# Patient Record
Sex: FEMALE | Race: WHITE | NOT HISPANIC OR LATINO | Employment: OTHER | URBAN - METROPOLITAN AREA
[De-identification: names, ages, dates, MRNs, and addresses within clinical notes are randomized per-mention and may not be internally consistent; named-entity substitution may affect disease eponyms.]

---

## 2017-05-03 DIAGNOSIS — R35.0 FREQUENCY OF MICTURITION: ICD-10-CM

## 2017-05-03 DIAGNOSIS — R30.0 DYSURIA: ICD-10-CM

## 2017-05-26 ENCOUNTER — APPOINTMENT (EMERGENCY)
Dept: CT IMAGING | Facility: HOSPITAL | Age: 82
End: 2017-05-26
Payer: MEDICARE

## 2017-05-26 ENCOUNTER — HOSPITAL ENCOUNTER (EMERGENCY)
Facility: HOSPITAL | Age: 82
Discharge: HOME | End: 2017-05-26
Attending: EMERGENCY MEDICINE | Admitting: EMERGENCY MEDICINE
Payer: MEDICARE

## 2017-05-26 ENCOUNTER — APPOINTMENT (EMERGENCY)
Dept: RADIOLOGY | Facility: HOSPITAL | Age: 82
End: 2017-05-26
Payer: MEDICARE

## 2017-05-26 VITALS
DIASTOLIC BLOOD PRESSURE: 74 MMHG | TEMPERATURE: 97.4 F | OXYGEN SATURATION: 94 % | HEIGHT: 62 IN | SYSTOLIC BLOOD PRESSURE: 178 MMHG | BODY MASS INDEX: 22.03 KG/M2 | RESPIRATION RATE: 21 BRPM | HEART RATE: 103 BPM | WEIGHT: 119.71 LBS

## 2017-05-26 DIAGNOSIS — R53.83 FATIGUE: Primary | ICD-10-CM

## 2017-05-26 DIAGNOSIS — R30.0 DYSURIA: ICD-10-CM

## 2017-05-26 DIAGNOSIS — R20.2 FACIAL PARESTHESIA: ICD-10-CM

## 2017-05-26 LAB
ALBUMIN SERPL BCP-MCNC: 3.4 G/DL (ref 3.5–5)
ALP SERPL-CCNC: 76 U/L (ref 46–116)
ALT SERPL W P-5'-P-CCNC: 29 U/L (ref 12–78)
ANION GAP SERPL CALCULATED.3IONS-SCNC: 10 MMOL/L (ref 4–13)
AST SERPL W P-5'-P-CCNC: 39 U/L (ref 5–45)
ATRIAL RATE: 102 BPM
BASOPHILS # BLD AUTO: 0.12 THOUSANDS/ΜL (ref 0–0.1)
BASOPHILS NFR BLD AUTO: 1 % (ref 0–1)
BILIRUB SERPL-MCNC: 0.5 MG/DL (ref 0.2–1)
BUN SERPL-MCNC: 29 MG/DL (ref 5–25)
CALCIUM SERPL-MCNC: 10.7 MG/DL (ref 8.3–10.1)
CHLORIDE SERPL-SCNC: 96 MMOL/L (ref 100–108)
CO2 SERPL-SCNC: 26 MMOL/L (ref 21–32)
CREAT SERPL-MCNC: 0.76 MG/DL (ref 0.6–1.3)
EOSINOPHIL # BLD AUTO: 0.65 THOUSAND/ΜL (ref 0–0.61)
EOSINOPHIL NFR BLD AUTO: 6 % (ref 0–6)
ERYTHROCYTE [DISTWIDTH] IN BLOOD BY AUTOMATED COUNT: 12 % (ref 11.6–15.1)
EXT BILIRUBIN, UA: ABNORMAL
EXT BLOOD URINE: ABNORMAL
EXT GLUCOSE, UA: ABNORMAL
EXT KETONES: ABNORMAL
EXT NITRITE, UA: ABNORMAL
EXT PH, UA: 8
EXT PROTEIN, UA: ABNORMAL
EXT SPECIFIC GRAVITY, UA: 1
EXT UROBILINOGEN: ABNORMAL
GFR SERPL CREATININE-BSD FRML MDRD: >60 ML/MIN/1.73SQ M
GLUCOSE SERPL-MCNC: 124 MG/DL (ref 65–140)
HCT VFR BLD AUTO: 41.2 % (ref 34.8–46.1)
HGB BLD-MCNC: 13.2 G/DL (ref 11.5–15.4)
LACTATE SERPL-SCNC: 1.4 MMOL/L (ref 0.5–2)
LYMPHOCYTES # BLD AUTO: 2.59 THOUSANDS/ΜL (ref 0.6–4.47)
LYMPHOCYTES NFR BLD AUTO: 24 % (ref 14–44)
MCH RBC QN AUTO: 29.1 PG (ref 26.8–34.3)
MCHC RBC AUTO-ENTMCNC: 32 G/DL (ref 31.4–37.4)
MCV RBC AUTO: 91 FL (ref 82–98)
MONOCYTES # BLD AUTO: 0.79 THOUSAND/ΜL (ref 0.17–1.22)
MONOCYTES NFR BLD AUTO: 7 % (ref 4–12)
NEUTROPHILS # BLD AUTO: 6.79 THOUSANDS/ΜL (ref 1.85–7.62)
NEUTS SEG NFR BLD AUTO: 62 % (ref 43–75)
NRBC BLD AUTO-RTO: 0 /100 WBCS
P AXIS: 93 DEGREES
PLATELET # BLD AUTO: 275 THOUSANDS/UL (ref 149–390)
PMV BLD AUTO: 11.1 FL (ref 8.9–12.7)
POTASSIUM SERPL-SCNC: 4.5 MMOL/L (ref 3.5–5.3)
PR INTERVAL: 160 MS
PROT SERPL-MCNC: 8.5 G/DL (ref 6.4–8.2)
QRS AXIS: 93 DEGREES
QRSD INTERVAL: 86 MS
QT INTERVAL: 326 MS
QTC INTERVAL: 424 MS
RBC # BLD AUTO: 4.53 MILLION/UL (ref 3.81–5.12)
SODIUM SERPL-SCNC: 132 MMOL/L (ref 136–145)
SPECIMEN SOURCE: NORMAL
T WAVE AXIS: 54 DEGREES
TROPONIN I BLD-MCNC: 0.01 NG/ML (ref 0–0.08)
VENTRICULAR RATE: 102 BPM
WBC # BLD AUTO: 11 THOUSAND/UL (ref 4.31–10.16)
WBC # BLD EST: ABNORMAL 10*3/UL

## 2017-05-26 PROCEDURE — 96361 HYDRATE IV INFUSION ADD-ON: CPT

## 2017-05-26 PROCEDURE — 36415 COLL VENOUS BLD VENIPUNCTURE: CPT | Performed by: EMERGENCY MEDICINE

## 2017-05-26 PROCEDURE — 71020 HB CHEST X-RAY 2VW FRONTAL&LATL: CPT

## 2017-05-26 PROCEDURE — 93005 ELECTROCARDIOGRAM TRACING: CPT

## 2017-05-26 PROCEDURE — 96360 HYDRATION IV INFUSION INIT: CPT

## 2017-05-26 PROCEDURE — 83605 ASSAY OF LACTIC ACID: CPT | Performed by: EMERGENCY MEDICINE

## 2017-05-26 PROCEDURE — 80053 COMPREHEN METABOLIC PANEL: CPT | Performed by: EMERGENCY MEDICINE

## 2017-05-26 PROCEDURE — 71250 CT THORAX DX C-: CPT

## 2017-05-26 PROCEDURE — 84484 ASSAY OF TROPONIN QUANT: CPT

## 2017-05-26 PROCEDURE — 81002 URINALYSIS NONAUTO W/O SCOPE: CPT | Performed by: EMERGENCY MEDICINE

## 2017-05-26 PROCEDURE — 70450 CT HEAD/BRAIN W/O DYE: CPT

## 2017-05-26 PROCEDURE — 85025 COMPLETE CBC W/AUTO DIFF WBC: CPT | Performed by: EMERGENCY MEDICINE

## 2017-05-26 PROCEDURE — 99284 EMERGENCY DEPT VISIT MOD MDM: CPT

## 2017-05-26 RX ORDER — NITROFURANTOIN 25; 75 MG/1; MG/1
100 CAPSULE ORAL 2 TIMES DAILY
COMMUNITY
End: 2017-06-04

## 2017-05-26 RX ORDER — METOPROLOL SUCCINATE 50 MG/1
1 TABLET, EXTENDED RELEASE ORAL DAILY
COMMUNITY
Start: 2014-06-05 | End: 2018-09-10 | Stop reason: HOSPADM

## 2017-05-26 RX ORDER — LISINOPRIL AND HYDROCHLOROTHIAZIDE 25; 20 MG/1; MG/1
1 TABLET ORAL DAILY
COMMUNITY
Start: 2014-06-05 | End: 2018-09-10 | Stop reason: HOSPADM

## 2017-05-26 RX ORDER — ASPIRIN 81 MG/1
1 TABLET, CHEWABLE ORAL DAILY
Status: ON HOLD | COMMUNITY
Start: 2014-06-05 | End: 2019-01-01 | Stop reason: SDUPTHER

## 2017-05-26 RX ORDER — FELODIPINE 5 MG/1
1 TABLET, EXTENDED RELEASE ORAL DAILY
COMMUNITY
Start: 2014-06-05 | End: 2019-04-22 | Stop reason: HOSPADM

## 2017-05-26 RX ORDER — NITROFURANTOIN 25; 75 MG/1; MG/1
100 CAPSULE ORAL 2 TIMES DAILY
Qty: 14 CAPSULE | Refills: 0 | Status: SHIPPED | OUTPATIENT
Start: 2017-05-26 | End: 2017-06-02

## 2017-05-26 RX ADMIN — SODIUM CHLORIDE 1000 ML: 0.9 INJECTION, SOLUTION INTRAVENOUS at 13:42

## 2017-05-30 ENCOUNTER — ALLSCRIPTS OFFICE VISIT (OUTPATIENT)
Dept: OTHER | Facility: OTHER | Age: 82
End: 2017-05-30

## 2017-05-31 ENCOUNTER — TRANSCRIBE ORDERS (OUTPATIENT)
Dept: LAB | Facility: CLINIC | Age: 82
End: 2017-05-31

## 2017-05-31 ENCOUNTER — APPOINTMENT (OUTPATIENT)
Dept: LAB | Facility: CLINIC | Age: 82
End: 2017-05-31
Payer: MEDICARE

## 2017-05-31 DIAGNOSIS — R35.0 URINARY FREQUENCY: ICD-10-CM

## 2017-05-31 DIAGNOSIS — R35.0 URINARY FREQUENCY: Primary | ICD-10-CM

## 2017-05-31 DIAGNOSIS — R30.0 DYSURIA: ICD-10-CM

## 2017-05-31 LAB
BACTERIA UR QL AUTO: ABNORMAL /HPF
BILIRUB UR QL STRIP: NEGATIVE
CLARITY UR: CLEAR
COLOR UR: ABNORMAL
GLUCOSE UR STRIP-MCNC: NEGATIVE MG/DL
HGB UR QL STRIP.AUTO: ABNORMAL
KETONES UR STRIP-MCNC: NEGATIVE MG/DL
LEUKOCYTE ESTERASE UR QL STRIP: ABNORMAL
NITRITE UR QL STRIP: POSITIVE
NON-SQ EPI CELLS URNS QL MICRO: ABNORMAL /HPF
PH UR STRIP.AUTO: 8.5 [PH] (ref 4.5–8)
PROT UR STRIP-MCNC: ABNORMAL MG/DL
RBC #/AREA URNS AUTO: ABNORMAL /HPF
SP GR UR STRIP.AUTO: 1 (ref 1–1.03)
TRI-PHOS CRY URNS QL MICRO: ABNORMAL /HPF
UROBILINOGEN UR QL STRIP.AUTO: 0.2 E.U./DL
WBC #/AREA URNS AUTO: ABNORMAL /HPF

## 2017-05-31 PROCEDURE — 87086 URINE CULTURE/COLONY COUNT: CPT

## 2017-05-31 PROCEDURE — 81001 URINALYSIS AUTO W/SCOPE: CPT

## 2017-05-31 PROCEDURE — 87077 CULTURE AEROBIC IDENTIFY: CPT

## 2017-05-31 PROCEDURE — 87186 SC STD MICRODIL/AGAR DIL: CPT

## 2017-06-01 ENCOUNTER — GENERIC CONVERSION - ENCOUNTER (OUTPATIENT)
Dept: OTHER | Facility: OTHER | Age: 82
End: 2017-06-01

## 2017-06-02 LAB — BACTERIA UR CULT: NORMAL

## 2017-06-04 ENCOUNTER — HOSPITAL ENCOUNTER (EMERGENCY)
Facility: HOSPITAL | Age: 82
Discharge: HOME | End: 2017-06-04
Attending: EMERGENCY MEDICINE | Admitting: EMERGENCY MEDICINE
Payer: MEDICARE

## 2017-06-04 ENCOUNTER — APPOINTMENT (EMERGENCY)
Dept: RADIOLOGY | Facility: HOSPITAL | Age: 82
End: 2017-06-04
Payer: MEDICARE

## 2017-06-04 VITALS
BODY MASS INDEX: 22.68 KG/M2 | SYSTOLIC BLOOD PRESSURE: 147 MMHG | RESPIRATION RATE: 16 BRPM | TEMPERATURE: 98 F | HEART RATE: 82 BPM | OXYGEN SATURATION: 97 % | HEIGHT: 61 IN | DIASTOLIC BLOOD PRESSURE: 72 MMHG | WEIGHT: 120.15 LBS

## 2017-06-04 DIAGNOSIS — N39.0 UTI (URINARY TRACT INFECTION): Primary | ICD-10-CM

## 2017-06-04 DIAGNOSIS — E87.8 HYPOCHLOREMIA: ICD-10-CM

## 2017-06-04 DIAGNOSIS — E87.6 HYPOKALEMIA: ICD-10-CM

## 2017-06-04 DIAGNOSIS — E87.1 HYPONATREMIA: ICD-10-CM

## 2017-06-04 DIAGNOSIS — R53.1 WEAKNESS: ICD-10-CM

## 2017-06-04 LAB
ALBUMIN SERPL BCP-MCNC: 3.4 G/DL (ref 3.5–5)
ALP SERPL-CCNC: 69 U/L (ref 46–116)
ALT SERPL W P-5'-P-CCNC: 26 U/L (ref 12–78)
ANION GAP SERPL CALCULATED.3IONS-SCNC: 10 MMOL/L (ref 4–13)
AST SERPL W P-5'-P-CCNC: 21 U/L (ref 5–45)
BACTERIA UR QL AUTO: ABNORMAL /HPF
BASOPHILS # BLD AUTO: 0.1 THOUSANDS/ΜL (ref 0–0.1)
BASOPHILS NFR BLD AUTO: 1 % (ref 0–1)
BILIRUB SERPL-MCNC: 0.4 MG/DL (ref 0.2–1)
BILIRUB UR QL STRIP: NEGATIVE
BUN SERPL-MCNC: 24 MG/DL (ref 5–25)
CALCIUM SERPL-MCNC: 10 MG/DL (ref 8.3–10.1)
CHLORIDE SERPL-SCNC: 93 MMOL/L (ref 100–108)
CLARITY UR: CLEAR
CO2 SERPL-SCNC: 28 MMOL/L (ref 21–32)
COLOR UR: YELLOW
CREAT SERPL-MCNC: 0.8 MG/DL (ref 0.6–1.3)
EOSINOPHIL # BLD AUTO: 0.42 THOUSAND/ΜL (ref 0–0.61)
EOSINOPHIL NFR BLD AUTO: 5 % (ref 0–6)
ERYTHROCYTE [DISTWIDTH] IN BLOOD BY AUTOMATED COUNT: 12 % (ref 11.6–15.1)
GFR SERPL CREATININE-BSD FRML MDRD: >60 ML/MIN/1.73SQ M
GLUCOSE SERPL-MCNC: 137 MG/DL (ref 65–140)
GLUCOSE UR STRIP-MCNC: NEGATIVE MG/DL
HCT VFR BLD AUTO: 38.3 % (ref 34.8–46.1)
HGB BLD-MCNC: 12.4 G/DL (ref 11.5–15.4)
HGB UR QL STRIP.AUTO: NEGATIVE
KETONES UR STRIP-MCNC: NEGATIVE MG/DL
LEUKOCYTE ESTERASE UR QL STRIP: ABNORMAL
LYMPHOCYTES # BLD AUTO: 1.65 THOUSANDS/ΜL (ref 0.6–4.47)
LYMPHOCYTES NFR BLD AUTO: 19 % (ref 14–44)
MCH RBC QN AUTO: 28.5 PG (ref 26.8–34.3)
MCHC RBC AUTO-ENTMCNC: 32.4 G/DL (ref 31.4–37.4)
MCV RBC AUTO: 88 FL (ref 82–98)
MONOCYTES # BLD AUTO: 0.92 THOUSAND/ΜL (ref 0.17–1.22)
MONOCYTES NFR BLD AUTO: 11 % (ref 4–12)
NEUTROPHILS # BLD AUTO: 5.37 THOUSANDS/ΜL (ref 1.85–7.62)
NEUTS SEG NFR BLD AUTO: 63 % (ref 43–75)
NITRITE UR QL STRIP: NEGATIVE
NON-SQ EPI CELLS URNS QL MICRO: ABNORMAL /HPF
NRBC BLD AUTO-RTO: 0 /100 WBCS
PH UR STRIP.AUTO: 7 [PH] (ref 4.5–8)
PLATELET # BLD AUTO: 258 THOUSANDS/UL (ref 149–390)
PMV BLD AUTO: 10.4 FL (ref 8.9–12.7)
POTASSIUM SERPL-SCNC: 3.2 MMOL/L (ref 3.5–5.3)
PROT SERPL-MCNC: 7.7 G/DL (ref 6.4–8.2)
PROT UR STRIP-MCNC: NEGATIVE MG/DL
RBC # BLD AUTO: 4.35 MILLION/UL (ref 3.81–5.12)
RBC #/AREA URNS AUTO: ABNORMAL /HPF
SODIUM SERPL-SCNC: 131 MMOL/L (ref 136–145)
SP GR UR STRIP.AUTO: 1.01 (ref 1–1.03)
UROBILINOGEN UR QL STRIP.AUTO: 0.2 E.U./DL
WBC # BLD AUTO: 8.49 THOUSAND/UL (ref 4.31–10.16)
WBC #/AREA URNS AUTO: ABNORMAL /HPF

## 2017-06-04 PROCEDURE — 93005 ELECTROCARDIOGRAM TRACING: CPT

## 2017-06-04 PROCEDURE — 96361 HYDRATE IV INFUSION ADD-ON: CPT

## 2017-06-04 PROCEDURE — 87040 BLOOD CULTURE FOR BACTERIA: CPT | Performed by: EMERGENCY MEDICINE

## 2017-06-04 PROCEDURE — 81001 URINALYSIS AUTO W/SCOPE: CPT | Performed by: EMERGENCY MEDICINE

## 2017-06-04 PROCEDURE — 36415 COLL VENOUS BLD VENIPUNCTURE: CPT | Performed by: EMERGENCY MEDICINE

## 2017-06-04 PROCEDURE — 85025 COMPLETE CBC W/AUTO DIFF WBC: CPT | Performed by: EMERGENCY MEDICINE

## 2017-06-04 PROCEDURE — 96365 THER/PROPH/DIAG IV INF INIT: CPT

## 2017-06-04 PROCEDURE — 99285 EMERGENCY DEPT VISIT HI MDM: CPT

## 2017-06-04 PROCEDURE — 96375 TX/PRO/DX INJ NEW DRUG ADDON: CPT

## 2017-06-04 PROCEDURE — 80053 COMPREHEN METABOLIC PANEL: CPT | Performed by: EMERGENCY MEDICINE

## 2017-06-04 RX ORDER — ONDANSETRON 2 MG/ML
4 INJECTION INTRAMUSCULAR; INTRAVENOUS ONCE
Status: COMPLETED | OUTPATIENT
Start: 2017-06-04 | End: 2017-06-04

## 2017-06-04 RX ORDER — POTASSIUM CHLORIDE 20 MEQ/1
40 TABLET, EXTENDED RELEASE ORAL ONCE
Status: COMPLETED | OUTPATIENT
Start: 2017-06-04 | End: 2017-06-04

## 2017-06-04 RX ORDER — CIPROFLOXACIN 500 MG/1
500 TABLET, FILM COATED ORAL DAILY
COMMUNITY
Start: 2017-06-02 | End: 2017-06-09

## 2017-06-04 RX ORDER — CIPROFLOXACIN 2 MG/ML
400 INJECTION, SOLUTION INTRAVENOUS ONCE
Status: COMPLETED | OUTPATIENT
Start: 2017-06-04 | End: 2017-06-04

## 2017-06-04 RX ORDER — ONDANSETRON 4 MG/1
4 TABLET, FILM COATED ORAL EVERY 6 HOURS PRN
Qty: 6 TABLET | Refills: 0 | Status: SHIPPED | OUTPATIENT
Start: 2017-06-04 | End: 2018-10-09 | Stop reason: ALTCHOICE

## 2017-06-04 RX ADMIN — POTASSIUM CHLORIDE 40 MEQ: 1500 TABLET, EXTENDED RELEASE ORAL at 14:21

## 2017-06-04 RX ADMIN — SODIUM CHLORIDE 500 ML: 0.9 INJECTION, SOLUTION INTRAVENOUS at 15:56

## 2017-06-04 RX ADMIN — ONDANSETRON 4 MG: 2 INJECTION INTRAMUSCULAR; INTRAVENOUS at 15:11

## 2017-06-04 RX ADMIN — CIPROFLOXACIN 400 MG: 2 INJECTION, SOLUTION INTRAVENOUS at 14:18

## 2017-06-04 RX ADMIN — SODIUM CHLORIDE 1000 ML: 0.9 INJECTION, SOLUTION INTRAVENOUS at 12:02

## 2017-06-05 ENCOUNTER — GENERIC CONVERSION - ENCOUNTER (OUTPATIENT)
Dept: OTHER | Facility: OTHER | Age: 82
End: 2017-06-05

## 2017-06-05 LAB
ATRIAL RATE: 93 BPM
P AXIS: 94 DEGREES
PR INTERVAL: 128 MS
QRS AXIS: 95 DEGREES
QRSD INTERVAL: 82 MS
QT INTERVAL: 556 MS
QTC INTERVAL: 691 MS
T WAVE AXIS: 93 DEGREES
VENTRICULAR RATE: 93 BPM

## 2017-06-09 LAB
BACTERIA BLD CULT: NORMAL
BACTERIA BLD CULT: NORMAL

## 2017-07-10 ENCOUNTER — ALLSCRIPTS OFFICE VISIT (OUTPATIENT)
Dept: OTHER | Facility: OTHER | Age: 82
End: 2017-07-10

## 2017-07-10 DIAGNOSIS — I10 ESSENTIAL (PRIMARY) HYPERTENSION: ICD-10-CM

## 2018-01-10 NOTE — RESULT NOTES
Verified Results  (1) URINALYSIS (will reflex a microscopy if leukocytes, occult blood, protein or nitrites are not within normal limits) [id number] 11:41AM Janna Petties, [place]     Test Name Result Flag Reference   COLOR Ana     CLARITY Clear     SPECIFIC GRAVITY UA 1 000 L 1 003-1 030   PH UA 8 5 H 4 5-8 0   LEUKOCYTE ESTERASE UA Large A Negative   NITRITE UA Positive A Negative   PROTEIN UA 30 (1+) mg/dl A Negative   GLUCOSE UA Negative mg/dl  Negative   KETONES UA Negative mg/dl  Negative   UROBILINOGEN UA 0 2 E U /dl  0 2, 1 0 E U /dl   BILIRUBIN UA Negative  Negative   BLOOD UA Trace A Negative   BACTERIA Occasional /hpf  None Seen, Occasional   EPITHELIAL CELLS None Seen /hpf  None Seen, Occasional   TRIPLE PHOSPHATE CRYSTALS Occasional /hpf     RBC UA None Seen /hpf  None Seen   WBC UA 20-30 /hpf A None Seen

## 2018-01-12 VITALS
HEART RATE: 114 BPM | DIASTOLIC BLOOD PRESSURE: 68 MMHG | OXYGEN SATURATION: 92 % | BODY MASS INDEX: 23.07 KG/M2 | HEIGHT: 60 IN | TEMPERATURE: 98.2 F | SYSTOLIC BLOOD PRESSURE: 132 MMHG | WEIGHT: 117.5 LBS

## 2018-01-12 NOTE — RESULT NOTES
Verified Results  (1) URINE CULTURE [id number] 11:41AM Ashlee Limonos, Hildred Koyanagi     Test Name Result Flag Reference   CLINICAL REPORT (Report)     Test:        Urine culture  Specimen Type:   Urine  Specimen Date:   5/31/2017 11:41 AM  Result Date:    6/2/2017 9:55 AM  Result Status:   Final result  Resulting Lab:   [facility] [address] [place] [id number]            Tel: [phone]      CULTURE                                       ------------------                                   >100,000 cfu/ml Proteus mirabilis      SUSCEPTIBILITY                                   ------------------                                                       Proteus mirabilis  METHOD                 EULA  -------------------------------------  -------------------------  AMPICILLIN ($$)             <=8 00 ug/ml Susceptible  AZTREONAM ($$$)             <=8 ug/ml   Susceptible  CEFAZOLIN ($)              <=8 00 ug/ml Susceptible  CIPROFLOXACIN ($)            <=1 00 ug/ml Susceptible  GENTAMICIN ($$)             <=4 ug/ml   Susceptible  LEVOFLOXACIN ($)            <=2 00 ug/ml Susceptible  NITROFURANTOIN             >64 ug/ml   Resistant  PIPERACILLIN + TAZOBACTAM ($$$)     <=16 ug/ml  Susceptible  TETRACYCLINE              >8 ug/ml   Resistant  TOBRAMYCIN ($)             <=4 ug/ml   Susceptible  TRIMETHOPRIM + SULFAMETHOXAZOLE ($$$)  <=2/38 ug/ml Susceptible

## 2018-01-13 NOTE — CONSULTS
Current Meds   1  Acetaminophen-Codeine #3 300-30 MG Oral Tablet; TAKE 1 TABLET TWICE DAILY AS   NEEDED FOR PAIN;   Therapy: 36UWF3452 to (Evaluate:05Apr2016); Last Rx:[id number] Ordered   2  Aspir-81 81 MG Oral Tablet Delayed Release; TAKE 1 TABLET DAILY; Therapy: 78CUZ9205 to Recorded   3  Felodipine ER 5 MG Oral Tablet Extended Release 24 Hour; TAKE 1 TABLET ONCE   DAILY; Therapy: 84NVX6370 to (Rick Villanueva)  Requested for: 76ZKV0618; Last   Rx:[id number] Ordered   4  Lisinopril-Hydrochlorothiazide 20-25 MG Oral Tablet; TAKE 1 TABLET DAILY; Therapy: 97SOO9258 to (Rick Villanueva)  Requested for: 73USI8260; Last   Rx:[id number] Ordered   5  Metoprolol Succinate ER 50 MG Oral Tablet Extended Release 24 Hour; TAKE 1 TABLET   DAILY; Therapy: 05SPS7116 to (Rick Villanueva)  Requested for: 18PWO8643; Last   Rx:[id number] Ordered   6  Zolpidem Tartrate 5 MG Oral Tablet; take 1 tablet by mouth at bedtime if needed for   sleep; Therapy: 53HJA5968 to (Evaluate:04Jun2016)  Requested for: 29JBA9761; Last   Rx:[id number] Ordered    Allergies    1  No Known Drug Allergies    Procedure    Findings:  This tracing demonstrated sinus rhythm with a heart rate ranging between 55 and 1:30 the average rate was 78--#2 occasional PVCs totaling 755 throughout the recording #3 one ventricular couplet #4 rare APCs #5 several 3 beat runs of SVT with rates of approximately 140 #6 short runs of ectopic atrial rhythm and ectopic atrial tachycardia with rates of approximately  #5 no pauses greater than 2 seconds noted #6 no symptoms reported      Signatures   Electronically signed by : IAIN Cummings ; Oct 11 2016  2:29PM EST                       (Author)

## 2018-01-15 VITALS
BODY MASS INDEX: 23.21 KG/M2 | TEMPERATURE: 97.9 F | HEART RATE: 92 BPM | DIASTOLIC BLOOD PRESSURE: 88 MMHG | HEIGHT: 59 IN | OXYGEN SATURATION: 94 % | SYSTOLIC BLOOD PRESSURE: 152 MMHG | WEIGHT: 115.13 LBS

## 2018-01-16 NOTE — PROGRESS NOTES
Assessment    1  Encounter for preventive health examination (V70 0) (Z00 00)    Plan  Hypertension    · Felodipine ER 5 MG Oral Tablet Extended Release 24 Hour; TAKE 1 TABLET  ONCE DAILY   · Lisinopril-Hydrochlorothiazide 20-25 MG Oral Tablet (Zestoretic); TAKE 1 TABLET  DAILY   · Metoprolol Succinate ER 50 MG Oral Tablet Extended Release 24 Hour; TAKE 1  TABLET DAILY    Discussion/Summary    Chronic problems are stable  Laboratory testing is up-to-date  No symptoms are reported  Medications will be renewed  Follow-up in 6 months  Impression: Initial Annual Wellness Visit, with preventive exam as well as age and risk appropriate counseling completed  Cardiovascular screening and counseling: screening is current  Diabetes screening and counseling: screening is current and Dx - V77 1 Screen for DM  Colorectal cancer screening and counseling: screening not indicated and Dx - V76 51 Screen for CRC  Breast cancer screening and counseling: the risks and benefits of screening were discussed and the patient declines screening  Cervical cancer screening and counseling: screening not indicated  Osteoporosis screening and counseling: counseling was given on obtaining adequate amounts of calcium and vitamin D on a daily basis  Abdominal aortic aneurysm screening and counseling: screening not indicated and Dx - V81 2 Screen for CV Disorder  Glaucoma screening and counseling: screening is current and Dx - V80 1 Screen for Glaucoma  HIV screening and counseling: screening not indicated  Immunizations: the patient declines the influenza vaccination, the patient declines the pneumococcal vaccination, hepatitis B vaccination series is not indicated at this time due to the patient's low risk of marie the disease, the patient declines the Zostavax vaccine, the patient declines the Td vaccine and the patient declines the Tdap vaccine        History of Present Illness  A patient with hypertension, with a remote history of left breast cancer with mastectomy and resultant chronic lymphedema of the left arm, hypertension, hyperlipidemia, carotid plaque without stenosis, osteoarthritis  The patient does not wish to pursue anything regarding these diagnoses other than basic treatment  She does not want to do carotid ultrasounds, echocardiograms, other preventive measures  Complaint of periodic fatigue which seems to wax and wane with no particular rhyme or reason; it is not exertional   Complaint of arthritic pain in particular pain regarding the left upper arm and shoulder  I believe a certain degree of this is due to the weight of the left arm which is diffusely edematous and has been that way for a number of years  The patient is being seen for the initial annual wellness visit  Medicare Screening and Risk Factors   Hospitalizations: no previous hospitalizations  Medicare Screening Tests Risk Questions   Abdominal aortic aneurysm risk assessment: none indicated  Osteoporosis risk assessment: none indicated  Drug and Alcohol Use: The patient has never used smokeless tobacco  The patient reports rare alcohol use  She has never used illicit drugs  Diet and Physical Activity: Current diet includes limited junk food, 1 servings of fruit per day, 1 servings of vegetables per day, 1 servings of meat per day, 1 servings of whole grains per day, 1 servings of simple carbohydrates per day and 2 cups of coffee per day  She exercises daily  Exercise: walking 30 minutes per day  Mood Disorder and Cognitive Impairment Screening:   Depression screening  mild to moderate symptoms  She reports feeling down, depressed, or hopeless over the past two weeks  She reports feeling little interest or pleasure in doing things over the past two weeks     Cognitive impairment screening: denies difficulty learning/retaining new information, denies difficulty handling complex tasks, denies difficulty with reasoning, denies difficulty with language and denies difficulty with behavior  Functional Ability/Level of Safety: Hearing is slightly decreased, significantly decreased in the right ear, slightly decreased in the left ear and a hearing aid is not used  The patient is currently able to do activities of daily living with limitations, able to do instrumental activities of daily living with limitations and unable to drive  Activities of daily living details: transportation help needed  Fall risk factors: The patient fell 1 GETTING OUT OF BED times in the past 12 months  Advance Directives: Advance directives: no living will, no durable power of  for health care directives and no advance directives  Co-Managers and Medical Equipment/Suppliers: See Patient Care Team      Active Problems    1  Actinic keratosis (702 0) (L57 0)   2  GERD (gastroesophageal reflux disease) (530 81) (K21 9)   3  Hypercalcemia (275 42) (E83 52)   4  Hyperlipidemia (272 4) (E78 5)   5  Hypertension (401 9) (I10)   6  Injury of head, sequela (908 9) (S09 90XS)   7  Intermittent palpitations (785 1) (R00 2)   8  Leg pain (729 5) (M79 606)   9  Mitral valve disorder (424 0) (I05 9)   10  Occlusion and stenosis of carotid artery (433 10) (I65 29)   11  Osteoarthritis, generalized (715 00) (M15 9)   12  Other fatigue (780 79) (R53 83)   13  Other headache syndrome (339 89) (G44 89)    Past Medical History    1  History of Abnormal EKG (794 31) (R94 31)   2  History of Acute upper respiratory infection of multiple sites (465 8) (J06 9)   3  History of Benign neoplasm of skin of face (216 3) (D23 30)   4  History of Cerebrovascular disease, ill-defined, acute (436) (I67 89)   5  History of Colonoscopy (Fiberoptic)   6  History of Ecchymoses, spontaneous (782 7) (R23 3)   7  History of cataract (V12 49) (Z86 69)   8  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)   9  History of dizziness (V13 89) (Z87 898)   10   History of Lack of coordination due to stroke (434 91,781 3) (I63 9,R27 9)   11  History of Lymphedema syndrome, postmastectomy (457 0) (I97 2)   12  History of Malignant neoplasm of skin of face (173 30) (C44 300)   13  Personal history of malignant neoplasm of breast (V10 3) (Z85 3)   14  Personal history of urinary tract infection (V13 02) (Z87 440)   15  History of Rectal prolapse (569 1) (K62 3)   16  History of Seborrheic keratosis, inflamed (702 11) (L82 0)    Surgical History    1  History of Appendectomy   2  History of Breast Surgery Radical Mastectomy   3  History of Total Abdominal Hysterectomy    Social History    · Denied: History of Alcohol use   · Denied: History of drug use   · Lives independently   · Never a smoker   · Retired    Current Meds   1  Aspir-81 81 MG Oral Tablet Delayed Release; TAKE 1 TABLET DAILY; Therapy: 29SGS8406 to Recorded   2  Felodipine ER 5 MG Oral Tablet Extended Release 24 Hour; TAKE 1 TABLET ONCE   DAILY; Therapy: 62ANU4404 to ([employer])  Requested for: 87LTO8044; Last   Rx:[id number] Ordered   3  Lisinopril-Hydrochlorothiazide 20-25 MG Oral Tablet; TAKE 1 TABLET DAILY; Therapy: 44AYI3364 to (Arachno)  Requested for: 04VGP3015; Last   Rx:[id number] Ordered   4  Metoprolol Succinate ER 50 MG Oral Tablet Extended Release 24 Hour; TAKE 1 TABLET   DAILY; Therapy: 71PPX4793 to (Arachno)  Requested for: 78SGZ5049; Last   Rx:[id number] Ordered    Allergies    1   No Known Drug Allergies    Immunizations  Influenza --- Gaby Pippins: never   PPSV --- Gaby Pippins: never   Tdap --- Gaby Pippins: never     Vitals  Signs   Recorded: 13Dec2016 12:58PM   Heart Rate: 97  Systolic: 247  Diastolic: 80  Height: 5 ft   Weight: 121 lb   BMI Calculated: 23 63  BSA Calculated: 1 5    Results/Data  PHQ-2 Adult Depression Screening 13Dec2016 01:05PM User, Ahs     Test Name Result Flag Reference   PHQ-2 Adult Depression Score 1     Over the last two weeks, how often have you been bothered by any of the following problems?   Little interest or pleasure in doing things: Not at all - 0  Feeling down, depressed, or hopeless: Several days - 1   PHQ-2 Adult Depression Screening Negative         Signatures   Electronically signed by : IAIN Stein ; Dec 13 2016  1:14PM EST                       (Author)

## 2018-03-07 NOTE — PROCEDURES
Current Meds   1  Acetaminophen-Codeine #3 300-30 MG Oral Tablet; TAKE 1 TABLET TWICE DAILY AS   NEEDED FOR PAIN;   Therapy: 35DXB8183 to (Evaluate:05Apr2016); Last Rx:[id number] Ordered   2  Aspir-81 81 MG Oral Tablet Delayed Release; TAKE 1 TABLET DAILY; Therapy: 88HZM2220 to Recorded   3  Felodipine ER 5 MG Oral Tablet Extended Release 24 Hour; TAKE 1 TABLET ONCE   DAILY; Therapy: 00TNY2073 to (Janeal Stagers)  Requested for: 93ZZT8628; Last   Rx:[id number] Ordered   4  Lisinopril-Hydrochlorothiazide 20-25 MG Oral Tablet; TAKE 1 TABLET DAILY; Therapy: 65YMK7182 to (Janeal Stagers)  Requested for: 56LOT3531; Last   Rx:[id number] Ordered   5  Metoprolol Succinate ER 50 MG Oral Tablet Extended Release 24 Hour; TAKE 1 TABLET   DAILY; Therapy: 85PKX2776 to (Janeal Stagers)  Requested for: 96FQD8841; Last   Rx:[id number] Ordered   6  Zolpidem Tartrate 5 MG Oral Tablet; take 1 tablet by mouth at bedtime if needed for   sleep; Therapy: 95VAN0579 to (Evaluate:04Jun2016)  Requested for: 15WIO5626; Last   Rx:[id number] Ordered    Allergies    1  No Known Drug Allergies    Procedure    Findings: This tracing demonstrated sinus rhythm with a heart rate ranging between 55 and 1:30 the average rate was 78--#2 occasional PVCs totaling 755 throughout the recording #3 one ventricular couplet #4 rare APCs #5 several 3 beat runs of SVT with rates of approximately 140 #6 short runs of ectopic atrial rhythm and ectopic atrial tachycardia with rates of approximately  #5 no pauses greater than 2 seconds noted #6 no symptoms reported      Future Appointments    Date/Time Provider Specialty Site   12/13/2016 01:00 PM IAIN Espinosa   [facility]     Signatures   Electronically signed by : IAIN Crow ; Oct 11 2016  2:29PM EST                       (Author)

## 2018-04-02 ENCOUNTER — HOSPITAL ENCOUNTER (INPATIENT)
Facility: HOSPITAL | Age: 83
LOS: 6 days | Discharge: NURSING, REHAB OR LONG-TERM CARE | DRG: 394 | End: 2018-04-09
Attending: EMERGENCY MEDICINE | Admitting: INTERNAL MEDICINE
Payer: MEDICARE

## 2018-04-02 DIAGNOSIS — K92.1 BLOODY STOOLS: ICD-10-CM

## 2018-04-02 DIAGNOSIS — E86.0 DEHYDRATION: ICD-10-CM

## 2018-04-02 DIAGNOSIS — K59.00 CONSTIPATION: ICD-10-CM

## 2018-04-02 DIAGNOSIS — R19.7 DIARRHEA: Primary | ICD-10-CM

## 2018-04-02 DIAGNOSIS — R10.9 ABDOMINAL PAIN: ICD-10-CM

## 2018-04-02 LAB
ALBUMIN SERPL BCP-MCNC: 3.5 G/DL (ref 3.5–5)
ALP SERPL-CCNC: 69 U/L (ref 46–116)
ALT SERPL W P-5'-P-CCNC: 23 U/L (ref 12–78)
ANION GAP SERPL CALCULATED.3IONS-SCNC: 8 MMOL/L (ref 4–13)
AST SERPL W P-5'-P-CCNC: 29 U/L (ref 5–45)
BASOPHILS # BLD AUTO: 0.07 THOUSANDS/ΜL (ref 0–0.1)
BASOPHILS NFR BLD AUTO: 0 % (ref 0–1)
BILIRUB SERPL-MCNC: 0.4 MG/DL (ref 0.2–1)
BUN SERPL-MCNC: 43 MG/DL (ref 5–25)
CALCIUM SERPL-MCNC: 10 MG/DL (ref 8.3–10.1)
CHLORIDE SERPL-SCNC: 100 MMOL/L (ref 100–108)
CO2 SERPL-SCNC: 31 MMOL/L (ref 21–32)
CREAT SERPL-MCNC: 1.4 MG/DL (ref 0.6–1.3)
EOSINOPHIL # BLD AUTO: 0.36 THOUSAND/ΜL (ref 0–0.61)
EOSINOPHIL NFR BLD AUTO: 2 % (ref 0–6)
ERYTHROCYTE [DISTWIDTH] IN BLOOD BY AUTOMATED COUNT: 13.1 % (ref 11.6–15.1)
GFR SERPL CREATININE-BSD FRML MDRD: 32 ML/MIN/1.73SQ M
GLUCOSE SERPL-MCNC: 155 MG/DL (ref 65–140)
HCT VFR BLD AUTO: 38.6 % (ref 34.8–46.1)
HGB BLD-MCNC: 12.3 G/DL (ref 11.5–15.4)
LACTATE SERPL-SCNC: 2.5 MMOL/L (ref 0.5–2)
LIPASE SERPL-CCNC: 252 U/L (ref 73–393)
LYMPHOCYTES # BLD AUTO: 1.82 THOUSANDS/ΜL (ref 0.6–4.47)
LYMPHOCYTES NFR BLD AUTO: 10 % (ref 14–44)
MAGNESIUM SERPL-MCNC: 1.5 MG/DL (ref 1.6–2.6)
MCH RBC QN AUTO: 29.3 PG (ref 26.8–34.3)
MCHC RBC AUTO-ENTMCNC: 31.9 G/DL (ref 31.4–37.4)
MCV RBC AUTO: 92 FL (ref 82–98)
MONOCYTES # BLD AUTO: 1.08 THOUSAND/ΜL (ref 0.17–1.22)
MONOCYTES NFR BLD AUTO: 6 % (ref 4–12)
NEUTROPHILS # BLD AUTO: 14.39 THOUSANDS/ΜL (ref 1.85–7.62)
NEUTS SEG NFR BLD AUTO: 81 % (ref 43–75)
NRBC BLD AUTO-RTO: 0 /100 WBCS
PLATELET # BLD AUTO: 169 THOUSANDS/UL (ref 149–390)
PMV BLD AUTO: 12 FL (ref 8.9–12.7)
POTASSIUM SERPL-SCNC: 3.7 MMOL/L (ref 3.5–5.3)
PROT SERPL-MCNC: 7.6 G/DL (ref 6.4–8.2)
RBC # BLD AUTO: 4.2 MILLION/UL (ref 3.81–5.12)
SODIUM SERPL-SCNC: 139 MMOL/L (ref 136–145)
TROPONIN I SERPL-MCNC: <0.02 NG/ML
WBC # BLD AUTO: 17.8 THOUSAND/UL (ref 4.31–10.16)

## 2018-04-02 PROCEDURE — 93010 ELECTROCARDIOGRAM REPORT: CPT | Performed by: INTERNAL MEDICINE

## 2018-04-02 PROCEDURE — 80053 COMPREHEN METABOLIC PANEL: CPT | Performed by: EMERGENCY MEDICINE

## 2018-04-02 PROCEDURE — 84484 ASSAY OF TROPONIN QUANT: CPT | Performed by: EMERGENCY MEDICINE

## 2018-04-02 PROCEDURE — 96360 HYDRATION IV INFUSION INIT: CPT

## 2018-04-02 PROCEDURE — 83735 ASSAY OF MAGNESIUM: CPT | Performed by: EMERGENCY MEDICINE

## 2018-04-02 PROCEDURE — 93005 ELECTROCARDIOGRAM TRACING: CPT

## 2018-04-02 PROCEDURE — 83690 ASSAY OF LIPASE: CPT | Performed by: EMERGENCY MEDICINE

## 2018-04-02 PROCEDURE — 83605 ASSAY OF LACTIC ACID: CPT | Performed by: EMERGENCY MEDICINE

## 2018-04-02 PROCEDURE — 36415 COLL VENOUS BLD VENIPUNCTURE: CPT | Performed by: EMERGENCY MEDICINE

## 2018-04-02 PROCEDURE — 85025 COMPLETE CBC W/AUTO DIFF WBC: CPT | Performed by: EMERGENCY MEDICINE

## 2018-04-02 RX ORDER — SODIUM CHLORIDE 9 MG/ML
125 INJECTION, SOLUTION INTRAVENOUS CONTINUOUS
Status: DISCONTINUED | OUTPATIENT
Start: 2018-04-02 | End: 2018-04-03 | Stop reason: HOSPADM

## 2018-04-02 RX ADMIN — SODIUM CHLORIDE 125 ML/HR: 0.9 INJECTION, SOLUTION INTRAVENOUS at 23:40

## 2018-04-03 ENCOUNTER — APPOINTMENT (EMERGENCY)
Dept: CT IMAGING | Facility: HOSPITAL | Age: 83
DRG: 394 | End: 2018-04-03
Payer: MEDICARE

## 2018-04-03 PROBLEM — I10 HTN (HYPERTENSION): Chronic | Status: ACTIVE | Noted: 2018-04-03

## 2018-04-03 PROBLEM — E86.0 DEHYDRATION: Status: ACTIVE | Noted: 2018-04-03

## 2018-04-03 PROBLEM — R19.7 DIARRHEA: Status: ACTIVE | Noted: 2018-04-03

## 2018-04-03 PROBLEM — N17.9 ACUTE KIDNEY INJURY (HCC): Status: ACTIVE | Noted: 2018-04-03

## 2018-04-03 LAB
ANION GAP SERPL CALCULATED.3IONS-SCNC: 10 MMOL/L (ref 4–13)
ATRIAL RATE: 78 BPM
ATRIAL RATE: 84 BPM
BUN SERPL-MCNC: 41 MG/DL (ref 5–25)
CALCIUM SERPL-MCNC: 9.4 MG/DL (ref 8.3–10.1)
CAMPYLOBACTER DNA SPEC NAA+PROBE: NORMAL
CHLORIDE SERPL-SCNC: 103 MMOL/L (ref 100–108)
CO2 SERPL-SCNC: 25 MMOL/L (ref 21–32)
CREAT SERPL-MCNC: 1.13 MG/DL (ref 0.6–1.3)
ERYTHROCYTE [DISTWIDTH] IN BLOOD BY AUTOMATED COUNT: 13.2 % (ref 11.6–15.1)
GFR SERPL CREATININE-BSD FRML MDRD: 41 ML/MIN/1.73SQ M
GLUCOSE SERPL-MCNC: 157 MG/DL (ref 65–140)
HCT VFR BLD AUTO: 38.4 % (ref 34.8–46.1)
HCT VFR BLD AUTO: 39.8 % (ref 34.8–46.1)
HGB BLD-MCNC: 12.1 G/DL (ref 11.5–15.4)
HGB BLD-MCNC: 12.6 G/DL (ref 11.5–15.4)
LACTATE SERPL-SCNC: 1.8 MMOL/L (ref 0.5–2)
LACTATE SERPL-SCNC: 2.2 MMOL/L (ref 0.5–2)
MCH RBC QN AUTO: 28.9 PG (ref 26.8–34.3)
MCHC RBC AUTO-ENTMCNC: 31.5 G/DL (ref 31.4–37.4)
MCV RBC AUTO: 92 FL (ref 82–98)
P AXIS: 66 DEGREES
P AXIS: 98 DEGREES
PLATELET # BLD AUTO: 144 THOUSANDS/UL (ref 149–390)
PMV BLD AUTO: 11.6 FL (ref 8.9–12.7)
POTASSIUM SERPL-SCNC: 3.6 MMOL/L (ref 3.5–5.3)
PR INTERVAL: 134 MS
PR INTERVAL: 144 MS
QRS AXIS: 97 DEGREES
QRS AXIS: 97 DEGREES
QRSD INTERVAL: 78 MS
QRSD INTERVAL: 80 MS
QT INTERVAL: 360 MS
QT INTERVAL: 442 MS
QTC INTERVAL: 410 MS
QTC INTERVAL: 558 MS
RBC # BLD AUTO: 4.19 MILLION/UL (ref 3.81–5.12)
SALMONELLA DNA SPEC QL NAA+PROBE: NORMAL
SHIGA TOXIN STX GENE SPEC NAA+PROBE: NORMAL
SHIGELLA DNA SPEC QL NAA+PROBE: NORMAL
SODIUM SERPL-SCNC: 138 MMOL/L (ref 136–145)
T WAVE AXIS: -86 DEGREES
T WAVE AXIS: 75 DEGREES
VENTRICULAR RATE: 78 BPM
VENTRICULAR RATE: 96 BPM
WBC # BLD AUTO: 12.51 THOUSAND/UL (ref 4.31–10.16)

## 2018-04-03 PROCEDURE — 85027 COMPLETE CBC AUTOMATED: CPT | Performed by: PHYSICIAN ASSISTANT

## 2018-04-03 PROCEDURE — 99285 EMERGENCY DEPT VISIT HI MDM: CPT

## 2018-04-03 PROCEDURE — 85018 HEMOGLOBIN: CPT | Performed by: INTERNAL MEDICINE

## 2018-04-03 PROCEDURE — 93010 ELECTROCARDIOGRAM REPORT: CPT | Performed by: INTERNAL MEDICINE

## 2018-04-03 PROCEDURE — 99223 1ST HOSP IP/OBS HIGH 75: CPT | Performed by: INTERNAL MEDICINE

## 2018-04-03 PROCEDURE — 74176 CT ABD & PELVIS W/O CONTRAST: CPT

## 2018-04-03 PROCEDURE — 71250 CT THORAX DX C-: CPT

## 2018-04-03 PROCEDURE — 99223 1ST HOSP IP/OBS HIGH 75: CPT | Performed by: PHYSICIAN ASSISTANT

## 2018-04-03 PROCEDURE — 80048 BASIC METABOLIC PNL TOTAL CA: CPT | Performed by: PHYSICIAN ASSISTANT

## 2018-04-03 PROCEDURE — 83605 ASSAY OF LACTIC ACID: CPT | Performed by: PHYSICIAN ASSISTANT

## 2018-04-03 PROCEDURE — 87505 NFCT AGENT DETECTION GI: CPT | Performed by: EMERGENCY MEDICINE

## 2018-04-03 PROCEDURE — 96361 HYDRATE IV INFUSION ADD-ON: CPT

## 2018-04-03 PROCEDURE — 85014 HEMATOCRIT: CPT | Performed by: INTERNAL MEDICINE

## 2018-04-03 RX ORDER — SODIUM CHLORIDE 9 MG/ML
125 INJECTION, SOLUTION INTRAVENOUS CONTINUOUS
Status: DISCONTINUED | OUTPATIENT
Start: 2018-04-03 | End: 2018-04-03 | Stop reason: HOSPADM

## 2018-04-03 RX ORDER — HEPARIN SODIUM 5000 [USP'U]/ML
5000 INJECTION, SOLUTION INTRAVENOUS; SUBCUTANEOUS EVERY 8 HOURS SCHEDULED
Status: DISCONTINUED | OUTPATIENT
Start: 2018-04-03 | End: 2018-04-03

## 2018-04-03 RX ORDER — ONDANSETRON 2 MG/ML
4 INJECTION INTRAMUSCULAR; INTRAVENOUS EVERY 6 HOURS PRN
Status: DISCONTINUED | OUTPATIENT
Start: 2018-04-03 | End: 2018-04-09 | Stop reason: HOSPADM

## 2018-04-03 RX ORDER — FENTANYL CITRATE 50 UG/ML
50 INJECTION, SOLUTION INTRAMUSCULAR; INTRAVENOUS ONCE
Status: COMPLETED | OUTPATIENT
Start: 2018-04-03 | End: 2018-04-03

## 2018-04-03 RX ORDER — ASPIRIN 81 MG/1
81 TABLET, CHEWABLE ORAL DAILY
Status: DISCONTINUED | OUTPATIENT
Start: 2018-04-03 | End: 2018-04-09 | Stop reason: HOSPADM

## 2018-04-03 RX ORDER — SODIUM CHLORIDE 9 MG/ML
75 INJECTION, SOLUTION INTRAVENOUS CONTINUOUS
Status: DISCONTINUED | OUTPATIENT
Start: 2018-04-03 | End: 2018-04-04

## 2018-04-03 RX ORDER — TRAZODONE HYDROCHLORIDE 50 MG/1
50 TABLET ORAL
Status: DISCONTINUED | OUTPATIENT
Start: 2018-04-03 | End: 2018-04-09 | Stop reason: HOSPADM

## 2018-04-03 RX ORDER — HEPARIN SODIUM 5000 [USP'U]/ML
5000 INJECTION, SOLUTION INTRAVENOUS; SUBCUTANEOUS EVERY 8 HOURS SCHEDULED
Status: DISCONTINUED | OUTPATIENT
Start: 2018-04-03 | End: 2018-04-09 | Stop reason: HOSPADM

## 2018-04-03 RX ORDER — METOPROLOL SUCCINATE 50 MG/1
50 TABLET, EXTENDED RELEASE ORAL DAILY
Status: DISCONTINUED | OUTPATIENT
Start: 2018-04-03 | End: 2018-04-09 | Stop reason: HOSPADM

## 2018-04-03 RX ORDER — AMLODIPINE BESYLATE 5 MG/1
5 TABLET ORAL DAILY
Status: DISCONTINUED | OUTPATIENT
Start: 2018-04-03 | End: 2018-04-09 | Stop reason: HOSPADM

## 2018-04-03 RX ORDER — ACETAMINOPHEN 325 MG/1
650 TABLET ORAL EVERY 6 HOURS PRN
Status: DISCONTINUED | OUTPATIENT
Start: 2018-04-03 | End: 2018-04-09 | Stop reason: HOSPADM

## 2018-04-03 RX ADMIN — HEPARIN SODIUM 5000 UNITS: 5000 INJECTION, SOLUTION INTRAVENOUS; SUBCUTANEOUS at 14:10

## 2018-04-03 RX ADMIN — SODIUM CHLORIDE 75 ML/HR: 0.9 INJECTION, SOLUTION INTRAVENOUS at 03:00

## 2018-04-03 RX ADMIN — FENTANYL CITRATE 50 MCG: 50 INJECTION, SOLUTION INTRAMUSCULAR; INTRAVENOUS at 01:32

## 2018-04-03 RX ADMIN — HEPARIN SODIUM 5000 UNITS: 5000 INJECTION, SOLUTION INTRAVENOUS; SUBCUTANEOUS at 06:19

## 2018-04-03 RX ADMIN — TRAZODONE HYDROCHLORIDE 50 MG: 50 TABLET ORAL at 03:02

## 2018-04-03 RX ADMIN — AMLODIPINE BESYLATE 5 MG: 5 TABLET ORAL at 08:56

## 2018-04-03 RX ADMIN — METOPROLOL SUCCINATE 50 MG: 50 TABLET, EXTENDED RELEASE ORAL at 08:56

## 2018-04-03 RX ADMIN — HEPARIN SODIUM 5000 UNITS: 5000 INJECTION, SOLUTION INTRAVENOUS; SUBCUTANEOUS at 21:21

## 2018-04-03 RX ADMIN — ASPIRIN 81 MG 81 MG: 81 TABLET ORAL at 08:55

## 2018-04-03 RX ADMIN — TRAZODONE HYDROCHLORIDE 50 MG: 50 TABLET ORAL at 21:21

## 2018-04-03 RX ADMIN — SODIUM CHLORIDE 75 ML/HR: 0.9 INJECTION, SOLUTION INTRAVENOUS at 11:34

## 2018-04-03 NOTE — PROGRESS NOTES
Progress Note - Umberto Langley 12/6/1922, 80 y o  female MRN: [id number]    Unit/Bed#: -01 Encounter: [id number]    Primary Care Provider: Navi Tovar MD   Date and time admitted to hospital: 4/2/2018 10:28 PM        * Diarrhea   Assessment & Plan    Patient reports her diarrhea has resolved  As per nursing staff patient had blood in the stools last night  Hemoglobin appears stable at baseline  CT of the abdomen on admission showed thickening of the colon suggestive of infectious versus inflammatory colitis  No evidence of diverticulitis  Check FOBT   Consult Gastroenterology  Follow-up on H&H  C diff pending            Acute kidney injury [facility])   Assessment & Plan    Patient's creatinine was elevated on admission, improved with IV fluids back to baseline today  Monitor BMP        HTN (hypertension)   Assessment & Plan    Blood pressure is stable continue home medications            VTE Pharmacologic Prophylaxis:   Pharmacologic: Heparin  Mechanical VTE Prophylaxis in Place: Yes    Patient Centered Rounds: I have performed bedside rounds with nursing staff today  Discussions with Specialists or Other Care Team Provider:  Discussed with case management    Education and Discussions with Family / Patient:  Explained to the patient in detail about her management plan    Time Spent for Care: 30 minutes  More than 50% of total time spent on counseling and coordination of care as described above  Current Length of Stay: 0 day(s)    Current Patient Status: Inpatient   Certification Statement: The patient will continue to require additional inpatient hospital stay due to Management of diarrhea, further evaluation of blood in the stool    Discharge Plan:    Continued hospitalization For evaluation for diarrhea  Code Status: Level 3 - DNAR and DNI      Subjective:   Patient seen and examined  She notes her diarrhea has improved, did not have any bowel movements since last night    Has mild abdominal pain otherwise feels better  Denies any nausea or vomiting  No other acute complaints  As per nursing staff patient had an episode of blood in the stool last night  Objective:     Vitals:   Temp (24hrs), Av 1 °F (36 7 °C), Min:97 7 °F (36 5 °C), Max:98 5 °F (36 9 °C)    HR:  [] 104  Resp:  [18] 18  BP: (124-155)/(60-80) 124/60  SpO2:  [95 %-96 %] 96 %  Body mass index is 20 16 kg/m²  Input and Output Summary (last 24 hours): Intake/Output Summary (Last 24 hours) at 18 1316  Last data filed at 18 1148   Gross per 24 hour   Intake             1442 ml   Output              625 ml   Net              817 ml       Physical Exam:     Physical Exam   Constitutional: She is oriented to person, place, and time  She appears well-developed and well-nourished  HENT:   Head: Normocephalic and atraumatic  Eyes: EOM are normal  Pupils are equal, round, and reactive to light  Neck: Normal range of motion  Neck supple  Cardiovascular: Normal rate and regular rhythm  Pulmonary/Chest: Effort normal and breath sounds normal    Abdominal: Soft  Bowel sounds are normal  There is tenderness  Diffuse mild tenderness all over the abdomen   Musculoskeletal: Normal range of motion  Neurological: She is alert and oriented to person, place, and time  Skin: Skin is warm and dry           Additional Data:     Labs:      Results from last 7 days  Lab Units 18  0501 18  2321   WBC Thousand/uL 12 51* 17 80*   HEMOGLOBIN g/dL 12 1 12 3   HEMATOCRIT % 38 4 38 6   PLATELETS Thousands/uL 144* 169   NEUTROS PCT %  --  81*   LYMPHS PCT %  --  10*   MONOS PCT %  --  6   EOS PCT %  --  2       Results from last 7 days  Lab Units 18  0501 18  2321   SODIUM mmol/L 138 139   POTASSIUM mmol/L 3 6 3 7   CHLORIDE mmol/L 103 100   CO2 mmol/L 25 31   BUN mg/dL 41* 43*   CREATININE mg/dL 1 13 1 40*   CALCIUM mg/dL 9 4 10 0   TOTAL PROTEIN g/dL  --  7 6   BILIRUBIN TOTAL mg/dL  -- 0 40   ALK PHOS U/L  --  69   ALT U/L  --  23   AST U/L  --  29   GLUCOSE RANDOM mg/dL 157* 155*           * I Have Reviewed All Lab Data Listed Above  * Additional Pertinent Lab Tests Reviewed: [place] 66 Admission Reviewed    Imaging:    Imaging Reports Reviewed Today Include:  CT of the chest abdomen pelvis-mild wall thickening throughout the colon, not well evaluated in absence of colonic distention suggesting inflammatory or infectious colitis  Imaging Personally Reviewed by Myself Includes:  CT of abdomen    Recent Cultures (last 7 days):           Last 24 Hours Medication List:     Current Facility-Administered Medications:  acetaminophen 650 mg Oral Q6H PRN Little Monique, PA-C    amLODIPine 5 mg Oral Daily Little Monique, PA-C    aspirin 81 mg Oral Daily Little Monique, PA-C    heparin (porcine) 5,000 Units Subcutaneous Q8H [facility] Abbi Camacho MD    metoprolol succinate 50 mg Oral Daily Little Monique, PA-C    ondansetron 4 mg Intravenous Q6H PRN Little Monique, PA-C    sodium chloride 1,000 mL Intravenous Once Karl Osgood, MD    sodium chloride 75 mL/hr Intravenous Continuous Little Monique, PA-C Last Rate: 75 mL/hr (04/03/18 1134)   traZODone 50 mg Oral HS Little oMnique, PA-C         Today, Patient Was Seen By: Quinn Barbour MD    ** Please Note: Dictation voice to text software may have been used in the creation of this document   **

## 2018-04-03 NOTE — ASSESSMENT & PLAN NOTE
Patient reports her diarrhea has resolved  As per nursing staff patient had blood in the stools last night  Hemoglobin appears stable at baseline  CT of the abdomen on admission showed thickening of the colon suggestive of infectious versus inflammatory colitis  No evidence of diverticulitis  Check FOBT   Consult Gastroenterology    Follow-up on H&H  C diff pending

## 2018-04-03 NOTE — ASSESSMENT & PLAN NOTE
Admit  IV fluids  C diff pending, contact precautions  Will wait on c diff results before starting any antibiotics

## 2018-04-03 NOTE — CONSULTS
Consultation - [facility]  Lisa Herrera 80 y o  female MRN: [id number]  Unit/Bed#: -01 Encounter: [id number]        Consults    Reason for Consult / Principal Problem: Abdominal Pain, Diarrhea    HPI: Ms Brittanie Warren is a 79 yo F with a PMH of HTN, hx breast cancer s/p L mastectomy with LUE lymphedema, presenting with acute, large volume watery diarrhea yesterday  She denies any true abdominal pain prior to the watery diarrhea but did note a distended abdomen  She did not see any blood in her stools  She denies any associated fever, nausea, or vomiting  She lives in [facility] but she is not aware of anyone with similar symptoms  She has not had any further BMs today  Her last colonoscopy was >10 years ago and she denies any significant findings  Currently she is having mild abdominal tenderness  REVIEW OF SYSTEMS: Negative except for as stated above    Historical Information   Past Medical History:   Diagnosis Date    Breast cancer ([address] )     Cancer ([address] )     Hypertension     Lymphedema of arm     L arm     Past Surgical History:   Procedure Laterality Date    BLADDER REPAIR      BREAST SURGERY      HYSTERECTOMY      MASTECTOMY       Social History   History   Alcohol Use No     History   Drug Use No     History   Smoking Status    Never Smoker   Smokeless Tobacco    Not on file     History reviewed  No pertinent family history      Meds/Allergies     Prescriptions Prior to Admission   Medication    aspirin 81 mg chewable tablet    felodipine (PLENDIL) 5 mg 24 hr tablet    lisinopril-hydrochlorothiazide (PRINZIDE,ZESTORETIC) 20-25 MG per tablet    metoprolol succinate (TOPROL-XL) 50 mg 24 hr tablet    ondansetron (ZOFRAN) 4 mg tablet     Current Facility-Administered Medications   Medication Dose Route Frequency    acetaminophen (TYLENOL) tablet 650 mg  650 mg Oral Q6H PRN    amLODIPine (NORVASC) tablet 5 mg  5 mg Oral Daily    aspirin chewable tablet 81 mg  81 mg Oral Daily    heparin (porcine) subcutaneous injection 5,000 Units  5,000 Units Subcutaneous Q8H [facility]    metoprolol succinate (TOPROL-XL) 24 hr tablet 50 mg  50 mg Oral Daily    ondansetron (ZOFRAN) injection 4 mg  4 mg Intravenous Q6H PRN    sodium chloride 0 9 % bolus 1,000 mL  1,000 mL Intravenous Once    sodium chloride 0 9 % infusion  75 mL/hr Intravenous Continuous    traZODone (DESYREL) tablet 50 mg  50 mg Oral HS       No Known Allergies        Objective     Blood pressure 119/53, pulse 105, temperature 98 6 °F (37 °C), temperature source Oral, resp  rate 17, height 5' 2" (1 575 m), weight 50 kg (110 lb 3 7 oz), SpO2 96 %        Intake/Output Summary (Last 24 hours) at 04/03/18 1613  Last data filed at 04/03/18 1338   Gross per 24 hour   Intake             1442 ml   Output              725 ml   Net              717 ml         PHYSICAL EXAM:      General Appearance:   Alert, oriented x3, no acute distress   HENT[de-identified]  Eyes:   Normocephalic, atraumatic   Anicteric   Neck:  Supple, symmetrical, trachea midline   Lungs:   Clear to auscultation bilaterally, no respiratory distress   Heart[de-identified]   RRR, no murmur   Abdomen:   Non-distended, soft, BS active, (+) mild generalized TTP    Rectal:   Deferred    Extremities:  No cyanosis or LE edema    Skin:  No jaundice or pallor     Lab Results:     Results from last 7 days  Lab Units 04/03/18  1402 04/03/18  0501 04/02/18  2321   WBC Thousand/uL  --  12 51* 17 80*   HEMOGLOBIN g/dL 12 6 12 1 12 3   HEMATOCRIT % 39 8 38 4 38 6   PLATELETS Thousands/uL  --  144* 169   NEUTROS PCT %  --   --  81*   LYMPHS PCT %  --   --  10*   MONOS PCT %  --   --  6   EOS PCT %  --   --  2       Results from last 7 days  Lab Units 04/03/18  0501 04/02/18  2321   SODIUM mmol/L 138 139   POTASSIUM mmol/L 3 6 3 7   CHLORIDE mmol/L 103 100   CO2 mmol/L 25 31   BUN mg/dL 41* 43*   CREATININE mg/dL 1 13 1 40*   CALCIUM mg/dL 9 4 10 0   TOTAL PROTEIN g/dL  --  7 6   BILIRUBIN TOTAL mg/dL  --  0 40   ALK PHOS U/L  --  69   ALT U/L  --  23   AST U/L  --  29   GLUCOSE RANDOM mg/dL 157* 155*           Results from last 7 days  Lab Units 04/02/18  2321   LIPASE u/L 252       Imaging Studies: I have personally reviewed pertinent imaging studies  Ct Chest Abdomen Pelvis Wo Contrast  Result Date: 4/3/2018  Impression: 1  Mild wall thickening throughout the colon, not well evaluated in the absence of colonic distention suggesting inflammatory or infectious colitis  No evidence of diverticulitis or bowel obstruction  2   No acute cardiopulmonary process  ASSESSMENT and PLAN:    Principal Problem:    Diarrhea  Active Problems:    Dehydration    HTN (hypertension)    Acute kidney injury ([address] )    Abdominal Pain  Diarrhea  Hematochezia  - CT on admission shows mild wall thickening in the transverse, splenic flexure, and descending colon; discussion with radiologist also notes she is a vasculopath  - Due to associated leukocytosis, elevated lactic acid, and CONSTANCE on admission, high suspicion for Cdiff colitis v ischemic colitis due to report of bloody diarrhea  - Check Cdiff  - Stool culture pending  - Hb stable, continue to monitor closely  - Would prefer to hold off on antibiotics for now until Cdiff is collected but if she becomes HD unstable, would recommend empiric abx with cipro/flagyl  - IVF hydration, supportive care with antiemetics  - Keep NPO for now for bowel rest due to concern for ischemic colitis      Patient will be seen and examined by Dr Bryson Saenz  All alva medical decisions were made by Dr Bryson Saenz  Thank you for allowing us to participate in the care of this patient  We will follow with you closely

## 2018-04-03 NOTE — CASE MANAGEMENT
Initial Clinical Review    Admission: Date/Time/Statement: 4/3/18 @ 0124     Orders Placed This Encounter   Procedures    Inpatient Admission (expected length of stay for this patient is greater than two midnights)     Standing Status:   Standing     Number of Occurrences:   1     Order Specific Question:   Admitting Physician     Answer:   Jerad Birch [[id number]]     Order Specific Question:   Level of Care     Answer:   Med Surg [[id number]]     Order Specific Question:   Estimated length of stay     Answer:   More than 2 Midnights     Order Specific Question:   Certification     Answer:   I certify that inpatient services are medically necessary for this patient for a duration of greater than two midnights  See H&P and MD Progress Notes for additional information about the patient's course of treatment  ED: Date/Time/Mode of Arrival:   ED Arrival Information     Expected Arrival Acuity Means of Arrival Escorted By Service Admission Type    - 4/2/2018 22:28 Urgent Ambulance [facility] General Medicine Urgent    Arrival Complaint    ABDOMINAL PAIN          Chief Complaint:   Chief Complaint   Patient presents with    Diarrhea     Pt presents with c/o severe diarrhea, weakness and diffuse abd discomfort  Symptoms began early afternoon   Abdominal Pain       History of Illness: Andrews Fletcher is a 80 y o  female who presents with complaints of abdominal pain and diarrhea that began tonight  Pt denies fever, chills, nausea    Pt resides at [facility] and began to feel ill around 9:00 pm and asked to be taken to the hospital    ED Vital Signs:   ED Triage Vitals   Temperature Pulse Respirations Blood Pressure SpO2   04/03/18 0141 04/02/18 2231 04/02/18 2231 04/02/18 2231 04/02/18 2231   97 7 °F (36 5 °C) 74 18 137/64 95 %      Temp Source Heart Rate Source Patient Position - Orthostatic VS BP Location FiO2 (%)   04/03/18 0141 04/02/18 2231 04/02/18 2231 04/02/18 2231 --   Oral Monitor Lying Right arm       Pain Score       04/03/18 0251       5        Wt Readings from Last 1 Encounters:   04/03/18 50 kg (110 lb 3 7 oz)       Vital Signs (abnormal): wnl    Abnormal Labs/Diagnostic Test Results: lactic acid   2 5, BUN creat   43  1 40, gluc   155, mg  1 5, wbc 17 80  Ct chest- 1   Mild wall thickening throughout the colon, not well evaluated in the absence of colonic distention suggesting inflammatory or infectious colitis   No evidence of diverticulitis or bowel obstruction  2   No acute cardiopulmonary process  EKG-Sinus rhythm with marked sinus arrhythmia with occasional Premature ventricular complexes    ED Treatment:   Medication Administration from 04/02/2018 2226 to 04/03/2018 0245       Date/Time Order Dose Route Action Action by Comments     04/02/2018 2340 sodium chloride 0 9 % infusion 125 mL/hr Intravenous [address] Jose Alejandro Saunders RN      04/03/2018 0132 fentanyl citrate (PF) 100 MCG/2ML 50 mcg 50 mcg Intravenous Given Jose Alejandro Saunders RN           Past Medical/Surgical History: Active Ambulatory Problems     Diagnosis Date Noted    No Active Ambulatory Problems     Resolved Ambulatory Problems     Diagnosis Date Noted    No Resolved Ambulatory Problems     Past Medical History:   Diagnosis Date    [facility]     Cancer [facility] [facility]     Hypertension     Lymphedema of arm        Admitting Diagnosis: Diarrhea [R19 7]  Dehydration [E86 0]  Abdominal pain [R10 9]    Age/Sex: 80 y o  female    Assessment/Plan:   * Diarrhea   Assessment & Plan     Admit  IV fluids  C diff pending, contact precautions  Will wait on c diff results before starting any antibiotics            Dehydration   Assessment & Plan     IV fluid hydration          HTN (hypertension)   Assessment & Plan     Continue metoprolol, norvasc  Lisinopril/HCTZ held secondary to dehydration              VTE Prophylaxis: Heparin  / sequential compression device   Code Status: DNR/DNI  POLST: There is no POLST form on file for this patient (pre-hospital)  Discussion with family: Family not present for discussion      Anticipated Length of Stay:  Patient will be admitted on an Inpatient basis with an anticipated length of stay of  More than 2 midnights     Justification for Hospital Stay: IV fluids         Admission Orders:  Scheduled Meds:   Current Facility-Administered Medications:  acetaminophen 650 mg Oral Q6H PRN Benjamen Rice, PA-C    amLODIPine 5 mg Oral Daily Benjamen Rice, PA-C    aspirin 81 mg Oral Daily Benjamen Rice, PA-C    heparin (porcine) 5,000 Units Subcutaneous Q8H [facility] Luan Haas MD    metoprolol succinate 50 mg Oral Daily Benjamen Rice, PA-C    ondansetron 4 mg Intravenous Q6H PRN Benjamen Rice, PA-C    sodium chloride 1,000 mL Intravenous Once Claudeen Baumann, MD    sodium chloride 75 mL/hr Intravenous Continuous Benjamen Rice, PA-C Last Rate: 75 mL/hr (04/03/18 0300)   traZODone 50 mg Oral HS Benjamen Rice, PA-C      Continuous Infusions:   sodium chloride 75 mL/hr Last Rate: 75 mL/hr (04/03/18 0300)     PRN Meds:   acetaminophen    ondansetron    Reg diet   Up w/ assist   Tele   Bmp, cbc , c-diff  GI consult   SCD  Contact isolation   Lactic acid  2 2, BUN creat   41  1 13, gluc   157, wbc   12 51, plt   144

## 2018-04-03 NOTE — PHYSICIAN ADVISOR
Current patient class: Inpatient  The patient is currently on Hospital Day: 2      The patient was admitted to the hospital at [address] [place] [address] on 4/3/18 for the following diagnosis:  Diarrhea [R19 7]  Dehydration [E86 0]  Abdominal pain [R10 9]       There is documentation in the medical record of an expected length of stay of at least 2 midnights  The patient is therefore expected to satisfy the 2 midnight benchmark and given the 2 midnight presumption is appropriate for INPATIENT ADMISSION  Given this expectation of a satisfying stay, CMS instructs us that the patient is most often appropriate for inpatient admission under part A provided medical necessity is documented in the chart  After review of the relevant documentation, labs, vital signs and test results, the patient is appropriate for INPATIENT ADMISSION  Admission to the hospital as an inpatient is a complex decision making process which requires the practitioner to consider the patients presenting complaint, history and physical examination and all relevant testing  With this in mind, in this case, the patient was deemed appropriate for INPATIENT ADMISSION  After review of the documentation and testing available at the time of the admission I concur with this clinical determination of medical necessity  Rationale is as follows: The patient is a 41-year-old female who presented to the hospital on April 2, 2018 with abdominal pain and diarrhea  White blood cell count was 17 80 with BUN 43, creatinine 1 40 and baseline creatinine 0 80  CT showed mild wall thickening throughout the colon  Lactic acid was elevated  The primary diagnoses include diarrhea, dehydration, and acute kidney injury  She remains on intravenous fluids and stool analysis is pending  The patient is expected to remain hospitalized for more than two midnights  The patient currently has tachycardia  She continues to have leukocytosis, although improving    Inpatient level of care is appropriate given her advanced age and concerns upon admission      The patients vitals on arrival were ED Triage Vitals   Temperature Pulse Respirations Blood Pressure SpO2   04/03/18 0141 04/02/18 2231 04/02/18 2231 04/02/18 2231 04/02/18 2231   97 7 °F (36 5 °C) 74 18 137/64 95 %      Temp Source Heart Rate Source Patient Position - Orthostatic VS BP Location FiO2 (%)   04/03/18 0141 04/02/18 2231 04/02/18 2231 04/02/18 2231 --   Oral Monitor Lying Right arm       Pain Score       04/03/18 0251       5           Past Medical History:   Diagnosis Date    [facility] [facility]     Cancer [facility] [facility]     Hypertension     Lymphedema of arm     L arm     Past Surgical History:   Procedure Laterality Date    BLADDER REPAIR      BREAST SURGERY      HYSTERECTOMY      MASTECTOMY             Consults have been placed to:   IP CONSULT TO GASTROENTEROLOGY    Vitals:    04/02/18 2231 04/03/18 0141 04/03/18 0251 04/03/18 0750   BP: 137/64  155/80 124/60   BP Location: Right arm  Right arm Right arm   Pulse: 74  98 104   Resp: 18  18 18   Temp:  97 7 °F (36 5 °C) 98 °F (36 7 °C) 98 5 °F (36 9 °C)   TempSrc:  Oral Oral Oral   SpO2: 95%  96% 96%   Weight: 52 9 kg (116 lb 11 2 oz)  50 kg (110 lb 3 7 oz)    Height: 5' 2" (1 575 m)  5' 2" (1 575 m)        Most recent labs:    Recent Labs      04/02/18 2321 04/03/18   0501   WBC  17 80*  12 51*   HGB  12 3  12 1   HCT  38 6  38 4   PLT  169  144*   K  3 7  3 6   NA  139  138   CALCIUM  10 0  9 4   BUN  43*  41*   CREATININE  1 40*  1 13   LIPASE  252   --    TROPONINI  <0 02   --    AST  29   --    ALT  23   --    ALKPHOS  69   --    BILITOT  0 40   --        Scheduled Meds:  Current Facility-Administered Medications:  acetaminophen 650 mg Oral Q6H PRN Yareli Precise, PA-C    amLODIPine 5 mg Oral Daily Yareli Precise, PA-C    aspirin 81 mg Oral Daily Yareli Precise, PA-C    heparin (porcine) 5,000 Units Subcutaneous Q8H Gema Panchal MD    metoprolol succinate 50 mg Oral Daily Fernanda Amaya PA-C    ondansetron 4 mg Intravenous Q6H PRN Fernanda Amaya PA-C    sodium chloride 1,000 mL Intravenous Once Karl Osgood, MD    sodium chloride 75 mL/hr Intravenous Continuous Fernanda Amaya PA-C Last Rate: 75 mL/hr (04/03/18 1134)   traZODone 50 mg Oral HS Fernanda Amaya PA-C      Continuous Infusions:  sodium chloride 75 mL/hr Last Rate: 75 mL/hr (04/03/18 1134)     PRN Meds:   acetaminophen    ondansetron    Surgical procedures (if appropriate):

## 2018-04-03 NOTE — ASSESSMENT & PLAN NOTE
Patient's creatinine was elevated on admission, improved with IV fluids back to baseline today    Monitor BMP

## 2018-04-03 NOTE — H&P
H&P- Jennifer Dacosta 12/6/1922, 80 y o  female MRN: [id number]    Unit/Bed#: ED 18 Encounter: [id number]    Primary Care Provider: Matilde Tsang MD   Date and time admitted to hospital: 4/2/2018 10:28 PM        * Diarrhea   Assessment & Plan    Admit  IV fluids  C diff pending, contact precautions  Will wait on c diff results before starting any antibiotics  Dehydration   Assessment & Plan    IV fluid hydration        HTN (hypertension)   Assessment & Plan    Continue metoprolol, norvasc  Lisinopril/HCTZ held secondary to dehydration  VTE Prophylaxis: Heparin  / sequential compression device   Code Status: DNR/DNI  POLST: There is no POLST form on file for this patient (pre-hospital)  Discussion with family: Family not present for discussion  Anticipated Length of Stay:  Patient will be admitted on an Inpatient basis with an anticipated length of stay of  More than 2 midnights  Justification for Hospital Stay: IV fluids    Total Time for Visit, including Counseling / Coordination of Care: 30 minutes  Greater than 50% of this total time spent on direct patient counseling and coordination of care  Chief Complaint:   Diarrhea    History of Present Illness:    Jennifer Dacosta is a 80 y o  female who presents with complaints of abdominal pain and diarrhea that began tonight  Pt denies fever, chills, nausea  Pt resides at [facility] and began to feel ill around 9:00 pm and asked to be taken to the hospital     Review of Systems:    Review of Systems   Gastrointestinal: Positive for diarrhea  All other systems reviewed and are negative        Past Medical and Surgical History:     Past Medical History:   Diagnosis Date    [facility] [address] [facility]     Cancer ([facility] )     Hypertension     Lymphedema of arm     L arm       Past Surgical History:   Procedure Laterality Date    BLADDER REPAIR      BREAST SURGERY      HYSTERECTOMY      MASTECTOMY Meds/Allergies:    Prior to Admission medications    Medication Sig Start Date End Date Taking? Authorizing Provider   aspirin 81 mg chewable tablet Chew 1 tablet daily 6/5/14   Historical Provider, MD   felodipine (PLENDIL) 5 mg 24 hr tablet Take 1 tablet by mouth daily 6/5/14   Historical Provider, MD   lisinopril-hydrochlorothiazide (PRINZIDE,ZESTORETIC) 20-25 MG per tablet Take 1 tablet by mouth daily 6/5/14   Historical Provider, MD   metoprolol succinate (TOPROL-XL) 50 mg 24 hr tablet Take 1 tablet by mouth daily 6/5/14   Historical Provider, MD   ondansetron (ZOFRAN) 4 mg tablet Take 1 tablet by mouth every 6 (six) hours as needed for nausea or vomiting for up to 6 doses 6/4/17   Yamel Adan DO     I have reviewed home medications with patient personally  Allergies: No Known Allergies    Social History:     Marital Status:    Occupation: retired  Patient Pre-hospital Living Situation: lives at [facility]  Patient Pre-hospital Level of Mobility: ambulatory  Patient Pre-hospital Diet Restrictions: none  Substance Use History:   History   Alcohol Use No     History   Smoking Status    Never Smoker   Smokeless Tobacco    Not on file     History   Drug Use No       Family History:    History reviewed  No pertinent family history  Physical Exam:     Vitals:   Blood Pressure: 137/64 (04/02/18 2231)  Pulse: 74 (04/02/18 2231)  Temperature: 97 7 °F (36 5 °C) (04/03/18 0141)  Temp Source: Oral (04/03/18 0141)  Respirations: 18 (04/02/18 2231)  Height: 5' 2" (157 5 cm) (04/02/18 2231)  Weight - Scale: 52 9 kg (116 lb 11 2 oz) (04/02/18 2231)  SpO2: 95 % (04/02/18 2231)    Physical Exam   Constitutional: She is oriented to person, place, and time  She appears well-developed and well-nourished  HENT:   Head: Normocephalic and atraumatic     Right Ear: External ear normal    Left Ear: External ear normal    Nose: Nose normal    Mouth/Throat: Oropharynx is clear and moist    Eyes: Conjunctivae and EOM are normal  Pupils are equal, round, and reactive to light  Neck: Normal range of motion  Cardiovascular: Normal rate, regular rhythm and normal heart sounds  Pulmonary/Chest: Effort normal and breath sounds normal    Abdominal: Soft  Bowel sounds are normal  There is no tenderness  Musculoskeletal: Normal range of motion  She exhibits no edema  Neurological: She is alert and oriented to person, place, and time  Skin: Skin is warm and dry  Vitals reviewed  Additional Data:     Lab Results: I have personally reviewed pertinent reports  Results from last 7 days  Lab Units 04/02/18  2321   WBC Thousand/uL 17 80*   HEMOGLOBIN g/dL 12 3   HEMATOCRIT % 38 6   PLATELETS Thousands/uL 169   NEUTROS PCT % 81*   LYMPHS PCT % 10*   MONOS PCT % 6   EOS PCT % 2       Results from last 7 days  Lab Units 04/02/18  2321   SODIUM mmol/L 139   POTASSIUM mmol/L 3 7   CHLORIDE mmol/L 100   CO2 mmol/L 31   BUN mg/dL 43*   CREATININE mg/dL 1 40*   CALCIUM mg/dL 10 0   TOTAL PROTEIN g/dL 7 6   BILIRUBIN TOTAL mg/dL 0 40   ALK PHOS U/L 69   ALT U/L 23   AST U/L 29   GLUCOSE RANDOM mg/dL 155*           Imaging: I have personally reviewed pertinent reports  CT chest abdomen pelvis wo contrast   Final Result by Raine Barraza MD (04/03 0047)         1  Mild wall thickening throughout the colon, not well evaluated in the absence of colonic distention suggesting inflammatory or infectious colitis  No evidence of diverticulitis or bowel obstruction  2   No acute cardiopulmonary process  Workstation performed: [id number]             EKG, Pathology, and Other Studies Reviewed on Admission:   · EKG: sinus tachycardia    Allscripts / Epic Records Reviewed: Yes     ** Please Note: This note has been constructed using a voice recognition system   **

## 2018-04-03 NOTE — PLAN OF CARE
Problem: PAIN - ADULT  Goal: Verbalizes/displays adequate comfort level or baseline comfort level  Interventions:  - Encourage patient to monitor pain and request assistance  - Assess pain using appropriate pain scale  - Administer analgesics based on type and severity of pain and evaluate response  - Implement non-pharmacological measures as appropriate and evaluate response  - Consider cultural and social influences on pain and pain management  - Notify physician/advanced practitioner if interventions unsuccessful or patient reports new pain   Outcome: Progressing      Problem: INFECTION - ADULT  Goal: Absence or prevention of progression during hospitalization  INTERVENTIONS:  - Assess and monitor for signs and symptoms of infection  - Monitor lab/diagnostic results  - Monitor all insertion sites, i e  indwelling lines, tubes, and drains  - Monitor endotracheal (as able) and nasal secretions for changes in amount and color  - Tea appropriate cooling/warming therapies per order  - Administer medications as ordered  - Instruct and encourage patient and family to use good hand hygiene technique  - Identify and instruct in appropriate isolation precautions for identified infection/condition   Outcome: Progressing    Goal: Absence of fever/infection during neutropenic period  INTERVENTIONS:  - Monitor WBC  - Implement neutropenic guidelines   Outcome: Progressing      Problem: SAFETY ADULT  Goal: Patient will remain free of falls  INTERVENTIONS:  - Assess patient frequently for physical needs  -  Identify cognitive and physical deficits and behaviors that affect risk of falls    -  Tea fall precautions as indicated by assessment   - Educate patient/family on patient safety including physical limitations  - Instruct patient to call for assistance with activity based on assessment  - Modify environment to reduce risk of injury  - Consider OT/PT consult to assist with strengthening/mobility    Outcome: Progressing    Goal: Maintain or return to baseline ADL function  INTERVENTIONS:  -  Assess patient's ability to carry out ADLs; assess patient's baseline for ADL function and identify physical deficits which impact ability to perform ADLs (bathing, care of mouth/teeth, toileting, grooming, dressing, etc )  - Assess/evaluate cause of self-care deficits   - Assess range of motion  - Assess patient's mobility; develop plan if impaired  - Assess patient's need for assistive devices and provide as appropriate  - Encourage maximum independence but intervene and supervise when necessary  ¯ Involve family in performance of ADLs  ¯ Assess for home care needs following discharge   ¯ Request OT consult to assist with ADL evaluation and planning for discharge  ¯ Provide patient education as appropriate   Outcome: Progressing    Goal: Maintain or return mobility status to optimal level  INTERVENTIONS:  - Assess patient's baseline mobility status (ambulation, transfers, stairs, etc )    - Identify cognitive and physical deficits and behaviors that affect mobility  - Identify mobility aids required to assist with transfers and/or ambulation (gait belt, sit-to-stand, lift, walker, cane, etc )  - Evans fall precautions as indicated by assessment  - Record patient progress and toleration of activity level on Mobility SBAR; progress patient to next Phase/Stage  - Instruct patient to call for assistance with activity based on assessment  - Request Rehabilitation consult to assist with strengthening/weightbearing, etc    Outcome: Progressing      Problem: DISCHARGE PLANNING  Goal: Discharge to home or other facility with appropriate resources  INTERVENTIONS:  - Identify barriers to discharge w/patient and caregiver  - Arrange for needed discharge resources and transportation as appropriate  - Identify discharge learning needs (meds, wound care, etc )  - Arrange for interpretive services to assist at discharge as needed  - Refer to Case Management Department for coordinating discharge planning if the patient needs post-hospital services based on physician/advanced practitioner order or complex needs related to functional status, cognitive ability, or social support system   Outcome: Progressing      Problem: Knowledge Deficit  Goal: Patient/family/caregiver demonstrates understanding of disease process, treatment plan, medications, and discharge instructions  Complete learning assessment and assess knowledge base  Interventions:  - Provide teaching at level of understanding  - Provide teaching via preferred learning methods   Outcome: Progressing      Problem: Prexisting or High Potential for Compromised Skin Integrity  Goal: Skin integrity is maintained or improved  INTERVENTIONS:  - Identify patients at risk for skin breakdown  - Assess and monitor skin integrity  - Assess and monitor nutrition and hydration status  - Monitor labs (i e  albumin)  - Assess for incontinence   - Turn and reposition patient  - Assist with mobility/ambulation  - Relieve pressure over bony prominences  - Avoid friction and shearing  - Provide appropriate hygiene as needed including keeping skin clean and dry  - Evaluate need for skin moisturizer/barrier cream  - Collaborate with interdisciplinary team (i e  Nutrition, Rehabilitation, etc )   - Patient/family teaching   Outcome: Progressing      Problem: Potential for Falls  Goal: Patient will remain free of falls  INTERVENTIONS:  - Assess patient frequently for physical needs  -  Identify cognitive and physical deficits and behaviors that affect risk of falls    -  Phyllis fall precautions as indicated by assessment   - Educate patient/family on patient safety including physical limitations  - Instruct patient to call for assistance with activity based on assessment  - Modify environment to reduce risk of injury  - Consider OT/PT consult to assist with strengthening/mobility    Outcome: Progressing

## 2018-04-03 NOTE — ED PROVIDER NOTES
History  Chief Complaint   Patient presents with    Diarrhea     Pt presents with c/o severe diarrhea, weakness and diffuse abd discomfort  Symptoms began early afternoon   Abdominal Pain       History provided by:  Patient  Abdominal Pain   Pain location:  Generalized  Pain quality: aching and cramping    Pain radiates to:  Does not radiate  Pain severity:  Moderate  Onset quality:  Gradual  Duration:  1 day  Timing:  Constant  Progression:  Worsening  Chronicity:  New  Context: previous surgery (BULL/BSO)    Context: not suspicious food intake    Relieved by:  None tried  Worsened by: Bowel movements  Ineffective treatments:  None tried  Associated symptoms: anorexia, chills, diarrhea, fatigue and nausea    Associated symptoms: no chest pain and no vomiting    Risk factors: being elderly    Risk factors: no recent hospitalization        Prior to Admission Medications   Prescriptions Last Dose Informant Patient Reported? Taking?   aspirin 81 mg chewable tablet   Yes No   Sig: Chew 1 tablet daily   felodipine (PLENDIL) 5 mg 24 hr tablet   Yes No   Sig: Take 1 tablet by mouth daily   lisinopril-hydrochlorothiazide (PRINZIDE,ZESTORETIC) 20-25 MG per tablet   Yes No   Sig: Take 1 tablet by mouth daily   metoprolol succinate (TOPROL-XL) 50 mg 24 hr tablet   Yes No   Sig: Take 1 tablet by mouth daily   ondansetron (ZOFRAN) 4 mg tablet   No No   Sig: Take 1 tablet by mouth every 6 (six) hours as needed for nausea or vomiting for up to 6 doses      Facility-Administered Medications: None       Past Medical History:   Diagnosis Date    [facility]     [facility]     Hypertension     Lymphedema of arm     L arm       Past Surgical History:   Procedure Laterality Date    BLADDER REPAIR      BREAST SURGERY      HYSTERECTOMY      MASTECTOMY         No family history on file  I have reviewed and agree with the history as documented      Social History   Substance Use Topics    Smoking status: Never Smoker    Smokeless tobacco: Not on file    Alcohol use No        Review of Systems   Constitutional: Positive for chills and fatigue  Cardiovascular: Negative for chest pain  Gastrointestinal: Positive for abdominal pain, anorexia, diarrhea and nausea  Negative for vomiting  All other systems reviewed and are negative  Physical Exam  ED Triage Vitals [04/02/18 2231]   Temp Pulse Respirations Blood Pressure SpO2   -- 74 18 137/64 95 %      Temp src Heart Rate Source Patient Position - Orthostatic VS BP Location FiO2 (%)   -- Monitor Lying Right arm --      Pain Score       --           Orthostatic Vital Signs  Vitals:    04/02/18 2231   BP: 137/64   Pulse: 74   Patient Position - Orthostatic VS: Lying       Physical Exam   Constitutional: She appears well-developed and well-nourished  No distress  HENT:   Head: Normocephalic and atraumatic  Mouth/Throat: Mucous membranes are dry  Eyes: EOM are normal  Pupils are equal, round, and reactive to light  Neck: Neck supple  Cardiovascular: Normal rate and regular rhythm  Pulmonary/Chest: Effort normal and breath sounds normal  No respiratory distress  She has no wheezes  Abdominal: Soft  Bowel sounds are normal  She exhibits no distension  There is tenderness  Musculoskeletal: She exhibits edema (left arm with chronic lymphedema)  Neurological: She is alert  She exhibits normal muscle tone  Skin: Skin is warm  No rash noted  She is not diaphoretic  No erythema  Nursing note and vitals reviewed        ED Medications  Medications   sodium chloride 0 9 % infusion (125 mL/hr Intravenous New Bag 4/2/18 0140)   sodium chloride 0 9 % bolus 1,000 mL (not administered)   fentanyl citrate (PF) 100 MCG/2ML 50 mcg (not administered)       Diagnostic Studies  Results Reviewed     Procedure Component Value Units Date/Time    Stool Enteric Bacterial Panel by PCR [[id number]]     Lab Status:  No result Specimen:  Stool from Rectum     Clostridium difficile toxin by PCR [[id number]]     Lab Status:  No result Specimen:  Stool from Per Rectum     Lactic acid, plasma [[id number]]  (Abnormal) Collected:  04/02/18 2321    Lab Status:  Final result Specimen:  Blood from Arm, Right Updated:  04/02/18 2353     LACTIC ACID 2 5 (HH) mmol/L     Narrative:         Result may be elevated if tourniquet was used during collection  Troponin I [[id number]]  (Normal) Collected:  04/02/18 2321    Lab Status:  Final result Specimen:  Blood from Arm, Right Updated:  04/02/18 2347     Troponin I <0 02 ng/mL     Narrative:         [employer] Chemistry analyzer 99% cutoff is > 0 04 ng/mL in network labs    o cTnI 99% cutoff is useful only when applied to patients in the clinical setting of myocardial ischemia  o cTnI 99% cutoff should be interpreted in the context of clinical history, ECG findings and possibly cardiac imaging to establish correct diagnosis  o cTnI 99% cutoff may be suggestive but clearly not indicative of a coronary event without the clinical setting of myocardial ischemia  Comprehensive metabolic panel [[id number]]  (Abnormal) Collected:  04/02/18 2321    Lab Status:  Final result Specimen:  Blood from Arm, Right Updated:  04/02/18 2344     Sodium 139 mmol/L      Potassium 3 7 mmol/L      Chloride 100 mmol/L      CO2 31 mmol/L      Anion Gap 8 mmol/L      BUN 43 (H) mg/dL      Creatinine 1 40 (H) mg/dL      Glucose 155 (H) mg/dL      Calcium 10 0 mg/dL      AST 29 U/L      ALT 23 U/L      Alkaline Phosphatase 69 U/L      Total Protein 7 6 g/dL      Albumin 3 5 g/dL      Total Bilirubin 0 40 mg/dL      eGFR 32 ml/min/1 73sq m     Narrative:         National Kidney Disease Education Program recommendations are as follows:  GFR calculation is accurate only with a steady state creatinine  Chronic Kidney disease less than 60 ml/min/1 73 sq  meters  Kidney failure less than 15 ml/min/1 73 sq  meters      Magnesium [[id number]]  (Abnormal) Collected:  04/02/18 2321    Lab Status:  Final result Specimen:  Blood from Arm, Right Updated:  04/02/18 2344     Magnesium 1 5 (L) mg/dL     Lipase [[id number]]  (Normal) Collected:  04/02/18 2321    Lab Status:  Final result Specimen:  Blood from Arm, Right Updated:  04/02/18 2344     Lipase 252 u/L     CBC and differential [[id number]]  (Abnormal) Collected:  04/02/18 2321    Lab Status:  Final result Specimen:  Blood from Arm, Right Updated:  04/02/18 2329     WBC 17 80 (H) Thousand/uL      RBC 4 20 Million/uL      Hemoglobin 12 3 g/dL      Hematocrit 38 6 %      MCV 92 fL      MCH 29 3 pg      MCHC 31 9 g/dL      RDW 13 1 %      MPV 12 0 fL      Platelets 550 Thousands/uL      nRBC 0 /100 WBCs      Neutrophils Relative 81 (H) %      Lymphocytes Relative 10 (L) %      Monocytes Relative 6 %      Eosinophils Relative 2 %      Basophils Relative 0 %      Neutrophils Absolute 14 39 (H) Thousands/µL      Lymphocytes Absolute 1 82 Thousands/µL      Monocytes Absolute 1 08 Thousand/µL      Eosinophils Absolute 0 36 Thousand/µL      Basophils Absolute 0 07 Thousands/µL                  CT chest abdomen pelvis wo contrast   Final Result by Hector Olivarez MD (04/03 0047)         1  Mild wall thickening throughout the colon, not well evaluated in the absence of colonic distention suggesting inflammatory or infectious colitis  No evidence of diverticulitis or bowel obstruction  2   No acute cardiopulmonary process                 Workstation performed: [id number]                    Procedures  ECG 12 Lead Documentation  Date/Time: 4/3/2018 1:25 AM  Performed by: Jana Méndez  Authorized by: Jana Méndez     Indications / Diagnosis:  Tachycarida  ECG reviewed by me, the ED Provider: yes    Patient location:  ED  Quality:     Tracing quality:  Limited by artifact  Rate:     ECG rate:  78    ECG rate assessment: normal    Rhythm:     Rhythm: sinus rhythm    Ectopy:     Ectopy: PVCs    T waves:     T waves: non-specific             Phone Contacts  ED Phone Contact    ED Course  ED Course                                MDM  Number of Diagnoses or Management Options  Abdominal pain: new and requires workup  Dehydration: new and requires workup  Diarrhea: new and requires workup  Diagnosis management comments: 49-year-old female with history of breast cancer over 40 years ago, hypertension and osteoarthritis is coming in with complaint of abdominal pain with diarrhea for the past 1 day  Has been weak and fatigued  Has had numerous episodes of diarrhea  Does not report any bleeding  Has associated nausea but no vomiting  Has associated chills but did not note fever  Patient denies lightheadedness or dizziness but patient care home record says that patient had near syncopal episode related to it  Patient denies having any chest pain or shortness of breath at this time  Patient's EKG showed sinus rhythm with frequent PVCs, however nurses repeated EKG because patient will have periods of tachycardia were her heart rate is over 100  Also on tele monitor patient will have periods of tachycardia that P-waves do not appear parents that could be atrial fibrillation versus frequent PACs but because patient was having chills there is irregular baseline making it difficult to determine rhythm  Patient had labs and CT scan showing bowel inflammation but was limited secondary to no IV contrast   However no IV contrast use because patient is dehydrated with mildly elevated creatinine and elevated white blood cell count lactate  Patient will receive IV fluid boluses and received a small dose of fentanyl for pain  Stool cultures were sent for possible bacterial etiology versus viral given patient is high-risk being a nursing home patient         Amount and/or Complexity of Data Reviewed  Clinical lab tests: ordered and reviewed  Tests in the radiology section of CPT®: ordered and reviewed  Independent visualization of images, tracings, or specimens: yes    Risk of Complications, Morbidity, and/or Mortality  Presenting problems: high    Patient Progress  Patient progress: stable    CritCare Time    Disposition  Final diagnoses:   Diarrhea   Abdominal pain   Dehydration     Time reflects when diagnosis was documented in both MDM as applicable and the Disposition within this note     Time User Action Codes Description Comment    4/3/2018  1:23 AM Carlos TIMMONS Add [R19 7] Diarrhea     4/3/2018  1:23 AM Manuel, [address] [R10 9] Abdominal pain     4/3/2018  1:23 AM Manuel, [address] [E86 0] Dehydration       ED Disposition     ED Disposition Condition Comment    Admit  Case was discussed with Araseli and the patient's admission status was agreed to be Admission Status: inpatient status to the service of Dr Wayne Gibson   Follow-up Information    None       Patient's Medications   Discharge Prescriptions    No medications on file     No discharge procedures on file      ED Provider  Electronically Signed by           Andrea Cleveland MD  04/03/18 0099

## 2018-04-04 PROBLEM — D64.9 ANEMIA: Status: ACTIVE | Noted: 2018-04-04

## 2018-04-04 PROBLEM — E87.6 HYPOKALEMIA: Status: ACTIVE | Noted: 2018-04-04

## 2018-04-04 LAB
ANION GAP SERPL CALCULATED.3IONS-SCNC: 9 MMOL/L (ref 4–13)
BUN SERPL-MCNC: 26 MG/DL (ref 5–25)
CALCIUM SERPL-MCNC: 8.6 MG/DL (ref 8.3–10.1)
CHLORIDE SERPL-SCNC: 105 MMOL/L (ref 100–108)
CO2 SERPL-SCNC: 26 MMOL/L (ref 21–32)
CREAT SERPL-MCNC: 0.73 MG/DL (ref 0.6–1.3)
ERYTHROCYTE [DISTWIDTH] IN BLOOD BY AUTOMATED COUNT: 13.2 % (ref 11.6–15.1)
GFR SERPL CREATININE-BSD FRML MDRD: 70 ML/MIN/1.73SQ M
GLUCOSE SERPL-MCNC: 88 MG/DL (ref 65–140)
HCT VFR BLD AUTO: 34.6 % (ref 34.8–46.1)
HGB BLD-MCNC: 11.1 G/DL (ref 11.5–15.4)
MCH RBC QN AUTO: 29.1 PG (ref 26.8–34.3)
MCHC RBC AUTO-ENTMCNC: 32.1 G/DL (ref 31.4–37.4)
MCV RBC AUTO: 91 FL (ref 82–98)
PLATELET # BLD AUTO: 133 THOUSANDS/UL (ref 149–390)
PMV BLD AUTO: 11.3 FL (ref 8.9–12.7)
POTASSIUM SERPL-SCNC: 2.9 MMOL/L (ref 3.5–5.3)
RBC # BLD AUTO: 3.82 MILLION/UL (ref 3.81–5.12)
SODIUM SERPL-SCNC: 140 MMOL/L (ref 136–145)
WBC # BLD AUTO: 8.09 THOUSAND/UL (ref 4.31–10.16)

## 2018-04-04 PROCEDURE — 99232 SBSQ HOSP IP/OBS MODERATE 35: CPT | Performed by: INTERNAL MEDICINE

## 2018-04-04 PROCEDURE — 85027 COMPLETE CBC AUTOMATED: CPT | Performed by: INTERNAL MEDICINE

## 2018-04-04 PROCEDURE — 80048 BASIC METABOLIC PNL TOTAL CA: CPT | Performed by: INTERNAL MEDICINE

## 2018-04-04 RX ORDER — POTASSIUM CHLORIDE 20MEQ/15ML
40 LIQUID (ML) ORAL ONCE
Status: COMPLETED | OUTPATIENT
Start: 2018-04-04 | End: 2018-04-04

## 2018-04-04 RX ORDER — POTASSIUM CHLORIDE 14.9 MG/ML
20 INJECTION INTRAVENOUS ONCE
Status: COMPLETED | OUTPATIENT
Start: 2018-04-04 | End: 2018-04-05

## 2018-04-04 RX ADMIN — ASPIRIN 81 MG 81 MG: 81 TABLET ORAL at 08:28

## 2018-04-04 RX ADMIN — AMLODIPINE BESYLATE 5 MG: 5 TABLET ORAL at 08:29

## 2018-04-04 RX ADMIN — METOPROLOL SUCCINATE 50 MG: 50 TABLET, EXTENDED RELEASE ORAL at 08:29

## 2018-04-04 RX ADMIN — HEPARIN SODIUM 5000 UNITS: 5000 INJECTION, SOLUTION INTRAVENOUS; SUBCUTANEOUS at 13:08

## 2018-04-04 RX ADMIN — SODIUM CHLORIDE 75 ML/HR: 0.9 INJECTION, SOLUTION INTRAVENOUS at 00:09

## 2018-04-04 RX ADMIN — TRAZODONE HYDROCHLORIDE 50 MG: 50 TABLET ORAL at 21:25

## 2018-04-04 RX ADMIN — SODIUM CHLORIDE 75 ML/HR: 0.9 INJECTION, SOLUTION INTRAVENOUS at 13:08

## 2018-04-04 RX ADMIN — HEPARIN SODIUM 5000 UNITS: 5000 INJECTION, SOLUTION INTRAVENOUS; SUBCUTANEOUS at 21:25

## 2018-04-04 RX ADMIN — HEPARIN SODIUM 5000 UNITS: 5000 INJECTION, SOLUTION INTRAVENOUS; SUBCUTANEOUS at 07:02

## 2018-04-04 RX ADMIN — POTASSIUM CHLORIDE 40 MEQ: 20 SOLUTION ORAL at 14:13

## 2018-04-04 RX ADMIN — POTASSIUM CHLORIDE 20 MEQ: 200 INJECTION, SOLUTION INTRAVENOUS at 14:13

## 2018-04-04 NOTE — PROGRESS NOTES
GI Progress Note - [facility] 80 y o  female MRN: [id number]    Unit/Bed#: -01 Encounter: [id number]    Subjective: Ms Jakub Santos is feeling better today  She had a bloody bowel movement last night but has not had any further bowel movements since then  She denies abdominal pain or abdominal distention  She denies nausea or vomiting  She is tolerating clear liquids so far  Nephew updated at bedside  Objective:     Vitals: Blood pressure 105/55, pulse 84, temperature 98 1 °F (36 7 °C), temperature source Oral, resp  rate 16, height 5' 2" (1 575 m), weight 50 kg (110 lb 3 7 oz), SpO2 93 %  ,Body mass index is 20 16 kg/m²  Intake/Output Summary (Last 24 hours) at 04/04/18 1537  Last data filed at 04/04/18 1348   Gross per 24 hour   Intake          1486 25 ml   Output              350 ml   Net          1136 25 ml       Physical Exam:     General Appearance: Alert, oriented x3, no acute distress  Lungs: CTA bilaterally, no respiratory distress  Heart: RRR, no murmur  Abdomen: Non-distended, BS active, soft, NTTP  Extremities: No cyanosis or LE edema, (+) LUE lymphedema    Invasive Devices     Peripheral Intravenous Line            Peripheral IV 04/03/18 Right Hand 1 day                Lab Results:    Results from last 7 days  Lab Units 04/04/18  0533  04/02/18  2321   WBC Thousand/uL 8 09  < > 17 80*   HEMOGLOBIN g/dL 11 1*  < > 12 3   HEMATOCRIT % 34 6*  < > 38 6   PLATELETS Thousands/uL 133*  < > 169   NEUTROS PCT %  --   --  81*   LYMPHS PCT %  --   --  10*   MONOS PCT %  --   --  6   EOS PCT %  --   --  2   < > = values in this interval not displayed      Results from last 7 days  Lab Units 04/04/18  0533  04/02/18  2321   SODIUM mmol/L 140  < > 139   POTASSIUM mmol/L 2 9*  < > 3 7   CHLORIDE mmol/L 105  < > 100   CO2 mmol/L 26  < > 31   BUN mg/dL 26*  < > 43*   CREATININE mg/dL 0 73  < > 1 40*   CALCIUM mg/dL 8 6  < > 10 0   TOTAL PROTEIN g/dL  --   --  7 6   BILIRUBIN TOTAL mg/dL  --   --  0 40 ALK PHOS U/L  --   --  69   ALT U/L  --   --  23   AST U/L  --   --  29   GLUCOSE RANDOM mg/dL 88  < > 155*   < > = values in this interval not displayed  Results from last 7 days  Lab Units 04/02/18  2321   LIPASE u/L 252       Imaging Studies: I have personally reviewed pertinent imaging studies  Ct Chest Abdomen Pelvis Wo Contrast  Result Date: 4/3/2018  Impression: 1  Mild wall thickening throughout the colon, not well evaluated in the absence of colonic distention suggesting inflammatory or infectious colitis  No evidence of diverticulitis or bowel obstruction  2   No acute cardiopulmonary process         Assessment and Plan:     Acute Colitis  Hematochezia  - CT on admission shows mild wall thickening in the transverse, splenic flexure, and descending colon; discussion with radiologist also notes she is a vasculopath  - Suspect infectious colitis such as Cdiff v ischemic colitis in the setting of bloody diarrhea and mildly elevated lactic acid on admission  - Check Cdiff, discussed with nursing staff to send next sample even if it is bloody  - Stool culture negative  - Hb has remained stable and pt denies any further bloody BMs since last night; continue to monitor Hb  - Continue to hold off on empiric antibiotics for now; her leukocytosis has resolved with supportive care and she has been afebrile  - IVF hydration, antiemetics if needed  - Continue clear liquids for now, can consider advancing diet later today if she does not have any further bloody BMs and her symptoms do not return  - Hold off on colonoscopy for now, she is HD stable      The patient will be seen by Dr Viola Lugo

## 2018-04-04 NOTE — PROGRESS NOTES
Progress Note - Diego Pearce 12/6/1922, 80 y o  female MRN: [id number]    Unit/Bed#: -01 Encounter: [id number]    Primary Care Provider: Nerissa Warner MD   Date and time admitted to hospital: 4/2/2018 10:28 PM        * Diarrhea   Assessment & Plan    Differential:  Infective/inflammatory/ ischemic colitis  CT of the abdomen on admission showed thickening of the colon suggestive of infectious versus inflammatory colitis  No evidence of diverticulitis  No more episodes of diarrhea/bloody stools  Gastroenterology recommendations appreciated  Follow-up on H&H  C diff pending  Will start oral vancomycin once C diff sample was collected  Dehydration   Assessment & Plan    Encourage oral fluid intake  Will start clear liquid diet        Anemia   Assessment & Plan    Likely secondary to GI blood loss  Hemoglobin stable  Continue to monitor        Hypokalemia   Assessment & Plan    Likely secondary to diarrhea  Replete and monitor        Acute kidney injury [facility])   Assessment & Plan    Patient's creatinine was elevated on admission, improved with IV fluids  Encourage p o  fluid intake          HTN (hypertension)   Assessment & Plan    Blood pressure is stable continue home medications            VTE Pharmacologic Prophylaxis:   Pharmacologic: Heparin  Mechanical VTE Prophylaxis in Place: Yes    Patient Centered Rounds: I have performed bedside rounds with nursing staff today  Discussions with Specialists or Other Care Team Provider:  Discussed with GI    Education and Discussions with Family / Patient:  Patient    Time Spent for Care: 20 minutes  More than 50% of total time spent on counseling and coordination of care as described above      Current Length of Stay: 1 day(s)    Current Patient Status: Inpatient   Certification Statement: The patient will continue to require additional inpatient hospital stay due to Evaluation of abdominal pain/diarrhea/rectal bleed    Discharge Plan: Continued hospitalization for now  Code Status: Level 3 - DNAR and DNI      Subjective:   Patient seen and examined  She is sitting in the chair comfortably  Denies any complaints at this time  She told me that she is very hungry and would like to eat something  Denies any more episodes of diarrhea/blood in the stool  No acute events per nursing staff    Objective:     Vitals:   Temp (24hrs), Av 3 °F (36 8 °C), Min:98 1 °F (36 7 °C), Max:98 6 °F (37 °C)    HR:  [] 84  Resp:  [16-18] 16  BP: (102-130)/(53-60) 105/55  SpO2:  [91 %-96 %] 93 %  Body mass index is 20 16 kg/m²  Input and Output Summary (last 24 hours): Intake/Output Summary (Last 24 hours) at 18 1337  Last data filed at 18 0959   Gross per 24 hour   Intake          1486 25 ml   Output              250 ml   Net          1236 25 ml       Physical Exam:     Physical Exam   Constitutional: She is oriented to person, place, and time  She appears well-developed and well-nourished  No distress  Elderly, frail woman not in acute distress   HENT:   Head: Normocephalic and atraumatic  Mouth/Throat: Oropharynx is clear and moist    Eyes: EOM are normal  Pupils are equal, round, and reactive to light  Neck: Normal range of motion  Neck supple  Cardiovascular: Normal rate and regular rhythm  Pulmonary/Chest: Effort normal and breath sounds normal    Abdominal: Soft  Bowel sounds are normal  She exhibits no distension  Generalized tenderness to palpation   Musculoskeletal: Normal range of motion  Neurological: She is alert and oriented to person, place, and time  Skin: Skin is warm and dry           Additional Data:     Labs:      Results from last 7 days  Lab Units 18  0533  18  2321   WBC Thousand/uL 8 09  < > 17 80*   HEMOGLOBIN g/dL 11 1*  < > 12 3   HEMATOCRIT % 34 6*  < > 38 6   PLATELETS Thousands/uL 133*  < > 169   NEUTROS PCT %  --   --  81*   LYMPHS PCT %  --   --  10*   MONOS PCT %  --   -- 6   EOS PCT %  --   --  2   < > = values in this interval not displayed  Results from last 7 days  Lab Units 04/04/18  0533  04/02/18  2321   SODIUM mmol/L 140  < > 139   POTASSIUM mmol/L 2 9*  < > 3 7   CHLORIDE mmol/L 105  < > 100   CO2 mmol/L 26  < > 31   BUN mg/dL 26*  < > 43*   CREATININE mg/dL 0 73  < > 1 40*   CALCIUM mg/dL 8 6  < > 10 0   TOTAL PROTEIN g/dL  --   --  7 6   BILIRUBIN TOTAL mg/dL  --   --  0 40   ALK PHOS U/L  --   --  69   ALT U/L  --   --  23   AST U/L  --   --  29   GLUCOSE RANDOM mg/dL 88  < > 155*   < > = values in this interval not displayed  * I Have Reviewed All Lab Data Listed Above  * Additional Pertinent Lab Tests Reviewed: Ronnell 66 Admission Reviewed    Imaging:    Imaging Reports Reviewed Today Include:  Imaging Personally Reviewed by Myself Includes:     Recent Cultures (last 7 days):           Last 24 Hours Medication List:     Current Facility-Administered Medications:  acetaminophen 650 mg Oral Q6H PRN Benjamen Rice, PA-C    amLODIPine 5 mg Oral Daily Benjamen Rice, PA-C    aspirin 81 mg Oral Daily Benjamen Rice, PA-C    heparin (porcine) 5,000 Units Subcutaneous Q8H [address] Luan Haas MD    metoprolol succinate 50 mg Oral Daily Benjamen Rice, PA-C    ondansetron 4 mg Intravenous Q6H PRN Benjamen Rice, PA-C    potassium chloride 40 mEq Oral Once Suresh Samuel MD    potassium chloride 20 mEq Intravenous Once Suresh Samuel MD    sodium chloride 1,000 mL Intravenous Once Claudeen Baumann, MD    sodium chloride 75 mL/hr Intravenous Continuous Benjamen Rice, PA-C Last Rate: 75 mL/hr (04/04/18 1308)   traZODone 50 mg Oral HS Benjamen Rice, PA-C         Today, Patient Was Seen By: Suresh Samuel MD    ** Please Note: Dictation voice to text software may have been used in the creation of this document   **

## 2018-04-04 NOTE — PLAN OF CARE
Problem: DISCHARGE PLANNING  Goal: Discharge to home or other facility with appropriate resources  INTERVENTIONS:  - Identify barriers to discharge w/patient and caregiver  - Arrange for needed discharge resources and transportation as appropriate  - Identify discharge learning needs (meds, wound care, etc )  - Arrange for interpretive services to assist at discharge as needed  - Refer to Case Management Department for coordinating discharge planning if the patient needs post-hospital services based on physician/advanced practitioner order or complex needs related to functional status, cognitive ability, or social support system   Outcome: Progressing  Spoke to her nephew in the room and he states that the patient lives at [facility] and will return there at [facility]  Nurse from [facility] will need to be called at [facility]  Pt ambulates there with a walker and has been doing the same here  Spoke with nephew and he is POA  He wants her transported back to [facility] in a [facility] [employer]  [facility]

## 2018-04-04 NOTE — ASSESSMENT & PLAN NOTE
Patient's creatinine was elevated on admission, improved with IV fluids    Encourage p o  fluid intake

## 2018-04-04 NOTE — ASSESSMENT & PLAN NOTE
Differential:  Infective/inflammatory/ ischemic colitis  CT of the abdomen on admission showed thickening of the colon suggestive of infectious versus inflammatory colitis  No evidence of diverticulitis  No more episodes of diarrhea/bloody stools  Gastroenterology recommendations appreciated  Follow-up on H&H  C diff pending  Will start oral vancomycin once C diff sample was collected

## 2018-04-04 NOTE — SOCIAL WORK
Spoke to her nephew in the room and he states that the patient lives at [facility] and will return there at [facility]  Nurse from [facility] will need to be called at [place]  Pt ambulates there with a walker and has been doing the same here  Spoke with nephew and he is POA  He wants her transported back to [facility] in a [facility] [employer]  [place]   CM reviewed discharge planning process including the following: identifying help at home, patient preference for discharge planning needs, pharmacy preference, and availability of treatment team to discuss questions or concerns patient and/or family may have regarding understanding medications and recognizing signs and symptoms once discharged  CM also encouraged patient to follow up with all recommended appointments after discharge  Patient advised of importance for patient and family to participate in managing patients medical well being

## 2018-04-05 PROBLEM — K52.9 COLITIS, ACUTE: Status: ACTIVE | Noted: 2018-04-03

## 2018-04-05 LAB
ANION GAP SERPL CALCULATED.3IONS-SCNC: 8 MMOL/L (ref 4–13)
BUN SERPL-MCNC: 13 MG/DL (ref 5–25)
CALCIUM SERPL-MCNC: 9.4 MG/DL (ref 8.3–10.1)
CHLORIDE SERPL-SCNC: 104 MMOL/L (ref 100–108)
CO2 SERPL-SCNC: 27 MMOL/L (ref 21–32)
CREAT SERPL-MCNC: 0.67 MG/DL (ref 0.6–1.3)
ERYTHROCYTE [DISTWIDTH] IN BLOOD BY AUTOMATED COUNT: 13 % (ref 11.6–15.1)
GFR SERPL CREATININE-BSD FRML MDRD: 75 ML/MIN/1.73SQ M
GLUCOSE SERPL-MCNC: 99 MG/DL (ref 65–140)
HCT VFR BLD AUTO: 38.5 % (ref 34.8–46.1)
HEMOCCULT STL QL: NEGATIVE
HGB BLD-MCNC: 12.5 G/DL (ref 11.5–15.4)
MAGNESIUM SERPL-MCNC: 1 MG/DL (ref 1.6–2.6)
MCH RBC QN AUTO: 28.9 PG (ref 26.8–34.3)
MCHC RBC AUTO-ENTMCNC: 32.5 G/DL (ref 31.4–37.4)
MCV RBC AUTO: 89 FL (ref 82–98)
PLATELET # BLD AUTO: 122 THOUSANDS/UL (ref 149–390)
PMV BLD AUTO: 11.2 FL (ref 8.9–12.7)
POTASSIUM SERPL-SCNC: 3.3 MMOL/L (ref 3.5–5.3)
RBC # BLD AUTO: 4.32 MILLION/UL (ref 3.81–5.12)
SODIUM SERPL-SCNC: 139 MMOL/L (ref 136–145)
WBC # BLD AUTO: 7.35 THOUSAND/UL (ref 4.31–10.16)

## 2018-04-05 PROCEDURE — 99232 SBSQ HOSP IP/OBS MODERATE 35: CPT | Performed by: INTERNAL MEDICINE

## 2018-04-05 PROCEDURE — 83735 ASSAY OF MAGNESIUM: CPT | Performed by: INTERNAL MEDICINE

## 2018-04-05 PROCEDURE — 80048 BASIC METABOLIC PNL TOTAL CA: CPT | Performed by: INTERNAL MEDICINE

## 2018-04-05 PROCEDURE — 85027 COMPLETE CBC AUTOMATED: CPT | Performed by: INTERNAL MEDICINE

## 2018-04-05 PROCEDURE — 87493 C DIFF AMPLIFIED PROBE: CPT | Performed by: EMERGENCY MEDICINE

## 2018-04-05 PROCEDURE — 82272 OCCULT BLD FECES 1-3 TESTS: CPT | Performed by: INTERNAL MEDICINE

## 2018-04-05 RX ORDER — POTASSIUM CHLORIDE 20MEQ/15ML
40 LIQUID (ML) ORAL ONCE
Status: COMPLETED | OUTPATIENT
Start: 2018-04-05 | End: 2018-04-05

## 2018-04-05 RX ADMIN — HEPARIN SODIUM 5000 UNITS: 5000 INJECTION, SOLUTION INTRAVENOUS; SUBCUTANEOUS at 22:29

## 2018-04-05 RX ADMIN — POTASSIUM CHLORIDE 40 MEQ: 20 SOLUTION ORAL at 16:16

## 2018-04-05 RX ADMIN — ASPIRIN 81 MG 81 MG: 81 TABLET ORAL at 09:42

## 2018-04-05 RX ADMIN — TRAZODONE HYDROCHLORIDE 50 MG: 50 TABLET ORAL at 22:26

## 2018-04-05 RX ADMIN — HEPARIN SODIUM 5000 UNITS: 5000 INJECTION, SOLUTION INTRAVENOUS; SUBCUTANEOUS at 13:28

## 2018-04-05 RX ADMIN — AMLODIPINE BESYLATE 5 MG: 5 TABLET ORAL at 09:41

## 2018-04-05 RX ADMIN — HEPARIN SODIUM 5000 UNITS: 5000 INJECTION, SOLUTION INTRAVENOUS; SUBCUTANEOUS at 06:05

## 2018-04-05 RX ADMIN — POTASSIUM CHLORIDE 40 MEQ: 20 SOLUTION ORAL at 11:21

## 2018-04-05 RX ADMIN — METOPROLOL SUCCINATE 50 MG: 50 TABLET, EXTENDED RELEASE ORAL at 09:41

## 2018-04-05 NOTE — PROGRESS NOTES
GI Progress Note - Tyree Hauser 80 y o  female MRN: [id number]    Unit/Bed#: -01 Encounter: [id number]    Subjective:  She had a large watery bowel movement this morning  She denies any presence blood or melena  She is having some mild abdominal tenderness  She denies any nausea or vomiting  Objective:     Vitals: Blood pressure 151/83, pulse (!) 115, temperature 98 °F (36 7 °C), temperature source Oral, resp  rate 18, height 5' 2" (1 575 m), weight 50 kg (110 lb 3 7 oz), SpO2 98 %  ,Body mass index is 20 16 kg/m²  Intake/Output Summary (Last 24 hours) at 04/05/18 1354  Last data filed at 04/05/18 0900   Gross per 24 hour   Intake              400 ml   Output              300 ml   Net              100 ml       Physical Exam:     General Appearance: Alert and oriented x3, no acute distress  Lungs: Clear to auscultation bilaterally, no rales or rhonchi, no labored breathing/accessory muscle use  Heart: Regular rate and rhythm, no murmur  Abdomen: Non-distended, soft, BS active, (+) mild generalized TTP  Extremities: No cyanosis or LE edema, (+) LUE lymphedema    Invasive Devices     Peripheral Intravenous Line            Peripheral IV 04/03/18 Right Hand 2 days                Lab Results:    Results from last 7 days  Lab Units 04/05/18  0440  04/02/18  2321   WBC Thousand/uL 7 35  < > 17 80*   HEMOGLOBIN g/dL 12 5  < > 12 3   HEMATOCRIT % 38 5  < > 38 6   PLATELETS Thousands/uL 122*  < > 169   NEUTROS PCT %  --   --  81*   LYMPHS PCT %  --   --  10*   MONOS PCT %  --   --  6   EOS PCT %  --   --  2   < > = values in this interval not displayed      Results from last 7 days  Lab Units 04/05/18  0440  04/02/18  2321   SODIUM mmol/L 139  < > 139   POTASSIUM mmol/L 3 3*  < > 3 7   CHLORIDE mmol/L 104  < > 100   CO2 mmol/L 27  < > 31   BUN mg/dL 13  < > 43*   CREATININE mg/dL 0 67  < > 1 40*   CALCIUM mg/dL 9 4  < > 10 0   TOTAL PROTEIN g/dL  --   --  7 6   BILIRUBIN TOTAL mg/dL  --   --  0 40   ALK PHOS U/L  --   --  69   ALT U/L  --   --  23   AST U/L  --   --  29   GLUCOSE RANDOM mg/dL 99  < > 155*   < > = values in this interval not displayed  Results from last 7 days  Lab Units 04/02/18  2321   LIPASE u/L 252       Imaging Studies: I have personally reviewed pertinent imaging studies  Ct Chest Abdomen Pelvis Wo Contrast  Result Date: 4/3/2018  Impression: 1  Mild wall thickening throughout the colon, not well evaluated in the absence of colonic distention suggesting inflammatory or infectious colitis  No evidence of diverticulitis or bowel obstruction  2   No acute cardiopulmonary process         Assessment and Plan:     Acute Colitis  Hematochezia  - CT on admission shows mild wall thickening in the transverse, splenic flexure, and descending colon  - Suspect infectious colitis such as Cdiff v ischemic colitis in the setting of bloody diarrhea and mildly elevated lactic acid on admission  - Cdiff sample sent, results pending  - Stool culture negative  - Hb has remained stable, no further hematochezia  - Okay to advance diet to low fiber diet due to the colitis  - If Cdiff is positive, will recommend treatment with flagyl 500 mg PO TID x 14 days  - Supportive care  - We will continue to hold off on colonoscopy for now as she has remained HD stable with no further episodes of bleeding  - Pt will likely be stable tomorrow for discharge from [facility] once Cdiff results are back      The patient will be seen by Dr Liseth Aj

## 2018-04-05 NOTE — ASSESSMENT & PLAN NOTE
CT of the abdomen on admission showed thickening of the colon suggestive of infectious versus inflammatory colitis  No evidence of diverticulitis  No more episodes of diarrhea/bloody stools         Hemoglobin remained stable  C diff sample sent, follow-up with result  Gastroenterology recommendations appreciated  Diet advance to low-fiber diet for colitis

## 2018-04-05 NOTE — PROGRESS NOTES
Progress Note - Chrissy Card 12/6/1922, 80 y o  female MRN: [id number]    Unit/Bed#: -01 Encounter: [id number]    Primary Care Provider: Golden Mcneal MD   Date and time admitted to hospital: 4/2/2018 10:28 PM        * Colitis, acute   Assessment & Plan    CT of the abdomen on admission showed thickening of the colon suggestive of infectious versus inflammatory colitis  No evidence of diverticulitis  No more episodes of diarrhea/bloody stools  Hemoglobin remained stable  C diff sample sent, follow-up with result  Gastroenterology recommendations appreciated  Diet advance to low-fiber diet for colitis            Anemia   Assessment & Plan    Likely secondary to GI blood loss  Hemoglobin stable  Continue to monitor        Hypokalemia   Assessment & Plan    Likely secondary to diarrhea  Replete and monitor        Acute kidney injury [facility])   Assessment & Plan    Patient's creatinine was elevated on admission, improved with IV fluids  Encourage p o  fluid intake          HTN (hypertension)   Assessment & Plan    Blood pressure is stable continue home medications            VTE Pharmacologic Prophylaxis:   Pharmacologic: Heparin  Mechanical VTE Prophylaxis in Place: Yes    Patient Centered Rounds: I have performed bedside rounds with nursing staff today  Discussions with Specialists or Other Care Team Provider:  Discussed with GI    Education and Discussions with Family / Patient: patient    Time Spent for Care: 20 minutes  More than 50% of total time spent on counseling and coordination of care as described above  Current Length of Stay: 2 day(s)    Current Patient Status: Inpatient   Certification Statement: The patient will continue to require additional inpatient hospital stay due to GI evaluation, C diff result is pending    Discharge Plan: plan to discharge next 24 hours if C diff is negative    Code Status: Level 3 - DNAR and DNI      Subjective:   Patient seen and examined    She denies any complaints at this time  She notes abdominal pain is improved  Denies any nausea/vomiting  Patient did not have any bowel movement since yesterday morning  Objective:     Vitals:   Temp (24hrs), Av 1 °F (36 7 °C), Min:98 °F (36 7 °C), Max:98 2 °F (36 8 °C)    HR:  [] 115  Resp:  [16-18] 18  BP: (108-151)/(58-83) 151/83  SpO2:  [96 %-98 %] 98 %  Body mass index is 20 16 kg/m²  Input and Output Summary (last 24 hours): Intake/Output Summary (Last 24 hours) at 18 1553  Last data filed at 18 1424   Gross per 24 hour   Intake              500 ml   Output              300 ml   Net              200 ml       Physical Exam:     Physical Exam   Constitutional: She is oriented to person, place, and time  She appears well-developed and well-nourished  HENT:   Head: Normocephalic and atraumatic  Eyes: EOM are normal  Pupils are equal, round, and reactive to light  Neck: Normal range of motion  Neck supple  Cardiovascular: Normal rate, regular rhythm and normal heart sounds  Pulmonary/Chest: Effort normal and breath sounds normal  No respiratory distress  Abdominal: Soft  Bowel sounds are normal  There is no tenderness  Musculoskeletal: Normal range of motion  Neurological: She is alert and oriented to person, place, and time  Skin: Skin is warm and dry  Additional Data:     Labs:      Results from last 7 days  Lab Units 18  0440  18  2321   WBC Thousand/uL 7 35  < > 17 80*   HEMOGLOBIN g/dL 12 5  < > 12 3   HEMATOCRIT % 38 5  < > 38 6   PLATELETS Thousands/uL 122*  < > 169   NEUTROS PCT %  --   --  81*   LYMPHS PCT %  --   --  10*   MONOS PCT %  --   --  6   EOS PCT %  --   --  2   < > = values in this interval not displayed      Results from last 7 days  Lab Units 18  0440  18  2321   SODIUM mmol/L 139  < > 139   POTASSIUM mmol/L 3 3*  < > 3 7   CHLORIDE mmol/L 104  < > 100   CO2 mmol/L 27  < > 31   BUN mg/dL 13  < > 43* CREATININE mg/dL 0 67  < > 1 40*   CALCIUM mg/dL 9 4  < > 10 0   TOTAL PROTEIN g/dL  --   --  7 6   BILIRUBIN TOTAL mg/dL  --   --  0 40   ALK PHOS U/L  --   --  69   ALT U/L  --   --  23   AST U/L  --   --  29   GLUCOSE RANDOM mg/dL 99  < > 155*   < > = values in this interval not displayed  * I Have Reviewed All Lab Data Listed Above  * Additional Pertinent Lab Tests Reviewed: Ronnell 66 Admission Reviewed    Imaging:    Recent Cultures (last 7 days):           Last 24 Hours Medication List:     Current Facility-Administered Medications:  acetaminophen 650 mg Oral Q6H PRN Rafa Coleman PA-C   amLODIPine 5 mg Oral Daily Rafa Coleman PA-C   aspirin 81 mg Oral Daily Rafa Coleman PA-C   heparin (porcine) 5,000 Units Subcutaneous Q8H [address] Avril Higgins MD   metoprolol succinate 50 mg Oral Daily Rafa Coleman PA-C   ondansetron 4 mg Intravenous Q6H PRN Rafa Coleman PA-C   potassium chloride 40 mEq Oral Once Suad Vasques MD   sodium chloride 1,000 mL Intravenous Once Deejay Lopez MD   traZODone 50 mg Oral HS Rafa Coleman PA-C        Today, Patient Was Seen By: Suad Vasques MD    ** Please Note: Dictation voice to text software may have been used in the creation of this document   **

## 2018-04-06 PROBLEM — E83.42 HYPOMAGNESEMIA: Status: ACTIVE | Noted: 2018-04-06

## 2018-04-06 LAB
ANION GAP SERPL CALCULATED.3IONS-SCNC: 9 MMOL/L (ref 4–13)
BUN SERPL-MCNC: 15 MG/DL (ref 5–25)
C DIFF TOX GENS STL QL NAA+PROBE: NORMAL
CALCIUM SERPL-MCNC: 9.3 MG/DL (ref 8.3–10.1)
CHLORIDE SERPL-SCNC: 104 MMOL/L (ref 100–108)
CO2 SERPL-SCNC: 27 MMOL/L (ref 21–32)
CREAT SERPL-MCNC: 0.77 MG/DL (ref 0.6–1.3)
ERYTHROCYTE [DISTWIDTH] IN BLOOD BY AUTOMATED COUNT: 13 % (ref 11.6–15.1)
GFR SERPL CREATININE-BSD FRML MDRD: 66 ML/MIN/1.73SQ M
GLUCOSE SERPL-MCNC: 106 MG/DL (ref 65–140)
HCT VFR BLD AUTO: 36.1 % (ref 34.8–46.1)
HGB BLD-MCNC: 11.9 G/DL (ref 11.5–15.4)
MAGNESIUM SERPL-MCNC: 0.9 MG/DL (ref 1.6–2.6)
MCH RBC QN AUTO: 29.4 PG (ref 26.8–34.3)
MCHC RBC AUTO-ENTMCNC: 33 G/DL (ref 31.4–37.4)
MCV RBC AUTO: 89 FL (ref 82–98)
PLATELET # BLD AUTO: 120 THOUSANDS/UL (ref 149–390)
PMV BLD AUTO: 11.4 FL (ref 8.9–12.7)
POTASSIUM SERPL-SCNC: 3.6 MMOL/L (ref 3.5–5.3)
RBC # BLD AUTO: 4.05 MILLION/UL (ref 3.81–5.12)
SODIUM SERPL-SCNC: 140 MMOL/L (ref 136–145)
WBC # BLD AUTO: 6.83 THOUSAND/UL (ref 4.31–10.16)

## 2018-04-06 PROCEDURE — 99232 SBSQ HOSP IP/OBS MODERATE 35: CPT | Performed by: INTERNAL MEDICINE

## 2018-04-06 PROCEDURE — 97163 PT EVAL HIGH COMPLEX 45 MIN: CPT

## 2018-04-06 PROCEDURE — G8979 MOBILITY GOAL STATUS: HCPCS

## 2018-04-06 PROCEDURE — 80048 BASIC METABOLIC PNL TOTAL CA: CPT | Performed by: INTERNAL MEDICINE

## 2018-04-06 PROCEDURE — G8978 MOBILITY CURRENT STATUS: HCPCS

## 2018-04-06 PROCEDURE — 83735 ASSAY OF MAGNESIUM: CPT | Performed by: INTERNAL MEDICINE

## 2018-04-06 PROCEDURE — 85027 COMPLETE CBC AUTOMATED: CPT | Performed by: INTERNAL MEDICINE

## 2018-04-06 RX ORDER — MAGNESIUM SULFATE HEPTAHYDRATE 40 MG/ML
2 INJECTION, SOLUTION INTRAVENOUS ONCE
Status: COMPLETED | OUTPATIENT
Start: 2018-04-06 | End: 2018-04-06

## 2018-04-06 RX ORDER — MAGNESIUM SULFATE HEPTAHYDRATE 40 MG/ML
2 INJECTION, SOLUTION INTRAVENOUS ONCE
Status: COMPLETED | OUTPATIENT
Start: 2018-04-06 | End: 2018-04-07

## 2018-04-06 RX ADMIN — MAGNESIUM SULFATE HEPTAHYDRATE 2 G: 40 INJECTION, SOLUTION INTRAVENOUS at 10:00

## 2018-04-06 RX ADMIN — TRAZODONE HYDROCHLORIDE 50 MG: 50 TABLET ORAL at 21:34

## 2018-04-06 RX ADMIN — HEPARIN SODIUM 5000 UNITS: 5000 INJECTION, SOLUTION INTRAVENOUS; SUBCUTANEOUS at 14:23

## 2018-04-06 RX ADMIN — AMLODIPINE BESYLATE 5 MG: 5 TABLET ORAL at 10:00

## 2018-04-06 RX ADMIN — HEPARIN SODIUM 5000 UNITS: 5000 INJECTION, SOLUTION INTRAVENOUS; SUBCUTANEOUS at 21:34

## 2018-04-06 RX ADMIN — MAGNESIUM SULFATE HEPTAHYDRATE 2 G: 40 INJECTION, SOLUTION INTRAVENOUS at 07:03

## 2018-04-06 RX ADMIN — METOPROLOL SUCCINATE 50 MG: 50 TABLET, EXTENDED RELEASE ORAL at 10:00

## 2018-04-06 RX ADMIN — ASPIRIN 81 MG 81 MG: 81 TABLET ORAL at 10:00

## 2018-04-06 RX ADMIN — HEPARIN SODIUM 5000 UNITS: 5000 INJECTION, SOLUTION INTRAVENOUS; SUBCUTANEOUS at 06:18

## 2018-04-06 NOTE — NURSING NOTE
Patient is resting in bed without distress,patient states she had mucus stool yesterday no bowel movement this shift  Alia Marte was notified of yesterday's  Mag level  RN  clarified that it was result from 4/5  Communication obtained to draw this mornings lab and administer Magnesium  sulfate after result

## 2018-04-06 NOTE — PHYSICAL THERAPY NOTE
PT Evaluation (18min)  (13:20-13:38)    Past Medical History:   Diagnosis Date    [facility] cancer ([facility] [address] [facility]     Cancer ([facility] [address] )     Hypertension     Lymphedema of arm     L arm        04/06/18 1338   Note Type   Note type Eval only   Pain Assessment   Pain Assessment No/denies pain   Home Living   Type of Home Assisted living  [facility]   Home Layout Two level  (15 steps to bedroom)   [address]   Prior Function   Level of [place] Independent with ADLs and functional mobility   Receives Help From Personal care attendant  ([facility] staff prn)   ADL Assistance Independent   IADLs Needs assistance   Falls in the last 6 months 1 to 4   Restrictions/Precautions   Other Precautions Contact/isolation;Cognitive; Chair Alarm; Bed Alarm; Fall Risk;Limb alert   General   Additional Pertinent History pt presents to [facility] c abdominal pain + diarrhea  currently on contact precautions to r/o c-diff  PT consulted for mobility + d/c planning  Family/Caregiver Present No   Cognition   Orientation Level Oriented to person;Oriented to situation;Oriented to time   RUE Assessment   RUE Assessment X  (shldr limited)   LUE Assessment   LUE Assessment X  (shldr limited)   RLE Assessment   RLE Assessment WFL  (4-/5)   LLE Assessment   LLE Assessment WFL  (4-/5)   Coordination   Sensation WFL   Transfers   Sit to Stand 5  Supervision   Additional items Verbal cues   Stand to Sit 5  Supervision   Additional items Armrests; Verbal cues   Ambulation/Elevation   Gait pattern Narrow LAWANDA; Decreased foot clearance; Inconsistent dejan   Gait Assistance 4  Minimal assist   Additional items Assist x 1;Verbal cues  (minor R lateral uncorrected loss of balance observed)   Assistive Device None;Rolling walker  (pt initially declined RW)   Distance 15' s AD; 100' c RW   Balance   Static Sitting Good   Dynamic Sitting Good   Static Standing Fair -   Dynamic Standing Poor +   Ambulatory Poor +  (lateral loss of balance requiring min (A)x1 to recover ) Activity Tolerance   Activity Tolerance Patient limited by fatigue   Nurse Made Aware Beatrice Omalley   Assessment   Prognosis Good   Problem List Decreased strength;Decreased endurance; Impaired balance;Decreased mobility; Decreased cognition;Decreased safety awareness   Assessment pt is a 89y/o f who [place] [facility] [place] + abdominal pain  currently on contact precautions 2* C-diff  PMH significant for HTN, anemia, CONSTANCE, hyperkalemia, + breast cancer  at baseline, pt (I) c functional mobility s AD  resides at [facility] c 15 steps to access bedroom  currently requires min (A)x1 for mobility tasks 2* deficits in strength, balance, gait quality + activity tolerance noted in PT exam above  Barthel Index 65/100  pt initially declined use of RW c ambulation + experienced minor, uncorrected lateral loss of balance requiring min (A)x1 to recover  stability improved c use of ' min (A)x1 c cues for pacing + to avoid obstacles  would benefit from skilled PT to maximize functional mobility + improve quality of life  pt seated OOB in chair at end of session c chair alarm activated  upon d/c, recommend STR if [facility] unable to accept pt c hhpt  PT eval of high complexity 2* unstable med status c pt requiring ongoing medical management 2* c-diff  pt of advanced age c multiple co-morbidities  presents c decline in mobility requiring min (A)x1 for safety c RW  loss of balance noted during session requiring min (A)x1 to recover  pt has 15 steps to negotiate to access room at [facility]  Barriers to Discharge Inaccessible home environment   Goals   Patient Goals "to get better"  STG Expiration Date 04/16/18   Short Term Goal #1 1   increase strength 1/2 grade to improve overall functional mobility, 2  perform transfers mod (I) to safely perform ADLs, 3  ambulate 150' c least restricive AD mod (I) to safely navigate [facility] environment, 4  negotiate 15 stairs mod (I) to access bedroom   Plan   Treatment/Interventions Functional transfer training;LE strengthening/ROM; Elevations; Therapeutic exercise; Endurance training;Patient/family training;Equipment eval/education; Bed mobility;Gait training;Spoke to nursing   PT Frequency 5x/wk   Recommendation   Recommendation Short-term skilled PT; Home PT  (pending ability to [facility] to accept pt)   PT - OK to Discharge Yes  (to STR or care home c PT)   Barthel Index   Feeding 10   Bathing 0   Grooming Score 5   Dressing Score 5   Bladder Score 10   Bowels Score 10   Toilet Use Score 5   Transfers (Bed/Chair) Score 10   Mobility (Level Surface) Score 10   Stairs Score 0   Barthel Index Score 65     Gonzalo Valencia, PT

## 2018-04-06 NOTE — PROGRESS NOTES
GI Progress Note - Yareli Kamara 80 y o  female MRN: [id number]    Unit/Bed#: -01 Encounter: [id number]    Subjective: Her abdominal discomfort is improving  She has not had any more bowel movements since the episode of diarrhea yesterday morning  Her Cdiff sample was no tested because it was reported as form stool  She is tolerating a normal diet  She has not had any rectal bleeding in 48 hours  Objective:     Vitals: Blood pressure 131/77, pulse 93, temperature 98 1 °F (36 7 °C), temperature source Oral, resp  rate 18, height 5' 2" (1 575 m), weight 50 kg (110 lb 3 7 oz), SpO2 97 %  ,Body mass index is 20 16 kg/m²  Intake/Output Summary (Last 24 hours) at 04/06/18 1046  Last data filed at 04/06/18 0814   Gross per 24 hour   Intake              500 ml   Output              450 ml   Net               50 ml       Physical Exam:     General Appearance: Alert, oriented x3, no acute distress  Lungs: CTA bilaterally, no respiratory distress  Heart: RRR, no murmur  Abdomen: Non-distended, soft, BS active, (+) mild generalized TTP without any guarding  Extremities: No cyanosis or LE edema    Invasive Devices     Peripheral Intravenous Line            Peripheral IV 04/03/18 Right Hand 3 days                Lab Results:    Results from last 7 days  Lab Units 04/06/18  0655  04/02/18  2321   WBC Thousand/uL 6 83  < > 17 80*   HEMOGLOBIN g/dL 11 9  < > 12 3   HEMATOCRIT % 36 1  < > 38 6   PLATELETS Thousands/uL 120*  < > 169   NEUTROS PCT %  --   --  81*   LYMPHS PCT %  --   --  10*   MONOS PCT %  --   --  6   EOS PCT %  --   --  2   < > = values in this interval not displayed      Results from last 7 days  Lab Units 04/06/18  0655  04/02/18  2321   SODIUM mmol/L 140  < > 139   POTASSIUM mmol/L 3 6  < > 3 7   CHLORIDE mmol/L 104  < > 100   CO2 mmol/L 27  < > 31   BUN mg/dL 15  < > 43*   CREATININE mg/dL 0 77  < > 1 40*   CALCIUM mg/dL 9 3  < > 10 0   TOTAL PROTEIN g/dL  --   --  7 6   BILIRUBIN TOTAL mg/dL  -- --  0 40   ALK PHOS U/L  --   --  69   ALT U/L  --   --  23   AST U/L  --   --  29   GLUCOSE RANDOM mg/dL 106  < > 155*   < > = values in this interval not displayed  Results from last 7 days  Lab Units 04/02/18  2321   LIPASE u/L 252       Imaging Studies: I have personally reviewed pertinent imaging studies  Ct Chest Abdomen Pelvis Wo Contrast  Result Date: 4/3/2018  Impression: 1  Mild wall thickening throughout the colon, not well evaluated in the absence of colonic distention suggesting inflammatory or infectious colitis  No evidence of diverticulitis or bowel obstruction  2   No acute cardiopulmonary process         Assessment and Plan:     Acute Colitis  Hematochezia  - CT on admission shows mild wall thickening in the transverse, splenic flexure, and descending colon  - Suspect she had ischemic colitis due to location of colitis, presence of bloody stool, leukocytosis, and elevated lactic acid on admission; another etiology could be a self limited viral infection causing acute colitis  - Cdiff sample was not tested due to formed stool making Cdiff very unlikely  - Stool culture negative  - Hb has remained stable, leukocytosis has resolved without any infection  - Continue low fiber diet for the next 1-2 weeks while colitis continues to resolve  - Supportive care  - No plan for colonoscopy at this time  - Patient is stable for discharge from a GI standpoint      The patient was seen by Dr Liseth Aj

## 2018-04-06 NOTE — PLAN OF CARE
Problem: PHYSICAL THERAPY ADULT  Goal: Performs mobility at highest level of function for planned discharge setting  See evaluation for individualized goals  Treatment/Interventions: Functional transfer training, LE strengthening/ROM, Elevations, Therapeutic exercise, Endurance training, Patient/family training, Equipment eval/education, Bed mobility, Gait training, Spoke to nursing          See flowsheet documentation for full assessment, interventions and recommendations  Prognosis: Good  Problem List: Decreased strength, Decreased endurance, Impaired balance, Decreased mobility, Decreased cognition, Decreased safety awareness  Assessment: pt is a 89y/o f who presents to [facility] c diarrhea + abdominal pain  currently on contact precautions 2* C-diff  PMH significant for HTN, anemia, CONSTANCE, hyperkalemia, + breast cancer  at baseline, pt (I) c functional mobility s AD  resides at [facility] c 15 steps to access bedroom  currently requires min (A)x1 for mobility tasks 2* deficits in strength, balance, gait quality + activity tolerance noted in PT exam above  Barthel Index 65/100  pt initially declined use of RW c ambulation + experienced minor, uncorrected lateral loss of balance requiring min (A)x1 to recover  stability improved c use of ' min (A)x1 c cues for pacing + to avoid obstacles  would benefit from skilled PT to maximize functional mobility + improve quality of life  pt seated OOB in chair at end of session c chair alarm activated  upon d/c, recommend STR if [facility] unable to accept pt c hhpt  PT eval of high complexity 2* unstable med status c pt requiring ongoing medical management 2* c-diff  pt of advanced age c multiple co-morbidities  presents c decline in mobility requiring min (A)x1 for safety c RW  loss of balance noted during session requiring min (A)x1 to recover  pt has 15 steps to negotiate to access room at [facility]    Barriers to Discharge: Inaccessible home environment     Recommendation: Short-term skilled PT, Home PT (pending ability to [facility] to accept pt)     PT - OK to Discharge: Yes    See flowsheet documentation for full assessment

## 2018-04-06 NOTE — PROGRESS NOTES
Progress Note - Herman Mcfadden 12/6/1922, 80 y o  female MRN: [id number]    Unit/Bed#: -01 Encounter: [id number]    Primary Care Provider: Hallie Garcia MD   Date and time admitted to hospital: 4/2/2018 10:28 PM        * Colitis, acute   Assessment & Plan    CT of the abdomen on admission showed thickening of the colon suggestive of infectious versus inflammatory colitis  No evidence of diverticulitis  No more episodes of diarrhea/bloody stools  Hemoglobin remained stable  C diff negative  Diet advanced to low-fiber diet for colitis  As per GI no planned procedure at this time               Dehydration   Assessment & Plan    Encourage oral fluid intake  Anemia   Assessment & Plan    Hemoglobin stable continue to monitor  No more episodes of GI bleed        Hypokalemia   Assessment & Plan    Likely secondary to diarrhea  Replete and monitor  Mag level decreased - replete and monitor        Hypomagnesemia   Assessment & Plan    Repeat and monitor        HTN (hypertension)   Assessment & Plan    Blood pressure is stable continue home medications            VTE Pharmacologic Prophylaxis:   Pharmacologic: Heparin  Mechanical VTE Prophylaxis in Place: Yes    Patient Centered Rounds: I have performed bedside rounds with nursing staff today  Discussions with Specialists or Other Care Team Provider:  Discussed with GI    Education and Discussions with Family / Patient:  Patient    Time Spent for Care: 20 minutes  More than 50% of total time spent on counseling and coordination of care as described above  Current Length of Stay: 3 day(s)    Current Patient Status: Inpatient   Certification Statement: The patient will continue to require additional inpatient hospital stay due to Placement    Discharge Plan:  Likely discharge to short-term rehab in next 24 hours    Code Status: Level 3 - DNAR and DNI      Subjective:   Patient seen and examined    She does note that she had some liquid stool/incontinence, ongoing for last 2 days  Denies any diarrhea  No other complaints  able to tolerate diet well    Objective:     Vitals:   Temp (24hrs), Av 4 °F (36 9 °C), Min:98 1 °F (36 7 °C), Max:98 7 °F (37 1 °C)    HR:  [89-93] 93  Resp:  [18] 18  BP: (131-133)/(77-81) 131/77  SpO2:  [94 %-97 %] 97 %  Body mass index is 20 16 kg/m²  Input and Output Summary (last 24 hours): Intake/Output Summary (Last 24 hours) at 18 1614  Last data filed at 18 1442   Gross per 24 hour   Intake              902 ml   Output              850 ml   Net               52 ml       Physical Exam:     Physical Exam   Constitutional: She appears well-developed and well-nourished  HENT:   Head: Normocephalic and atraumatic  Eyes: EOM are normal  Pupils are equal, round, and reactive to light  Neck: Normal range of motion  Neck supple  Additional Data:     Labs:      Results from last 7 days  Lab Units 18  0655  18  2321   WBC Thousand/uL 6 83  < > 17 80*   HEMOGLOBIN g/dL 11 9  < > 12 3   HEMATOCRIT % 36 1  < > 38 6   PLATELETS Thousands/uL 120*  < > 169   NEUTROS PCT %  --   --  81*   LYMPHS PCT %  --   --  10*   MONOS PCT %  --   --  6   EOS PCT %  --   --  2   < > = values in this interval not displayed  Results from last 7 days  Lab Units 18  0655  18  2321   SODIUM mmol/L 140  < > 139   POTASSIUM mmol/L 3 6  < > 3 7   CHLORIDE mmol/L 104  < > 100   CO2 mmol/L 27  < > 31   BUN mg/dL 15  < > 43*   CREATININE mg/dL 0 77  < > 1 40*   CALCIUM mg/dL 9 3  < > 10 0   TOTAL PROTEIN g/dL  --   --  7 6   BILIRUBIN TOTAL mg/dL  --   --  0 40   ALK PHOS U/L  --   --  69   ALT U/L  --   --  23   AST U/L  --   --  29   GLUCOSE RANDOM mg/dL 106  < > 155*   < > = values in this interval not displayed  * I Have Reviewed All Lab Data Listed Above  * Additional Pertinent Lab Tests Reviewed:  Ronnell Veloz Admission Reviewed    Imaging:        Recent Cultures (last 7 days):       Results from last 7 days  Lab Units 04/05/18  1231   C DIFF TOXIN B  NEGATIVE for C difficle toxin by PCR  Last 24 Hours Medication List:     Current Facility-Administered Medications:  acetaminophen 650 mg Oral Q6H PRN RAZA Larios-LB   amLODIPine 5 mg Oral Daily RAZA Larios-LB   aspirin 81 mg Oral Daily Little RAZA Amaya-LB   heparin (porcine) 5,000 Units Subcutaneous Q8H [address] Abbi Camacho MD   metoprolol succinate 50 mg Oral Daily Fernanda Amaya PA-C   ondansetron 4 mg Intravenous Q6H PRN RAZA Larios-LB   sodium chloride 1,000 mL Intravenous Once Karl Osgood, MD   traZODone 50 mg Oral HS Fernanda Amaya PA-C        Today, Patient Was Seen By: Quinn Barbour MD    ** Please Note: Dictation voice to text software may have been used in the creation of this document   **

## 2018-04-06 NOTE — ASSESSMENT & PLAN NOTE
CT of the abdomen on admission showed thickening of the colon suggestive of infectious versus inflammatory colitis  No evidence of diverticulitis  No more episodes of diarrhea/bloody stools         Hemoglobin remained stable  C diff negative  Diet advanced to low-fiber diet for colitis  As per GI no planned procedure at this time

## 2018-04-07 PROBLEM — R26.2 AMBULATORY DYSFUNCTION: Status: ACTIVE | Noted: 2018-04-07

## 2018-04-07 LAB — MAGNESIUM SERPL-MCNC: 1.6 MG/DL (ref 1.6–2.6)

## 2018-04-07 PROCEDURE — 99232 SBSQ HOSP IP/OBS MODERATE 35: CPT | Performed by: INTERNAL MEDICINE

## 2018-04-07 PROCEDURE — 83735 ASSAY OF MAGNESIUM: CPT | Performed by: INTERNAL MEDICINE

## 2018-04-07 RX ORDER — POLYETHYLENE GLYCOL 3350 17 G/17G
17 POWDER, FOR SOLUTION ORAL DAILY
Status: DISCONTINUED | OUTPATIENT
Start: 2018-04-07 | End: 2018-04-09 | Stop reason: HOSPADM

## 2018-04-07 RX ADMIN — ASPIRIN 81 MG 81 MG: 81 TABLET ORAL at 09:43

## 2018-04-07 RX ADMIN — HEPARIN SODIUM 5000 UNITS: 5000 INJECTION, SOLUTION INTRAVENOUS; SUBCUTANEOUS at 13:50

## 2018-04-07 RX ADMIN — HEPARIN SODIUM 5000 UNITS: 5000 INJECTION, SOLUTION INTRAVENOUS; SUBCUTANEOUS at 22:18

## 2018-04-07 RX ADMIN — HEPARIN SODIUM 5000 UNITS: 5000 INJECTION, SOLUTION INTRAVENOUS; SUBCUTANEOUS at 05:50

## 2018-04-07 RX ADMIN — POLYETHYLENE GLYCOL (3350) 17 G: 17 POWDER, FOR SOLUTION ORAL at 09:43

## 2018-04-07 RX ADMIN — AMLODIPINE BESYLATE 5 MG: 5 TABLET ORAL at 09:43

## 2018-04-07 RX ADMIN — TRAZODONE HYDROCHLORIDE 50 MG: 50 TABLET ORAL at 22:18

## 2018-04-07 RX ADMIN — METOPROLOL SUCCINATE 50 MG: 50 TABLET, EXTENDED RELEASE ORAL at 09:43

## 2018-04-07 NOTE — ASSESSMENT & PLAN NOTE
CT of the abdomen on admission showed thickening of the colon suggestive of infectious versus inflammatory colitis  No evidence of diverticulitis  No more episodes of diarrhea/bloody stools         Hemoglobin remained stable  C diff negative  Diet advanced to low-fiber diet for colitis  As per GI no planned procedure at this time   Patient clinically improved

## 2018-04-07 NOTE — PLAN OF CARE
Problem: DISCHARGE PLANNING - CARE MANAGEMENT  Goal: Discharge to post-acute care or home with appropriate resources  INTERVENTIONS:  - Conduct assessment to determine patient/family and health care team treatment goals, and need for post-acute services based on payer coverage, community resources, and patient preferences, and barriers to discharge  - Address psychosocial, clinical, and financial barriers to discharge as identified in assessment in conjunction with the patient/family and health care team  - Arrange appropriate level of post-acute services according to patients   needs and preference and payer coverage in collaboration with the physician and health care team  - Communicate with and update the patient/family, physician, and health care team regarding progress on the discharge plan  - Arrange appropriate transportation to post-acute venues   Outcome: Progressing  CM met with pt and maddie Levine at bedside  CM informed pt's grandson conversation CM had with pt regarding STR  Ada Lemons is in agreement  [facility] to [facility] once discharged from [address] [place] [address]  CM contacted [facility] to give them an update that pt will return once dc from [address] [place] [address] facility  Please call facility once pt has a bed in facility of choice to inform [facility]  Pt has no other needs a this time  CM department to follow pt through discharge process

## 2018-04-07 NOTE — SOCIAL WORK
CM met with ELÍAS to discuss pt's dc options  Pt has too many steps to go into [place] and would rather go to a [facility] facility  CM put in a referral after meeting with pt  Pt has no other needs at this time

## 2018-04-07 NOTE — PROGRESS NOTES
Progress Note - Inderjit Villagran 12/6/1922, 80 y o  female MRN: [id number]    Unit/Bed#: -01 Encounter: [id number]    Primary Care Provider: Veronica Ventura MD   Date and time admitted to hospital: 4/2/2018 10:28 PM        * Colitis, acute   Assessment & Plan    CT of the abdomen on admission showed thickening of the colon suggestive of infectious versus inflammatory colitis  No evidence of diverticulitis  No more episodes of diarrhea/bloody stools  Hemoglobin remained stable  C diff negative  Diet advanced to low-fiber diet for colitis  As per GI no planned procedure at this time   Patient clinically improved               Dehydration   Assessment & Plan              Anemia   Assessment & Plan    Hemoglobin stable continue to monitor  No more episodes of GI bleed        Hypomagnesemia   Assessment & Plan    Repeat and monitor        Acute kidney injury [facility])   Assessment & Plan    Patient's creatinine was elevated on admission, improved with IV fluids  Encourage p o  fluid intake          HTN (hypertension)   Assessment & Plan    Blood pressure is stable continue home medications        Ambulatory dysfunction   Assessment & Plan    Patient is slightly unsteady on her feet likely secondary to deconditioning being in the hospital   PT evaluated the patient, recommended short-term rehab  Follow up with case management              VTE Pharmacologic Prophylaxis:   Pharmacologic: Heparin  Mechanical VTE Prophylaxis in Place: Yes    Patient Centered Rounds: I have performed bedside rounds with nursing staff today  Discussions with Specialists or Other Care Team Provider:  Case management    Education and Discussions with Family / Patient:  Patient    Time Spent for Care: 20 minutes  More than 50% of total time spent on counseling and coordination of care as described above      Current Length of Stay: 4 day(s)    Current Patient Status: Inpatient   Certification Statement: The patient will continue to require additional inpatient hospital stay due to Placement evaluation    Discharge Plan:  Patient is medically stable for discharge  Pending placement to short-term rehab when it becomes available    Code Status: Level 3 - DNAR and DNI      Subjective:   Patient seen and examined  She denies any complaints at this time  No more episodes of bloody stool    Objective:     Vitals:   Temp (24hrs), Av 3 °F (36 8 °C), Min:97 9 °F (36 6 °C), Max:98 6 °F (37 °C)    HR:  [91-96] 96  Resp:  [17-18] 18  BP: (121-144)/(63-83) 144/76  SpO2:  [93 %-98 %] 93 %  Body mass index is 20 16 kg/m²  Input and Output Summary (last 24 hours): Intake/Output Summary (Last 24 hours) at 18 1356  Last data filed at 18 1252   Gross per 24 hour   Intake              740 ml   Output              525 ml   Net              215 ml       Physical Exam:     Physical Exam   Constitutional: She is oriented to person, place, and time  She appears well-developed and well-nourished  HENT:   Head: Normocephalic and atraumatic  Eyes: EOM are normal  Pupils are equal, round, and reactive to light  Neck: Normal range of motion  Neck supple  Cardiovascular: Normal rate and regular rhythm  Pulmonary/Chest: Effort normal and breath sounds normal    Abdominal: Soft  Bowel sounds are normal    Musculoskeletal: Normal range of motion  Neurological: She is alert and oriented to person, place, and time  Skin: Skin is warm and dry  Additional Data:     Labs:      Results from last 7 days  Lab Units 18  0655  18  2321   WBC Thousand/uL 6 83  < > 17 80*   HEMOGLOBIN g/dL 11 9  < > 12 3   HEMATOCRIT % 36 1  < > 38 6   PLATELETS Thousands/uL 120*  < > 169   NEUTROS PCT %  --   --  81*   LYMPHS PCT %  --   --  10*   MONOS PCT %  --   --  6   EOS PCT %  --   --  2   < > = values in this interval not displayed      Results from last 7 days  Lab Units 18  0655  18  2321   SODIUM mmol/L 140  < > 139   POTASSIUM mmol/L 3 6  < > 3 7   CHLORIDE mmol/L 104  < > 100   CO2 mmol/L 27  < > 31   BUN mg/dL 15  < > 43*   CREATININE mg/dL 0 77  < > 1 40*   CALCIUM mg/dL 9 3  < > 10 0   TOTAL PROTEIN g/dL  --   --  7 6   BILIRUBIN TOTAL mg/dL  --   --  0 40   ALK PHOS U/L  --   --  69   ALT U/L  --   --  23   AST U/L  --   --  29   GLUCOSE RANDOM mg/dL 106  < > 155*   < > = values in this interval not displayed  * I Have Reviewed All Lab Data Listed Above  * Additional Pertinent Lab Tests Reviewed: Ronnell 66 Admission Reviewed    Imaging:      Recent Cultures (last 7 days):       Results from last 7 days  Lab Units 04/05/18  1231   C DIFF TOXIN B  NEGATIVE for C difficle toxin by PCR  Last 24 Hours Medication List:     Current Facility-Administered Medications:  acetaminophen 650 mg Oral Q6H PRN Earla Downey, PA-C   amLODIPine 5 mg Oral Daily Earla Downey, PA-C   aspirin 81 mg Oral Daily Earla Downey, PA-C   heparin (porcine) 5,000 Units Subcutaneous Q8H [facility] Arlette Randhawa MD   metoprolol succinate 50 mg Oral Daily Earla Downey, PA-C   ondansetron 4 mg Intravenous Q6H PRN Earla Downey, PA-C   polyethylene glycol 17 g Oral Daily Castillo Allen MD   sodium chloride 1,000 mL Intravenous Once Carina Jung MD   traZODone 50 mg Oral HS Earla Downey, PA-C        Today, Patient Was Seen By: Castillo Allen MD    ** Please Note: Dictation voice to text software may have been used in the creation of this document   **

## 2018-04-07 NOTE — ASSESSMENT & PLAN NOTE
Patient is slightly unsteady on her feet likely secondary to deconditioning being in the hospital   PT evaluated the patient, recommended short-term rehab    Follow up with case management

## 2018-04-07 NOTE — SOCIAL WORK
CM met with pt and maddie Ruiz at bedside  CM informed pt's grandson conversation CM had with pt regarding STR  Waneta Speak is in agreement  Pt will return to [place] once discharged from [facility] of choice  CM contacted [facility] to give them an update that pt will return once dc from [facility] facility  Please call facility once pt has a bed in facility of choice to inform [facility]  Pt has no other needs a this time  CM department to follow pt through discharge process

## 2018-04-07 NOTE — SOCIAL WORK
CM met with pt at bedside to consult STR options  After reviewing options, pt is agreeable to [facility] since she want to stay local  CM put in for referrals and also contact pt's grandson Epifanio Diony [phone] to give him an update  CM attempted to contact the person on file (MRS Nolberto Daniel [phone]) but the person said it was the wrong number  CM received Berle Meth information from Pt  Pt has no other needs at this time  CM department to follow pt through discharge process

## 2018-04-07 NOTE — PLAN OF CARE
Problem: DISCHARGE PLANNING - CARE MANAGEMENT  Goal: Discharge to post-acute care or home with appropriate resources  INTERVENTIONS:  - Conduct assessment to determine patient/family and health care team treatment goals, and need for post-acute services based on payer coverage, community resources, and patient preferences, and barriers to discharge  - Address psychosocial, clinical, and financial barriers to discharge as identified in assessment in conjunction with the patient/family and health care team  - Arrange appropriate level of post-acute services according to patients   needs and preference and payer coverage in collaboration with the physician and health care team  - Communicate with and update the patient/family, physician, and health care team regarding progress on the discharge plan  - Arrange appropriate transportation to post-acute venues  Outcome: Progressing  CM met with ELÍAS to discuss pt's dc options  Pt has too many steps to go into [place] and would rather go to a [facility] facility  CM put in a referral after meeting with pt  Pt has no other needs at this time

## 2018-04-08 PROCEDURE — 99232 SBSQ HOSP IP/OBS MODERATE 35: CPT | Performed by: INTERNAL MEDICINE

## 2018-04-08 RX ORDER — SENNOSIDES 8.6 MG
1 TABLET ORAL
Status: DISCONTINUED | OUTPATIENT
Start: 2018-04-08 | End: 2018-04-09

## 2018-04-08 RX ADMIN — HEPARIN SODIUM 5000 UNITS: 5000 INJECTION, SOLUTION INTRAVENOUS; SUBCUTANEOUS at 06:26

## 2018-04-08 RX ADMIN — ASPIRIN 81 MG 81 MG: 81 TABLET ORAL at 09:46

## 2018-04-08 RX ADMIN — METOPROLOL SUCCINATE 50 MG: 50 TABLET, EXTENDED RELEASE ORAL at 09:46

## 2018-04-08 RX ADMIN — HEPARIN SODIUM 5000 UNITS: 5000 INJECTION, SOLUTION INTRAVENOUS; SUBCUTANEOUS at 23:16

## 2018-04-08 RX ADMIN — TRAZODONE HYDROCHLORIDE 50 MG: 50 TABLET ORAL at 23:16

## 2018-04-08 RX ADMIN — HEPARIN SODIUM 5000 UNITS: 5000 INJECTION, SOLUTION INTRAVENOUS; SUBCUTANEOUS at 13:11

## 2018-04-08 RX ADMIN — POLYETHYLENE GLYCOL (3350) 17 G: 17 POWDER, FOR SOLUTION ORAL at 09:46

## 2018-04-08 RX ADMIN — AMLODIPINE BESYLATE 5 MG: 5 TABLET ORAL at 09:46

## 2018-04-08 NOTE — PLAN OF CARE
DISCHARGE PLANNING     Discharge to home or other facility with appropriate resources Progressing        DISCHARGE PLANNING - CARE MANAGEMENT     Discharge to post-acute care or home with appropriate resources Progressing        INFECTION - ADULT     Absence or prevention of progression during hospitalization Progressing     Absence of fever/infection during neutropenic period Progressing        Knowledge Deficit     Patient/family/caregiver demonstrates understanding of disease process, treatment plan, medications, and discharge instructions Progressing        PAIN - ADULT     Verbalizes/displays adequate comfort level or baseline comfort level Progressing        Potential for Falls     Patient will remain free of falls Progressing        Prexisting or High Potential for Compromised Skin Integrity     Skin integrity is maintained or improved Progressing        SAFETY ADULT     Patient will remain free of falls Progressing     Maintain or return to baseline ADL function Progressing     Maintain or return mobility status to optimal level Progressing

## 2018-04-08 NOTE — PLAN OF CARE
Problem: PAIN - ADULT  Goal: Verbalizes/displays adequate comfort level or baseline comfort level  Interventions:  - Encourage patient to monitor pain and request assistance  - Assess pain using appropriate pain scale  - Administer analgesics based on type and severity of pain and evaluate response  - Implement non-pharmacological measures as appropriate and evaluate response  - Consider cultural and social influences on pain and pain management  - Notify physician/advanced practitioner if interventions unsuccessful or patient reports new pain   Outcome: Progressing      Problem: INFECTION - ADULT  Goal: Absence or prevention of progression during hospitalization  INTERVENTIONS:  - Assess and monitor for signs and symptoms of infection  - Monitor lab/diagnostic results  - Monitor all insertion sites, i e  indwelling lines, tubes, and drains  - Monitor endotracheal (as able) and nasal secretions for changes in amount and color  - [place] appropriate cooling/warming therapies per order  - Administer medications as ordered  - Instruct and encourage patient and family to use good hand hygiene technique  - Identify and instruct in appropriate isolation precautions for identified infection/condition   Outcome: Progressing    Goal: Absence of fever/infection during neutropenic period  INTERVENTIONS:  - Monitor WBC  - Implement neutropenic guidelines   Outcome: Progressing      Problem: SAFETY ADULT  Goal: Patient will remain free of falls  INTERVENTIONS:  - Assess patient frequently for physical needs  -  Identify cognitive and physical deficits and behaviors that affect risk of falls    -  [place] fall precautions as indicated by assessment   - Educate patient/family on patient safety including physical limitations  - Instruct patient to call for assistance with activity based on assessment  - Modify environment to reduce risk of injury  - Consider OT/PT consult to assist with strengthening/mobility    Outcome: Progressing    Goal: Maintain or return to baseline ADL function  INTERVENTIONS:  -  Assess patient's ability to carry out ADLs; assess patient's baseline for ADL function and identify physical deficits which impact ability to perform ADLs (bathing, care of mouth/teeth, toileting, grooming, dressing, etc )  - Assess/evaluate cause of self-care deficits   - Assess range of motion  - Assess patient's mobility; develop plan if impaired  - Assess patient's need for assistive devices and provide as appropriate  - Encourage maximum independence but intervene and supervise when necessary  ¯ Involve family in performance of ADLs  ¯ Assess for home care needs following discharge   ¯ Request OT consult to assist with ADL evaluation and planning for discharge  ¯ Provide patient education as appropriate   Outcome: Progressing    Goal: Maintain or return mobility status to optimal level  INTERVENTIONS:  - Assess patient's baseline mobility status (ambulation, transfers, stairs, etc )    - Identify cognitive and physical deficits and behaviors that affect mobility  - Identify mobility aids required to assist with transfers and/or ambulation (gait belt, sit-to-stand, lift, walker, cane, etc )  - [place] fall precautions as indicated by assessment  - Record patient progress and toleration of activity level on Mobility SBAR; progress patient to next Phase/Stage  - Instruct patient to call for assistance with activity based on assessment  - Request Rehabilitation consult to assist with strengthening/weightbearing, etc    Outcome: Progressing      Problem: DISCHARGE PLANNING  Goal: Discharge to home or other facility with appropriate resources  INTERVENTIONS:  - Identify barriers to discharge w/patient and caregiver  - Arrange for needed discharge resources and transportation as appropriate  - Identify discharge learning needs (meds, wound care, etc )  - Arrange for interpretive services to assist at discharge as needed  - Refer to Case Management Department for coordinating discharge planning if the patient needs post-hospital services based on physician/advanced practitioner order or complex needs related to functional status, cognitive ability, or social support system    Outcome: Progressing      Problem: Knowledge Deficit  Goal: Patient/family/caregiver demonstrates understanding of disease process, treatment plan, medications, and discharge instructions  Complete learning assessment and assess knowledge base  Interventions:  - Provide teaching at level of understanding  - Provide teaching via preferred learning methods   Outcome: Progressing      Problem: Prexisting or High Potential for Compromised Skin Integrity  Goal: Skin integrity is maintained or improved  INTERVENTIONS:  - Identify patients at risk for skin breakdown  - Assess and monitor skin integrity  - Assess and monitor nutrition and hydration status  - Monitor labs (i e  albumin)  - Assess for incontinence   - Turn and reposition patient  - Assist with mobility/ambulation  - Relieve pressure over bony prominences  - Avoid friction and shearing  - Provide appropriate hygiene as needed including keeping skin clean and dry  - Evaluate need for skin moisturizer/barrier cream  - Collaborate with interdisciplinary team (i e  Nutrition, Rehabilitation, etc )   - Patient/family teaching   Outcome: Progressing      Problem: Potential for Falls  Goal: Patient will remain free of falls  INTERVENTIONS:  - Assess patient frequently for physical needs  -  Identify cognitive and physical deficits and behaviors that affect risk of falls    -  [place] fall precautions as indicated by assessment   - Educate patient/family on patient safety including physical limitations  - Instruct patient to call for assistance with activity based on assessment  - Modify environment to reduce risk of injury  - Consider OT/PT consult to assist with strengthening/mobility    Outcome: Progressing

## 2018-04-08 NOTE — PROGRESS NOTES
Progress Note - Kvng Reynolds 12/6/1922, 80 y o  female MRN: [id number]    Unit/Bed#: -01 Encounter: [id number]    Primary Care Provider: Kayla Perales MD   Date and time admitted to hospital: 4/2/2018 10:28 PM        * Colitis, acute   Assessment & Plan    CT of the abdomen on admission showed thickening of the colon suggestive of infectious versus inflammatory colitis  No evidence of diverticulitis  No more episodes of diarrhea/bloody stools  Hemoglobin remained stable  C diff negative  Tolerated low-fiber diet  As per GI no planned procedure at this time   Patient clinically improved               Anemia   Assessment & Plan    Hemoglobin stable continue to monitor  No more episodes of GI bleed        Hypokalemia   Assessment & Plan    Likely secondary to diarrhea  Resolved         Hypomagnesemia   Assessment & Plan    Resolved with supplementation        Acute kidney injury [facility])   Assessment & Plan    Patient's creatinine was elevated on admission, improved with IV fluids  Encourage p o  fluid intake          HTN (hypertension)   Assessment & Plan    Blood pressure is stable continue home medications        Ambulatory dysfunction   Assessment & Plan    Patient is slightly unsteady on her feet likely secondary to deconditioning being in the hospital   PT evaluated the patient, recommended short-term rehab  Follow up with case management              VTE Pharmacologic Prophylaxis:   Pharmacologic: Heparin  Mechanical VTE Prophylaxis in Place: Yes    Patient Centered Rounds: I have performed bedside rounds with nursing staff today  Discussions with Specialists or Other Care Team Provider:  Case management    Education and Discussions with Family / Patient:  Patient    Time Spent for Care: 20 minutes  More than 50% of total time spent on counseling and coordination of care as described above      Current Length of Stay: 5 day(s)    Current Patient Status: Inpatient   Certification Statement: The patient will continue to require additional inpatient hospital stay due to Placement    Discharge Plan:  Discharge to short-term rehab when available    Code Status: Level 3 - DNAR and DNI      Subjective:   Patient seen and examined  Denies any complaints  She does note that she did have any bowel movement for the last 2 days  Objective:     Vitals:   Temp (24hrs), Av 2 °F (36 8 °C), Min:98 °F (36 7 °C), Max:98 5 °F (36 9 °C)    HR:  [82-88] 86  Resp:  [17-18] 18  BP: (110-144)/(55-66) 144/66  SpO2:  [93 %-98 %] 93 %  Body mass index is 20 16 kg/m²  Input and Output Summary (last 24 hours): Intake/Output Summary (Last 24 hours) at 18 1400  Last data filed at 18 1142   Gross per 24 hour   Intake              382 ml   Output             1150 ml   Net             -768 ml       Physical Exam:     Physical Exam   Constitutional: She is oriented to person, place, and time  She appears well-developed and well-nourished  HENT:   Head: Normocephalic and atraumatic  Eyes: EOM are normal  Pupils are equal, round, and reactive to light  Neck: Normal range of motion  Neck supple  Cardiovascular: Normal rate and regular rhythm  Pulmonary/Chest: Effort normal and breath sounds normal    Abdominal: Soft  Bowel sounds are normal    Musculoskeletal: Normal range of motion  Neurological: She is alert and oriented to person, place, and time  Skin: Skin is warm and dry  Additional Data:     Labs:      Results from last 7 days  Lab Units 18  0655  18  2321   WBC Thousand/uL 6 83  < > 17 80*   HEMOGLOBIN g/dL 11 9  < > 12 3   HEMATOCRIT % 36 1  < > 38 6   PLATELETS Thousands/uL 120*  < > 169   NEUTROS PCT %  --   --  81*   LYMPHS PCT %  --   --  10*   MONOS PCT %  --   --  6   EOS PCT %  --   --  2   < > = values in this interval not displayed      Results from last 7 days  Lab Units 18  0655  18  2321   SODIUM mmol/L 140  < > 139   POTASSIUM mmol/L 3 6  < > 3 7   CHLORIDE mmol/L 104  < > 100   CO2 mmol/L 27  < > 31   BUN mg/dL 15  < > 43*   CREATININE mg/dL 0 77  < > 1 40*   CALCIUM mg/dL 9 3  < > 10 0   TOTAL PROTEIN g/dL  --   --  7 6   BILIRUBIN TOTAL mg/dL  --   --  0 40   ALK PHOS U/L  --   --  69   ALT U/L  --   --  23   AST U/L  --   --  29   GLUCOSE RANDOM mg/dL 106  < > 155*   < > = values in this interval not displayed  * I Have Reviewed All Lab Data Listed Above  * Additional Pertinent Lab Tests Reviewed: Ronnell 66 Admission Reviewed    Imaging:    Recent Cultures (last 7 days):       Results from last 7 days  Lab Units 04/05/18  1231   C DIFF TOXIN B  NEGATIVE for C difficle toxin by PCR  Last 24 Hours Medication List:     Current Facility-Administered Medications:  acetaminophen 650 mg Oral Q6H PRN Nitza Bee PA-C   amLODIPine 5 mg Oral Daily Nitza Bee PA-C   aspirin 81 mg Oral Daily Nitza Bee PA-C   heparin (porcine) 5,000 Units Subcutaneous Q8H [address] Taylor Renae MD   metoprolol succinate 50 mg Oral Daily Nitza Bee PA-C   ondansetron 4 mg Intravenous Q6H PRN Nitza Bee PA-C   polyethylene glycol 17 g Oral Daily Juliet Perez MD   senna 1 tablet Oral HS PRN Juliet Perez MD   sodium chloride 1,000 mL Intravenous Once Lois Gray MD   traZODone 50 mg Oral HS Nitza Bee PA-C        Today, Patient Was Seen By: Juliet Perez MD    ** Please Note: Dictation voice to text software may have been used in the creation of this document   **

## 2018-04-08 NOTE — ASSESSMENT & PLAN NOTE
CT of the abdomen on admission showed thickening of the colon suggestive of infectious versus inflammatory colitis  No evidence of diverticulitis  No more episodes of diarrhea/bloody stools         Hemoglobin remained stable  C diff negative  Tolerated low-fiber diet  As per GI no planned procedure at this time   Patient clinically improved

## 2018-04-09 VITALS
OXYGEN SATURATION: 97 % | DIASTOLIC BLOOD PRESSURE: 63 MMHG | HEART RATE: 80 BPM | TEMPERATURE: 98.2 F | HEIGHT: 62 IN | SYSTOLIC BLOOD PRESSURE: 137 MMHG | WEIGHT: 110.23 LBS | BODY MASS INDEX: 20.28 KG/M2 | RESPIRATION RATE: 18 BRPM

## 2018-04-09 PROCEDURE — 99238 HOSP IP/OBS DSCHRG MGMT 30/<: CPT | Performed by: INTERNAL MEDICINE

## 2018-04-09 RX ORDER — SENNOSIDES 8.6 MG
1 TABLET ORAL
Status: DISCONTINUED | OUTPATIENT
Start: 2018-04-09 | End: 2018-04-09 | Stop reason: HOSPADM

## 2018-04-09 RX ORDER — TRAZODONE HYDROCHLORIDE 50 MG/1
50 TABLET ORAL
COMMUNITY
End: 2019-04-22 | Stop reason: SDUPTHER

## 2018-04-09 RX ORDER — DOCUSATE SODIUM 100 MG/1
100 CAPSULE, LIQUID FILLED ORAL 2 TIMES DAILY
Status: DISCONTINUED | OUTPATIENT
Start: 2018-04-09 | End: 2018-04-09 | Stop reason: HOSPADM

## 2018-04-09 RX ORDER — POLYETHYLENE GLYCOL 3350 17 G/17G
17 POWDER, FOR SOLUTION ORAL DAILY
Qty: 14 EACH | Refills: 0 | Status: SHIPPED | OUTPATIENT
Start: 2018-04-10 | End: 2019-03-11 | Stop reason: HOSPADM

## 2018-04-09 RX ORDER — DOCUSATE SODIUM 100 MG/1
100 CAPSULE, LIQUID FILLED ORAL 2 TIMES DAILY
Qty: 10 CAPSULE | Refills: 0 | Status: SHIPPED | OUTPATIENT
Start: 2018-04-09 | End: 2019-03-11 | Stop reason: HOSPADM

## 2018-04-09 RX ADMIN — ASPIRIN 81 MG 81 MG: 81 TABLET ORAL at 09:03

## 2018-04-09 RX ADMIN — METOPROLOL SUCCINATE 50 MG: 50 TABLET, EXTENDED RELEASE ORAL at 09:03

## 2018-04-09 RX ADMIN — HEPARIN SODIUM 5000 UNITS: 5000 INJECTION, SOLUTION INTRAVENOUS; SUBCUTANEOUS at 05:21

## 2018-04-09 RX ADMIN — HEPARIN SODIUM 5000 UNITS: 5000 INJECTION, SOLUTION INTRAVENOUS; SUBCUTANEOUS at 14:16

## 2018-04-09 RX ADMIN — DOCUSATE SODIUM 100 MG: 100 CAPSULE, LIQUID FILLED ORAL at 11:09

## 2018-04-09 RX ADMIN — AMLODIPINE BESYLATE 5 MG: 5 TABLET ORAL at 09:03

## 2018-04-09 RX ADMIN — POLYETHYLENE GLYCOL (3350) 17 G: 17 POWDER, FOR SOLUTION ORAL at 09:03

## 2018-04-09 NOTE — ASSESSMENT & PLAN NOTE
CT of the abdomen on admission showed thickening of the colon suggestive of infectious versus inflammatory colitis  No evidence of diverticulitis  No more episodes of diarrhea/bloody stools  Patient did not have any bowel movement over the last 2 days         Hemoglobin remained stable  C diff negative  Tolerated low-fiber diet  As per GI no planned procedure at this time   Patient clinically improved   Continue stool softeners as needed

## 2018-04-09 NOTE — CASE MANAGEMENT
Continued Stay Review    Date:     Vital Signs:  Temp (24hrs), Av 3 °F (36 8 °C), Min:97 9 °F (36 6 °C), Max:98 6 °F (37 °C)     HR:  [91-96] 96  Resp:  [17-18] 18  BP: (121-144)/(63-83) 144/76  SpO2:  [93 %-98 %] 93 %  Body mass index is 20 16 kg/m²  Medications:   Scheduled Meds:   Current Facility-Administered Medications:  acetaminophen 650 mg Oral Q6H PRN Rafa Coleman PA-C   amLODIPine 5 mg Oral Daily RAZA Taylor-LB   aspirin 81 mg Oral Daily Rafa Coleman PA-C   docusate sodium 100 mg Oral BID Suad Vasques MD   heparin (porcine) 5,000 Units Subcutaneous Q8H [address] Avril Higgins MD   metoprolol succinate 50 mg Oral Daily Rafa Colemna PA-C   ondansetron 4 mg Intravenous Q6H PRN Rafa Coleman PA-C   polyethylene glycol 17 g Oral Daily Suad Vasques MD   senna 1 tablet Oral HS Suad Vasques MD   sodium chloride 1,000 mL Intravenous Once Deejay Lopez MD   traZODone 50 mg Oral HS Rafa Coleman PA-C     Continuous Infusions:    PRN Meds:   acetaminophen    ondansetron    Abnormal Labs/Diagnostic Results:   Mg 1 6    Age/Sex: 80 y o  female     Assessment/Plan:   Colitis, acute   Assessment & Plan     CT of the abdomen on admission showed thickening of the colon suggestive of infectious versus inflammatory colitis  No evidence of diverticulitis  No more episodes of diarrhea/bloody stools  Hemoglobin remained stable  C diff negative  Diet advanced to low-fiber diet for colitis  As per GI no planned procedure at this time   Patient clinically improved           Dehydration   Assessment & Plan            Anemia   Assessment & Plan     Hemoglobin stable continue to monitor  No more episodes of GI bleed          Hypomagnesemia   Assessment & Plan     Repeat and monitor          Acute kidney injury [facility])   Assessment & Plan     Patient's creatinine was elevated on admission, improved with IV fluids    Encourage p o  fluid intake         HTN (hypertension) Assessment & Plan     Blood pressure is stable continue home medications          Ambulatory dysfunction   Assessment & Plan     Patient is slightly unsteady on her feet likely secondary to deconditioning being in the hospital   PT evaluated the patient, recommended short-term rehab  Follow up with case management             VTE Pharmacologic Prophylaxis:   Pharmacologic: Heparin  Mechanical VTE Prophylaxis in Place:  Yes     Current Length of Stay: 4 day(s)     Current Patient Status: Inpatient   Certification Statement: The patient will continue to require additional inpatient hospital stay due to Placement evaluation    Discharge Plan: TBD

## 2018-04-09 NOTE — DISCHARGE SUMMARY
Discharge- Tierney Servant 12/6/1922, 80 y o  female MRN: [id number]    Unit/Bed#: -01 Encounter: [id number]    Primary Care Provider: Lu Briones MD   Date and time admitted to hospital: 4/2/2018 10:28 PM        * Colitis, acute   Assessment & Plan    CT of the abdomen on admission showed thickening of the colon suggestive of infectious versus inflammatory colitis  No evidence of diverticulitis  No more episodes of diarrhea/bloody stools  Patient did not have any bowel movement over the last 2 days  Hemoglobin remained stable  C diff negative  Tolerated low-fiber diet  As per GI no planned procedure at this time   Patient clinically improved   Continue stool softeners as needed              Anemia   Assessment & Plan    Hemoglobin stable continue to monitor  No more episodes of GI bleed        Hypokalemia   Assessment & Plan    Likely secondary to diarrhea  Resolved         Hypomagnesemia   Assessment & Plan    Resolved with supplementation        Acute kidney injury [facility])   Assessment & Plan    Patient's creatinine was elevated on admission, improved with IV fluids  Encourage p o  fluid intake          Ambulatory dysfunction   Assessment & Plan    Patient is slightly unsteady on her feet likely secondary to deconditioning being in the hospital   Patient will be discharged to short-term rehab today                  Resolved Problems  Date Reviewed: 4/9/2018    None          Consultations During Hospital Stay:  · Gastroenterology    Procedures Performed:     · None    Significant Findings / Test Results:     · CT of the chest/ abdomen pelvis  1   Mild wall thickening throughout the colon, not well evaluated in the absence of colonic distention suggesting inflammatory or infectious colitis   No evidence of diverticulitis or bowel obstruction  2   No acute cardiopulmonary process  Incidental Findings:   · None    Test Results Pending at Discharge (will require follow up):    · None Outpatient Tests Requested:  · None    Complications:  None    Reason for Admission:  Abdominal pain, diarrhea    Hospital Course:     Umberto Langley is a 80 y o  female with past medical history of hypertension, history of breast cancer status post left mastectomy with left upper extremity lymphedema who originally presented to the hospital on 4/2/2018 due to complaints of abdominal pain, acute onset watery diarrhea lasting for 1 day prior to admission  While in the hospital patient's diarrhea has progressed to rectal bleeding  CT of the abdomen on admission showed mild wall thickening in the colon suggesting inflammatory versus infectious colitis  Patient had mild leukocytosis on admission  Patient was monitor clinically and supportively managed with IV fluid hydration and electrolyte supplementation  Patient's diarrhea eventually resolved, as well as her abdominal pain as well  She was tested for infectious causes including C diff and other stool studies which were negative  Likely cause of colitis is believed to be ischemic etiology  However she did not have any further episodes of rectal bleeding and leukocytosis also resolved without any intervention/antibiotics  Patient's diet was slowly advanced and she could tolerate it well  Gastroenterology was also consulted and followed the patient as well  Patient's hemoglobin continued to remain stable  Rest of the chronic medical problems have been stable and resumed on home medications  Patient condition was stable and was discharged to short-term rehab  Please see above list of diagnoses and related plan for additional information  Condition at Discharge: stable     Discharge Day Visit / Exam:     Subjective:  Patient seen and examined  She denies any complaints at this time  She still notes that she did not have any bowel movement over the last 2 days  She is trying stool softeners      Vitals: Blood Pressure: 145/65 (04/09/18 0831)  Pulse: 85 (04/09/18 0831)  Temperature: 98 2 °F (36 8 °C) (04/09/18 0831)  Temp Source: Oral (04/09/18 0831)  Respirations: 18 (04/09/18 0831)  Height: 5' 2" (157 5 cm) (04/03/18 0251)  Weight - Scale: 50 kg (110 lb 3 7 oz) (04/03/18 0251)  SpO2: 96 % (04/09/18 0831)  Exam:   Physical Exam   Constitutional: She is oriented to person, place, and time  She appears well-developed and well-nourished  HENT:   Head: Normocephalic and atraumatic  Eyes: EOM are normal  Pupils are equal, round, and reactive to light  Neck: Normal range of motion  Neck supple  Cardiovascular: Normal rate and regular rhythm  Pulmonary/Chest: Effort normal and breath sounds normal    Abdominal: Soft  Bowel sounds are normal    Musculoskeletal: Normal range of motion  Neurological: She is alert and oriented to person, place, and time  Skin: Skin is warm and dry  Discussion with Family:  Discussed with patient in detail about her management plan    Discharge instructions/Information to patient and family:   See after visit summary for information provided to patient and family  Provisions for Follow-Up Care:  See after visit summary for information related to follow-up care and any pertinent home health orders  Disposition:     Acute Rehab at [facility]    For Discharges to [facility] SNF:   · [facility] - Discharge summary with instructions will be faxed to facility    Planned Readmission:  None     Discharge Statement:  I spent 25 minutes discharging the patient  This time was spent on the day of discharge  I had direct contact with the patient on the day of discharge  Greater than 50% of the total time was spent examining patient, answering all patient questions, arranging and discussing plan of care with patient as well as directly providing post-discharge instructions  Additional time then spent on discharge activities      Discharge Medications:  See after visit summary for reconciled discharge medications provided to patient and family        ** Please Note: This note has been constructed using a voice recognition system **

## 2018-04-09 NOTE — ASSESSMENT & PLAN NOTE
Patient is slightly unsteady on her feet likely secondary to deconditioning being in the hospital   Patient will be discharged to short-term rehab today

## 2018-04-09 NOTE — SOCIAL WORK
S/w pt at bedside this am to discuss dcp for today  Made patient aware that she was accepted at both facilities  She has no preference over either on and would like me to s/w her grandson to decide  I s/w pt's grandson Blayne Ma via telephone and he would like pt to go to [facility]  S/w Luca Mercado at [facility] is going to review with the DON there for official acceptance for today  Awaiting call back  Pt was set up for a 1230pm w/c transport with [facility] but the physician was not able to complete the d/c in time and transport was ultimately moved to 530pm as this was the next earliest available time  Luca Mercado at [facility] aware of above  S/w pt's grandson Maxim Elliott to make him aware of above plan  I also discussed the OOP cost of the w/c transport which he was agreeable to  IMM discussed

## 2018-04-09 NOTE — PLAN OF CARE
Problem: DISCHARGE PLANNING - CARE MANAGEMENT  Goal: Discharge to post-acute care or home with appropriate resources  INTERVENTIONS:  - Conduct assessment to determine patient/family and health care team treatment goals, and need for post-acute services based on payer coverage, community resources, and patient preferences, and barriers to discharge  - Address psychosocial, clinical, and financial barriers to discharge as identified in assessment in conjunction with the patient/family and health care team  - Arrange appropriate level of post-acute services according to patients   needs and preference and payer coverage in collaboration with the physician and health care team  - Communicate with and update the patient/family, physician, and health care team regarding progress on the discharge plan  - Arrange appropriate transportation to post-acute venues   Outcome: Completed Date Met: 04/09/18  S/w pt at bedside this am to discuss dcp for today  Made patient aware that she was accepted at both facilities  She has no preference over either on and would like me to s/w her grandson to decide  I s/w pt's grandson Kota Gandhi via telephone and he would like pt to go to [facility]  S/w Myles Jarrett at [facility] is going to review with the DON there for official acceptance for today  Awaiting call back  Pt was set up for a 1230pm w/c transport with [facility] but the physician was not able to complete the d/c in time and transport was ultimately moved to 530pm as this was the next earliest available time  Myles Jarrett at [facility] aware of above  S/w pt's grandson Chris Greenwood to make him aware of above plan  I also discussed the OOP cost of the w/c transport which he was agreeable to  IMM discussed

## 2018-04-10 ENCOUNTER — TRANSITIONAL CARE MANAGEMENT (OUTPATIENT)
Dept: INTERNAL MEDICINE CLINIC | Facility: CLINIC | Age: 83
End: 2018-04-10

## 2018-04-19 ENCOUNTER — OFFICE VISIT (OUTPATIENT)
Dept: GASTROENTEROLOGY | Facility: CLINIC | Age: 83
End: 2018-04-19
Payer: MEDICARE

## 2018-04-19 VITALS
SYSTOLIC BLOOD PRESSURE: 112 MMHG | WEIGHT: 106.13 LBS | BODY MASS INDEX: 19.41 KG/M2 | DIASTOLIC BLOOD PRESSURE: 60 MMHG

## 2018-04-19 DIAGNOSIS — R19.7 DIARRHEA, UNSPECIFIED TYPE: Primary | ICD-10-CM

## 2018-04-19 PROCEDURE — 99214 OFFICE O/P EST MOD 30 MIN: CPT | Performed by: NURSE PRACTITIONER

## 2018-04-19 RX ORDER — ACETAMINOPHEN 500 MG
500 TABLET ORAL EVERY 6 HOURS PRN
COMMUNITY
End: 2018-10-09 | Stop reason: ALTCHOICE

## 2018-04-19 RX ORDER — CHOLESTYRAMINE 4 G/9G
POWDER, FOR SUSPENSION ORAL
Qty: 30 EACH | Refills: 0 | Status: SHIPPED | OUTPATIENT
Start: 2018-04-19 | End: 2018-10-09 | Stop reason: ALTCHOICE

## 2018-04-19 RX ORDER — SODIUM PHOSPHATE, DIBASIC AND SODIUM PHOSPHATE, MONOBASIC 7; 19 G/133ML; G/133ML
1 ENEMA RECTAL
COMMUNITY
End: 2019-03-11 | Stop reason: HOSPADM

## 2018-04-19 NOTE — PROGRESS NOTES
Assessment/Plan:    [address] [place] [address] given orders for stool tests and a printed prescription for Questran once daily as needed for loose stool  They will also hold her Colace  The patient and her nephew, Kristy Mak we'll follow-up in 2 weeks and we will discuss treatment options then  Problem List Items Addressed This Visit     Diarrhea - Primary    Relevant Medications    cholestyramine (QUESTRAN) 4 g packet    Other Relevant Orders    Ova and parasite examination    White Blood Cells, Stool by Gram Stain    Clostridium difficile toxin by PCR    Stool Enteric Bacterial Panel by PCR            Subjective:      Patient ID: Umberto Langley is a 80 y o  female  79-year-old female is here status post hospitalization for loose stools, dehydration  She had an CAT scan of her abdomen and pelvis which showed colon wall thickening and inflammatory versus infectious reasons  She was seen by gastroenterology who believes that she perhaps has an ischemic colitis  She had a C  Difficile that was done and her stool was negative for that  Prior to presentation to the ER she had loose stools, bright red blood per rectum and abdominal pain  She was stabilized in the hospital With IV fluids And sent back to the rehabilitation facility  Since being back from the rehabilitation facility the patient, his memory is not great she has had some other episodes of loose stool including waken up twice last night from a sound sleep  She reports loose stool this morning ×1  She cannot remember what she ate today or yesterday and cannot remember about her bowel habits prior to last night  I called her POA who did not know she had an appointment today and we rescheduled the appointment for 2 weeks so he could be here as well     The patient has some memory issues and her family history is the hospitalization no and so I will wait for the nephew to come in to obtain a full history and we will discuss treatment options at that time        The following portions of the patient's history were reviewed and updated as appropriate:   She  has a past medical history of Breast cancer ([facility] [address] ); Cancer ([facility] [address] ); Hypertension; and Lymphedema of arm  She   Patient Active Problem List    Diagnosis Date Noted    Diarrhea 04/19/2018    Ambulatory dysfunction 04/07/2018    Hypomagnesemia 04/06/2018    Hypokalemia 04/04/2018    Anemia 04/04/2018    Colitis, acute 04/03/2018    Dehydration 04/03/2018    HTN (hypertension) 04/03/2018    Acute kidney injury ([facility] [address] ) 04/03/2018     She  has a past surgical history that includes Breast surgery; Mastectomy; Hysterectomy; and Bladder repair  Her family history is not on file  She  reports that she has never smoked  She does not have any smokeless tobacco history on file  She reports that she does not drink alcohol or use drugs    Current Outpatient Prescriptions   Medication Sig Dispense Refill    acetaminophen (TYLENOL) 500 mg tablet Take 500 mg by mouth every 6 (six) hours as needed for mild pain      aspirin 81 mg chewable tablet Chew 1 tablet daily      docusate sodium (COLACE) 100 mg capsule Take 1 capsule (100 mg total) by mouth 2 (two) times a day 10 capsule 0    felodipine (PLENDIL) 5 mg 24 hr tablet Take 1 tablet by mouth daily      LACTOBACILLUS PO Take by mouth      lisinopril-hydrochlorothiazide (PRINZIDE,ZESTORETIC) 20-25 MG per tablet Take 1 tablet by mouth daily      metoprolol succinate (TOPROL-XL) 50 mg 24 hr tablet Take 1 tablet by mouth daily      ondansetron (ZOFRAN) 4 mg tablet Take 1 tablet by mouth every 6 (six) hours as needed for nausea or vomiting for up to 6 doses 6 tablet 0    polyethylene glycol (MIRALAX) 17 g packet Take 17 g by mouth daily 14 each 0    Pseudoephedrine-APAP  MG TABS Take by mouth      sodium phosphate-biphosphate (FLEET) 7-19 g 118 mL enema Insert 1 enema into the rectum      traZODone (DESYREL) 50 mg tablet Take 50 mg by mouth daily at bedtime  cholestyramine (QUESTRAN) 4 g packet Take one packet daily as needed for loose stools  Hold for constipation/normal bowel pattern 30 each 0     No current facility-administered medications for this visit  Current Outpatient Prescriptions on File Prior to Visit   Medication Sig    aspirin 81 mg chewable tablet Chew 1 tablet daily    docusate sodium (COLACE) 100 mg capsule Take 1 capsule (100 mg total) by mouth 2 (two) times a day    felodipine (PLENDIL) 5 mg 24 hr tablet Take 1 tablet by mouth daily    lisinopril-hydrochlorothiazide (PRINZIDE,ZESTORETIC) 20-25 MG per tablet Take 1 tablet by mouth daily    metoprolol succinate (TOPROL-XL) 50 mg 24 hr tablet Take 1 tablet by mouth daily    ondansetron (ZOFRAN) 4 mg tablet Take 1 tablet by mouth every 6 (six) hours as needed for nausea or vomiting for up to 6 doses    polyethylene glycol (MIRALAX) 17 g packet Take 17 g by mouth daily    traZODone (DESYREL) 50 mg tablet Take 50 mg by mouth daily at bedtime     No current facility-administered medications on file prior to visit  She has No Known Allergies       Review of Systems   Respiratory: Negative  Cardiovascular: Negative  Gastrointestinal: Positive for blood in stool and diarrhea  Negative for abdominal pain, constipation, nausea, rectal pain and vomiting  Objective:      /60   Wt 48 1 kg (106 lb 2 oz)   BMI 19 41 kg/m²          Physical Exam   Constitutional:   Thin   Eyes: No scleral icterus  Cardiovascular: Normal rate and regular rhythm  Pulmonary/Chest: Effort normal and breath sounds normal    Abdominal: Soft  Bowel sounds are normal  She exhibits no distension and no mass  There is no tenderness  There is no rebound and no guarding  Lymphadenopathy:     She has no cervical adenopathy

## 2018-09-04 ENCOUNTER — APPOINTMENT (EMERGENCY)
Dept: CT IMAGING | Facility: HOSPITAL | Age: 83
DRG: 682 | End: 2018-09-04
Payer: MEDICARE

## 2018-09-04 ENCOUNTER — HOSPITAL ENCOUNTER (INPATIENT)
Facility: HOSPITAL | Age: 83
LOS: 6 days | Discharge: HOME | DRG: 682 | End: 2018-09-10
Attending: EMERGENCY MEDICINE | Admitting: INTERNAL MEDICINE
Payer: MEDICARE

## 2018-09-04 ENCOUNTER — APPOINTMENT (EMERGENCY)
Dept: RADIOLOGY | Facility: HOSPITAL | Age: 83
DRG: 682 | End: 2018-09-04
Payer: MEDICARE

## 2018-09-04 DIAGNOSIS — R41.82 ALTERED MENTAL STATUS: ICD-10-CM
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
## 2018-09-04 DIAGNOSIS — I48.91 ATRIAL FIBRILLATION WITH RVR (HCC): ICD-10-CM

## 2018-09-04 DIAGNOSIS — R26.2 AMBULATORY DYSFUNCTION: ICD-10-CM

## 2018-09-04 DIAGNOSIS — N39.0 UTI (URINARY TRACT INFECTION): Primary | ICD-10-CM

## 2018-09-04 DIAGNOSIS — R63.6 UNDERWEIGHT: ICD-10-CM

## 2018-09-04 DIAGNOSIS — R00.0 TACHYCARDIA: ICD-10-CM

## 2018-09-04 DIAGNOSIS — I10 ESSENTIAL HYPERTENSION: Chronic | ICD-10-CM

## 2018-09-04 LAB
ANION GAP SERPL CALCULATED.3IONS-SCNC: 10 MMOL/L (ref 4–13)
ATRIAL RATE: 87 BPM
BACTERIA UR QL AUTO: ABNORMAL /HPF
BASOPHILS # BLD AUTO: 0.04 THOUSANDS/ΜL (ref 0–0.1)
BASOPHILS NFR BLD AUTO: 1 % (ref 0–1)
BILIRUB UR QL STRIP: NEGATIVE
BUN SERPL-MCNC: 38 MG/DL (ref 5–25)
CALCIUM SERPL-MCNC: 9.7 MG/DL (ref 8.3–10.1)
CHLORIDE SERPL-SCNC: 99 MMOL/L (ref 100–108)
CLARITY UR: ABNORMAL
CO2 SERPL-SCNC: 29 MMOL/L (ref 21–32)
COLOR UR: YELLOW
CREAT SERPL-MCNC: 1.62 MG/DL (ref 0.6–1.3)
EOSINOPHIL # BLD AUTO: 0.18 THOUSAND/ΜL (ref 0–0.61)
EOSINOPHIL NFR BLD AUTO: 3 % (ref 0–6)
ERYTHROCYTE [DISTWIDTH] IN BLOOD BY AUTOMATED COUNT: 12.5 % (ref 11.6–15.1)
GFR SERPL CREATININE-BSD FRML MDRD: 27 ML/MIN/1.73SQ M
GLUCOSE SERPL-MCNC: 151 MG/DL (ref 65–140)
GLUCOSE UR STRIP-MCNC: NEGATIVE MG/DL
HCT VFR BLD AUTO: 40.1 % (ref 34.8–46.1)
HGB BLD-MCNC: 12.9 G/DL (ref 11.5–15.4)
HGB UR QL STRIP.AUTO: NEGATIVE
IMM GRANULOCYTES # BLD AUTO: 0.02 THOUSAND/UL (ref 0–0.2)
IMM GRANULOCYTES NFR BLD AUTO: 0 % (ref 0–2)
KETONES UR STRIP-MCNC: ABNORMAL MG/DL
LEUKOCYTE ESTERASE UR QL STRIP: ABNORMAL
LYMPHOCYTES # BLD AUTO: 2.6 THOUSANDS/ΜL (ref 0.6–4.47)
LYMPHOCYTES NFR BLD AUTO: 35 % (ref 14–44)
MCH RBC QN AUTO: 29.3 PG (ref 26.8–34.3)
MCHC RBC AUTO-ENTMCNC: 32.2 G/DL (ref 31.4–37.4)
MCV RBC AUTO: 91 FL (ref 82–98)
MONOCYTES # BLD AUTO: 0.3 THOUSAND/ΜL (ref 0.17–1.22)
MONOCYTES NFR BLD AUTO: 4 % (ref 4–12)
NEUTROPHILS # BLD AUTO: 4.2 THOUSANDS/ΜL (ref 1.85–7.62)
NEUTS SEG NFR BLD AUTO: 57 % (ref 43–75)
NITRITE UR QL STRIP: POSITIVE
NON-SQ EPI CELLS URNS QL MICRO: ABNORMAL /HPF
NRBC BLD AUTO-RTO: 0 /100 WBCS
P AXIS: 89 DEGREES
PH UR STRIP.AUTO: 5.5 [PH] (ref 4.5–8)
PLATELET # BLD AUTO: 183 THOUSANDS/UL (ref 149–390)
PMV BLD AUTO: 12.3 FL (ref 8.9–12.7)
POTASSIUM SERPL-SCNC: 3.3 MMOL/L (ref 3.5–5.3)
PR INTERVAL: 134 MS
PROT UR STRIP-MCNC: ABNORMAL MG/DL
QRS AXIS: 73 DEGREES
QRSD INTERVAL: 76 MS
QT INTERVAL: 354 MS
QTC INTERVAL: 425 MS
RBC # BLD AUTO: 4.41 MILLION/UL (ref 3.81–5.12)
RBC #/AREA URNS AUTO: ABNORMAL /HPF
SODIUM SERPL-SCNC: 138 MMOL/L (ref 136–145)
SP GR UR STRIP.AUTO: 1.02 (ref 1–1.03)
T WAVE AXIS: 17 DEGREES
UROBILINOGEN UR QL STRIP.AUTO: 1 E.U./DL
VENTRICULAR RATE: 87 BPM
WBC # BLD AUTO: 7.34 THOUSAND/UL (ref 4.31–10.16)
WBC #/AREA URNS AUTO: ABNORMAL /HPF

## 2018-09-04 PROCEDURE — 93010 ELECTROCARDIOGRAM REPORT: CPT | Performed by: INTERNAL MEDICINE

## 2018-09-04 PROCEDURE — 96360 HYDRATION IV INFUSION INIT: CPT

## 2018-09-04 PROCEDURE — 70450 CT HEAD/BRAIN W/O DYE: CPT

## 2018-09-04 PROCEDURE — 87186 SC STD MICRODIL/AGAR DIL: CPT | Performed by: EMERGENCY MEDICINE

## 2018-09-04 PROCEDURE — 99285 EMERGENCY DEPT VISIT HI MDM: CPT

## 2018-09-04 PROCEDURE — 36415 COLL VENOUS BLD VENIPUNCTURE: CPT | Performed by: EMERGENCY MEDICINE

## 2018-09-04 PROCEDURE — 87086 URINE CULTURE/COLONY COUNT: CPT | Performed by: EMERGENCY MEDICINE

## 2018-09-04 PROCEDURE — 87077 CULTURE AEROBIC IDENTIFY: CPT | Performed by: EMERGENCY MEDICINE

## 2018-09-04 PROCEDURE — 72170 X-RAY EXAM OF PELVIS: CPT

## 2018-09-04 PROCEDURE — 96361 HYDRATE IV INFUSION ADD-ON: CPT

## 2018-09-04 PROCEDURE — 93005 ELECTROCARDIOGRAM TRACING: CPT

## 2018-09-04 PROCEDURE — 72125 CT NECK SPINE W/O DYE: CPT

## 2018-09-04 PROCEDURE — 81001 URINALYSIS AUTO W/SCOPE: CPT | Performed by: EMERGENCY MEDICINE

## 2018-09-04 PROCEDURE — 80048 BASIC METABOLIC PNL TOTAL CA: CPT | Performed by: EMERGENCY MEDICINE

## 2018-09-04 PROCEDURE — 71046 X-RAY EXAM CHEST 2 VIEWS: CPT

## 2018-09-04 PROCEDURE — 85025 COMPLETE CBC W/AUTO DIFF WBC: CPT | Performed by: EMERGENCY MEDICINE

## 2018-09-04 RX ADMIN — SODIUM CHLORIDE 500 ML: 0.9 INJECTION, SOLUTION INTRAVENOUS at 20:45

## 2018-09-04 NOTE — ED NOTES
Spoke with Sg Gonzalez at [address] [place] [address] advanced directive       Laura Houston, [address] [facility]  39/[birth year] [id number]

## 2018-09-05 PROBLEM — G93.41 ACUTE METABOLIC ENCEPHALOPATHY: Status: ACTIVE | Noted: 2018-09-05

## 2018-09-05 PROBLEM — R41.82 ALTERED MENTAL STATUS: Status: ACTIVE | Noted: 2018-09-05

## 2018-09-05 PROBLEM — I89.0 LYMPHEDEMA OF ARM: Status: ACTIVE | Noted: 2018-09-05

## 2018-09-05 PROBLEM — N39.0 UTI (URINARY TRACT INFECTION): Status: ACTIVE | Noted: 2018-09-05
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
## 2018-09-05 LAB
ANION GAP SERPL CALCULATED.3IONS-SCNC: 6 MMOL/L (ref 4–13)
BASOPHILS # BLD AUTO: 0.03 THOUSANDS/ΜL (ref 0–0.1)
BASOPHILS NFR BLD AUTO: 0 % (ref 0–1)
BUN SERPL-MCNC: 39 MG/DL (ref 5–25)
CALCIUM SERPL-MCNC: 9.2 MG/DL (ref 8.3–10.1)
CHLORIDE SERPL-SCNC: 103 MMOL/L (ref 100–108)
CO2 SERPL-SCNC: 30 MMOL/L (ref 21–32)
CREAT SERPL-MCNC: 1.21 MG/DL (ref 0.6–1.3)
EOSINOPHIL # BLD AUTO: 0.01 THOUSAND/ΜL (ref 0–0.61)
EOSINOPHIL NFR BLD AUTO: 0 % (ref 0–6)
ERYTHROCYTE [DISTWIDTH] IN BLOOD BY AUTOMATED COUNT: 12.6 % (ref 11.6–15.1)
GFR SERPL CREATININE-BSD FRML MDRD: 38 ML/MIN/1.73SQ M
GLUCOSE SERPL-MCNC: 122 MG/DL (ref 65–140)
HCT VFR BLD AUTO: 37.2 % (ref 34.8–46.1)
HGB BLD-MCNC: 11.9 G/DL (ref 11.5–15.4)
IMM GRANULOCYTES # BLD AUTO: 0.05 THOUSAND/UL (ref 0–0.2)
IMM GRANULOCYTES NFR BLD AUTO: 0 % (ref 0–2)
LYMPHOCYTES # BLD AUTO: 1.7 THOUSANDS/ΜL (ref 0.6–4.47)
LYMPHOCYTES NFR BLD AUTO: 11 % (ref 14–44)
MCH RBC QN AUTO: 29.2 PG (ref 26.8–34.3)
MCHC RBC AUTO-ENTMCNC: 32 G/DL (ref 31.4–37.4)
MCV RBC AUTO: 91 FL (ref 82–98)
MONOCYTES # BLD AUTO: 1.04 THOUSAND/ΜL (ref 0.17–1.22)
MONOCYTES NFR BLD AUTO: 7 % (ref 4–12)
NEUTROPHILS # BLD AUTO: 12.72 THOUSANDS/ΜL (ref 1.85–7.62)
NEUTS SEG NFR BLD AUTO: 82 % (ref 43–75)
NRBC BLD AUTO-RTO: 0 /100 WBCS
PLATELET # BLD AUTO: 143 THOUSANDS/UL (ref 149–390)
PMV BLD AUTO: 12.1 FL (ref 8.9–12.7)
POTASSIUM SERPL-SCNC: 3.4 MMOL/L (ref 3.5–5.3)
RBC # BLD AUTO: 4.08 MILLION/UL (ref 3.81–5.12)
SODIUM SERPL-SCNC: 139 MMOL/L (ref 136–145)
WBC # BLD AUTO: 15.55 THOUSAND/UL (ref 4.31–10.16)

## 2018-09-05 PROCEDURE — 97163 PT EVAL HIGH COMPLEX 45 MIN: CPT

## 2018-09-05 PROCEDURE — G8979 MOBILITY GOAL STATUS: HCPCS

## 2018-09-05 PROCEDURE — 99223 1ST HOSP IP/OBS HIGH 75: CPT | Performed by: INTERNAL MEDICINE

## 2018-09-05 PROCEDURE — 87081 CULTURE SCREEN ONLY: CPT | Performed by: INTERNAL MEDICINE

## 2018-09-05 PROCEDURE — G8978 MOBILITY CURRENT STATUS: HCPCS

## 2018-09-05 PROCEDURE — 85025 COMPLETE CBC W/AUTO DIFF WBC: CPT | Performed by: INTERNAL MEDICINE

## 2018-09-05 PROCEDURE — 80048 BASIC METABOLIC PNL TOTAL CA: CPT | Performed by: INTERNAL MEDICINE

## 2018-09-05 RX ORDER — ACETAMINOPHEN 325 MG/1
650 TABLET ORAL EVERY 6 HOURS PRN
Status: DISCONTINUED | OUTPATIENT
Start: 2018-09-05 | End: 2018-09-10 | Stop reason: HOSPADM

## 2018-09-05 RX ORDER — SODIUM CHLORIDE AND POTASSIUM CHLORIDE .9; .15 G/100ML; G/100ML
100 SOLUTION INTRAVENOUS CONTINUOUS
Status: DISCONTINUED | OUTPATIENT
Start: 2018-09-05 | End: 2018-09-07

## 2018-09-05 RX ORDER — HEPARIN SODIUM 5000 [USP'U]/ML
5000 INJECTION, SOLUTION INTRAVENOUS; SUBCUTANEOUS EVERY 8 HOURS SCHEDULED
Status: DISCONTINUED | OUTPATIENT
Start: 2018-09-05 | End: 2018-09-10 | Stop reason: HOSPADM

## 2018-09-05 RX ORDER — FELODIPINE 2.5 MG/1
5 TABLET, EXTENDED RELEASE ORAL DAILY
Status: DISCONTINUED | OUTPATIENT
Start: 2018-09-05 | End: 2018-09-07

## 2018-09-05 RX ORDER — ASPIRIN 81 MG/1
81 TABLET, CHEWABLE ORAL DAILY
Status: DISCONTINUED | OUTPATIENT
Start: 2018-09-05 | End: 2018-09-10 | Stop reason: HOSPADM

## 2018-09-05 RX ADMIN — SODIUM CHLORIDE AND POTASSIUM CHLORIDE 75 ML/HR: .9; .15 SOLUTION INTRAVENOUS at 03:00

## 2018-09-05 RX ADMIN — HEPARIN SODIUM 5000 UNITS: 5000 INJECTION, SOLUTION INTRAVENOUS; SUBCUTANEOUS at 15:00

## 2018-09-05 RX ADMIN — HEPARIN SODIUM 5000 UNITS: 5000 INJECTION, SOLUTION INTRAVENOUS; SUBCUTANEOUS at 06:34

## 2018-09-05 RX ADMIN — HEPARIN SODIUM 5000 UNITS: 5000 INJECTION, SOLUTION INTRAVENOUS; SUBCUTANEOUS at 22:37

## 2018-09-05 RX ADMIN — FELODIPINE 5 MG: 2.5 TABLET, FILM COATED, EXTENDED RELEASE ORAL at 09:47

## 2018-09-05 RX ADMIN — CEFTRIAXONE 1000 MG: 1 INJECTION, POWDER, FOR SOLUTION INTRAMUSCULAR; INTRAVENOUS at 03:00

## 2018-09-05 RX ADMIN — ASPIRIN 81 MG 81 MG: 81 TABLET ORAL at 09:47

## 2018-09-05 NOTE — NURSING NOTE
Assessed patient with /42, p 102  Denies dizziness or other symptoms  Notified SLIM  NP Lauren Trejo  Will continue to monitor   Deborah Batres RN

## 2018-09-05 NOTE — ED NOTES
Attempted calling [facility] with update at number provided [phone], unsuccessful in reaching anyone at this extension       Daniel Willis RN  09/04/18 1071

## 2018-09-05 NOTE — TRAUMA DOCUMENTATION
Trauma end per physician, patient being treated medically  End frequent vital signs and neurochecks

## 2018-09-05 NOTE — ASSESSMENT & PLAN NOTE
Currently stable  Continue to hold Toprol and hydrochlorothiazide due to elevated creatinine    If stable today resume Toprol

## 2018-09-05 NOTE — PLAN OF CARE
Problem: PHYSICAL THERAPY ADULT  Goal: Performs mobility at highest level of function for planned discharge setting  See evaluation for individualized goals  Treatment/Interventions: ADL retraining, Functional transfer training, LE strengthening/ROM, Elevations, Therapeutic exercise, Endurance training, Patient/family training, Equipment eval/education, Bed mobility, Gait training, Spoke to nursing  Equipment Recommended: Other (Comment) (plan to trial [facility] next session)       See flowsheet documentation for full assessment, interventions and recommendations  Prognosis: Good  Problem List: Decreased endurance, Impaired balance, Decreased strength, Decreased mobility (back safety precautions)  Assessment: Pt is 80 y o  female seen for PT evaluation s/p admit to [facility] on 9/4/2018 w/ Altered mental status  PT consulted to assess pt's functional mobility and d/c needs  Order placed for PT eval and tx, w/ up w/ A order  Performed at least 2 patient identifiers during session: Name and wristband  Comorbidities affecting pt's physical performance at time of assessment include: breast cancer, HTN, lymphedema of left upper extremity  PTA, pt was independent w/ all functional mobility without AD, ambulates community distances and elevations, ambulates household distances, has [address], lives at [facility] and retired  Personal factors affecting pt at time of IE include: inaccessible home environment, stairs to enter home, inability to navigate level surfaces w/o external assistance and positive fall history  Please find objective findings from PT assessment regarding body systems outlined above with impairments and limitations including weakness, impaired balance, decreased endurance, gait deviations, decreased activity tolerance, decreased functional mobility tolerance, fall risk and back safety precautions   The following objective measures performed on IE also reveal limitations: Barthel Index: 50/100 and Modified Jonas: 4 (moderate/severe disability)  Pt's clinical presentation is currently unstable/unpredictable evident in need for assist w/ all phases of mobility when usually mobilizing independently, tolerance to only 5 feet of ambulation x 2 trials, and ongoing teatment and evaluation for fall including urinary tract infection  Pt to benefit from continued PT tx to address deficits as defined above and maximize level of functional independent mobility and consistency  From PT/mobility standpoint, recommendation at time of d/c would be STR pending progress in order to facilitate return to PLOF  Barriers to Discharge: Inaccessible home environment ([address])     Recommendation: Short-term skilled PT     PT - OK to Discharge: Yes (when medically cleared; if to STR)    See flowsheet documentation for full assessment

## 2018-09-05 NOTE — CASE MANAGEMENT
Initial Clinical Review    Admission: Date/Time/Statement: 9/4/18 @ 2250     Orders Placed This Encounter   Procedures    Inpatient Admission (expected length of stay for this patient is greater than two midnights)     Standing Status:   Standing     Number of Occurrences:   1     Order Specific Question:   Admitting Physician     Answer:   Cindy Gonzalez [[id number]]     Order Specific Question:   Level of Care     Answer:   Med Surg [[id number]]     Order Specific Question:   Estimated length of stay     Answer:   More than 2 Midnights     Order Specific Question:   Certification     Answer:   I certify that inpatient services are medically necessary for this patient for a duration of greater than two midnights  See H&P and MD Progress Notes for additional information about the patient's course of treatment  ED: Date/Time/Mode of Arrival:   ED Arrival Information     Expected Arrival Acuity Means of Arrival Escorted By Service Admission Type    - 9/4/2018 18:16 Emergent Ambulance [facility] General Medicine Emergency    Arrival Complaint    -          Chief Complaint:   Chief Complaint   Patient presents with    Fall     Fall from standing level at personal care home; no LOC, no blood thinners; patient having decreasing level of consciousness per EMS on transport to hospital; trauma evaluation called       History of Illness: Mary Carmen Rose is a 80 y o  female who went to stand and felt very weak she states that she fell striking her head  Patient was brought to the emergency room a full trauma workup was undertaken there no evidence for fracture of the neck or head  Patient was found to have slight alteration in mentation at that time and discovered to have what appears to be urinary tract infection on urine dip        ED Vital Signs:   ED Triage Vitals   Temperature Pulse Respirations Blood Pressure SpO2   09/04/18 1830 09/04/18 1825 09/04/18 1830 09/04/18 1830 09/04/18 1825   97 8 °F (36 6 °C) 93 20 126/60 97 %      Temp Source Heart Rate Source Patient Position - Orthostatic VS BP Location FiO2 (%)   09/04/18 1830 09/04/18 1830 09/04/18 1830 09/04/18 1830 --   Oral Monitor Lying Right arm       Pain Score       09/05/18 0000       No Pain        Wt Readings from Last 1 Encounters:   09/04/18 48 1 kg (106 lb 0 7 oz)       Vital Signs (abnormal):   09/05/18 0700  98 6 °F (37 °C)  100  18  130/60  86  95 %  Nasal cannula  Lying   09/04/18 2334  98 6 °F (37 °C)  100  18  132/67  --  95 %  Nasal cannula  Lying   09/04/18 22:30:14  --   107  22  142/65  --  97 %  --  Lying   09/04/18 2230  --  --  --  142/65  --  --  --  --   09/04/18 22:25:14  --   109   32  --  --  96 %  --  --   09/04/18 22:20:14  --  104  20  --  --  95 %  Nasal cannula  --   09/04/18 22:15:14  --  104  22  151/63  --   87 %  None (Room air)  Lying   09/04/18 22:10:14  --  104   36  --  --   83 %  --  --   09/04/18 22:05:24  --  103   43  --  --   87 %  --  --   09/04/18 22:00:                     Abnormal Labs/Diagnostic Test Results: K 3 3, cl 99 , BUN creat 38  1 62, gluc 151  CXR -   Stable cardiomegaly and left apical scarring      Pelvis xray -    No evidence of acute pelvic fracture  CT head- wnl   CT spine- Osteopenia and degenerative change with no acute osseous abnormality  EKG- NSR     ED Treatment:   Medication Administration from 09/04/2018 1816 to 09/04/2018 2324       Date/Time Order Dose Route Action Action by Comments     09/04/2018 2223 sodium chloride 0 9 % bolus 500 mL 0 mL Intravenous Stopped Yan Deluca RN      09/04/2018 2045 sodium chloride 0 9 % bolus 500 mL 500 mL Intravenous New Bag Yan Deluca RN           Past Medical/Surgical History:    Active Ambulatory Problems     Diagnosis Date Noted    Colitis, acute 04/03/2018    Dehydration 04/03/2018    HTN (hypertension) 04/03/2018    Acute kidney injury ([facility] [address] ) 04/03/2018    Hypokalemia 04/04/2018    Anemia 04/04/2018    Hypomagnesemia 04/06/2018    Ambulatory dysfunction 04/07/2018    Diarrhea 04/19/2018     Past Medical History:   Diagnosis Date    [facility] cancer [facility] [address] )     Cancer ([facility] [address] )     Hypertension     Lymphedema of arm        Admitting Diagnosis: UTI (urinary tract infection) [N39 0]  Altered mental status [R41 82]  Abdominal pain [R10 9]    Age/Sex: 80 y o  female    Assessment/Plan:   Acute metabolic encephalopathy   Assessment & Plan     Likely secondary to infection  Patient appears to be having a urinary tract infection  Follow-up cultures  Improving at this time        * Altered mental status   Assessment & Plan     Likely secondary to metabolic encephalopathy related to infection  At the time of my eval   Patient is fully cognizant           UTI (urinary tract infection)   Assessment & Plan     Based on urinary dip and clinical picture  Follow-up cultures  Continue ceftriaxone        HTN (hypertension)   Assessment & Plan     Currently stable  Continue to hold Toprol and hydrochlorothiazide due to elevated creatinine  If stable today resume Toprol       Lymphedema of arm   Assessment & Plan     Patient is status post mastectomy on the left secondary to breast cancer  She is stable lymphedema continued him remain elevated avoided needle sticks        Fall   Assessment & Plan     Patient states she did not lose consciousness there is some controversy over this  She states she felt weak and that she fell down  Patient found to have urinary tract infection as a source for this but will monitor patient overnight  at least to assure no cardiac arrhythmias were playing a role  Trauma workup was negative  She has no focal neurological deficits to suggest that she needs MRI         CONSTANCE Kidney: as evidenced by baseline creatinine 0 93 in January  Gentle hydration   F/u labs   VTE Prophylaxis: Heparin  / sequential compression device   Code Status:  Do not intubate but perform full CPR    POLST: POLST form is not discussed and not completed at this time  Discussion with family:  Terra Penny not present will need to be updated   Anticipated Length of Stay:  Patient will be admitted on an Inpatient basis with an anticipated length of stay of  greater than 2 midnights  Justification for Hospital Stay:  Treatment and evaluation for fall including urinary tract infection      Admission Orders:  Scheduled Meds:   Current Facility-Administered Medications:  acetaminophen 650 mg Oral Q6H PRN     aspirin 81 mg Oral Daily     cefTRIAXone 1,000 mg Intravenous Q24H  Last Rate: 1,000 mg (09/05/18 0300)   felodipine 5 mg Oral Daily     heparin (porcine) 5,000 Units Subcutaneous Q8H [address]     sodium chloride 0 9 % with KCl 20 mEq/L 75 mL/hr Intravenous Continuous  Last Rate: 75 mL/hr (09/05/18 0300)     Continuous Infusions:   sodium chloride 0 9 % with KCl 20 mEq/L 75 mL/hr Last Rate: 75 mL/hr (09/05/18 0300)     PRN Meds:   acetaminophen     OT PT eval   Reg diet   Neuro checks q4hr   SCD  Tele   Up w/ assist       IM note  9/5  Subjective:   Patient denies fevers chills just states that I feel wiped out patient reports she was out all day yesterday visiting stores with her nephew and she just thinks this occurred because she is just exhausted  Patient made aware of the suspicion of a UTI is apprised this patient as she denies any dysuria or pelvic pressure    Patient is alert and oriented x2 was disoriented to time patient has no other general complaints at this time    Altered mental status:  CT head neck negative for acute findings continue neuro checks  CONSTANCE:  Responded to IV fluids creatinine improved to 1 21 continue to monitor BMP in the a m   UTI:  UA highly suggestive of urinary tract infection could be likely source of patient's change in status WBCs 10-20 innumerable bacteria urine culture pending would continue IV antibiotics at this time  Weakness:  PT OT evaluation  Shortness of breath:  Patient had noted tachypnea hypoxia there is no evidence of pneumonia on x-ray patient appears in no distress at this time will continue to monitor

## 2018-09-05 NOTE — ASSESSMENT & PLAN NOTE
Patient is status post mastectomy on the left secondary to breast cancer    She is stable lymphedema continued him remain elevated avoided needle sticks

## 2018-09-05 NOTE — ED PROVIDER NOTES
H&P Exam - Trauma   Jennifer Alfaro 80 y o  female MRN: [id number]  Unit/Bed#: /-01 Encounter: [id number]    Assessment/Plan   Trauma Alert: Trauma Acuity: Trauma Evaluation  Model of Arrival: Trauma Mode of Arrival: ALS via    Trauma Team: Current Providers  Attending Provider: Ebenezer Jean DO  Attending Provider: Jeffery Iqbal MD  Attending Provider: Barnie Simmonds, MD  ED Technician: Niki Amezquita  Registered Nurse: Kendra Lenz RN  ED Technician: Rd Nickerson  Registered Nurse: Mag Daniels RN  Registered Nurse: Fei Dixon RN  Patient Care Assistant: Rajinder Rosario  Unit Clerk: Chelsey Baxter  Unit Clerk: Chelsey Baxter  Registered Nurse: Edward Sanchez RN  Unit Clerk: Erick Mcknight  Respiratory Therapist: Sheryl Morales RT  Advanced Practitioner: RED Blackwell  Physical Therapist: Kellen Broderick PT  Unit Clerk: Ablert Caro  Patient Care Assistant: Greer Tran  Registered Nurse: Sohail Layton RN  Registered Nurse: Edward Sanchez RN  Registered Nurse: Lucia Lewis RN  Registered Nurse: Fei Dixon RN  Patient Care Assistant: Nancy Sousa  Patient Care Assistant: Viktoriya Chong  Patient Care Assistant: Viktoriya Chong  Patient Care Assistant: Sobeida Bull  Registered Nurse: Bimal Rosen RN  Patient Care Assistant: Erick Mcknight  Respiratory Therapist: RT Belen  Registered Nurse: lAivia Tolentino RN  Registered Nurse: Alivia Tolentino RN  Registered Nurse: Lakhwinder Fortune RN  Patient Care Assistant: Nancy Sousa  Unit Clerk: Albert Caro  Registered Nurse: Megan Ruiz RN  Registered Nurse:  Orville Kauffman RN  Patient Care Assistant: Sobeida Bull  Patient Care Assistant: Sobeida Bull  Patient Care Assistant: Maria R Sorenson  Physical Therapist: Huan Juarez PT  Registered Nurse: Bimal Rosen RN  Patient Care Assistant: Greer Tran  Unit Clerk: Curly Vania  Consultants: None    Trauma Active Problems: The patient fell from standing  She has a contusion to the left frontal area of the head  She is sluggish in her responses but is aware of who and where she is  Trauma Plan: CT head and neck  CXR, Pelvis, and labs      Chief Complaint:   Chief Complaint   Patient presents with   Yin King from standing level at personal care home; no LOC, no blood thinners; patient having decreasing level of consciousness per EMS on transport to hospital; trauma evaluation called       History of Present Illness   HPI:  [place] is a 80 y o  female who presents with 66-year-old female patient presents emergency department for evaluation of injury sustained in a fall from standing  The patient stated that the decrease in her level of consciousness during the time of transport here to the hospital   Patient is awake, alert, slightly sluggish in her responses but appropriate     Mechanism:Details of Incident: Patient fall from standing level - no LOC, no blood thinners; EMS states decreased LOC during transport to hospital Injury Date: 09/04/18   Injury Occurence Location - [address] [place] [address] Way: [facility]    HPI  Review of Systems    Historical Information     Immunizations:   Immunization History   Administered Date(s) Administered    Influenza TIV (IM) 12/06/1922    Pneumococcal Polysaccharide PPV23 12/06/1922    Tdap 12/06/1922       Past Medical History:   Diagnosis Date    Breast cancer ([address] )     [facility] [address] [address]     Hypertension     Lymphedema of arm     L arm     History reviewed  No pertinent family history  Past Surgical History:   Procedure Laterality Date    BLADDER REPAIR      BREAST SURGERY      HYSTERECTOMY      MASTECTOMY         Social History     Social History    Marital status:       Spouse name: N/A    Number of children: N/A    Years of education: N/A     Social History Main Topics    Smoking status: Never Smoker    Smokeless tobacco: Never Used    Alcohol use No    Drug use: No    Sexual activity: Not Asked     Other Topics Concern    None     Social History Narrative    None       Family History: Unknown    Meds/Allergies   Prior to Admission Medications   Prescriptions Last Dose Informant Patient Reported? Taking? LACTOBACILLUS PO  Self Yes No   Sig: Take by mouth   Pseudoephedrine-APAP  MG TABS  Self Yes No   Sig: Take by mouth   acetaminophen (TYLENOL) 500 mg tablet  Self Yes No   Sig: Take 500 mg by mouth every 6 (six) hours as needed for mild pain   aspirin 81 mg chewable tablet  Self Yes No   Sig: Chew 1 tablet daily   cholestyramine (QUESTRAN) 4 g packet   No No   Sig: Take one packet daily as needed for loose stools   Hold for constipation/normal bowel pattern   docusate sodium (COLACE) 100 mg capsule  Self No No   Sig: Take 1 capsule (100 mg total) by mouth 2 (two) times a day   felodipine (PLENDIL) 5 mg 24 hr tablet  Self Yes No   Sig: Take 1 tablet by mouth daily   lisinopril-hydrochlorothiazide (PRINZIDE,ZESTORETIC) 20-25 MG per tablet  Self Yes No   Sig: Take 1 tablet by mouth daily   metoprolol succinate (TOPROL-XL) 50 mg 24 hr tablet  Self Yes No   Sig: Take 1 tablet by mouth daily   ondansetron (ZOFRAN) 4 mg tablet  Self No No   Sig: Take 1 tablet by mouth every 6 (six) hours as needed for nausea or vomiting for up to 6 doses   polyethylene glycol (MIRALAX) 17 g packet  Self No No   Sig: Take 17 g by mouth daily   sodium phosphate-biphosphate (FLEET) 7-19 g 118 mL enema  Self Yes No   Sig: Insert 1 enema into the rectum   traZODone (DESYREL) 50 mg tablet  Self Yes No   Sig: Take 50 mg by mouth daily at bedtime      Facility-Administered Medications: None       No Known Allergies    PHYSICAL EXAM    PE limited by: Age    Objective   Vitals:   First set: Temperature: 97 8 °F (36 6 °C) (09/04/18 1830)  Pulse: 93 (09/04/18 1825)  Respirations: 20 (09/04/18 1830)  Blood Pressure: 126/60 (09/04/18 1830)  SpO2: 97 % (09/04/18 1825)    Primary Survey:   (A) Airway: Patent  (B) Breathing: Without accessory muscle use  (C) Circulation: Pulses:   normal  (D) Disabliity:  GCS Total:  14  (E) Expose:  Completed    Secondary Survey: (Click on Physical Exam tab above)  Physical Exam    Invasive Devices     Peripheral Intravenous Line            Peripheral IV 09/06/18 Right Forearm less than 1 day                Lab Results:   Results Reviewed     Procedure Component Value Units Date/Time    Urine culture [[id number]]  (Abnormal)  (Susceptibility) Collected:  09/04/18 2228    Lab Status:  Final result Specimen:  Urine from Urine, Straight Cath Updated:  09/07/18 0954     Urine Culture >100,000 cfu/ml Klebsiella pneumoniae (A)      >100,000 cfu/ml Aerococcus urinae (A)    Susceptibility      Klebsiella pneumoniae     EULA    Ampicillin ($$) 16 00 ug/ml Resistant    Ampicillin + Sulbactam ($) 4/2 ug/ml Susceptible    Aztreonam ($$$)  <=4 ug/ml Susceptible    Cefazolin ($) <=2 00 ug/ml Susceptible    Ciprofloxacin ($)  <=1 00 ug/ml Susceptible    Gentamicin ($$) <=1 ug/ml Susceptible    Levofloxacin ($) <=0 25 ug/ml Susceptible    Nitrofurantoin <=32 ug/ml Susceptible    Tetracycline <=4 ug/ml Susceptible    Tobramycin ($) <=1 ug/ml Susceptible    Trimethoprim + Sulfamethoxazole ($$$) <=2/38 ug/ml Susceptible    ZID Performed Yes                 Susceptibility      Aerococcus urinae     EULA    ZID Performed Yes                    Urine Microscopic [[id number]]  (Abnormal) Collected:  09/04/18 2228    Lab Status:  Final result Specimen:  Urine from Urine, Straight Cath Updated:  09/04/18 2251     RBC, UA None Seen /hpf      WBC, UA 10-20 (A) /hpf      Epithelial Cells Occasional /hpf      Bacteria, UA Innumerable (A) /hpf     UA w Reflex to Microscopic w Reflex to Culture [[id number]]  (Abnormal) Collected:  09/04/18 2228    Lab Status:  Final result Specimen:  Urine from Urine, Straight Cath Updated:  09/04/18 2242     Color, UA Yellow     Clarity, UA Cloudy     Specific [place], UA 1 020     pH, UA 5 5     Leukocytes, UA Trace (A)     Nitrite, UA Positive (A)     Protein, UA 30 (1+) (A) mg/dl      Glucose, UA Negative mg/dl      Ketones, UA Trace (A) mg/dl      Urobilinogen, UA 1 0 E U /dl      Bilirubin, UA Negative     Blood, UA Negative    Basic metabolic panel [[id number]]  (Abnormal) Collected:  09/04/18 1915    Lab Status:  Final result Specimen:  Blood from Arm, Right Updated:  09/04/18 1937     Sodium 138 mmol/L      Potassium 3 3 (L) mmol/L      Chloride 99 (L) mmol/L      CO2 29 mmol/L      ANION GAP 10 mmol/L      BUN 38 (H) mg/dL      Creatinine 1 62 (H) mg/dL      Glucose 151 (H) mg/dL      Calcium 9 7 mg/dL      eGFR 27 ml/min/1 73sq m     Narrative:         National Kidney Disease Education Program recommendations are as follows:  GFR calculation is accurate only with a steady state creatinine  Chronic Kidney disease less than 60 ml/min/1 73 sq  meters  Kidney failure less than 15 ml/min/1 73 sq  meters  CBC and differential [[id number]] Collected:  09/04/18 1915    Lab Status:  Final result Specimen:  Blood from Arm, Right Updated:  09/04/18 1928     WBC 7 34 Thousand/uL      RBC 4 41 Million/uL      Hemoglobin 12 9 g/dL      Hematocrit 40 1 %      MCV 91 fL      MCH 29 3 pg      MCHC 32 2 g/dL      RDW 12 5 %      MPV 12 3 fL      Platelets 324 Thousands/uL      nRBC 0 /100 WBCs      Neutrophils Relative 57 %      Immat GRANS % 0 %      Lymphocytes Relative 35 %      Monocytes Relative 4 %      Eosinophils Relative 3 %      Basophils Relative 1 %      Neutrophils Absolute 4 20 Thousands/µL      Immature Grans Absolute 0 02 Thousand/uL      Lymphocytes Absolute 2 60 Thousands/µL      Monocytes Absolute 0 30 Thousand/µL      Eosinophils Absolute 0 18 Thousand/µL      Basophils Absolute 0 04 Thousands/µL                  Imaging Studies:   XR pelvis ap only 1 or 2 vw   Final Result by Alpesh Shirley MD (09/05 8238)      No evidence of acute pelvic fracture  Workstation performed: [id number]         XR chest 2 views   Final Result by Daisy Honeycutt MD (09/04 2045)      Stable cardiomegaly and left apical scarring  No acute cardiopulmonary disease  Workstation performed: [id number]         TRAUMA - CT spine cervical wo contrast   Final Result by Murtaza Rivera DO (09/04 1911)      Osteopenia and degenerative change with no acute osseous abnormality  Workstation performed: [id number]         TRAUMA - CT head wo contrast   Final Result by Murtaza Rivera DO (09/04 1908)      No acute intracranial abnormality  Microangiopathic changes  Workstation performed: [id number]             Other Studies: Labs    Code Status: Level 1 - Full Code  Advance Directive and Living Will:      Power of : Yes  POLST:      Procedures  Procedures       Phone Contacts  ED Phone Contact     ED Course         MDM  Number of Diagnoses or Management Options  Altered mental status: new and requires workup  Fall, initial encounter: new and requires workup  UTI (urinary tract infection): new and requires workup     Amount and/or Complexity of Data Reviewed  Clinical lab tests: ordered and reviewed  Tests in the radiology section of CPT®: ordered and reviewed  Decide to obtain previous medical records or to obtain history from someone other than the patient: yes  Review and summarize past medical records: yes    Patient Progress  Patient progress: stable    The patient presented with a condition in which there was a high probability of imminent or life-threatening deterioration, and critical care services (excluding separately billable procedures) totalled 30-74 minutes          Disposition  Final diagnoses:   UTI (urinary tract infection)   Altered mental status   Fall, initial encounter     Time reflects when diagnosis was documented in both MDM as applicable and the Disposition within this note     Time User Action Codes Description Comment 9/4/2018 10:48 PM Ijeoma Shankar Add [N39 0] UTI (urinary tract infection)     9/4/2018 10:48 PM Ijeoma Shankar Add [R41 82] Altered mental status     9/4/2018 10:48 PM Ijeoma Shankar Add [B63  XXXA] Fall, initial encounter       ED Disposition     ED Disposition Condition Comment    Admit  Case was discussed with Dr Macey Portillo and the patient's admission status was agreed to be Admission Status: inpatient status to the service of Dr Macey Portillo MD Documentation      Most Recent Value   Accepting Facility Name, [address] [place] [address],[address]       RN Documentation      [facility] Name, [address] [place] [address],[address]       Follow-up Information    None       Current Discharge Medication List      CONTINUE these medications which have NOT CHANGED    Details   acetaminophen (TYLENOL) 500 mg tablet Take 500 mg by mouth every 6 (six) hours as needed for mild pain      aspirin 81 mg chewable tablet Chew 1 tablet daily      cholestyramine (QUESTRAN) 4 g packet Take one packet daily as needed for loose stools   Hold for constipation/normal bowel pattern  Qty: 30 each, Refills: 0    Associated Diagnoses: Diarrhea, unspecified type      docusate sodium (COLACE) 100 mg capsule Take 1 capsule (100 mg total) by mouth 2 (two) times a day  Qty: 10 capsule, Refills: 0    Associated Diagnoses: Constipation      felodipine (PLENDIL) 5 mg 24 hr tablet Take 1 tablet by mouth daily      LACTOBACILLUS PO Take by mouth      lisinopril-hydrochlorothiazide (PRINZIDE,ZESTORETIC) 20-25 MG per tablet Take 1 tablet by mouth daily      metoprolol succinate (TOPROL-XL) 50 mg 24 hr tablet Take 1 tablet by mouth daily      ondansetron (ZOFRAN) 4 mg tablet Take 1 tablet by mouth every 6 (six) hours as needed for nausea or vomiting for up to 6 doses  Qty: 6 tablet, Refills: 0      polyethylene glycol (MIRALAX) 17 g packet Take 17 g by mouth daily  Qty: 14 each, Refills: 0    Associated Diagnoses: Constipation Pseudoephedrine-APAP  MG TABS Take by mouth      sodium phosphate-biphosphate (FLEET) 7-19 g 118 mL enema Insert 1 enema into the rectum      traZODone (DESYREL) 50 mg tablet Take 50 mg by mouth daily at bedtime           No discharge procedures on file        ED Provider  Electronically Signed by         Leopoldo Hymen, DO  09/07/18 9355

## 2018-09-05 NOTE — ASSESSMENT & PLAN NOTE
Likely secondary to metabolic encephalopathy related to infection  At the time of my eval   Patient is fully cognizant

## 2018-09-05 NOTE — ASSESSMENT & PLAN NOTE
Patient states she did not lose consciousness there is some controversy over this  She states she felt weak and that she fell down  Patient found to have urinary tract infection as a source for this but will monitor patient overnight  at least to assure no cardiac arrhythmias were playing a role  Trauma workup was negative  She has no focal neurological deficits to suggest that she needs MRI

## 2018-09-05 NOTE — ED NOTES
Call placed to [facility] to notify of patient admission       Josiane Gregory RN  09/04/18 6690

## 2018-09-05 NOTE — PROGRESS NOTES
Brief HPI:  Patient admitted overnight as a trauma after a sustained fall at her personal care home patient had reported no LOC was alert and oriented however sluggish in her responses to staff  Current plan of care  Altered mental status:  CT head neck negative for acute findings continue neuro checks  CONSTANCE:  Responded to IV fluids creatinine improved to 1 21 continue to monitor BMP in the a m   UTI:  UA highly suggestive of urinary tract infection could be likely source of patient's change in status WBCs 10-20 innumerable bacteria urine culture pending would continue IV antibiotics at this time  Weakness:  PT OT evaluation  Shortness of breath:  Patient had noted tachypnea hypoxia there is no evidence of pneumonia on x-ray patient appears in no distress at this time will continue to monitor      Physical Exam   Constitutional: She is oriented to person, place, and time  She appears cachectic  She appears ill  HENT:   Head: Normocephalic and atraumatic  Eyes: Conjunctivae and EOM are normal    Neck: Normal range of motion  Cardiovascular: Normal rate, regular rhythm and normal heart sounds  Pulmonary/Chest: Effort normal and breath sounds normal    Abdominal: Soft  Bowel sounds are normal    Musculoskeletal: Normal range of motion  Neurological: She is alert and oriented to person, place, and time  Skin: Skin is warm and dry  Psychiatric: She has a normal mood and affect  Her behavior is normal         Subjective:   Patient denies fevers chills just states that I feel wiped out patient reports she was out all day yesterday visiting stores with her nephew and she just thinks this occurred because she is just exhausted  Patient made aware of the suspicion of a UTI is apprised this patient as she denies any dysuria or pelvic pressure  Patient is alert and oriented x2 was disoriented to time patient has no other general complaints at this time

## 2018-09-05 NOTE — PHYSICAL THERAPY NOTE
Physical Therapy Evaluation     Patient's Name: Tyree Hauser    Admitting Diagnosis  UTI (urinary tract infection) [N39 0]  Altered mental status [R41 82]  Abdominal pain [R10 9]    Problem List  Patient Active Problem List   Diagnosis    Colitis, acute    Dehydration    HTN (hypertension)    Acute kidney [facility] ([facility]    Hypokalemia    Anemia    Hypomagnesemia    Ambulatory dysfunction    Diarrhea    Altered mental status    UTI (urinary tract infection)    Fall    Lymphedema of arm    Acute metabolic encephalopathy       Past Medical History  Past Medical History:   Diagnosis Date    [facility] )     Cancer ([facility] )     Hypertension     Lymphedema of arm     L arm       Past Surgical History  Past Surgical History:   Procedure Laterality Date    BLADDER REPAIR      BREAST SURGERY      HYSTERECTOMY      MASTECTOMY        09/05/18 1528   Note Type   Note type Eval only   Pain Assessment   Pain Assessment No/denies pain   Pain Score No Pain   Home Living   Type of Home Apartment  ([place] [address])   Home Layout Stairs to enter with rails; One level;Performs ADLs on one level; Able to live on main level with bedroom/bathroom  ([address] with rail, flight of steps for meals)   Bathroom Shower/Tub Walk-in shower   Bathroom Toilet Raised   Bathroom Equipment Grab bars in shower; Shower chair   Bathroom Accessibility Accessible   Home Equipment Walker   Additional Comments not using AD at baseline   Prior Function   Level of [place] Independent with ADLs and functional mobility; Needs assistance with IADLs   Lives With Facility staff   Receives Help From Other (Comment)  (Facility staff)   ADL Assistance Independent   IADLs Needs assistance   Falls in the last 6 months 1 to 4   Vocational Retired   Comments (-) driving   Restrictions/Precautions   [place] Bearing Precautions Per Order No   Braces or Orthoses Other (Comment)  (none per patient)   Other Precautions Contact/isolation; Bed Alarm;Chair Alarm;Multiple lines;Limb alert; Fall Risk  (back safety precautions)   General   Additional Pertinent History HPI: 40-year-old female patient presents for evaluation of injury sustained in a fall from standing  Family/Caregiver Present No   Cognition   Overall Cognitive Status WFL   Arousal/Participation Cooperative   Orientation Level Oriented X4   Memory Within functional limits   Following Commands Follows multistep commands with increased time or repetition   Comments Pt agreeable to PT evaluation   RUE Assessment   RUE Assessment WFL   RUE Strength   RUE Overall Strength Within Functional Limits - able to perform ADL tasks with strength   LUE Assessment   LUE Assessment X   LUE Strength   LUE Overall Strength Due to  precautions  (limb alert)   RLE Assessment   RLE Assessment WFL  (Grossly assessed with functional mobility)   Strength RLE   RLE Overall Strength 4/5  (Grossly assessed with functional mobility: at least 4/5)   LLE Assessment   LLE Assessment WFL   Strength LLE   LLE Overall Strength 4/5  (Grossly assessed with functional mobility: at least 4/5)   Coordination   Movements are Fluid and Coordinated 1   Sensation WFL   Light Touch   RLE Light Touch Grossly intact   LLE Light Touch Grossly intact   Bed Mobility   Rolling R 5  Supervision   Additional items Assist x 1; Increased time required;Verbal cues; Bedrails   Supine to Sit 4  Minimal assistance   Additional items Assist x 2; Increased time required;Verbal cues;LE management; Bedrails   Sit to Supine 4  Minimal assistance   Additional items Assist x 1; Increased time required;Verbal cues;LE management   Transfers   Sit to Stand 4  Minimal assistance   Additional items Assist x 1; Increased time required;Verbal cues   Stand to Sit 4  Minimal assistance   Additional items Assist x 1; Increased time required;Verbal cues   Ambulation/Elevation   Gait pattern Short stride; Inconsistent dejan; Improper Weight shift;Narrow LAWANDA; Excessively slow Gait Assistance 4  Minimal assist   Additional items Assist x 1;Verbal cues   Assistive Device None  (reaching for furniture support, pt declined use of walker)   Distance 5 ft x 2   Stair Management Assistance Not tested   Balance   Static Sitting Good   Dynamic Sitting Fair +   Static Standing Fair   Dynamic Standing Fair -   Ambulatory Poor +   Endurance Deficit   Endurance Deficit Yes   Endurance Deficit Description + fatigue, mild lightheadedness with positional changes   Activity Tolerance   Activity Tolerance Patient limited by fatigue   Nurse Made Aware yes, Arsenio Macdonald, RN verbalized pt appropriate for PT evaluation, made aware of session outcomes   Assessment   Prognosis Good   Problem List Decreased endurance; Impaired balance;Decreased strength;Decreased mobility  (back safety precautions)   Assessment Pt is 80 y o  female seen for PT evaluation s/p admit to [facility] on 9/4/2018 w/ Altered mental status  PT consulted to assess pt's functional mobility and d/c needs  Order placed for PT eval and tx, w/ up w/ A order  Performed at least 2 patient identifiers during session: Name and wristband  Comorbidities affecting pt's physical performance at time of assessment include: breast cancer, HTN, lymphedema of left upper extremity  PTA, pt was independent w/ all functional mobility without AD, ambulates community distances and elevations, ambulates household distances, has [address], lives at [facility] and retired  Personal factors affecting pt at time of IE include: inaccessible home environment, stairs to enter home, inability to navigate level surfaces w/o external assistance and positive fall history   Please find objective findings from PT assessment regarding body systems outlined above with impairments and limitations including weakness, impaired balance, decreased endurance, gait deviations, decreased activity tolerance, decreased functional mobility tolerance, fall risk and back safety precautions  The following objective measures performed on IE also reveal limitations: Barthel Index: 50/100 and Modified [place]: 4 (moderate/severe disability)  Pt's clinical presentation is currently unstable/unpredictable evident in need for assist w/ all phases of mobility when usually mobilizing independently, tolerance to only 5 feet of ambulation x 2 trials, and ongoing teatment and evaluation for fall including urinary tract infection  Pt to benefit from continued PT tx to address deficits as defined above and maximize level of functional independent mobility and consistency  From PT/mobility standpoint, recommendation at time of d/c would be STR pending progress in order to facilitate return to PLOF  Barriers to Discharge Inaccessible home environment  ([address])   Goals   Patient Goals To walk independently    STG Expiration Date 09/15/18   Short Term Goal #1 In 7-10 days: Increase bilateral LE strength 1/2 grade to facilitate independent mobility, Perform all bed mobility tasks modified independent to decrease caregiver burden, Perform all transfers modified independent to improve independence, Ambulate > 50 ft  x 2 with least restrictive assistive device modified independent w/o LOB and w/ normalized gait pattern 100% of the time, Navigate 3 stairs x 4 trials with close S with unilateral handrail to facilitate return to previous living environment, Increase all balance 1 grade to decrease risk for falls, Complete exercise program independently, Tolerate 4 hr OOB to faciliate upright tolerance, Complete % of the time and PT provider will perform functional balance assessment to determine fall risk   Treatment Day 0   Plan   Treatment/Interventions ADL retraining;Functional transfer training;LE strengthening/ROM; Elevations; Therapeutic exercise; Endurance training;Patient/family training;Equipment eval/education; Bed mobility;Gait training;Spoke to nursing   PT Frequency Other (Comment)  (3-5x/wk)   Recommendation   Recommendation Short-term skilled PT   Equipment Recommended Other (Comment)  (plan to trial [facility] next session)   PT - OK to Discharge Yes  (when medically cleared; if to STR)   Modified [place] Scale   Modified [place] Scale 4   Barthel Index   Feeding 10   Bathing 0   Grooming Score 5   Dressing Score 5   Bladder Score 10   Bowels Score 10   Toilet Use Score 5   Transfers (Bed/Chair) Score 5   Mobility (Level Surface) Score 0   Stairs Score 0   Barthel Index Score 50           Jeff Jones, PT, DPT

## 2018-09-05 NOTE — PHYSICIAN ADVISOR
Current patient class: Inpatient  The patient is currently on Hospital Day: 2 at [address]      The patient was admitted to the hospital at 2250 on 9/4/18 for the following diagnosis:  UTI (urinary tract infection) [N39 0]  Altered mental status [R41 82]  Abdominal pain [R10 9]       There is documentation in the medical record of an expected length of stay of at least 2 midnights  The patient is therefore expected to satisfy the 2 midnight benchmark and given the 2 midnight presumption is appropriate for INPATIENT ADMISSION  Given this expectation of a satisfying stay, CMS instructs us that the patient is most often appropriate for inpatient admission under part A provided medical necessity is documented in the chart  After review of the relevant documentation, labs, vital signs and test results, the patient is appropriate for INPATIENT ADMISSION  Admission to the hospital as an inpatient is a complex decision making process which requires the practitioner to consider the patients presenting complaint, history and physical examination and all relevant testing  With this in mind, in this case, the patient was deemed appropriate for INPATIENT ADMISSION  After review of the documentation and testing available at the time of the admission I concur with this clinical determination of medical necessity  Rationale is as follows: The patient is a 24-year-old female who was admitted to the hospital as an inpatient on September 4, 2018 with acute metabolic encephalopathy likely secondary to urinary tract infection  She had mild acute kidney injury from baseline  Leukocytosis was present  The patient actually presented as a trauma after she fell at her personal care home  She was sluggish to respond after this episode  Today, the patient is described as cachectic and ill-appearing  She seems generally weak    The patient is expected to remain in the hospital for further care to receive intravenous antibiotics pending urine culture results  She has had significant tachypnea with respiratory rates greater than 30, maximum 43  Today she has mild tachycardia  Additionally, her oxygen saturation has been in the hypoxic range 83 to 88% on room air  The patient also remains on intravenous fluids  She has not stabilized and continued acute hospital management is appropriate in the inpatient setting          The patients vitals on arrival were ED Triage Vitals   Temperature Pulse Respirations Blood Pressure SpO2   09/04/18 1830 09/04/18 1825 09/04/18 1830 09/04/18 1830 09/04/18 1825   97 8 °F (36 6 °C) 93 20 126/60 97 %      Temp Source Heart Rate Source Patient Position - Orthostatic VS BP Location FiO2 (%)   09/04/18 1830 09/04/18 1830 09/04/18 1830 09/04/18 1830 --   Oral Monitor Lying Right arm       Pain Score       09/05/18 0000       No Pain           Past Medical History:   Diagnosis Date    [facility]     Cancer ([facility] )     Hypertension     Lymphedema of arm     L arm     Past Surgical History:   Procedure Laterality Date    BLADDER REPAIR      BREAST SURGERY      HYSTERECTOMY      MASTECTOMY             Consults have been placed to:   None    Vitals:    09/04/18 2230 09/04/18 2230 09/04/18 2334 09/05/18 0700   BP: 142/65 142/65 132/67 130/60   BP Location:   Right arm Right arm   Pulse:  (!) 107 100 100   Resp:  22 18 18   Temp:   98 6 °F (37 °C) 98 6 °F (37 °C)   TempSrc:   Oral Oral   SpO2:  97% 95% 95%   Weight:       Height:           Most recent labs:    Recent Labs      09/05/18   0626   WBC  15 55*   HGB  11 9   HCT  37 2   PLT  143*   K  3 4*   NA  139   CALCIUM  9 2   BUN  39*   CREATININE  1 21       Scheduled Meds:  Current Facility-Administered Medications:  acetaminophen 650 mg Oral Q6H PRN Antonio Ozuna MD    aspirin 81 mg Oral Daily Antonio Ozuna MD    cefTRIAXone 1,000 mg Intravenous Q24H Antonio Ozuna MD Last Rate: 1,000 mg (09/05/18 0300)   felodipine 5 mg Oral Daily Claudine Salinas MD    heparin (porcine) 5,000 Units Subcutaneous [facility] Claudine Salinas MD    sodium chloride 0 9 % with KCl 20 mEq/L 75 mL/hr Intravenous Continuous Claudine Salinas MD Last Rate: 75 mL/hr (09/05/18 0300)     Continuous Infusions:  sodium chloride 0 9 % with KCl 20 mEq/L 75 mL/hr Last Rate: 75 mL/hr (09/05/18 0300)     PRN Meds:   acetaminophen    Surgical procedures (if appropriate):

## 2018-09-05 NOTE — ASSESSMENT & PLAN NOTE
Likely secondary to infection  Patient appears to be having a urinary tract infection    Follow-up cultures

## 2018-09-05 NOTE — H&P
H&P- Kilo Countess 12/6/1922, 80 y o  female MRN: [id number]    Unit/Bed#: -01 Encounter: [id number]    Primary Care Provider: Alisa Larose MD   Date and time admitted to hospital: 9/4/2018  6:17 PM        Acute metabolic encephalopathy   Assessment & Plan    Likely secondary to infection  Patient appears to be having a urinary tract infection  Follow-up cultures  Improving at this time        * Altered mental status   Assessment & Plan    Likely secondary to metabolic encephalopathy related to infection  At the time of my eval   Patient is fully cognizant  UTI (urinary tract infection)   Assessment & Plan    Based on urinary dip and clinical picture  Follow-up cultures  Continue ceftriaxone        HTN (hypertension)   Assessment & Plan    Currently stable  Continue to hold Toprol and hydrochlorothiazide due to elevated creatinine  If stable today resume Toprol        Lymphedema of arm   Assessment & Plan    Patient is status post mastectomy on the left secondary to breast cancer  She is stable lymphedema continued him remain elevated avoided needle sticks        [address] [place] [address] [place]    Patient states she did not lose consciousness there is some controversy over this  She states she felt weak and that she fell down  Patient found to have urinary tract infection as a source for this but will monitor patient overnight  at least to assure no cardiac arrhythmias were playing a role  Trauma workup was negative  She has no focal neurological deficits to suggest that she needs MRI  CONSTANCE Kidney: as evidenced by baseline creatinine 0 93 in January  Gentle hydration   F/u labs      VTE Prophylaxis: Heparin  / sequential compression device   Code Status:  Do not intubate but perform full CPR  POLST: POLST form is not discussed and not completed at this time    Discussion with family:  Nephew not present will need to be updated    Anticipated Length of Stay:  Patient will be admitted on an Inpatient basis with an anticipated length of stay of  greater than 2 midnights  Justification for Hospital Stay:  Treatment and evaluation for fall including urinary tract infection    Total Time for Visit, including Counseling / Coordination of Care: 1 hour  Greater than 50% of this total time spent on direct patient counseling and coordination of care  Chief Complaint:   Fall with altered mental status    History of Present Illness:    Isidoro Lott is a 80 y o  female who presents with who lives at [facility]  Patient evidently went to stand and felt very weak she states that she fell striking her head  Patient was brought to the emergency room a full trauma workup was undertaken there no evidence for fracture of the neck or head  Patient was found to have slight alteration in mentation at that time and discovered to have what appears to be urinary tract infection on urine dip  Patient's mentation improved with hydration and antibiotic therapy  At the time of my interview she was awake alert oriented and could give me the details of her medical health history  Patient felt that she did not lose consciousness but she just fell    Review of Systems:    Review of Systems   Constitutional: Negative for appetite change, chills, diaphoresis, fatigue, fever and unexpected weight change  HENT: Negative for dental problem, ear discharge, ear pain, facial swelling, hearing loss, mouth sores, nosebleeds and rhinorrhea  Eyes: Negative for pain, discharge, redness and visual disturbance  Respiratory: Negative for cough, chest tightness, shortness of breath and wheezing  Cardiovascular: Negative for chest pain, palpitations and leg swelling  Gastrointestinal: Negative for abdominal distention, abdominal pain, blood in stool, constipation, diarrhea, nausea and vomiting  Endocrine: Negative for cold intolerance, heat intolerance, polydipsia, polyphagia and polyuria  Genitourinary: Negative for dysuria, frequency, hematuria and urgency  Musculoskeletal: Negative for arthralgias and back pain  Skin: Negative for rash and wound  Neurological: Negative for dizziness, weakness, light-headedness, numbness and headaches  Confusion on admission  Hematological: Negative for adenopathy  Does not bruise/bleed easily  Psychiatric/Behavioral: Negative for confusion and dysphoric mood  Past Medical and Surgical History:     Past Medical History:   Diagnosis Date    [facility] )     Cancer ([facility] )     Hypertension     Lymphedema of arm     L arm       Past Surgical History:   Procedure Laterality Date    BLADDER REPAIR      BREAST SURGERY      HYSTERECTOMY      MASTECTOMY         Meds/Allergies:    Prior to Admission medications    Medication Sig Start Date End Date Taking? Authorizing Provider   acetaminophen (TYLENOL) 500 mg tablet Take 500 mg by mouth every 6 (six) hours as needed for mild pain    Historical Provider, MD   aspirin 81 mg chewable tablet Chew 1 tablet daily 6/5/14   Historical Provider, MD   cholestyramine (QUESTRAN) 4 g packet Take one packet daily as needed for loose stools   Hold for constipation/normal bowel pattern 4/19/18   RED Brennan   docusate sodium (COLACE) 100 mg capsule Take 1 capsule (100 mg total) by mouth 2 (two) times a day 4/9/18   Sarah Shay MD   felodipine (PLENDIL) 5 mg 24 hr tablet Take 1 tablet by mouth daily 6/5/14   Historical Provider, MD   LACTOBACILLUS PO Take by mouth    Historical Provider, MD   lisinopril-hydrochlorothiazide (PRINZIDE,ZESTORETIC) 20-25 MG per tablet Take 1 tablet by mouth daily 6/5/14   Historical Provider, MD   metoprolol succinate (TOPROL-XL) 50 mg 24 hr tablet Take 1 tablet by mouth daily 6/5/14   Historical Provider, MD   ondansetron (ZOFRAN) 4 mg tablet Take 1 tablet by mouth every 6 (six) hours as needed for nausea or vomiting for up to 6 doses 6/4/17   Kassie Carter DO polyethylene glycol (MIRALAX) 17 g packet Take 17 g by mouth daily 4/10/18   Sarah Shay MD   Pseudoephedrine-APAP  MG TABS Take by mouth    Historical Provider, MD   sodium phosphate-biphosphate (FLEET) 7-19 g 118 mL enema Insert 1 enema into the rectum    Historical Provider, MD   traZODone (DESYREL) 50 mg tablet Take 50 mg by mouth daily at bedtime    Historical Provider, MD     I have reviewed home medications with patient personally  Allergies: No Known Allergies    Social History:     Marital Status:    Occupation:  Patient used to work for [employer] in [place]  Patient Pre-hospital Living Situation:  Lives at a personal care  Patient Pre-hospital Level of Mobility:  Ambulates under her own weight with assistance  Patient Pre-hospital Diet Restrictions:   Substance Use History:   History   Alcohol Use No     History   Smoking Status    Never Smoker   Smokeless Tobacco    Never Used     History   Drug Use No       Family History:    History reviewed  No pertinent family history      Physical Exam:     Vitals:   Blood Pressure: 130/60 (09/05/18 0700)  Pulse: 100 (09/05/18 0700)  Temperature: 98 6 °F (37 °C) (09/05/18 0700)  Temp Source: Oral (09/05/18 0700)  Respirations: 18 (09/05/18 0700)  Height: 5' 2" (157 5 cm) (09/04/18 1830)  Weight - Scale: 48 1 kg (106 lb 0 7 oz) (09/04/18 1830)  SpO2: 95 % (09/05/18 0700)    Physical Exam    Generally frail elderly female no acute distress lying in bed she is normocephalic atraumatic pupils equal round and reactive to light extraocular muscles intact mucous membranes are moist neck is supple there is no JVD no lymph nodes no carotid bruits chest is decreased but clear to auscultation is no rhonchi rales or wheezes  Cardiovascular regular rate rhythm positive S1 and S2 no S3-S4 murmur or gallop  Abdomen soft nontender nondistended with positive bowel sounds no hepatosplenomegaly no guarding or rebound  Neurologically patient is awake alert oriented cranial nerves 2-12 appear to be intact    Additional Data:     Lab Results: I have personally reviewed pertinent films in PACS and I have personally reviewed pertinent films in PACS with a Radiologist       Results from last 7 days  Lab Units 18  0626   WBC Thousand/uL 15 55*   HEMOGLOBIN g/dL 11 9   HEMATOCRIT % 37 2   PLATELETS Thousands/uL 143*   NEUTROS PCT % 82*   LYMPHS PCT % 11*   MONOS PCT % 7   EOS PCT % 0       Results from last 7 days  Lab Units 18  0626   SODIUM mmol/L 139   POTASSIUM mmol/L 3 4*   CHLORIDE mmol/L 103   CO2 mmol/L 30   BUN mg/dL 39*   CREATININE mg/dL 1 21   CALCIUM mg/dL 9 2                   Imaging: I have personally reviewed pertinent reports  XR pelvis ap only 1 or 2 vw   Final Result by Octavio Palma MD ([id number])      No evidence of acute pelvic fracture  Workstation performed: [id number]         XR chest 2 views   Final Result by Mp Ibarra MD ([id number])      Stable cardiomegaly and left apical scarring  No acute cardiopulmonary disease  Workstation performed: [id number]         TRAUMA - CT spine cervical wo contrast   Final Result by Lucy Williamson DO ([id number])      Osteopenia and degenerative change with no acute osseous abnormality  Workstation performed: [id number]         TRAUMA - CT head wo contrast   Final Result by Lucy Williamson DO ([id number])      No acute intracranial abnormality  Microangiopathic changes  Workstation performed: [id number]             EKG, Pathology, and Other Studies Reviewed on Admission:   · EK beats per minute with possible diffuse nonspecific ST T wave changes  Allscripts / Epic Records Reviewed: Yes     ** Please Note: This note has been constructed using a voice recognition system   **

## 2018-09-06 PROBLEM — R63.6 UNDERWEIGHT: Status: ACTIVE | Noted: 2018-09-06

## 2018-09-06 LAB
ANION GAP SERPL CALCULATED.3IONS-SCNC: 10 MMOL/L (ref 4–13)
BUN SERPL-MCNC: 19 MG/DL (ref 5–25)
CALCIUM SERPL-MCNC: 9.6 MG/DL (ref 8.3–10.1)
CHLORIDE SERPL-SCNC: 103 MMOL/L (ref 100–108)
CO2 SERPL-SCNC: 25 MMOL/L (ref 21–32)
CREAT SERPL-MCNC: 0.98 MG/DL (ref 0.6–1.3)
ERYTHROCYTE [DISTWIDTH] IN BLOOD BY AUTOMATED COUNT: 12.6 % (ref 11.6–15.1)
GFR SERPL CREATININE-BSD FRML MDRD: 49 ML/MIN/1.73SQ M
GLUCOSE SERPL-MCNC: 131 MG/DL (ref 65–140)
HCT VFR BLD AUTO: 42.3 % (ref 34.8–46.1)
HGB BLD-MCNC: 13.5 G/DL (ref 11.5–15.4)
MCH RBC QN AUTO: 29.1 PG (ref 26.8–34.3)
MCHC RBC AUTO-ENTMCNC: 31.9 G/DL (ref 31.4–37.4)
MCV RBC AUTO: 91 FL (ref 82–98)
MRSA NOSE QL CULT: NORMAL
PLATELET # BLD AUTO: 157 THOUSANDS/UL (ref 149–390)
PMV BLD AUTO: 11.6 FL (ref 8.9–12.7)
POTASSIUM SERPL-SCNC: 3.6 MMOL/L (ref 3.5–5.3)
RBC # BLD AUTO: 4.64 MILLION/UL (ref 3.81–5.12)
SODIUM SERPL-SCNC: 138 MMOL/L (ref 136–145)
WBC # BLD AUTO: 10.81 THOUSAND/UL (ref 4.31–10.16)

## 2018-09-06 PROCEDURE — 93005 ELECTROCARDIOGRAM TRACING: CPT

## 2018-09-06 PROCEDURE — 97116 GAIT TRAINING THERAPY: CPT

## 2018-09-06 PROCEDURE — 85027 COMPLETE CBC AUTOMATED: CPT | Performed by: NURSE PRACTITIONER

## 2018-09-06 PROCEDURE — 99232 SBSQ HOSP IP/OBS MODERATE 35: CPT | Performed by: NURSE PRACTITIONER

## 2018-09-06 PROCEDURE — 80048 BASIC METABOLIC PNL TOTAL CA: CPT | Performed by: NURSE PRACTITIONER

## 2018-09-06 RX ORDER — METOPROLOL TARTRATE 5 MG/5ML
5 INJECTION INTRAVENOUS EVERY 6 HOURS PRN
Status: DISCONTINUED | OUTPATIENT
Start: 2018-09-06 | End: 2018-09-10 | Stop reason: HOSPADM

## 2018-09-06 RX ADMIN — FELODIPINE 5 MG: 2.5 TABLET, FILM COATED, EXTENDED RELEASE ORAL at 10:26

## 2018-09-06 RX ADMIN — HEPARIN SODIUM 5000 UNITS: 5000 INJECTION, SOLUTION INTRAVENOUS; SUBCUTANEOUS at 05:06

## 2018-09-06 RX ADMIN — HEPARIN SODIUM 5000 UNITS: 5000 INJECTION, SOLUTION INTRAVENOUS; SUBCUTANEOUS at 21:26

## 2018-09-06 RX ADMIN — METOPROLOL TARTRATE 5 MG: 1 INJECTION, SOLUTION INTRAVENOUS at 13:42

## 2018-09-06 RX ADMIN — ASPIRIN 81 MG 81 MG: 81 TABLET ORAL at 10:26

## 2018-09-06 RX ADMIN — SODIUM CHLORIDE 500 ML: 0.9 INJECTION, SOLUTION INTRAVENOUS at 13:39

## 2018-09-06 RX ADMIN — HEPARIN SODIUM 5000 UNITS: 5000 INJECTION, SOLUTION INTRAVENOUS; SUBCUTANEOUS at 13:52

## 2018-09-06 RX ADMIN — CEFTRIAXONE 1000 MG: 1 INJECTION, POWDER, FOR SOLUTION INTRAMUSCULAR; INTRAVENOUS at 02:50

## 2018-09-06 NOTE — ASSESSMENT & PLAN NOTE
· Urinalysis with microscopic noted elevated WBCs 10-20 and innumerable bacteria  · Preliminary urine culture is showing greater than 100,000 growth Klebsiella pneumonia aerococcus continue current antibiotic await sensitivity  · Patient became tachycardic anxious patient given a 1 time dose bolus of normal saline 500 cc tachycardia improved continue telemetry for now repeat CBC now  · Continue IV Rocephin  · PT OT evaluation pending based on their findings patient likely stable for discharge can be transition to Keflex

## 2018-09-06 NOTE — PLAN OF CARE
Problem: Potential for Falls  Goal: Patient will remain free of falls  INTERVENTIONS:  - Assess patient frequently for physical needs  -  Identify cognitive and physical deficits and behaviors that affect risk of falls  -  [place] fall precautions as indicated by assessment   - Educate patient/family on patient safety including physical limitations  - Instruct patient to call for assistance with activity based on assessment  - Modify environment to reduce risk of injury  - Consider OT/PT consult to assist with strengthening/mobility    Outcome: Progressing      Problem: SAFETY ADULT  Goal: Patient will remain free of falls  INTERVENTIONS:  - Assess patient frequently for physical needs  -  Identify cognitive and physical deficits and behaviors that affect risk of falls    -  [place] fall precautions as indicated by assessment   - Educate patient/family on patient safety including physical limitations  - Instruct patient to call for assistance with activity based on assessment  - Modify environment to reduce risk of injury  - Consider OT/PT consult to assist with strengthening/mobility    Outcome: Progressing

## 2018-09-06 NOTE — PROGRESS NOTES
Patient HR noted to be 197 on telemetry  Patient walking with PT and walked up the steps, otherwise asymptomatic  Ted Landau made aware- see new orders

## 2018-09-06 NOTE — ASSESSMENT & PLAN NOTE
· Present on admission likely in setting of UTI  · Likely secondary to metabolic encephalopathy related to UTI  · Patient is alert and oriented currently x3  · Continue to monitor

## 2018-09-06 NOTE — ASSESSMENT & PLAN NOTE
· Patient is status post mastectomy on the left secondary to breast cancer      · Chronic appears stable

## 2018-09-06 NOTE — PHYSICAL THERAPY NOTE
Physical Therapy Treatment Note:  Triston Tran, PT     09/06/18 1714   Pain Assessment   Pain Assessment No/denies pain   Pain Score No Pain   Restrictions/Precautions   Weight Bearing Precautions Per Order No   Braces or Orthoses Other (Comment)  (none)   Other Precautions Contact/isolation; Bed Alarm; Chair Alarm;Telemetry; Fall Risk   General   Chart Reviewed Yes   Response to Previous Treatment Patient with no complaints from previous session  Family/Caregiver Present No   Cognition   Overall Cognitive Status WFL   Arousal/Participation Alert; Responsive; Cooperative   Attention Within functional limits   Orientation Level Oriented X4   Memory Within functional limits   Following Commands Follows multistep commands without difficulty   Comments Pt agreeable to participation  (Pt seemed to have very good insight    )   Bed Mobility   Supine to Sit 6  Modified independent   Additional items Increased time required   Sit to Supine 6  Modified independent   Additional items Increased time required   Transfers   Sit to Stand 6  Modified independent   Stand to Sit 6  Modified independent   Additional Comments No AD used for upright mobility   Ambulation/Elevation   Gait pattern Excessively slow; Short stride   Gait Assistance 5  Supervision   Additional items Assist x 1   Assistive Device None   Distance 350'   Stair Management Assistance 5  Supervision   Additional items Assist x 1  (No signs of SOB/fatigue observed  )   Stair Management Technique One rail L  (Pt safely alternated between non-reciprocal and reciprocal  )   Number of Stairs 13   Balance   Static Sitting Good   Dynamic Sitting Fair +   Static Standing Fair   Dynamic Standing Fair   Ambulatory Fair   Endurance Deficit   Endurance Deficit (Pt did not appear to have an endurance deficit  )   Endurance Deficit Description However upon returning to pt's room, HR was 200      Activity Tolerance   Activity Tolerance (Pt appeared to tolerate well but had elevated HR as per RN  )   Nurse Made Aware RN consented to allow pt to participate in PT rx  RN disconnected IV for ambulation on stairs  Assessment   Prognosis (Good in re: to mobility/balance/safety  Not good in re: HR )   Problem List Decreased endurance;Decreased mobility   Assessment Pt seen for physical therapy treatment consisting of completion of bed mobility activities, ambulation on level surfaces and on 13 steps  Pt appeared to tolerate entire rx very well with no obvious signs of SOB, excessive fatigue, etc  However, upon return to pt's room, RN informed PT that pt's HR was 200  In re: to mobility, safety, balance, and ambulation, recommendation would have been for return to assisted living setting with home PT rx's and pacing of all activities, however recommendation may need to be based more on MD's medical recommendations( in re: pt's cardiac status at this time in light of pt's elevated HR  )  Will continue to rely on MD for recommendations for activities for future rx's  Barriers to Discharge (Has 14 steps to get to all meals )   Goals   Patient Goals To go home  STG Expiration Date 09/16/18   Short Term Goal #1 Pt is independent with completion of all transfers  Pt independently ambulates 400' with LRD, fair + balance, good safety and vital signs WNL's  Pt ambulates up/down 14 steps independently with 1 railing, fair+ balance, and vital signs WNL's  Treatment Day 1   Plan   Treatment/Interventions ADL retraining;Functional transfer training;Patient/family training;Equipment eval/education; Bed mobility;Gait training;Spoke to nursing   Progress Progressing toward goals   PT Frequency Other (Comment)  (3-5x/wk)   Recommendation   Recommendation Other (Comment)  (Will depend on MD rec's based on medical status  See assess )   Equipment Recommended (Pt did not have the need for AD today    )   PT - OK to Discharge (Depends on MD's rec's  )

## 2018-09-06 NOTE — ASSESSMENT & PLAN NOTE
· Under weight in adulthood BMI 19 4 weight has been stable x4 months  · Monitor ongoing intake and assistance with meals as needed

## 2018-09-06 NOTE — ASSESSMENT & PLAN NOTE
· Likely secondary to infection      · Preliminary urine culture is showing growth of gram-negative rods greater than 100,000

## 2018-09-06 NOTE — ASSESSMENT & PLAN NOTE
· Patient reports exhaustion she was out with her nephew the day of the event    · Patient reports fall however denies loss of consciousness  · CT head negative for acute findings  · Patient is overall clinically improved  · PT OT evaluation for discharge planning

## 2018-09-06 NOTE — PROGRESS NOTES
Progress Note - Umberto Langley 12/6/1922, 80 y o  female MRN: [id number]    Unit/Bed#: -01 Encounter: [id number]    Primary Care Provider: Navi Tovar MD   Date and time admitted to hospital: 9/4/2018  6:17 PM        * Altered mental status   Assessment & Plan    · Present on admission likely in setting of UTI  · Likely secondary to metabolic encephalopathy related to UTI  · Patient is alert and oriented currently x3  · Continue to monitor          Acute metabolic encephalopathy   Assessment & Plan    · Likely secondary to infection  · Preliminary urine culture is showing growth of gram-negative rods greater than 100,000        UTI (urinary tract infection)   Assessment & Plan    · Urinalysis with microscopic noted elevated WBCs 10-20 and innumerable bacteria  · Preliminary urine culture is showing greater than 100,000 growth Klebsiella pneumonia aerococcus continue current antibiotic await sensitivity  · Patient became tachycardic anxious patient given a 1 time dose bolus of normal saline 500 cc tachycardia improved continue telemetry for now repeat CBC now  · Continue IV Rocephin  · PT OT evaluation pending based on their findings patient likely stable for discharge can be transition to [facility]         Acute kidney injury [facility])   Assessment & Plan    · Present on admission evident by creatinine of 1 62  · Baseline appears to be 0 6-0 8  · Creatinine has improved with IV hydration currently resolving at 1 21  · Continue to monitor on current regimen  · BMP in the a m  Fall   Assessment & Plan    · Patient reports exhaustion she was out with her nephew the day of the event  · Patient reports fall however denies loss of consciousness  · CT head negative for acute findings  · Patient is overall clinically improved  · PT OT evaluation for discharge planning        HTN (hypertension)   Assessment & Plan    · Currently stable      · Blood pressures were soft can resume Toprol with parameters  · Continue to hold lisinopril HCTZ, can resume upon discharge        Lymphedema of arm   Assessment & Plan    · Patient is status post mastectomy on the left secondary to breast cancer  · Chronic appears stable        Underweight   Assessment & Plan    · Under weight in adulthood BMI 19 4 weight has been stable x4 months  · Monitor ongoing intake and assistance with meals as needed            VTE Pharmacologic Prophylaxis:   Pharmacologic: Heparin  Mechanical VTE Prophylaxis in Place: Yes    Patient Centered Rounds: I have performed bedside rounds with nursing staff today  Discussions with Specialists or Other Care Team Provider:  PT OT     Education and Discussions with Family / Patient:  Patient attempt to reach family non successful    Time Spent for Care: 35 minutes  More than 50% of total time spent on counseling and coordination of care as described above  Current Length of Stay: 2 day(s)    Current Patient Status: Inpatient   Certification Statement: The patient will continue to require additional inpatient hospital stay due to Need for further acute intervention for UTI acute encephalopathy    Discharge Plan:  Pending urine culture result anticipate discharge in next 24 hours    Code Status: Level 1 - Full Code      Subjective:   Patient is anxious states she does not feel like she is being cared for here patient discussion and reassurance given patient noted to have increased heart rate reports feeling anxious  Patient denies fevers chills chest pain shortness of breath and has no other generalized complaints at this time    Objective:     Vitals:   Temp (24hrs), Av 7 °F (37 1 °C), Min:97 9 °F (36 6 °C), Max:99 2 °F (37 3 °C)    HR:  [] 88  Resp:  [17-18] 18  BP: (100-138)/(40-60) 138/60  SpO2:  [93 %-99 %] 99 %  Body mass index is 19 4 kg/m²  Input and Output Summary (last 24 hours):        Intake/Output Summary (Last 24 hours) at 18 8448  Last data filed at 09/06/18 1300   Gross per 24 hour   Intake              730 ml   Output              850 ml   Net             -120 ml       Physical Exam:     Physical Exam   Constitutional: She is oriented to person, place, and time  She appears lethargic  HENT:   Head: Normocephalic and atraumatic  Eyes: Conjunctivae and EOM are normal    Neck: Normal range of motion  Cardiovascular: Normal rate, regular rhythm and normal heart sounds  Pulmonary/Chest: Effort normal and breath sounds normal    Abdominal: Soft  Bowel sounds are normal    Musculoskeletal: Normal range of motion  She exhibits edema  Chronic lymphedema left arm   Neurological: She is oriented to person, place, and time  She appears lethargic  Skin: Skin is warm and dry  Psychiatric: She has a normal mood and affect  Her behavior is normal          Additional Data:     Labs:      Results from last 7 days  Lab Units 09/05/18  0626   WBC Thousand/uL 15 55*   HEMOGLOBIN g/dL 11 9   HEMATOCRIT % 37 2   PLATELETS Thousands/uL 143*   NEUTROS PCT % 82*   LYMPHS PCT % 11*   MONOS PCT % 7   EOS PCT % 0       Results from last 7 days  Lab Units 09/05/18  0626   SODIUM mmol/L 139   POTASSIUM mmol/L 3 4*   CHLORIDE mmol/L 103   CO2 mmol/L 30   BUN mg/dL 39*   CREATININE mg/dL 1 21   CALCIUM mg/dL 9 2                     * I Have Reviewed All Lab Data Listed Above  * Additional Pertinent Lab Tests Reviewed:  All Labs Within Last 24 Hours Reviewed        Recent Cultures (last 7 days):       Results from last 7 days  Lab Units 09/04/18  2228   URINE CULTURE  >100,000 cfu/ml Klebsiella pneumoniae*  >100,000 cfu/ml Aerococcus urinae*       Last 24 Hours Medication List:     Current Facility-Administered Medications:  acetaminophen 650 mg Oral Q6H PRN Taylor Renae MD    aspirin 81 mg Oral Daily Taylor Renae MD    cefTRIAXone 1,000 mg Intravenous Q24H Taylor Renae MD Last Rate: 1,000 mg (09/06/18 0250)   felodipine 5 mg Oral Daily Taylor Renae MD heparin (porcine) 5,000 Units Subcutaneous Q8H [facility] Vikki Fournier MD    metoprolol 5 mg Intravenous Q6H PRN [employer]    sodium chloride 500 mL Intravenous Once [employer] Sales Last Rate: 500 mL (09/06/18 1339)   sodium chloride 0 9 % with KCl 20 mEq/L 100 mL/hr Intravenous Continuous [employer] Last Rate: 100 mL/hr (09/06/18 1447)        Today, Patient Was Seen By: RED Sales    ** Please Note: Dictation voice to text software may have been used in the creation of this document   **

## 2018-09-06 NOTE — PLAN OF CARE

## 2018-09-06 NOTE — ASSESSMENT & PLAN NOTE
· Present on admission evident by creatinine of 1 62  · Baseline appears to be 0 6-0 8  · Creatinine has improved with IV hydration currently resolving at 1 21  · Continue to monitor on current regimen  · BMP in the a m

## 2018-09-06 NOTE — PLAN OF CARE
Problem: PHYSICAL THERAPY ADULT  Goal: Performs mobility at highest level of function for planned discharge setting  See evaluation for individualized goals  Treatment/Interventions: ADL retraining, Functional transfer training, LE strengthening/ROM, Elevations, Therapeutic exercise, Endurance training, Patient/family training, Equipment eval/education, Bed mobility, Gait training, Spoke to nursing  Equipment Recommended: Other (Comment) (plan to trial [employer] next session)       See flowsheet documentation for full assessment, interventions and recommendations  Outcome: Progressing  Prognosis:  (Good in re: to mobility/balance/safety  Not good in re: HR )  Problem List: Decreased endurance, Decreased mobility  Assessment: Pt seen for physical therapy treatment consisting of completion of bed mobility activities, ambulation on level surfaces and on 13 steps  Pt appeared to tolerate entire rx very well with no obvious signs of SOB, excessive fatigue, etc  However, upon return to pt's room, RN informed PT that pt's HR was 200  In re: to mobility, safety, balance, and ambulation, recommendation would have been for return to assisted living setting with home PT rx's and pacing of all activities, however recommendation may need to be based more on MD's medical recommendations( in re: pt's cardiac status at this time in light of pt's elevated HR  )  Will continue to rely on MD for recommendations for activities for future rx's  Barriers to Discharge: (S)  (Has 14 steps to get to all meals )     Recommendation: Other (Comment) (Will depend on MD rec's based on medical status  See assess  )     PT - OK to Discharge:  (Depends on MD's rec's  )    See flowsheet documentation for full assessment

## 2018-09-06 NOTE — ASSESSMENT & PLAN NOTE
· Currently stable      · Blood pressures were soft can resume Toprol with parameters  · Continue to hold lisinopril HCTZ, can resume upon discharge

## 2018-09-06 NOTE — SOCIAL WORK
Cm met with patient at bedside, patient alert and oriented during interview  Patient reports being a residing of [facility] and has resided there for one year now  Patient reports being completely independent with ADL's, no dme use, vna or str  Patient reports all medications are administered by [facility] and reports seeing a MD while in [facility]  Patient reports having one daughter how passed away 3 years ago and stated her nephew Tyler Gómez is now her POA and reports he can transport her back to [facility] when stable  Cm contacted [facility] and spoke with   Rod who confirmed patient has been a resident for one year and was completely independent with her ADL's  Cm informed patient is seen by MD Whitney and medications are administered and dispensed at the facility  Luz Petit stated patient does not need a bed side assessment if she continues to be independent  Patient is aware of her medicare rights and desires to discharge to [facility] when stable  CM reviewed discharge planning process including the following: identifying help at home, patient preference for discharge planning needs, pharmacy preference, and availability of treatment team to discuss questions or concerns patient and/or family may have regarding understanding medications and recognizing signs and symptoms once discharged  CM also encouraged patient to follow up with all recommended appointments after discharge  Patient advised of importance for patient and family to participate in managing patients medical well being  CM name and role reviewed  Discharge Checklist reviewed and CM will continue to monitor for progress toward discharge goals in nursing and provider rounds

## 2018-09-07 LAB
ATRIAL RATE: 0 BPM
ATRIAL RATE: 116 BPM
BACTERIA UR CULT: ABNORMAL
BACTERIA UR CULT: ABNORMAL
QRS AXIS: 0 DEGREES
QRS AXIS: 80 DEGREES
QRSD INTERVAL: 0 MS
QRSD INTERVAL: 78 MS
QT INTERVAL: 0 MS
QT INTERVAL: 334 MS
QTC INTERVAL: 0 MS
QTC INTERVAL: 464 MS
T WAVE AXIS: -14 DEGREES
T WAVE AXIS: 0 DEGREES
VENTRICULAR RATE: 0 BPM
VENTRICULAR RATE: 116 BPM

## 2018-09-07 PROCEDURE — 99232 SBSQ HOSP IP/OBS MODERATE 35: CPT | Performed by: FAMILY MEDICINE

## 2018-09-07 PROCEDURE — 93010 ELECTROCARDIOGRAM REPORT: CPT | Performed by: INTERNAL MEDICINE

## 2018-09-07 RX ADMIN — CEFTRIAXONE 1000 MG: 1 INJECTION, POWDER, FOR SOLUTION INTRAMUSCULAR; INTRAVENOUS at 01:43

## 2018-09-07 RX ADMIN — HEPARIN SODIUM 5000 UNITS: 5000 INJECTION, SOLUTION INTRAVENOUS; SUBCUTANEOUS at 05:44

## 2018-09-07 RX ADMIN — ASPIRIN 81 MG 81 MG: 81 TABLET ORAL at 09:03

## 2018-09-07 RX ADMIN — METOPROLOL TARTRATE 25 MG: 25 TABLET ORAL at 21:57

## 2018-09-07 RX ADMIN — SODIUM CHLORIDE AND POTASSIUM CHLORIDE 100 ML/HR: .9; .15 SOLUTION INTRAVENOUS at 02:01

## 2018-09-07 RX ADMIN — CEFAZOLIN SODIUM 1000 MG: 1 SOLUTION INTRAVENOUS at 19:30

## 2018-09-07 RX ADMIN — HEPARIN SODIUM 5000 UNITS: 5000 INJECTION, SOLUTION INTRAVENOUS; SUBCUTANEOUS at 16:21

## 2018-09-07 RX ADMIN — METOPROLOL TARTRATE 5 MG: 1 INJECTION, SOLUTION INTRAVENOUS at 01:59

## 2018-09-07 RX ADMIN — METOPROLOL TARTRATE 25 MG: 25 TABLET ORAL at 11:29

## 2018-09-07 RX ADMIN — HEPARIN SODIUM 5000 UNITS: 5000 INJECTION, SOLUTION INTRAVENOUS; SUBCUTANEOUS at 21:59

## 2018-09-07 RX ADMIN — FELODIPINE 5 MG: 2.5 TABLET, FILM COATED, EXTENDED RELEASE ORAL at 09:04

## 2018-09-07 RX ADMIN — CEFAZOLIN SODIUM 1000 MG: 1 SOLUTION INTRAVENOUS at 11:31

## 2018-09-07 NOTE — ASSESSMENT & PLAN NOTE
· Present on admission evident by creatinine of 1 62  · Baseline appears to be 0 6-0 8  · Creatinine has improved with IV hydration currently resolving at 1 21  · This has resolved    Stop the IV fluids

## 2018-09-07 NOTE — SOCIAL WORK
Cm informed patient has a tentative discharge for 09/08/18 returning to [facility]  Cm spoke with patient regarding this matter, patient remains agreeable to returning to [place] and reports her nephew is able to transport back to [facility]  Cm reviewed patient medicare letter, patient reports understanding her rights and report no objections to the discharge  Patient provided cm with consent to speak with her nephew  Cm contacted [facility] and spoke with Román Mata  [facility] confirmed patient is able to return on Saturday if she is medically stable and requesting rx for new medication this day  Cm informed patient will discharge on Keflex 500 mg, rx faxed to [facility] as directed  Cm contacted patient nephew Taz Whatley via phone who confirmed he will transport patient back to [place] [facility] when she is medically stable  No reported needs at this time or objections to patient discharge

## 2018-09-07 NOTE — PROGRESS NOTES
Progress Note - Umberto Langley 12/6/1922, 80 y o  female MRN: [id number]    Unit/Bed#: -01 Encounter: [id number]    Primary Care Provider: Sagar Herron MD   Date and time admitted to hospital: 9/4/2018  6:17 PM        Acute metabolic encephalopathy   Assessment & Plan    · Likely secondary to infection  · Preliminary urine culture is showing growth of gram-negative rods greater than 100,000  Changed to Ancef  Lymphedema of arm   Assessment & Plan    · Patient is status post mastectomy on the left secondary to breast cancer  · Chronic appears stable        Fall   Assessment & Plan    · Patient reports exhaustion she was out with her nephew the day of the event  · Patient reports fall however denies loss of consciousness  · CT head negative for acute findings  · Patient is overall clinically improved  · PT OT evaluation for discharge planning  Patient might be able to be discharged tomorrow  Acute kidney injury [facility])   Assessment & Plan    · Present on admission evident by creatinine of 1 62  · Baseline appears to be 0 6-0 8  · Creatinine has improved with IV hydration currently resolving at 1 21  · This has resolved  Stop the IV fluids        HTN (hypertension)   Assessment & Plan    · Currently stable  · Blood pressures were soft can resume Toprol with parameters  · Added beta-blocker  Heart rate was on the higher side            VTE Pharmacologic Prophylaxis:   Pharmacologic: Heparin  Mechanical VTE Prophylaxis in Place: Yes    Patient Centered Rounds: I have performed bedside rounds with nursing staff today  Discussions with Specialists or Other Care Team Provider:  The case management    Education and Discussions with Family / Patient:  Left message for the nephew    Time Spent for Care: 20 minutes  More than 50% of total time spent on counseling and coordination of care as described above      Current Length of Stay: 3 day(s)    Current Patient Status: Inpatient Certification Statement: The patient will continue to require additional inpatient hospital stay due to Needing monitoring of her heart rate since it went up into the 170s last night    Discharge Plan:  Anticipate discharge tomorrow    Code Status: Level 1 - Full Code      Subjective:   Patient seen examined  She has no complaints today  States she is feeling little bit better  Objective:     Vitals:   Temp (24hrs), Av 7 °F (37 1 °C), Min:98 1 °F (36 7 °C), Max:99 2 °F (37 3 °C)    HR:  [] 98  Resp:  [17-18] 17  BP: (116-158)/(54-85) 158/85  SpO2:  [92 %-98 %] 94 %  Body mass index is 19 4 kg/m²  Input and Output Summary (last 24 hours): Intake/Output Summary (Last 24 hours) at 18 1028  Last data filed at 18 0401   Gross per 24 hour   Intake             1590 ml   Output             2300 ml   Net             -710 ml       Physical Exam:     Physical Exam  (   General Appearance:    Alert, cooperative, no distress, appears stated age                               Lungs:     Clear to auscultation bilaterally, respirations unlabored       Heart:    Regular rate and rhythm, S1 and S2 normal, no murmur, rub    or gallop   Abdomen:     Soft, non-tender, bowel sounds active all four quadrants,     no masses, no organomegaly           Extremities:   Extremities normal, atraumatic, no cyanosis or edema       Additional Data:     Labs:      Results from last 7 days  Lab Units 18  1617 18  0626   WBC Thousand/uL 10 81* 15 55*   HEMOGLOBIN g/dL 13 5 11 9   HEMATOCRIT % 42 3 37 2   PLATELETS Thousands/uL 157 143*   NEUTROS PCT %  --  82*   LYMPHS PCT %  --  11*   MONOS PCT %  --  7   EOS PCT %  --  0       Results from last 7 days  Lab Units 18  1617   SODIUM mmol/L 138   POTASSIUM mmol/L 3 6   CHLORIDE mmol/L 103   CO2 mmol/L 25   BUN mg/dL 19   CREATININE mg/dL 0 98   CALCIUM mg/dL 9 6                     * I Have Reviewed All Lab Data Listed Above    * Additional Pertinent Lab Tests Reviewed: [place] 66 Admission Reviewed      Recent Cultures (last 7 days):       Results from last 7 days  Lab Units 09/04/18  2228   URINE CULTURE  >100,000 cfu/ml Klebsiella pneumoniae*  >100,000 cfu/ml Aerococcus urinae*       Last 24 Hours Medication List:     Current Facility-Administered Medications:  acetaminophen 650 mg Oral Q6H PRN Vikki Fournier MD    aspirin 81 mg Oral Daily Vikki Fournier MD    cefTRIAXone 1,000 mg Intravenous Q24H Vikki Fournier MD Last Rate: 1,000 mg (09/07/18 0143)   heparin (porcine) 5,000 Units Subcutaneous Q8H [address] Vikki Fournier MD    metoprolol 5 mg Intravenous Q6H PRN [employer]    metoprolol tartrate 25 mg Oral Q12H Celina Trevizo MD         Today, Patient Was Seen By: Verónica Malin MD    ** Please Note: Dictation voice to text software may have been used in the creation of this document   **

## 2018-09-07 NOTE — ASSESSMENT & PLAN NOTE
· Likely secondary to infection  · Preliminary urine culture is showing growth of gram-negative rods greater than 100,000  Changed to Ancef

## 2018-09-07 NOTE — ASSESSMENT & PLAN NOTE
· Patient reports exhaustion she was out with her nephew the day of the event  · Patient reports fall however denies loss of consciousness  · CT head negative for acute findings  · Patient is overall clinically improved  · PT OT evaluation for discharge planning  Patient might be able to be discharged tomorrow

## 2018-09-07 NOTE — ASSESSMENT & PLAN NOTE
· Currently stable  · Blood pressures were soft can resume Toprol with parameters  · Added beta-blocker    Heart rate was on the higher side

## 2018-09-08 PROBLEM — R00.0 TACHYCARDIA: Status: ACTIVE | Noted: 2018-09-08

## 2018-09-08 LAB
ANION GAP SERPL CALCULATED.3IONS-SCNC: 8 MMOL/L (ref 4–13)
BASOPHILS # BLD AUTO: 0.05 THOUSANDS/ΜL (ref 0–0.1)
BASOPHILS NFR BLD AUTO: 1 % (ref 0–1)
BUN SERPL-MCNC: 14 MG/DL (ref 5–25)
CALCIUM SERPL-MCNC: 9.6 MG/DL (ref 8.3–10.1)
CHLORIDE SERPL-SCNC: 102 MMOL/L (ref 100–108)
CO2 SERPL-SCNC: 28 MMOL/L (ref 21–32)
CREAT SERPL-MCNC: 0.84 MG/DL (ref 0.6–1.3)
EOSINOPHIL # BLD AUTO: 0.8 THOUSAND/ΜL (ref 0–0.61)
EOSINOPHIL NFR BLD AUTO: 10 % (ref 0–6)
ERYTHROCYTE [DISTWIDTH] IN BLOOD BY AUTOMATED COUNT: 12.4 % (ref 11.6–15.1)
GFR SERPL CREATININE-BSD FRML MDRD: 59 ML/MIN/1.73SQ M
GLUCOSE SERPL-MCNC: 109 MG/DL (ref 65–140)
HCT VFR BLD AUTO: 36.7 % (ref 34.8–46.1)
HGB BLD-MCNC: 12.1 G/DL (ref 11.5–15.4)
IMM GRANULOCYTES # BLD AUTO: 0.03 THOUSAND/UL (ref 0–0.2)
IMM GRANULOCYTES NFR BLD AUTO: 0 % (ref 0–2)
LYMPHOCYTES # BLD AUTO: 1.9 THOUSANDS/ΜL (ref 0.6–4.47)
LYMPHOCYTES NFR BLD AUTO: 23 % (ref 14–44)
MCH RBC QN AUTO: 29.6 PG (ref 26.8–34.3)
MCHC RBC AUTO-ENTMCNC: 33 G/DL (ref 31.4–37.4)
MCV RBC AUTO: 90 FL (ref 82–98)
MONOCYTES # BLD AUTO: 0.71 THOUSAND/ΜL (ref 0.17–1.22)
MONOCYTES NFR BLD AUTO: 9 % (ref 4–12)
NEUTROPHILS # BLD AUTO: 4.87 THOUSANDS/ΜL (ref 1.85–7.62)
NEUTS SEG NFR BLD AUTO: 57 % (ref 43–75)
NRBC BLD AUTO-RTO: 0 /100 WBCS
PLATELET # BLD AUTO: 146 THOUSANDS/UL (ref 149–390)
PMV BLD AUTO: 11.5 FL (ref 8.9–12.7)
POTASSIUM SERPL-SCNC: 3.4 MMOL/L (ref 3.5–5.3)
RBC # BLD AUTO: 4.09 MILLION/UL (ref 3.81–5.12)
SODIUM SERPL-SCNC: 138 MMOL/L (ref 136–145)
WBC # BLD AUTO: 8.36 THOUSAND/UL (ref 4.31–10.16)

## 2018-09-08 PROCEDURE — 99222 1ST HOSP IP/OBS MODERATE 55: CPT | Performed by: INTERNAL MEDICINE

## 2018-09-08 PROCEDURE — 80048 BASIC METABOLIC PNL TOTAL CA: CPT | Performed by: FAMILY MEDICINE

## 2018-09-08 PROCEDURE — 85025 COMPLETE CBC W/AUTO DIFF WBC: CPT | Performed by: FAMILY MEDICINE

## 2018-09-08 PROCEDURE — 99232 SBSQ HOSP IP/OBS MODERATE 35: CPT | Performed by: FAMILY MEDICINE

## 2018-09-08 RX ORDER — POTASSIUM CHLORIDE 20 MEQ/1
40 TABLET, EXTENDED RELEASE ORAL ONCE
Status: COMPLETED | OUTPATIENT
Start: 2018-09-08 | End: 2018-09-08

## 2018-09-08 RX ADMIN — METOPROLOL TARTRATE 25 MG: 25 TABLET ORAL at 08:56

## 2018-09-08 RX ADMIN — METOPROLOL TARTRATE 25 MG: 25 TABLET ORAL at 21:11

## 2018-09-08 RX ADMIN — ASPIRIN 81 MG 81 MG: 81 TABLET ORAL at 08:56

## 2018-09-08 RX ADMIN — CEFAZOLIN SODIUM 1000 MG: 1 SOLUTION INTRAVENOUS at 04:30

## 2018-09-08 RX ADMIN — CEFAZOLIN SODIUM 1000 MG: 1 SOLUTION INTRAVENOUS at 12:39

## 2018-09-08 RX ADMIN — CEFAZOLIN SODIUM 1000 MG: 1 SOLUTION INTRAVENOUS at 18:42

## 2018-09-08 RX ADMIN — HEPARIN SODIUM 5000 UNITS: 5000 INJECTION, SOLUTION INTRAVENOUS; SUBCUTANEOUS at 21:11

## 2018-09-08 RX ADMIN — HEPARIN SODIUM 5000 UNITS: 5000 INJECTION, SOLUTION INTRAVENOUS; SUBCUTANEOUS at 15:42

## 2018-09-08 RX ADMIN — POTASSIUM CHLORIDE 40 MEQ: 1500 TABLET, EXTENDED RELEASE ORAL at 08:59

## 2018-09-08 RX ADMIN — HEPARIN SODIUM 5000 UNITS: 5000 INJECTION, SOLUTION INTRAVENOUS; SUBCUTANEOUS at 05:22

## 2018-09-08 NOTE — ASSESSMENT & PLAN NOTE
· Currently stable  · Blood pressures were soft can resume Toprol with parameters  · Added beta-blocker  Heart rate was on the higher side  I keep her ACE-inhibitor on hold

## 2018-09-08 NOTE — PLAN OF CARE
Problem: Potential for Falls  Goal: Patient will remain free of falls  INTERVENTIONS:  - Assess patient frequently for physical needs  -  Identify cognitive and physical deficits and behaviors that affect risk of falls  -  [place] fall precautions as indicated by assessment   - Educate patient/family on patient safety including physical limitations  - Instruct patient to call for assistance with activity based on assessment  - Modify environment to reduce risk of injury  - Consider OT/PT consult to assist with strengthening/mobility    Outcome: Progressing      Problem: SAFETY ADULT  Goal: Patient will remain free of falls  INTERVENTIONS:  - Assess patient frequently for physical needs  -  Identify cognitive and physical deficits and behaviors that affect risk of falls    -  [place] fall precautions as indicated by assessment   - Educate patient/family on patient safety including physical limitations  - Instruct patient to call for assistance with activity based on assessment  - Modify environment to reduce risk of injury  - Consider OT/PT consult to assist with strengthening/mobility    Outcome: Progressing

## 2018-09-08 NOTE — ASSESSMENT & PLAN NOTE
· Urinalysis with microscopic noted elevated WBCs 10-20 and innumerable bacteria  · Preliminary urine culture is showing greater than 100,000 growth Klebsiella pneumonia aerococcus continue current antibiotic await sensitivity  · Patient became tachycardic anxious patient given a 1 time dose bolus of normal saline 500 cc tachycardia improved continue telemetry for now repeat CBC now  · Transition to Keflex on discharge

## 2018-09-08 NOTE — ASSESSMENT & PLAN NOTE
· Patient reports exhaustion she was out with her nephew the day of the event  · Patient reports fall however denies loss of consciousness  · CT head negative for acute findings  · Patient is overall clinically improved  · Patient still does not feel ready for discharge today    Given her advanced age and high risk for falls we will keep her 1 more day

## 2018-09-08 NOTE — ASSESSMENT & PLAN NOTE
· Present on admission evident by creatinine of 1 62  · Baseline appears to be 0 6-0 8  · Patient is back at her baseline  · This has resolved    Stop the IV fluids

## 2018-09-08 NOTE — PROGRESS NOTES
Progress Note - Diogenes Connors 12/6/1922, 80 y o  female MRN: [id number]    Unit/Bed#: -01 Encounter: [id number]    Primary Care Provider: Javier Trinidad MD   Date and time admitted to hospital: 9/4/2018  6:17 PM        Tachycardia   Assessment & Plan    Patient is actually tachycardic  She has improved from yesterday ever since a restart the beta-blocker  May need another increased dose  Will have Cardiology evaluate  She has multiple PVCs as well  Will make sure that her electrolytes are all repleted  Acute metabolic encephalopathy   Assessment & Plan    · Likely secondary to infection  · Preliminary urine culture is showing growth of gram-negative rods greater than 100,000  Changed to Ancef  Lymphedema of arm   Assessment & Plan    · Patient is status post mastectomy on the left secondary to breast cancer  · Chronic appears stable        Fall   Assessment & Plan    · Patient reports exhaustion she was out with her nephew the day of the event  · Patient reports fall however denies loss of consciousness  · CT head negative for acute findings  · Patient is overall clinically improved  · Patient still does not feel ready for discharge today  Given her advanced age and high risk for falls we will keep her 1 more day        UTI (urinary tract infection)   Assessment & Plan    · Urinalysis with microscopic noted elevated WBCs 10-20 and innumerable bacteria  · Preliminary urine culture is showing greater than 100,000 growth Klebsiella pneumonia aerococcus continue current antibiotic await sensitivity  · Patient became tachycardic anxious patient given a 1 time dose bolus of normal saline 500 cc tachycardia improved continue telemetry for now repeat CBC now  · Transition to Keflex on discharge        Acute kidney injury [facility])   Assessment & Plan    · Present on admission evident by creatinine of 1 62  · Baseline appears to be 0 6-0 8  · Patient is back at her baseline    · This has resolved  Stop the IV fluids        HTN (hypertension)   Assessment & Plan    · Currently stable  · Blood pressures were soft can resume Toprol with parameters  · Added beta-blocker  Heart rate was on the higher side  I keep her ACE-inhibitor on hold  VTE Pharmacologic Prophylaxis:   Pharmacologic: Heparin  Mechanical VTE Prophylaxis in Place: Yes    Patient Centered Rounds: I have performed bedside rounds with nursing staff today  Education and Discussions with Family / Patient:  Discussed with nephew    Time Spent for Care: 20 minutes  More than 50% of total time spent on counseling and coordination of care as described above  Current Length of Stay: 4 day(s)    Current Patient Status: Inpatient   Certification Statement: The patient will continue to require additional inpatient hospital stay due to Feeling lightheaded and dizzy and also being tachycardic    Discharge Plan:  Anticipate discharge tomorrow    Code Status: Level 1 - Full Code      Subjective:   Patient seen examined  States she does not feel good today  Objective:     Vitals:   Temp (24hrs), Av 7 °F (37 1 °C), Min:97 7 °F (36 5 °C), Max:99 3 °F (37 4 °C)    HR:  [] 103  Resp:  [17-18] 18  BP: (131-166)/(60-95) 152/95  SpO2:  [92 %-98 %] 94 %  Body mass index is 19 4 kg/m²  Input and Output Summary (last 24 hours):        Intake/Output Summary (Last 24 hours) at 18 0830  Last data filed at 18 5697   Gross per 24 hour   Intake              570 ml   Output             4150 ml   Net            -3580 ml       Physical Exam:     Physical Exam  (   General Appearance:    Alert, cooperative, no distress, appears stated age                               Lungs:     Clear to auscultation bilaterally, respirations unlabored       Heart:  Tachycardic   Abdomen:     Soft, non-tender, bowel sounds active all four quadrants,     no masses, no organomegaly           Extremities:   Extremities normal, atraumatic, no cyanosis or edema       Additional Data:     Labs:      Results from last 7 days  Lab Units 09/08/18  0521   WBC Thousand/uL 8 36   HEMOGLOBIN g/dL 12 1   HEMATOCRIT % 36 7   PLATELETS Thousands/uL 146*   NEUTROS PCT % 57   LYMPHS PCT % 23   MONOS PCT % 9   EOS PCT % 10*       Results from last 7 days  Lab Units 09/08/18  0521   SODIUM mmol/L 138   POTASSIUM mmol/L 3 4*   CHLORIDE mmol/L 102   CO2 mmol/L 28   BUN mg/dL 14   CREATININE mg/dL 0 84   CALCIUM mg/dL 9 6                     * I Have Reviewed All Lab Data Listed Above  * Additional Pertinent Lab Tests Reviewed: Ron[place] 66 Admission Reviewed        Recent Cultures (last 7 days):       Results from last 7 days  Lab Units 09/04/18  2228   URINE CULTURE  >100,000 cfu/ml Klebsiella pneumoniae*  >100,000 cfu/ml Aerococcus urinae*       Last 24 Hours Medication List:     Current Facility-Administered Medications:  acetaminophen 650 mg Oral Q6H PRN Maida Keys MD    aspirin 81 mg Oral Daily Maida Keys MD    cefazolin 1,000 mg Intravenous Q8H David Mejia MD Last Rate: Stopped (09/08/18 0523)   heparin (porcine) 5,000 Units Subcutaneous Q8H [facility] Maida Keys MD    metoprolol 5 mg Intravenous Q6H PRN RED Clement    metoprolol tartrate 25 mg Oral Q12H [facility] David Mejia MD    potassium chloride 40 mEq Oral Once David Mejia MD         Today, Patient Was Seen By: David Mejia MD    ** Please Note: Dictation voice to text software may have been used in the creation of this document   **

## 2018-09-08 NOTE — SOCIAL WORK
IRA spoke with Miri Navarrete from [place] [address] and she stated that pt return back to [facility] on 9/9/18  Contacted Dany via telephone and kevin

## 2018-09-08 NOTE — ASSESSMENT & PLAN NOTE
Patient is actually tachycardic  She has improved from yesterday ever since a restart the beta-blocker  May need another increased dose  Will have Cardiology evaluate  She has multiple PVCs as well  Will make sure that her electrolytes are all repleted

## 2018-09-08 NOTE — CONSULTS
Consultation - Cardiology Team One  [place] 80 y o  female MRN: [id number]  Unit/Bed#: -01 Encounter: [id number]    Inpatient consult to Cardiology  Consult performed by: Bird Wheat ordered by: Esthela Loyola          Physician Requesting Consult: Alireza Reardon MD  Reason for Consult / Principal Problem: A Fib vs multifocal atrial tachycardia    HPI: Cardiologist Dr Uzma Donald is a 80y o  year old female who has a history of breast cancer, hypertension presenting with irregular heart rhythm in the setting of fall and head trauma  Patient went to stand and felt weak, fell and struck her head  Found to have slight alteration in mentation at that time  Found to have UTI  On telemetry, patient was found to be in an irregular heart rhythm  No chest pain, SOB, palpitations  REVIEW OF SYSTEMS:  Constitutional:  Denies fever or chills   Eyes:  Denies change in visual acuity   HENT:  Denies nasal congestion or sore throat   Respiratory:  Denies cough or shortness of breath   Cardiovascular:  Denies chest pain or edema   GI:  Denies abdominal pain, nausea, vomiting, bloody stools or diarrhea   :  Denies dysuria, frequency, difficulty in micturition and nocturia  Musculoskeletal:  +fall, +head trauma   Denies back pain or joint pain   Neurologic:  Denies headache, focal weakness or sensory changes   Endocrine:  Denies polyuria or polydipsia   Lymphatic:  Denies swollen glands   Psychiatric:  Denies depression or anxiety     Historical Information   Past Medical History:   Diagnosis Date    [facility] [address] [facility]     [email or web address]     Hypertension     Lymphedema of arm     L arm     Past Surgical History:   Procedure Laterality Date    BLADDER REPAIR      BREAST SURGERY      HYSTERECTOMY      MASTECTOMY       History   Alcohol Use No     History   Drug Use No     History   Smoking Status    Never Smoker   Smokeless Tobacco    Never Used       Family History: History reviewed  No pertinent family history  MEDS & ALLERGIES:  all current active meds have been reviewed and current meds: Current Facility-Administered Medications   Medication Dose Route Frequency    acetaminophen (TYLENOL) tablet 650 mg  650 mg Oral Q6H PRN    aspirin chewable tablet 81 mg  81 mg Oral Daily    ceFAZolin (ANCEF) IVPB (premix) 1,000 mg  1,000 mg Intravenous Q8H    heparin (porcine) subcutaneous injection 5,000 Units  5,000 Units Subcutaneous Q8H [facility]    metoprolol (LOPRESSOR) injection 5 mg  5 mg Intravenous Q6H PRN    metoprolol tartrate (LOPRESSOR) tablet 25 mg  25 mg Oral Q12H [facility]        No Known Allergies    OBJECTIVE:  Vitals:   Vitals:    09/08/18 0700   BP: 152/95   Pulse: 103   Resp: 18   Temp: 99 °F (37 2 °C)   SpO2: 94%     Body mass index is 19 4 kg/m²  Systolic (97IPB), YFX:983 , Min:131 , AXI:421     Diastolic (10ARQ), PEL:61, Min:60, Max:95      Intake/Output Summary (Last 24 hours) at 09/08/18 0947  Last data filed at 09/08/18 0523   Gross per 24 hour   Intake              570 ml   Output             4150 ml   Net            -3580 ml     Weight (last 2 days)     None        Invasive Devices     Peripheral Intravenous Line            Peripheral IV 09/06/18 Right Forearm 1 day                PHYSICAL EXAMS:  General:  Patient is not in acute distress, laying in the bed comfortably, awake, alert responding to commands  Head: Normocephalic, Atraumatic  HEENT: White sclera, pink conjunctiva,  PERRLA,pharynx benign  Neck:  Supple, no neck vein distention, carotids+2/+2 no bruits, thyromegaly, adenopathy  Respiratory: +decreased breath sounds  Cardiovascular:  +irregular  PMI normal, S1-S2 normal, No  Murmurs, thrills, gallops, rubs   GI:  Abdomen soft nontender   No hepatosplenomegaly, adenopathy, ascites,or rebound tenderness  Extremities: No edema, normal pulses, no calf tenderness, no joint deformities, no venous disease   Integument:  No skin rashes or ulceration  Lymphatic:  No cervical or inguinal lymphadenopathy  Neurologic:  Patient is awake alert, responding to command, well-oriented to time and place and person moving all extremities    LABORATORY RESULTS:      CBC with diff:   Results from last 7 days  Lab Units 09/08/18  0521 09/06/18  1617 09/05/18  0626 09/04/18  1915   WBC Thousand/uL 8 36 10 81* 15 55* 7 34   HEMOGLOBIN g/dL 12 1 13 5 11 9 12 9   HEMATOCRIT % 36 7 42 3 37 2 40 1   MCV fL 90 91 91 91   PLATELETS Thousands/uL 146* 157 143* 183   MCH pg 29 6 29 1 29 2 29 3   MCHC g/dL 33 0 31 9 32 0 32 2   RDW % 12 4 12 6 12 6 12 5   MPV fL 11 5 11 6 12 1 12 3   NRBC AUTO /100 WBCs 0  --  0 0       CMP:  Results from last 7 days  Lab Units 09/08/18  0521 09/06/18  1617 09/05/18  0626   SODIUM mmol/L 138 138 139   POTASSIUM mmol/L 3 4* 3 6 3 4*   CHLORIDE mmol/L 102 103 103   CO2 mmol/L 28 25 30   BUN mg/dL 14 19 39*   CREATININE mg/dL 0 84 0 98 1 21   CALCIUM mg/dL 9 6 9 6 9 2   EGFR ml/min/1 73sq m 59 49 38       BMP:  Results from last 7 days  Lab Units 09/08/18 0521 09/06/18  1617 09/05/18  0626   SODIUM mmol/L 138 138 139   POTASSIUM mmol/L 3 4* 3 6 3 4*   CHLORIDE mmol/L 102 103 103   CO2 mmol/L 28 25 30   BUN mg/dL 14 19 39*   CREATININE mg/dL 0 84 0 98 1 21   CALCIUM mg/dL 9 6 9 6 9 2                              Lipid Profile:   Lab Results   Component Value Date    CHOL 224 05/18/2015     Lab Results   Component Value Date    HDL 73 05/18/2015     Lab Results   Component Value Date    LDLCALC 125 (H) 05/18/2015     Lab Results   Component Value Date    TRIG 129 05/18/2015       Cardiac testing:   No results found for this or any previous visit  No results found for this or any previous visit  No procedure found  No results found for this or any previous visit  Imaging:   I have personally reviewed pertinent reports          EKG reviewed personally:  A Fib vs multifocal atrial tachycardia    Telemetry reviewed personally:   A Fib vs multifocal atrial tachycardia, with PACs and PVCs, wide complex tachycardia    Assessment/Plan:  1  Atrial fibrillation versus multifocal atrial tachycardia:  -irregular rhythm seen on telemetry, however can make out P waves  -irregular rhythm more likely to be multifocal atrial tab with PACs and PVCs  -echo ordered, will assess EF and regional wall motion abnormalities  -continue Lopressor 25 mg b i d  for rate control  -on heparin IV q 8  -will hold off on ischemic workup given advanced age    2  Wide complex tachycardia:  Nonsustained ventricular tachycardia versus atrial fibrillation with aberrant see  Echo ordered as above  Replenish electrolytes, K greater than 4, mg greater than 2  Continue beta-blocker  3   Hypertension:  Stable, continue present regimen  Code Status: Level 1 - Full Code    Counseling / Coordination of Care  Total floor / unit time spent today 35 minutes  Greater than 50% of total time was spent with the patient and / or family counseling and / or coordination of care  A description of the counseling / coordination of care: Review of history, current assessment, development of a plan      Yamilet Eugene PA-C  9/8/2018,9:47 AM

## 2018-09-09 ENCOUNTER — APPOINTMENT (INPATIENT)
Dept: NON INVASIVE DIAGNOSTICS | Facility: HOSPITAL | Age: 83
DRG: 682 | End: 2018-09-09
Payer: MEDICARE

## 2018-09-09 LAB
ANION GAP SERPL CALCULATED.3IONS-SCNC: 7 MMOL/L (ref 4–13)
BUN SERPL-MCNC: 20 MG/DL (ref 5–25)
CALCIUM SERPL-MCNC: 9.4 MG/DL (ref 8.3–10.1)
CHLORIDE SERPL-SCNC: 102 MMOL/L (ref 100–108)
CO2 SERPL-SCNC: 28 MMOL/L (ref 21–32)
CREAT SERPL-MCNC: 0.83 MG/DL (ref 0.6–1.3)
ERYTHROCYTE [DISTWIDTH] IN BLOOD BY AUTOMATED COUNT: 12.3 % (ref 11.6–15.1)
GFR SERPL CREATININE-BSD FRML MDRD: 60 ML/MIN/1.73SQ M
GLUCOSE SERPL-MCNC: 100 MG/DL (ref 65–140)
HCT VFR BLD AUTO: 35.9 % (ref 34.8–46.1)
HGB BLD-MCNC: 11.7 G/DL (ref 11.5–15.4)
MCH RBC QN AUTO: 29.3 PG (ref 26.8–34.3)
MCHC RBC AUTO-ENTMCNC: 32.6 G/DL (ref 31.4–37.4)
MCV RBC AUTO: 90 FL (ref 82–98)
PLATELET # BLD AUTO: 153 THOUSANDS/UL (ref 149–390)
PMV BLD AUTO: 11.3 FL (ref 8.9–12.7)
POTASSIUM SERPL-SCNC: 3.7 MMOL/L (ref 3.5–5.3)
RBC # BLD AUTO: 3.99 MILLION/UL (ref 3.81–5.12)
SODIUM SERPL-SCNC: 137 MMOL/L (ref 136–145)
WBC # BLD AUTO: 7.11 THOUSAND/UL (ref 4.31–10.16)

## 2018-09-09 PROCEDURE — 80048 BASIC METABOLIC PNL TOTAL CA: CPT | Performed by: FAMILY MEDICINE

## 2018-09-09 PROCEDURE — 99232 SBSQ HOSP IP/OBS MODERATE 35: CPT | Performed by: INTERNAL MEDICINE

## 2018-09-09 PROCEDURE — 99232 SBSQ HOSP IP/OBS MODERATE 35: CPT | Performed by: FAMILY MEDICINE

## 2018-09-09 PROCEDURE — 97116 GAIT TRAINING THERAPY: CPT

## 2018-09-09 PROCEDURE — 85027 COMPLETE CBC AUTOMATED: CPT | Performed by: FAMILY MEDICINE

## 2018-09-09 PROCEDURE — 93306 TTE W/DOPPLER COMPLETE: CPT

## 2018-09-09 PROCEDURE — 93306 TTE W/DOPPLER COMPLETE: CPT | Performed by: INTERNAL MEDICINE

## 2018-09-09 RX ORDER — CEPHALEXIN 500 MG/1
500 CAPSULE ORAL EVERY 8 HOURS SCHEDULED
Status: DISCONTINUED | OUTPATIENT
Start: 2018-09-09 | End: 2018-09-09

## 2018-09-09 RX ORDER — CEPHALEXIN 500 MG/1
500 CAPSULE ORAL EVERY 12 HOURS SCHEDULED
Status: DISCONTINUED | OUTPATIENT
Start: 2018-09-09 | End: 2018-09-10 | Stop reason: HOSPADM

## 2018-09-09 RX ORDER — METOPROLOL TARTRATE 50 MG/1
50 TABLET, FILM COATED ORAL EVERY 12 HOURS SCHEDULED
Status: DISCONTINUED | OUTPATIENT
Start: 2018-09-09 | End: 2018-09-09

## 2018-09-09 RX ADMIN — HEPARIN SODIUM 5000 UNITS: 5000 INJECTION, SOLUTION INTRAVENOUS; SUBCUTANEOUS at 21:56

## 2018-09-09 RX ADMIN — HEPARIN SODIUM 5000 UNITS: 5000 INJECTION, SOLUTION INTRAVENOUS; SUBCUTANEOUS at 05:32

## 2018-09-09 RX ADMIN — CEPHALEXIN 500 MG: 500 CAPSULE ORAL at 21:56

## 2018-09-09 RX ADMIN — CEPHALEXIN 500 MG: 500 CAPSULE ORAL at 15:04

## 2018-09-09 RX ADMIN — METOPROLOL TARTRATE 37.5 MG: 25 TABLET ORAL at 21:55

## 2018-09-09 RX ADMIN — HEPARIN SODIUM 5000 UNITS: 5000 INJECTION, SOLUTION INTRAVENOUS; SUBCUTANEOUS at 15:04

## 2018-09-09 RX ADMIN — CEFAZOLIN SODIUM 1000 MG: 1 SOLUTION INTRAVENOUS at 02:45

## 2018-09-09 RX ADMIN — ASPIRIN 81 MG 81 MG: 81 TABLET ORAL at 08:41

## 2018-09-09 RX ADMIN — METOPROLOL TARTRATE 25 MG: 25 TABLET ORAL at 08:41

## 2018-09-09 NOTE — PROGRESS NOTES
General Cardiology   Progress Note   Chrissy Card 80 y o  female MRN: [id number]  Unit/Bed#: -01 Encounter: [id number]      SUBJECTIVE:   No significant events overnight  I am feeling better  REVIEW OF SYSTEMS:  Constitutional:  Denies fever or chills   Eyes:  Denies change in visual acuity   HENT:  Denies nasal congestion or sore throat   Respiratory:  Denies cough or shortness of breath   Cardiovascular:  Denies chest pain or edema   GI:  Denies abdominal pain, nausea, vomiting, bloody stools or diarrhea   :  Denies dysuria, frequency, difficulty in micturition and nocturia  Musculoskeletal:  Denies back pain or joint pain   Neurologic:  Denies headache, focal weakness or sensory changes   Endocrine:  Denies polyuria or polydipsia   Lymphatic:  Denies swollen glands   Psychiatric:  Denies depression or anxiety     OBJECTIVE:   Vitals:  Vitals:    09/09/18 1100   BP: 166/72   Pulse: 95   Resp: 18   Temp: 98 6 °F (37 °C)   SpO2: 96%     Body mass index is 19 4 kg/m²  Systolic (90AJL), DXW:995 , Min:96 , ZZW:938     Diastolic (17GET), UCZ:12, Min:40, Max:89    No intake or output data in the 24 hours ending 09/09/18 1204  Weight (last 2 days)     None          Telemetry Review: MAT, PVC, NSVT    PHYSICAL EXAMS:  General:  Patient is not in acute distress, laying in the bed comfortably, awake, alert responding to commands  Head: Normocephalic, Atraumatic  HEENT:  Both pupils normal-size atraumatic, normocephalic, nonicteric  Neck:  JVP not raised  Trachea central  Respiratory:  Bronchovascular breathing all over the chest without any accompaniment  Cardiovascular:   S1-S2 normal   Regular rate and rhythm  GI:  Abdomen soft nontender   Liver and spleen normal size  Lymphatic:  No cervical or inguinal lymphadenopathy  Neurologic:  Patient is awake alert, responding to command, well-oriented to time and place and person moving     LABORATORY RESULTS:        CBC with diff:   Results from last 7 days  Lab Units 09/09/18  0456 09/08/18  0521 09/06/18  1617 09/05/18  0626 09/04/18  1915   WBC Thousand/uL 7 11 8 36 10 81* 15 55* 7 34   HEMOGLOBIN g/dL 11 7 12 1 13 5 11 9 12 9   HEMATOCRIT % 35 9 36 7 42 3 37 2 40 1   MCV fL 90 90 91 91 91   PLATELETS Thousands/uL 153 146* 157 143* 183   MCH pg 29 3 29 6 29 1 29 2 29 3   MCHC g/dL 32 6 33 0 31 9 32 0 32 2   RDW % 12 3 12 4 12 6 12 6 12 5   MPV fL 11 3 11 5 11 6 12 1 12 3   NRBC AUTO /100 WBCs  --  0  --  0 0       CMP:  Results from last 7 days  Lab Units 09/09/18  0456 09/08/18  0521 09/06/18  1617   SODIUM mmol/L 137 138 138   POTASSIUM mmol/L 3 7 3 4* 3 6   CHLORIDE mmol/L 102 102 103   CO2 mmol/L 28 28 25   BUN mg/dL 20 14 19   CREATININE mg/dL 0 83 0 84 0 98   CALCIUM mg/dL 9 4 9 6 9 6   EGFR ml/min/1 73sq m 60 59 49       BMP:  Results from last 7 days  Lab Units 09/09/18  0456 09/08/18  0521 09/06/18  1617   SODIUM mmol/L 137 138 138   POTASSIUM mmol/L 3 7 3 4* 3 6   CHLORIDE mmol/L 102 102 103   CO2 mmol/L 28 28 25   BUN mg/dL 20 14 19   CREATININE mg/dL 0 83 0 84 0 98   CALCIUM mg/dL 9 4 9 6 9 6                              Lipid Profile:   Lab Results   Component Value Date    CHOL 224 05/18/2015     Lab Results   Component Value Date    HDL 73 05/18/2015     Lab Results   Component Value Date    LDLCALC 125 (H) 05/18/2015     Lab Results   Component Value Date    TRIG 129 05/18/2015       Cardiac testing:  No results found for this or any previous visit  No results found for this or any previous visit  No results found for this or any previous visit  No procedure found  No results found for this or any previous visit      Meds/Allergies   all current active meds have been reviewed  Prescriptions Prior to Admission   Medication    acetaminophen (TYLENOL) 500 mg tablet    aspirin 81 mg chewable tablet    cholestyramine (QUESTRAN) 4 g packet    docusate sodium (COLACE) 100 mg capsule    felodipine (PLENDIL) 5 mg 24 hr tablet    LACTOBACILLUS PO  lisinopril-hydrochlorothiazide (PRINZIDE,ZESTORETIC) 20-25 MG per tablet    metoprolol succinate (TOPROL-XL) 50 mg 24 hr tablet    ondansetron (ZOFRAN) 4 mg tablet    polyethylene glycol (MIRALAX) 17 g packet    Pseudoephedrine-APAP  MG TABS    sodium phosphate-biphosphate (FLEET) 7-19 g 118 mL enema    traZODone (DESYREL) 50 mg tablet          ASSESSMENT & PLAN   1-  MAT/ PVCs/NSVT,  Heart rates improved  Currently [de-identified]  Continue beta-blockers  Echocardiogram ordered and pending  Further recommendations will based upon echocardiogram findings  2-  Hypertension, stable  Continue all medications    Jeri Carter PA-C  9/9/2018,12:04 PM    Portions of the record may have been created with voice recognition software   Occasional wrong word or "sound a like" substitutions may have occurred due to the inherent limitations of voice recognition software   Read the chart carefully and recognize, using context, where substitutions have occurred

## 2018-09-09 NOTE — PHYSICAL THERAPY NOTE
PT Progress Note (15min)  (14:00-14:15)       09/09/18 1415   Pain Assessment   Pain Assessment No/denies pain   Restrictions/Precautions   Other Precautions Fall Risk;Telemetry   General   Chart Reviewed Yes   Response to Previous Treatment Patient with no complaints from previous session  Family/Caregiver Present Yes   Cognition   Orientation Level Oriented to person;Oriented to place;Oriented to situation   Subjective   Subjective pt pleasant + agreeable to PT session  "I got everyone here today"  Bed Mobility   Supine to Sit 6  Modified independent   Additional items HOB elevated   Transfers   Sit to Stand 6  Modified independent   Stand to Sit 6  Modified independent   Ambulation/Elevation   Gait pattern Narrow LAWANDA; Decreased foot clearance; Inconsistent dejan   Gait Assistance 5  Supervision   Additional items Verbal cues   Assistive Device None   Distance 375'   Stair Management Assistance 5  Supervision   Additional items Verbal cues   Stair Management Technique Two rails; Alternating pattern; Foreward   Number of Stairs 8   Balance   Static Sitting Good   Dynamic Sitting Good   Static Standing Fair +   Dynamic Standing Fair   Ambulatory Fair   Endurance Deficit   Endurance Deficit Description HR low 100's throughout session   Activity Tolerance   Activity Tolerance Patient limited by fatigue   Nurse Made Aware Kareem   Assessment   Prognosis Good   Problem List Decreased strength;Decreased endurance; Impaired balance;Decreased mobility   Assessment pt cont to progress c PT  performs OOB mobility tasks c (S)  ambulated distance of 375' s overt loss of balance  HR remained stable in low 100's c functional mobility tasks  negotiated 8 stairs c B/L rails s difficulty  will cont skilled PT to further maximize (I) c functional mobility + improve quality of life  upon d/c, recommend pt return to [facility] c PT at next level of care  Barriers to Discharge None   Goals   Patient Goals "I want to go home"     STG Expiration Date 09/16/18   Treatment Day 2   Plan   Treatment/Interventions Functional transfer training;LE strengthening/ROM; Elevations; Therapeutic exercise; Endurance training;Patient/family training;Equipment eval/education; Bed mobility;Gait training;Spoke to nursing;Family;Spoke to MD   Progress Progressing toward goals   PT Frequency Other (Comment)  (3-5x/wk)   Recommendation   Recommendation Home PT   PT - OK to Discharge Yes     Vamsi Simental, PT

## 2018-09-09 NOTE — PROGRESS NOTES
Progress Note - Diego Pearce 12/6/1922, 80 y o  female MRN: [id number]    Unit/Bed#: -01 Encounter: [id number]    Primary Care Provider: Dmitriy Platt MD   Date and time admitted to hospital: 9/4/2018  6:17 PM        Tachycardia   Assessment & Plan    Patient is actually tachycardic  She has improved from yesterday ever since a restart the beta-blocker  May need another increased dose  Will have Cardiology evaluate  She has multiple PVCs as well  Will make sure that her electrolytes are all repleted     She likely has multifocal atrial tachycardia  Cardiology evaluation was appreciated  Echocardiogram is pending  Acute metabolic encephalopathy   Assessment & Plan    · Likely secondary to infection  · Preliminary urine culture is showing growth of gram-negative rods greater than 100,000  Changed to Ancef  Lymphedema of arm   Assessment & Plan    · Patient is status post mastectomy on the left secondary to breast cancer  · Chronic appears stable        Fall   Assessment & Plan    · Patient reports exhaustion she was out with her nephew the day of the event  · Patient reports fall however denies loss of consciousness  · CT head negative for acute findings  · Patient is overall clinically improved  · Patient still does not feel ready for discharge today    Given her advanced age and high risk for falls we will keep her 1 more day        UTI (urinary tract infection)   Assessment & Plan    · Urinalysis with microscopic noted elevated WBCs 10-20 and innumerable bacteria  · Preliminary urine culture is showing greater than 100,000 growth Klebsiella pneumonia aerococcus continue current antibiotic await sensitivity  · Patient became tachycardic anxious patient given a 1 time dose bolus of normal saline 500 cc tachycardia improved continue telemetry for now repeat CBC now  · Transition to Keflex on discharge        Acute kidney injury [facility])   Assessment & Plan    · Present on admission evident by creatinine of 1 62  · Baseline appears to be 0 6-0 8  · Patient is back at her baseline  · This has resolved  Stop the IV fluids        HTN (hypertension)   Assessment & Plan    · Currently stable  · Blood pressures were soft can resume Toprol with parameters  · Added beta-blocker  Heart rate was on the higher side  I keep her ACE-inhibitor on hold  * Altered mental status   Assessment & Plan    · Present on admission likely in setting of UTI  · Likely secondary to metabolic encephalopathy related to UTI  · Patient is alert and oriented currently x3  · Continue to monitor              VTE Pharmacologic Prophylaxis:   Pharmacologic: Heparin  Mechanical VTE Prophylaxis in Place: Yes    Patient Centered Rounds: I have performed bedside rounds with nursing staff today  Discussions with Specialists or Other Care Team Provider:  Discussed with Cardiology    Education and Discussions with Family / Patient:  Discussed with power-of-    Time Spent for Care: 20 minutes  More than 50% of total time spent on counseling and coordination of care as described above  Current Length of Stay: 5 day(s)    Current Patient Status: Inpatient       Discharge Plan:  Discharge will depend on echocardiogram   Patient also states she feels weak  I told her she needs to contemplate going to a skilled nursing facility if that is the case   So either discharge today or tomorrow    Code Status: Level 1 - Full Code      Subjective:   Patient seen examined  Feels very weak and tired today  Objective:     Vitals:   Temp (24hrs), Av 8 °F (37 1 °C), Min:98 7 °F (37 1 °C), Max:99 °F (37 2 °C)    HR:  [] 101  Resp:  [17-18] 18  BP: ()/(40-89) 150/89  SpO2:  [94 %-97 %] 94 %  Body mass index is 19 4 kg/m²       Input and Output Summary (last 24 hours):     No intake or output data in the 24 hours ending 18 1034    Physical Exam:     Physical Exam  (   General Appearance: Alert, cooperative, no distress, appears stated age                               Lungs:     Clear to auscultation bilaterally, respirations unlabored       Heart:    Regular rate and rhythm, S1 and S2 normal, no murmur, rub    or gallop   Abdomen:     Soft, non-tender, bowel sounds active all four quadrants,     no masses, no organomegaly           Extremities:   Extremities normal, atraumatic, no cyanosis or edema       Additional Data:     Labs:      Results from last 7 days  Lab Units 09/09/18  0456 09/08/18  0521   WBC Thousand/uL 7 11 8 36   HEMOGLOBIN g/dL 11 7 12 1   HEMATOCRIT % 35 9 36 7   PLATELETS Thousands/uL 153 146*   NEUTROS PCT %  --  57   LYMPHS PCT %  --  23   MONOS PCT %  --  9   EOS PCT %  --  10*       Results from last 7 days  Lab Units 09/09/18  0456   SODIUM mmol/L 137   POTASSIUM mmol/L 3 7   CHLORIDE mmol/L 102   CO2 mmol/L 28   BUN mg/dL 20   CREATININE mg/dL 0 83   CALCIUM mg/dL 9 4                     * I Have Reviewed All Lab Data Listed Above  * Additional Pertinent Lab Tests Reviewed: [facility] 66 Admission Reviewed        Recent Cultures (last 7 days):       Results from last 7 days  Lab Units 09/04/18  2228   URINE CULTURE  >100,000 cfu/ml Klebsiella pneumoniae*  >100,000 cfu/ml Aerococcus urinae*       Last 24 Hours Medication List:     Current Facility-Administered Medications:  acetaminophen 650 mg Oral Q6H PRN Taylor Renae MD    aspirin 81 mg Oral Daily Taylor Renae MD    cefazolin 1,000 mg Intravenous Q8H Helena Adamson MD Last Rate: 1,000 mg (09/09/18 0245)   heparin (porcine) 5,000 Units Subcutaneous Q8H [facility] Taylor Renae MD    metoprolol 5 mg Intravenous Q6H PRN RED Núñez    metoprolol tartrate 25 mg Oral Q12H Kellen Box MD         Today, Patient Was Seen By: Helena Adamson MD    ** Please Note: Dictation voice to text software may have been used in the creation of this document   **

## 2018-09-09 NOTE — ASSESSMENT & PLAN NOTE
Patient is actually tachycardic  She has improved from yesterday ever since a restart the beta-blocker  May need another increased dose  Will have Cardiology evaluate  She has multiple PVCs as well  Will make sure that her electrolytes are all repleted     She likely has multifocal atrial tachycardia  Cardiology evaluation was appreciated  Echocardiogram is pending

## 2018-09-09 NOTE — PLAN OF CARE
Problem: PHYSICAL THERAPY ADULT  Goal: Performs mobility at highest level of function for planned discharge setting  See evaluation for individualized goals  Treatment/Interventions: ADL retraining, Functional transfer training, LE strengthening/ROM, Elevations, Therapeutic exercise, Endurance training, Patient/family training, Equipment eval/education, Bed mobility, Gait training, Spoke to nursing  Equipment Recommended: Other (Comment) (plan to trial [facility] next session)       See flowsheet documentation for full assessment, interventions and recommendations  Outcome: Progressing  Prognosis: Good  Problem List: Decreased strength, Decreased endurance, Impaired balance, Decreased mobility  Assessment: pt cont to progress c PT  performs OOB mobility tasks c (S)  ambulated distance of 375' s overt loss of balance  HR remained stable in low 100's c functional mobility tasks  negotiated 8 stairs c B/L rails s difficulty  will cont skilled PT to further maximize (I) c functional mobility + improve quality of life  upon d/c, recommend pt return to [facility] c PT at next level of care  Barriers to Discharge: None     Recommendation: Home PT     PT - OK to Discharge: Yes    See flowsheet documentation for full assessment

## 2018-09-09 NOTE — PLAN OF CARE
Problem: Potential for Falls  Goal: Patient will remain free of falls  INTERVENTIONS:  - Assess patient frequently for physical needs  -  Identify cognitive and physical deficits and behaviors that affect risk of falls  -  [place] fall precautions as indicated by assessment   - Educate patient/family on patient safety including physical limitations  - Instruct patient to call for assistance with activity based on assessment  - Modify environment to reduce risk of injury  - Consider OT/PT consult to assist with strengthening/mobility    Outcome: Progressing      Problem: SAFETY ADULT  Goal: Patient will remain free of falls  INTERVENTIONS:  - Assess patient frequently for physical needs  -  Identify cognitive and physical deficits and behaviors that affect risk of falls    -  [place] fall precautions as indicated by assessment   - Educate patient/family on patient safety including physical limitations  - Instruct patient to call for assistance with activity based on assessment  - Modify environment to reduce risk of injury  - Consider OT/PT consult to assist with strengthening/mobility    Outcome: Progressing

## 2018-09-10 ENCOUNTER — DOCUMENTATION (OUTPATIENT)
Dept: MEDSURG UNIT | Facility: HOSPITAL | Age: 83
End: 2018-09-10

## 2018-09-10 VITALS
HEIGHT: 62 IN | RESPIRATION RATE: 18 BRPM | WEIGHT: 106.04 LBS | SYSTOLIC BLOOD PRESSURE: 132 MMHG | HEART RATE: 90 BPM | BODY MASS INDEX: 19.51 KG/M2 | OXYGEN SATURATION: 98 % | DIASTOLIC BLOOD PRESSURE: 72 MMHG | TEMPERATURE: 98.6 F
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
## 2018-09-10 PROBLEM — G93.41 ACUTE METABOLIC ENCEPHALOPATHY: Status: RESOLVED | Noted: 2018-09-05 | Resolved: 2018-09-10

## 2018-09-10 PROBLEM — R41.82 ALTERED MENTAL STATUS: Status: RESOLVED | Noted: 2018-09-05 | Resolved: 2018-09-10

## 2018-09-10 PROBLEM — R00.0 TACHYCARDIA: Status: RESOLVED | Noted: 2018-09-08 | Resolved: 2018-09-10

## 2018-09-10 PROCEDURE — G8987 SELF CARE CURRENT STATUS: HCPCS

## 2018-09-10 PROCEDURE — 97110 THERAPEUTIC EXERCISES: CPT

## 2018-09-10 PROCEDURE — G8988 SELF CARE GOAL STATUS: HCPCS

## 2018-09-10 PROCEDURE — 99239 HOSP IP/OBS DSCHRG MGMT >30: CPT | Performed by: FAMILY MEDICINE

## 2018-09-10 PROCEDURE — G8989 SELF CARE D/C STATUS: HCPCS

## 2018-09-10 PROCEDURE — 99232 SBSQ HOSP IP/OBS MODERATE 35: CPT | Performed by: INTERNAL MEDICINE

## 2018-09-10 PROCEDURE — 97535 SELF CARE MNGMENT TRAINING: CPT

## 2018-09-10 PROCEDURE — 97116 GAIT TRAINING THERAPY: CPT

## 2018-09-10 PROCEDURE — 97165 OT EVAL LOW COMPLEX 30 MIN: CPT

## 2018-09-10 RX ORDER — METOPROLOL TARTRATE 50 MG/1
50 TABLET, FILM COATED ORAL EVERY 12 HOURS SCHEDULED
Status: DISCONTINUED | OUTPATIENT
Start: 2018-09-10 | End: 2018-09-10 | Stop reason: HOSPADM

## 2018-09-10 RX ORDER — METOPROLOL TARTRATE 50 MG/1
50 TABLET, FILM COATED ORAL EVERY 12 HOURS SCHEDULED
Qty: 60 TABLET | Refills: 0 | Status: SHIPPED | OUTPATIENT
Start: 2018-09-10 | End: 2018-09-10

## 2018-09-10 RX ORDER — CEPHALEXIN 500 MG/1
500 CAPSULE ORAL EVERY 12 HOURS SCHEDULED
Qty: 4 CAPSULE | Refills: 0 | Status: SHIPPED | OUTPATIENT
Start: 2018-09-10 | End: 2018-09-12

## 2018-09-10 RX ORDER — METOPROLOL TARTRATE 50 MG/1
50 TABLET, FILM COATED ORAL EVERY 12 HOURS SCHEDULED
Qty: 60 TABLET | Refills: 0 | Status: SHIPPED | OUTPATIENT
Start: 2018-09-10 | End: 2018-10-09 | Stop reason: ALTCHOICE

## 2018-09-10 RX ADMIN — CEPHALEXIN 500 MG: 500 CAPSULE ORAL at 08:49

## 2018-09-10 RX ADMIN — METOPROLOL TARTRATE 37.5 MG: 25 TABLET ORAL at 08:49

## 2018-09-10 RX ADMIN — ASPIRIN 81 MG 81 MG: 81 TABLET ORAL at 08:49

## 2018-09-10 RX ADMIN — HEPARIN SODIUM 5000 UNITS: 5000 INJECTION, SOLUTION INTRAVENOUS; SUBCUTANEOUS at 05:02

## 2018-09-10 NOTE — ESCALATED TEAM [place]
Ayo notified that Mally Walker from [facility] spoke with patient on 09/09/18 and recommended the patient to go to [facility] due to patient residing on the second floor and having 13 steps to the second floor  Physical therapy is recommending home pt and aware of steps  Cm spoke with patient regarding the recommendation for [facility]  Patient stated that she desires  To return to [address] [place] [address] and does not feel as though she needs snf  Care team meeting conducted with Mally Walker with [place] via telephone, Elier Gallegos from PT, patient nurse Myrna Handing at patient bedside  Elier Gallegos completed a second physical therapy evaluation and is still recommending home pt  Elier Gallegos spoke with Mally Walker from [facility] regarding patient evaluation  Mally Walker stated patient is able to return to [facility]  Cm reviewed patient medicare letter, patient understands  Ayo spoke with patient nephew Taz Phylicia who stated he would be transporting patient back to [facility]

## 2018-09-10 NOTE — ASSESSMENT & PLAN NOTE
Patient is actually tachycardic  She has improved from yesterday ever since a restart the beta-blocker  May need another increased dose  Will have Cardiology evaluate  She has multiple PVCs as well  Will make sure that her electrolytes are all repleted     She likely has multifocal atrial tachycardia  Cardiology evaluation was appreciated  Echocardiogram shows an ejection fraction of 55-65%    Grade 1 diastolic dysfunction

## 2018-09-10 NOTE — PROGRESS NOTES
General Cardiology   Progress Note   Marquita Gomez 80 y o  female MRN: [id number]  Unit/Bed#: -01 Encounter: [id number]      SUBJECTIVE:   No significant events overnight  Im feeling well  Denies chest pain    REVIEW OF SYSTEMS:  Constitutional:  Denies fever or chills   Eyes:  Denies change in visual acuity   HENT:  Denies nasal congestion or sore throat   Respiratory:  Denies cough or shortness of breath   Cardiovascular:  Denies chest pain or edema   GI:  Denies abdominal pain, nausea, vomiting, bloody stools or diarrhea   :  Denies dysuria, frequency, difficulty in micturition and nocturia  Musculoskeletal:  Denies back pain or joint pain   Neurologic:  Denies headache, focal weakness or sensory changes   Endocrine:  Denies polyuria or polydipsia   Lymphatic:  Denies swollen glands   Psychiatric:  Denies depression or anxiety     OBJECTIVE:   Vitals:  Vitals:    09/10/18 0956   BP:    Pulse: 90   Resp:    Temp:    SpO2:      Body mass index is 19 4 kg/m²  Systolic (06ZBO), NS , Min:126 , TDT:324     Diastolic (56IBC), NTZ:58, Min:59, Max:86      Intake/Output Summary (Last 24 hours) at 09/10/18 1240  Last data filed at 18 2109   Gross per 24 hour   Intake              660 ml   Output                0 ml   Net              660 ml     Weight (last 2 days)     None          Telemetry Review: SR    PHYSICAL EXAMS:  General:  Patient is not in acute distress, laying in the bed comfortably, awake, alert responding to commands  Head: Normocephalic, Atraumatic  HEENT:  Both pupils normal-size atraumatic, normocephalic, nonicteric  Neck:  JVP not raised  Trachea central  Respiratory:  Bronchovascular breathing all over the chest without any accompaniment  Cardiovascular:  S1 S2 normal RRR  GI:  Abdomen soft nontender   Liver and spleen normal size  Lymphatic:  No cervical or inguinal lymphadenopathy  Neurologic:  Patient is awake alert, responding to command, well-oriented to time and place and person moving     LABORATORY RESULTS:        CBC with diff:   Results from last 7 days  Lab Units 18  0456 18  0521 18  1617 18  0626 18  1915   WBC Thousand/uL 7 11 8 36 10 81* 15 55* 7 34   HEMOGLOBIN g/dL 11 7 12 1 13 5 11 9 12 9   HEMATOCRIT % 35 9 36 7 42 3 37 2 40 1   MCV fL 90 90 91 91 91   PLATELETS Thousands/uL 153 146* 157 143* 183   MCH pg 29 3 29 6 29 1 29 2 29 3   MCHC g/dL 32 6 33 0 31 9 32 0 32 2   RDW % 12 3 12 4 12 6 12 6 12 5   MPV fL 11 3 11 5 11 6 12 1 12 3   NRBC AUTO /100 WBCs  --  0  --  0 0       CMP:  Results from last 7 days  Lab Units 18  0456 18  0521 18  1617   SODIUM mmol/L 137 138 138   POTASSIUM mmol/L 3 7 3 4* 3 6   CHLORIDE mmol/L 102 102 103   CO2 mmol/L 28 28 25   BUN mg/dL 20 14 19   CREATININE mg/dL 0 83 0 84 0 98   CALCIUM mg/dL 9 4 9 6 9 6   EGFR ml/min/1 73sq m 60 59 49       BMP:  Results from last 7 days  Lab Units 18  0456 18  0521 18  1617   SODIUM mmol/L 137 138 138   POTASSIUM mmol/L 3 7 3 4* 3 6   CHLORIDE mmol/L 102 102 103   CO2 mmol/L 28 28 25   BUN mg/dL 20 14 19   CREATININE mg/dL 0 83 0 84 0 98   CALCIUM mg/dL 9 4 9 6 9 6                              Lipid Profile:   Lab Results   Component Value Date    CHOL 224 2015     Lab Results   Component Value Date    HDL 73 2015     Lab Results   Component Value Date    LDLCALC 125 (H) 2015     Lab Results   Component Value Date    TRIG 129 2015       Cardiac testing:  Results for orders placed during the hospital encounter of 18   Echo complete with contrast if indicated    Narrative [address] [place], [place] [id number] [phone]    Transthoracic Echocardiogram  2D, M-mode, Doppler, and Color Doppler    Study date:  09-Sep-2018    Patient: Navarro Parkinson  MR number: [id number]  Account number: [de-identified]  : 06-Dec-1922  Age: 89 years  Gender: Female  Status: Inpatient  Location: Bedside  Height: 62 in  Weight: 106 lb  BP: 110/ 68 mmHg    Indications: Atrial Fibrillation    Diagnoses: I48 0 - Atrial fibrillation    Sonographer:  MARITZA Schneider,RDCS  Interpreting Physician:  Chrissy Licea MD  Referring Physician:  Susie Nageotte, PA-C  Group:  [facility]  Interpreting Physician:  Chrissy Licea MD    SUMMARY    LEFT VENTRICLE:  Systolic function was normal  Ejection fraction was estimated in the range of 55 % to 65 %  There were no regional wall motion abnormalities  Doppler parameters were consistent with abnormal left ventricular relaxation (grade 1 diastolic dysfunction)  MITRAL VALVE:  There was moderate regurgitation  AORTIC VALVE:  There was mild regurgitation  TRICUSPID VALVE:  There was mild regurgitation  The findings suggest moderate pulmonary hypertension  HISTORY: PRIOR HISTORY: Hypertension, Tachycardia, Hypokalemia    PROCEDURE: The procedure was performed at the bedside  This was a routine study  The transthoracic approach was used  The study included complete 2D imaging, M-mode, complete spectral Doppler, and color Doppler  The heart rate was 86 bpm,  at the start of the study  Images were obtained from the parasternal, apical, subcostal, and suprasternal notch acoustic windows  Echocardiographic views were limited due to low windows, decreased penetration, and lung interference  This  was a technically difficult study  LEFT VENTRICLE: Size was normal  Systolic function was normal  Ejection fraction was estimated in the range of 55 % to 65 %  There were no regional wall motion abnormalities  Wall thickness was normal  DOPPLER: Doppler parameters were  consistent with abnormal left ventricular relaxation (grade 1 diastolic dysfunction)      RIGHT VENTRICLE: The size was normal  Systolic function was normal  Wall thickness was normal     LEFT ATRIUM: Size was normal     RIGHT ATRIUM: Size was normal     MITRAL VALVE: Valve structure was normal  There was normal leaflet separation  DOPPLER: The transmitral velocity was within the normal range  There was no evidence for stenosis  There was moderate regurgitation  AORTIC VALVE: The valve was trileaflet  Leaflets exhibited normal thickness and normal cuspal separation  DOPPLER: Transaortic velocity was within the normal range  There was no evidence for stenosis  There was mild regurgitation  TRICUSPID VALVE: The valve structure was normal  There was normal leaflet separation  DOPPLER: The transtricuspid velocity was within the normal range  There was no evidence for stenosis  There was mild regurgitation  Estimated peak PA  pressure was 50 mmHg  The findings suggest moderate pulmonary hypertension  PULMONIC VALVE: Leaflets exhibited normal thickness, no calcification, and normal cuspal separation  DOPPLER: The transpulmonic velocity was within the normal range  There was no regurgitation  PERICARDIUM: There was no pericardial effusion  The pericardium was normal in appearance  AORTA: The root exhibited normal size  SYSTEMIC VEINS: IVC: The inferior vena cava was normal in size and course  Respirophasic changes were normal     SYSTEM MEASUREMENT TABLES    2D  %FS: 33 4 %  Ao Diam: 2 8 cm  EDV(Teich): 71 3 ml  EF(Teich): 62 7 %  ESV(Teich): 26 6 ml  IVSd: 0 9 cm  LA Area: 17 7 cm2  LA Diam: 3 8 cm  LVEDV MOD A4C: 49 9 ml  LVEF MOD A4C: 61 6 %  LVESV MOD A4C: 19 1 ml  LVIDd: 4 cm  LVIDs: 2 7 cm  LVLd A4C: 5 8 cm  LVLs A4C: 4 4 cm  LVOT Diam: 1 9 cm  LVPWd: 0 9 cm  RA Area: 16 6 cm2  RVIDd: 2 7 cm  SV MOD A4C: 30 7 ml  SV(Teich): 44 7 ml    CW  AV Env  Ti: 258 8 ms  AV VTI: 22 4 cm  AV Vmax: 1 2 m/s  AV Vmean: 0 9 m/s  AV maxP 2 mmHg  AV meanPG: 3 4 mmHg  MR VTI: 144 8 cm  MR Vmax: 5 3 m/s  MR Vmean: 4 m/s  MR maxP mmHg  MR meanP 7 mmHg  TR Vmax: 3 5 m/s  TR maxP 4 mmHg    MM  TAPSE: 1 6 cm    PW  FRANCIA (VTI): 2 3 cm2  FRANCIA Vmax: 2 1 cm2  E': 0 1 m/s  E/E': 13 6  LVOT Env  Ti: 289 2 ms  LVOT VTI: 18 7 cm  LVOT Vmax: 1 m/s  LVOT Vmean: 0 6 m/s  LVOT maxPG: 3 7 mmHg  LVOT meanP 9 mmHg  LVSV Dopp: 50 9 ml  MV A Sravan: 0 4 m/s  MV Dec [place]: 7 7 m/s2  MV DecT: 97 6 ms  MV E Sravan: 0 7 m/s  MV E/A Ratio: 1 9  MV PHT: 28 3 ms  MVA By PHT: 7 8 cm2    [facility] [place] [facility] Accredited Echocardiography Laboratory    Prepared and electronically signed by    Misa Ronquillo MD  Signed 09-Sep-2018 15:15:26       No results found for this or any previous visit  No results found for this or any previous visit  No procedure found  No results found for this or any previous visit  Meds/Allergies   all current active meds have been reviewed  Prescriptions Prior to Admission   Medication    acetaminophen (TYLENOL) 500 mg tablet    aspirin 81 mg chewable tablet    cholestyramine (QUESTRAN) 4 g packet    docusate sodium (COLACE) 100 mg capsule    felodipine (PLENDIL) 5 mg 24 hr tablet    LACTOBACILLUS PO    lisinopril-hydrochlorothiazide (PRINZIDE,ZESTORETIC) 20-25 MG per tablet    metoprolol succinate (TOPROL-XL) 50 mg 24 hr tablet    ondansetron (ZOFRAN) 4 mg tablet    polyethylene glycol (MIRALAX) 17 g packet    Pseudoephedrine-APAP  MG TABS    sodium phosphate-biphosphate (FLEET) 7-19 g 118 mL enema    traZODone (DESYREL) 50 mg tablet          ASSESSMENT & PLAN   1-AMA T/ PVCs /and SVT, heart rates improved  Continue beta-blockers  Echocardiogram done EF 55-65%,  Moderate MR, mild AR, mild TR, moderate pulmonary hypertension    2- hypertension, stable  Continue medications  Patient is stable from a cardiac standpoint for discharge  Will follow up in the office        Trang Caballero PA-C  9/10/2018,12:40 PM    Portions of the record may have been created with voice recognition software   Occasional wrong word or "sound a like" substitutions may have occurred due to the inherent limitations of voice recognition software   Read the chart carefully and recognize, using context, where substitutions have occurred

## 2018-09-10 NOTE — PLAN OF CARE
Problem: PHYSICAL THERAPY ADULT  Goal: Performs mobility at highest level of function for planned discharge setting  See evaluation for individualized goals  Treatment/Interventions: ADL retraining, Functional transfer training, LE strengthening/ROM, Elevations, Therapeutic exercise, Endurance training, Patient/family training, Equipment eval/education, Bed mobility, Gait training, Spoke to nursing  Equipment Recommended: Other (Comment) (plan to trial [facility] next session)       See flowsheet documentation for full assessment, interventions and recommendations  Outcome: Progressing  Prognosis: Good  Problem List: Decreased strength, Decreased endurance, Impaired balance, Decreased mobility  Assessment: Pt seen for PT treatment session this date with interventions consisting of gait training w/ emphasis on improving pt's ability to ambulate level surfaces x 200+50 ft with close S provided by therapist with no AD, Therapeutic exercise consisting of: AROM 10 reps B LE in sitting position and navigating 14 stairs x2 trials (7x2, x2 trials  steps) w/ B handrail with alternating pattern with SBA  Pt agreeable to PT treatment session upon arrival, pt found supine in bed w/ HOB elevated, in no apparent distress, A&O x 3 and responsive  In comparison to previous session, pt with improvements in consistency with community distance gait trials, no overt LOB, no symptoms or report of SOB/lightheadedness/dizziness, Tinetti performed revealing 24/28 (low risk for falls), seated ther ex in pain free range with vc for technique < 25% of the time  Post session: pt returned back to recliner, all needs in reach and RN notified of session findings/recommendations  Continue to recommend return to [facility] with PT at time of d/c in order to maximize pt's functional independence and safety w/ mobility  Pt continues to be functioning below baseline level, and remains limited 2* factors listed above   PT will continue to see pt while here in order to address the deficits listed above and provide interventions consistent w/ POC in effort to achieve STGs  PT present for meeting at bedside with RN Rasheed Reynoso, pt, and  Lydia via telephone from [facility]  PT discussed POC, progress w/ PT since including pt's consistency with level surface mobility and stair trials  Pt verbalizing no concerns to return back to previous living environment and navigate stairs  Barriers to Discharge: None     Recommendation: Home PT (return to [facility] with HHPT)     PT - OK to Discharge: Yes (when medically cleared with HHPT and staff A prn)    See flowsheet documentation for full assessment

## 2018-09-10 NOTE — PLAN OF CARE
Problem: OCCUPATIONAL THERAPY ADULT  Goal: Performs self-care activities at highest level of function for planned discharge setting  See evaluation for individualized goals  See flowsheet documentation for full assessment, interventions and recommendations  Assessment: Pt is a 80 y o  female seen for OT evaluation s/p admit to [facility] on 9/4/2018 w/ Altered mental status  Perfomed 2 patient identifiers: Name and wristband  Pt presenting with PMH colitis, dehydreation, HTN, acute kidney injury, hypokalemia, anemia, hypomagnesemia, ambulatory dysfunction, UTI, Lymphedema of arm  Prior to admission, pt was living in [facility]  pt was MI/I/BADLs and functional mobility using RW  She walks down to dinning room for meals  Upon evaluation: Pt is I/ bathing dressing and toileting  Functional mobility sup w/o device  Pt scoring a 80/100 on the barthel Index indicating that pt is currently functioning at highest practical level during ADLs and functional tasks  Pt report all signs and symptoms have resolved and feels that she ready for return home at [facility]  At this time OT services are not warrented  From OT standpoint, recommendation at time of d/c would be home with with support from facility staff as needed  OT Discharge Recommendation: 24 hour supervision/assist (back to [facility] )  OT - OK to Discharge:  Yes

## 2018-09-10 NOTE — DISCHARGE SUMMARY
Discharge- Angeles Boy 12/6/1922, 80 y o  female MRN: [id number]    Unit/Bed#: -01 Encounter: [id number]    Primary Care Provider: Rebeka Hannon MD   Date and time admitted to hospital: 9/4/2018  6:17 PM        Tachycardia   Assessment & Plan    Patient is actually tachycardic  She has improved from yesterday ever since a restart the beta-blocker  May need another increased dose  Will have Cardiology evaluate  She has multiple PVCs as well  Will make sure that her electrolytes are all repleted     She likely has multifocal atrial tachycardia  Cardiology evaluation was appreciated  Echocardiogram shows an ejection fraction of 55-65%  Grade 1 diastolic dysfunction        Acute metabolic encephalopathy   Assessment & Plan    · Likely secondary to infection  · Preliminary urine culture is showing growth of gram-negative rods greater than 100,000  Changed to Ancef  Lymphedema of arm   Assessment & Plan    · Patient is status post mastectomy on the left secondary to breast cancer  · Chronic appears stable        Fall   Assessment & Plan    · Patient reports exhaustion she was out with her nephew the day of the event    · Patient reports fall however denies loss of consciousness  · CT head negative for acute findings  · Patient is overall clinically improved  · Okay for discharge        UTI (urinary tract infection)   Assessment & Plan    · Urinalysis with microscopic noted elevated WBCs 10-20 and innumerable bacteria  · Preliminary urine culture is showing greater than 100,000 growth Klebsiella pneumonia aerococcus continue current antibiotic await sensitivity  · Patient became tachycardic anxious patient given a 1 time dose bolus of normal saline 500 cc tachycardia improved continue telemetry for now repeat CBC now  · Transition to Keflex on discharge        Acute kidney injury [facility])   Assessment & Plan    · Present on admission evident by creatinine of 1 62  · Baseline appears to be 0 6-0 8  · Patient is back at her baseline  · This has resolved  Stop the IV fluids        HTN (hypertension)   Assessment & Plan    · Currently stable  · I did increase the Toprol to 50 mg p  O  b i d  She can follow up with family doctor as an outpatient  Discharging Physician / Practitioner: Jan Chatterjee MD  PCP: Karon Ortega MD  Admission Date:   Admission Orders     Ordered        09/04/18 2250  Inpatient Admission (expected length of stay for this patient is greater than two midnights)  Once             Discharge Date: 09/10/18    Resolved Problems  Date Reviewed: 9/10/2018    None          Consultations During Hospital Stay:  · Cardiology    Procedures Performed:     · Echocardiogram showing ejection fraction of 55-65% with some grade 1 diastolic dysfunction    Significant Findings / Test Results:     · Multifocal atrial tachycardia  · Urinary tract infection    Incidental Findings:   · Multifocal atrial tachycardia     Test Results Pending at Discharge (will require follow up): · None     Outpatient Tests Requested:  · BMP in 1 week    Complications:  None    Reason for Admission:  [facility] Course:     Michelle Mejia is a 80 y o  female patient who originally presented to the hospital on 9/4/2018 due to fall with possible syncope    History of presenting illness:Edelmira Ontiveros is a 80 y o  female who presents with who lives at [facility]  Patient evidently went to stand and felt very weak she states that she fell striking her head  Patient was brought to the emergency room a full trauma workup was undertaken there no evidence for fracture of the neck or head  Patient was found to have slight alteration in mentation at that time and discovered to have what appears to be urinary tract infection on urine dip  Patient's mentation improved with hydration and antibiotic therapy    At the time of my interview she was awake alert oriented and could give me the details of her medical health history  Patient felt that she did not lose consciousness but she just fell    Hospital course: The patient was admitted for above reasons  There is no episode of syncope put the patient did have positive urinary tract infection  She was treated with antibiotics here and will need 2 more days as an outpatient from that standpoint she is doing okay  However while she was here she became very tachycardic and would go up into the 160s with ambulation  The patient was seen by Cardiology  Echocardiogram was otherwise unremarkable  They felt it was multifocal atrial tachycardia  No further workup besides the echocardiogram needed to be done secondary to patient's advanced age  Patient was and beta-blocker as an outpatient I did increase to 50 mg p o  b i d  Her heart rate seems to be doing a little bit better  She can follow up with family doctor as an outpatient  The rest of vital signs do remain stable  The patient initially felt very weak however she was ambulating the hallways without any kind of assistance and she was doing okay  She did ambulate to the point where she does not need rehab  She will be going back to her assisted living facility  Please see above list of diagnoses and related plan for additional information  Condition at Discharge: good     Discharge Day Visit / Exam:     Subjective:  Patient seen examined  No complaints  She feels okay    Vitals: Blood Pressure: 132/72 (09/10/18 0700)  Pulse: 90 (09/10/18 0956)  Temperature: 98 6 °F (37 °C) (09/10/18 0700)  Temp Source: Oral (09/10/18 0700)  Respirations: 18 (09/10/18 0700)  Height: 5' 2" (157 5 cm) (09/04/18 1830)  Weight - Scale: 48 1 kg (106 lb 0 7 oz) (09/04/18 1830)  SpO2: 98 % (09/10/18 0700)  Exam:   Physical Exam  (   General Appearance:    Alert, cooperative, no distress, appears stated age                               Lungs:     Clear to auscultation bilaterally, respirations unlabored       Heart:    Regular rate and rhythm, S1 and S2 normal, no murmur, rub    or gallop   Abdomen:     Soft, non-tender, bowel sounds active all four quadrants,     no masses, no organomegaly           Extremities:   Extremities normal, atraumatic, no cyanosis or edema     Discussion with Family:  Discussed with the nephew    Discharge instructions/Information to patient and family:   See after visit summary for information provided to patient and family  Provisions for Follow-Up Care:  See after visit summary for information related to follow-up care and any pertinent home health orders  Disposition:     Home with VNA Services (Reminder: Complete face to face encounter)    For Discharges to   [address] [facility]:   · Not Applicable to this Patient - Not Applicable to this Patient    Planned Readmission:  None anticipated     Discharge Statement:  I spent 35 minutes discharging the patient  This time was spent on the day of discharge  I had direct contact with the patient on the day of discharge  Greater than 50% of the total time was spent examining patient, answering all patient questions, arranging and discussing plan of care with patient as well as directly providing post-discharge instructions  Additional time then spent on discharge activities  Time spent in discharging the patient discharge paperwork was discharge process and reviewing the hospital course    Discharge Medications:  See after visit summary for reconciled discharge medications provided to patient and family        ** Please Note: This note has been constructed using a voice recognition system **

## 2018-09-10 NOTE — ASSESSMENT & PLAN NOTE
· Currently stable  · I did increase the Toprol to 50 mg p  O  b i d  She can follow up with family doctor as an outpatient

## 2018-09-10 NOTE — CASE MANAGEMENT
Continued Stay Review  Date: 9/8    Vital Signs: /72 (BP Location: Right arm)   Pulse 90   Temp 98 6 °F (37 °C) (Oral)   Resp 18   Ht 5' 2" (1 575 m)   Wt 48 1 kg (106 lb 0 7 oz)   SpO2 98%   BMI 19 40 kg/m²     Medications:   Scheduled Meds:   Current Facility-Administered Medications:  acetaminophen 650 mg Oral Q6H PRN Jeffery Iqbal MD   aspirin 81 mg Oral Daily Jeffery Iqbal MD   cephalexin 500 mg Oral Q12H [facility] Barnie Simmonds, MD   heparin (porcine) 5,000 Units Subcutaneous Q8H [facility] Jeffery Iqbal MD   metoprolol 5 mg Intravenous Q6H PRN [facility]   metoprolol tartrate 37 5 mg Oral Q12H [facility] Toni Doyle MD     Continuous Infusions:    PRN Meds:   acetaminophen    metoprolol    Abnormal Labs/Diagnostic Results: plt   146, K  3 4    Age/Sex: 80 y o  female     Assessment/Plan:   Tachycardia   Assessment & Plan     Patient is actually tachycardic  She has improved from yesterday ever since a restart the beta-blocker  May need another increased dose  Will have Cardiology evaluate  She has multiple PVCs as well  Will make sure that her electrolytes are all repleted           Acute metabolic encephalopathy   Assessment & Plan     · Likely secondary to infection  · Preliminary urine culture is showing growth of gram-negative rods greater than 100,000  Changed to Ancef           Lymphedema of arm   Assessment & Plan     · Patient is status post mastectomy on the left secondary to breast cancer  · Chronic appears stable          Fall   Assessment & Plan     · Patient reports exhaustion she was out with her nephew the day of the event  · Patient reports fall however denies loss of consciousness  · CT head negative for acute findings  · Patient is overall clinically improved  · Patient still does not feel ready for discharge today    Given her advanced age and high risk for falls we will keep her 1 more day          UTI (urinary tract infection)   Assessment & Plan     · Urinalysis with microscopic noted elevated WBCs 10-20 and innumerable bacteria  · Preliminary urine culture is showing greater than 100,000 growth Klebsiella pneumonia aerococcus continue current antibiotic await sensitivity  · Patient became tachycardic anxious patient given a 1 time dose bolus of normal saline 500 cc tachycardia improved continue telemetry for now repeat CBC now  · Transition to Keflex on discharge          Acute kidney injury [facility])   Assessment & Plan     · Present on admission evident by creatinine of 1 62  · Baseline appears to be 0 6-0 8  · Patient is back at her baseline  · This has resolved  Stop the IV fluids          HTN (hypertension)   Assessment & Plan     · Currently stable  · Blood pressures were soft can resume Toprol with parameters  · Added beta-blocker  Heart rate was on the higher side  I keep her ACE-inhibitor on hold                 VTE Pharmacologic Prophylaxis:   Pharmacologic: Heparin  Mechanical VTE Prophylaxis in Place: Yes     Patient Centered Rounds: I have performed bedside rounds with nursing staff today            Education and Discussions with Family / Patient:  Discussed with nephew     Time Spent for Care: 20 minutes  More than 50% of total time spent on counseling and coordination of care as described above      Current Length of Stay: 4 day(s)     Current Patient Status: Inpatient   Certification Statement: The patient will continue to require additional inpatient hospital stay due to Feeling lightheaded and dizzy and also being tachycardic     Discharge Plan:  TBD      Cardiology consult    9/8  Assessment/Plan:  1    Atrial fibrillation versus multifocal atrial tachycardia:  -irregular rhythm seen on telemetry, however can make out P waves  -irregular rhythm more likely to be multifocal atrial tab with PACs and PVCs  -echo ordered, will assess EF and regional wall motion abnormalities  -continue Lopressor 25 mg b i d  for rate control  -on heparin IV q 8  -will hold off on ischemic workup given advanced age     2  Wide complex tachycardia:  Nonsustained ventricular tachycardia versus atrial fibrillation with aberrant see  Echo ordered as above  Replenish electrolytes, K greater than 4, mg greater than 2  Continue beta-blocker      3    Hypertension:  Stable, continue present regimen

## 2018-09-10 NOTE — OCCUPATIONAL THERAPY NOTE
Occupational Therapy Evaluation         Patient Name: Herman Mcfadden  [id number] Date: 9/10/2018      09/10/18 1145   Restrictions/Precautions   Weight Bearing Precautions Per Order No   Braces or Orthoses (none per pt)   Other Precautions Fall Risk;Telemetry  (back safety precautions)   Pain Assessment   Pain Assessment No/denies pain   Pain Score No Pain   Home Living   Type of Home Apartment  (([place] [id number]))   Home Layout Stairs to enter with rails; One level;Performs ADLs on one level; Able to live on main level with bedroom/bathroom  (14 didi c [employer]gers; [employer] for meals )   Bathroom Shower/Tub Walk-in shower   Bathroom Toilet Raised   Bathroom Equipment Grab bars in shower; Shower chair   Bathroom Accessibility Accessible   Home Equipment Walker   Additional Comments not using AD at baseline   Prior Function   Level of [place] Independent with ADLs and functional mobility; Needs assistance with IADLs   Lives With Facility staff   Receives Help From Other (Comment)  (facility staff)   ADL Assistance Independent   IADLs Needs assistance   Falls in the last 6 months 1 to 4   Vocational Retired   Comments (-) driving    ADL   Where Assessed Chair   Eating Assistance 7  [phone]  [address] 7  Independent   Additional Comments HR post activity 95 bpm    Transfers   Sit to Stand 5  Supervision   Additional items Assist x 1   Toilet transfer 7  Independent   Functional Mobility   Functional Mobility 6  Modified independent   Balance   Static Sitting Good   Dynamic Sitting Good   Static Standing Fair +   Dynamic Standing Fair   Activity Tolerance   Nurse Made Aware bella LANDRUM Assessment   RUE Assessment WFL   RUE Strength   RUE Overall Strength Within Functional Limits - able to perform ADL tasks with strength   LUE Assessment   LUE Assessment X   LUE Strength   LUE Overall Strength Due to  precautions   Sensation   Light Touch No apparent deficits   Cognition   Overall Cognitive Status WFL   Arousal/Participation Alert; Responsive; Cooperative   Attention Within functional limits   Orientation Level Oriented to person;Oriented to place;Oriented to time   Memory Within functional limits   Following Commands Follows multistep commands without difficulty   Assessment   Assessment Pt is a 80 y o  female seen for OT evaluation s/p admit to [facility] on 9/4/2018 w/ Altered mental status  Perfomed 2 patient identifiers: Name and wristband  Pt presenting with PMH colitis, dehydreation, HTN, acute kidney injury, hypokalemia, anemia, hypomagnesemia, ambulatory dysfunction, UTI, Lymphedema of arm  Prior to admission, pt was living in [facility]  pt was MI/I/BADLs and functional mobility using RW  She walks down to dinning room for meals  Upon evaluation: Pt is I/ bathing dressing and toileting  Functional mobility sup w/o device  Pt scoring a 80/100 on the barthel Index indicating that pt is currently functioning at highest practical level during ADLs and functional tasks  Pt report all signs and symptoms have resolved and feels that she ready for return home at [facility]  At this time OT services are not warrented  From OT standpoint, recommendation at time of d/c would be home with with support from facility staff as needed      Recommendation   OT Discharge Recommendation 24 hour supervision/assist  (back to [facility] )   OT - OK to Discharge Yes   Barthel Index   Feeding 10   Bathing 0   Grooming Score 5   Dressing Score 10   Bladder Score 10   Bowels Score 10   Toilet Use Score 10   Transfers (Bed/Chair) Score 10   Mobility (Level Surface) Score 10   Stairs Score 5   Barthel Index Score 80   Modified [place] Scale   Modified Jonas Scale 2

## 2018-09-10 NOTE — ASSESSMENT & PLAN NOTE
· Patient reports exhaustion she was out with her nephew the day of the event    · Patient reports fall however denies loss of consciousness  · CT head negative for acute findings  · Patient is overall clinically improved  · Okay for discharge

## 2018-09-11 ENCOUNTER — TRANSITIONAL CARE MANAGEMENT (OUTPATIENT)
Dept: INTERNAL MEDICINE CLINIC | Facility: CLINIC | Age: 83
End: 2018-09-11

## 2018-10-09 ENCOUNTER — OFFICE VISIT (OUTPATIENT)
Dept: CARDIOLOGY CLINIC | Facility: CLINIC | Age: 83
End: 2018-10-09
Payer: MEDICARE

## 2018-10-09 VITALS
SYSTOLIC BLOOD PRESSURE: 122 MMHG | DIASTOLIC BLOOD PRESSURE: 60 MMHG | WEIGHT: 111.6 LBS | HEART RATE: 76 BPM | OXYGEN SATURATION: 96 % | BODY MASS INDEX: 20.54 KG/M2 | HEIGHT: 62 IN

## 2018-10-09 DIAGNOSIS — I10 ESSENTIAL HYPERTENSION: Primary | Chronic | ICD-10-CM

## 2018-10-09 DIAGNOSIS — I47.2 WIDE-COMPLEX TACHYCARDIA (HCC): ICD-10-CM

## 2018-10-09 DIAGNOSIS — I34.0 MODERATE MITRAL REGURGITATION: ICD-10-CM

## 2018-10-09 DIAGNOSIS — I47.1 MULTIFOCAL ATRIAL TACHYCARDIA (HCC): ICD-10-CM

## 2018-10-09 PROCEDURE — 99214 OFFICE O/P EST MOD 30 MIN: CPT | Performed by: INTERNAL MEDICINE

## 2018-10-09 RX ORDER — LISINOPRIL AND HYDROCHLOROTHIAZIDE 25; 20 MG/1; MG/1
1 TABLET ORAL DAILY
COMMUNITY
End: 2019-03-11 | Stop reason: HOSPADM

## 2018-10-09 RX ORDER — POTASSIUM CHLORIDE 20 MEQ/1
20 TABLET, EXTENDED RELEASE ORAL EVERY OTHER DAY
COMMUNITY
End: 2019-03-11 | Stop reason: HOSPADM

## 2018-10-09 RX ORDER — AMLODIPINE BESYLATE 5 MG/1
5 TABLET ORAL DAILY
COMMUNITY
End: 2019-03-11 | Stop reason: HOSPADM

## 2018-10-09 RX ORDER — METOPROLOL SUCCINATE 25 MG/1
25 TABLET, EXTENDED RELEASE ORAL DAILY
Status: ON HOLD | COMMUNITY
End: 2019-01-01 | Stop reason: SDUPTHER

## 2018-10-09 NOTE — PROGRESS NOTES
Cardiology Consultation     Richelle Saenz  [id number]  12/6/1922  [address] [facility] [place] [address]    Chief complaints:  Hospital follow-up for pre syncopal episode and abnormal heart rhythm    History of present illness:  72-year-old female patient who is still very active with past medical history of breast cancer and hypertension was recently admitted the hospital after she had a fall  Patient said she was  [place] and after while she felt very tired and fell on the floor  She did not lose consciousness  Watch in the hospital she had multiple episodes of nonsustained irregular heart rhythm suspicious for multifocal atrial tachycardia  She also had wide complex tachycardia suspicious for nonsustained ventricular tachycardia versus  SVT with aberrancy  Patient did not have any dizziness or syncopal episodes while she was in the hospital   She had an echocardiogram which showed moderate mitral regurgitation normal LV systolic function  Patient started on beta-blockers and she was discharged home  Since discharge she has done well and denies having any episodes of dizziness or syncope  Echo:  LEFT VENTRICLE:  Systolic function was normal  Ejection fraction was estimated in the range of 55 % to 65 %  There were no regional wall motion abnormalities  Doppler parameters were consistent with abnormal left ventricular relaxation (grade 1 diastolic dysfunction)      MITRAL VALVE:  There was moderate regurgitation      AORTIC VALVE:  There was mild regurgitation      TRICUSPID VALVE:  There was mild regurgitation    The findings suggest moderate pulmonary hypertension          Patient Active Problem List   Diagnosis    Colitis, acute    Dehydration    HTN (hypertension)    Acute kidney injury ([address] )    Hypokalemia    Anemia    Hypomagnesemia    Ambulatory dysfunction    Diarrhea    UTI (urinary tract infection)    Lymphedema of arm    Underweight     Past Medical History:   Diagnosis Date    [facility]     Cancer ([facility]     Hypertension     Lymphedema of arm     L arm     Social History     Social History    Marital status:      Spouse name: N/A    Number of children: N/A    Years of education: N/A     Occupational History    Not on file  Social History Main Topics    Smoking status: Never Smoker    Smokeless tobacco: Never Used    Alcohol use No    Drug use: No    Sexual activity: Not on file     Other Topics Concern    Not on file     Social History Narrative    No narrative on file      History reviewed  No pertinent family history    Past Surgical History:   Procedure Laterality Date    BLADDER REPAIR      BREAST SURGERY      HYSTERECTOMY      MASTECTOMY         Current Outpatient Prescriptions:     amLODIPine (NORVASC) 5 mg tablet, Take 5 mg by mouth daily, Disp: , Rfl:     aspirin 81 mg chewable tablet, Chew 1 tablet daily, Disp: , Rfl:     lisinopril-hydrochlorothiazide (PRINZIDE,ZESTORETIC) 20-25 MG per tablet, Take 1 tablet by mouth daily, Disp: , Rfl:     metoprolol succinate (TOPROL-XL) 50 mg 24 hr tablet, Take 25 mg by mouth daily, Disp: , Rfl:     polyethylene glycol (MIRALAX) 17 g packet, Take 17 g by mouth daily, Disp: 14 each, Rfl: 0    potassium chloride (K-DUR,KLOR-CON) 20 mEq tablet, Take 20 mEq by mouth every other day, Disp: , Rfl:     Pseudoephedrine-APAP  MG TABS, Take by mouth, Disp: , Rfl:     docusate sodium (COLACE) 100 mg capsule, Take 1 capsule (100 mg total) by mouth 2 (two) times a day (Patient not taking: Reported on 10/9/2018 ), Disp: 10 capsule, Rfl: 0    felodipine (PLENDIL) 5 mg 24 hr tablet, Take 1 tablet by mouth daily, Disp: , Rfl:     LACTOBACILLUS PO, Take by mouth, Disp: , Rfl:     sodium phosphate-biphosphate (FLEET) 7-19 g 118 mL enema, Insert 1 enema into the rectum, Disp: , Rfl:     traZODone (DESYREL) 50 mg tablet, Take 50 mg by mouth daily at bedtime, Disp: , Rfl:   No Known Allergies  Vitals:    10/09/18 1336   BP: 122/60   BP Location: Right arm   Patient Position: Sitting   Cuff Size: Adult   Pulse: 76   SpO2: 96%   Weight: 50 6 kg (111 lb 9 6 oz)   Height: 5' 2" (1 575 m)       Labs:  Admission on 09/04/2018, Discharged on 09/10/2018   Component Date Value    WBC 09/04/2018 7 34     RBC 09/04/2018 4 41     Hemoglobin 09/04/2018 12 9     Hematocrit 09/04/2018 40 1     MCV 09/04/2018 91     MCH 09/04/2018 29 3     MCHC 09/04/2018 32 2     RDW 09/04/2018 12 5     MPV 09/04/2018 12 3     Platelets 06/35/8642 183     nRBC 09/04/2018 0     Neutrophils Relative 09/04/2018 57     Immat GRANS % 09/04/2018 0     Lymphocytes Relative 09/04/2018 35     Monocytes Relative 09/04/2018 4     Eosinophils Relative 09/04/2018 3     Basophils Relative 09/04/2018 1     Neutrophils Absolute 09/04/2018 4 20     Immature Grans Absolute 09/04/2018 0 02     Lymphocytes Absolute 09/04/2018 2 60     Monocytes Absolute 09/04/2018 0 30     Eosinophils Absolute 09/04/2018 0 18     Basophils Absolute 09/04/2018 0 04     Sodium 09/04/2018 138     Potassium 09/04/2018 3 3*    Chloride 09/04/2018 99*    CO2 09/04/2018 29     ANION GAP 09/04/2018 10     BUN 09/04/2018 38*    Creatinine 09/04/2018 1 62*    Glucose 09/04/2018 151*    Calcium 09/04/2018 9 7     eGFR 09/04/2018 27     Color, UA 09/04/2018 Yellow     Clarity, UA 09/04/2018 Cloudy     Specific Gravity, UA 09/04/2018 1 020     pH, UA 09/04/2018 5 5     Leukocytes, UA 09/04/2018 Trace*    Nitrite, UA 09/04/2018 Positive*    Protein, UA 09/04/2018 30 (1+)*    Glucose, UA 09/04/2018 Negative     Ketones, UA 09/04/2018 Trace*    Urobilinogen, UA 09/04/2018 1 0     Bilirubin, UA 09/04/2018 Negative     Blood, UA 09/04/2018 Negative     RBC, UA 09/04/2018 None Seen     WBC, UA 09/04/2018 10-20*    Epithelial Cells 09/04/2018 Occasional     Bacteria, UA 09/04/2018 Innumerable*    Ventricular Rate 09/04/2018 87     Atrial Rate 09/04/2018 87     GA Interval 09/04/2018 134     QRSD Interval 09/04/2018 76     QT Interval 09/04/2018 354     QTC Interval 09/04/2018 425     P Axis 09/04/2018 89     QRS Axis 09/04/2018 73     T Wave [place] 09/04/2018 17     Urine Culture 09/04/2018 >100,000 cfu/ml Klebsiella pneumoniae*    Urine Culture 09/04/2018 >100,000 cfu/ml Aerococcus urinae*    MRSA Culture Only 09/05/2018 No Methicillin Resistant Staphlyococcus aureus (MRSA) isolated     Sodium 09/05/2018 139     Potassium 09/05/2018 3 4*    Chloride 09/05/2018 103     CO2 09/05/2018 30     ANION GAP 09/05/2018 6     BUN 09/05/2018 39*    Creatinine 09/05/2018 1 21     Glucose 09/05/2018 122     Calcium 09/05/2018 9 2     eGFR 09/05/2018 38     WBC 09/05/2018 15 55*    RBC 09/05/2018 4 08     Hemoglobin 09/05/2018 11 9     Hematocrit 09/05/2018 37 2     MCV 09/05/2018 91     MCH 09/05/2018 29 2     MCHC 09/05/2018 32 0     RDW 09/05/2018 12 6     MPV 09/05/2018 12 1     Platelets 57/35/7448 143*    nRBC 09/05/2018 0     Neutrophils Relative 09/05/2018 82*    Immat GRANS % 09/05/2018 0     Lymphocytes Relative 09/05/2018 11*    Monocytes Relative 09/05/2018 7     Eosinophils Relative 09/05/2018 0     Basophils Relative 09/05/2018 0     Neutrophils Absolute 09/05/2018 12 72*    Immature Grans Absolute 09/05/2018 0 05     Lymphocytes Absolute 09/05/2018 1 70     Monocytes Absolute 09/05/2018 1 04     Eosinophils Absolute 09/05/2018 0 01     Basophils Absolute 09/05/2018 0 03     Sodium 09/06/2018 138     Potassium 09/06/2018 3 6     Chloride 09/06/2018 103     CO2 09/06/2018 25     ANION GAP 09/06/2018 10     BUN 09/06/2018 19     Creatinine 09/06/2018 0 98     Glucose 09/06/2018 131     Calcium 09/06/2018 9 6     eGFR 09/06/2018 49     WBC 09/06/2018 10 81*    RBC 09/06/2018 4 64     Hemoglobin 09/06/2018 13 5     Hematocrit 09/06/2018 42 3     MCV 09/06/2018 91     MCH 09/06/2018 29 1     MCHC 09/06/2018 31 9     RDW 09/06/2018 12 6     Platelets 19/74/0324 157     MPV 09/06/2018 11 6     Ventricular Rate 09/06/2018 116     Atrial Rate 09/06/2018 116     QRSD Interval 09/06/2018 78     QT Interval 09/06/2018 334     QTC Interval 09/06/2018 464     QRS Axis 09/06/2018 80     T Wave [place] 09/06/2018 -14     Ventricular Rate 09/06/2018 0     Atrial Rate 09/06/2018 0     QRSD Interval 09/06/2018 0     QT Interval 09/06/2018 0     QTC Interval 09/06/2018 0     QRS Axis 09/06/2018 0     T Wave [place] 09/06/2018 0     Sodium 09/08/2018 138     Potassium 09/08/2018 3 4*    Chloride 09/08/2018 102     CO2 09/08/2018 28     ANION GAP 09/08/2018 8     BUN 09/08/2018 14     Creatinine 09/08/2018 0 84     Glucose 09/08/2018 109     Calcium 09/08/2018 9 6     eGFR 09/08/2018 59     WBC 09/08/2018 8 36     RBC 09/08/2018 4 09     Hemoglobin 09/08/2018 12 1     Hematocrit 09/08/2018 36 7     MCV 09/08/2018 90     MCH 09/08/2018 29 6     MCHC 09/08/2018 33 0     RDW 09/08/2018 12 4     MPV 09/08/2018 11 5     Platelets 09/07/3617 146*    nRBC 09/08/2018 0     Neutrophils Relative 09/08/2018 57     Immat GRANS % 09/08/2018 0     Lymphocytes Relative 09/08/2018 23     Monocytes Relative 09/08/2018 9     Eosinophils Relative 09/08/2018 10*    Basophils Relative 09/08/2018 1     Neutrophils Absolute 09/08/2018 4 87     Immature Grans Absolute 09/08/2018 0 03     Lymphocytes Absolute 09/08/2018 1 90     Monocytes Absolute 09/08/2018 0 71     Eosinophils Absolute 09/08/2018 0 80*    Basophils Absolute 09/08/2018 0 05     WBC 09/09/2018 7 11     RBC 09/09/2018 3 99     Hemoglobin 09/09/2018 11 7     Hematocrit 09/09/2018 35 9     MCV 09/09/2018 90     MCH 09/09/2018 29 3     MCHC 09/09/2018 32 6     RDW 09/09/2018 12 3     Platelets 33/94/0910 153     MPV 09/09/2018 11 3     Sodium 09/09/2018 137     Potassium 09/09/2018 3 7     Chloride 09/09/2018 102     CO2 09/09/2018 28     ANION GAP 09/09/2018 7     BUN 09/09/2018 20     Creatinine 09/09/2018 0 83     Glucose 09/09/2018 100     Calcium 09/09/2018 9 4     eGFR 09/09/2018 60      Imaging: No results found  Review of Systems:  Review of Systems   Constitutional: Negative for diaphoresis and fatigue  HENT: Negative for congestion and facial swelling  Eyes: Negative for photophobia and visual disturbance  Respiratory: Negative for chest tightness and shortness of breath  Cardiovascular: Negative for chest pain, palpitations and leg swelling  Gastrointestinal: Negative for abdominal pain and nausea  Endocrine: Negative for cold intolerance and heat intolerance  Musculoskeletal: Negative for arthralgias and myalgias  Skin: Negative for pallor and rash  Neurological: Negative for dizziness and tremors  Psychiatric/Behavioral: Negative for sleep disturbance  The patient is not nervous/anxious  Physical Exam:  Physical Exam   Constitutional: She is oriented to person, place, and time  She appears well-developed and well-nourished  HENT:   Head: Normocephalic and atraumatic  Eyes: Pupils are equal, round, and reactive to light  Conjunctivae and EOM are normal    Neck: Normal range of motion  Neck supple  No JVD present  No thyromegaly present  Cardiovascular: Normal rate, regular rhythm, S1 normal, S2 normal and intact distal pulses  Exam reveals no gallop and no friction rub  Murmur heard  Systolic murmur is present with a grade of 3/6   Pulses:       Carotid pulses are 2+ on the right side, and 2+ on the left side  Pulmonary/Chest: Effort normal and breath sounds normal  No respiratory distress  She has no wheezes  She has no rales  Abdominal: Soft  Bowel sounds are normal  She exhibits no distension  There is no tenderness  There is no rebound and no guarding  Musculoskeletal: Normal range of motion  She exhibits no edema  Neurological: She is alert and oriented to person, place, and time  She has normal reflexes  No cranial nerve deficit  Skin: Skin is warm and dry  Psychiatric: She has a normal mood and affect  Discussion/Summary:    1  NARROW COMPLEX TACHYCARDIA suspicious for multifocal atrial tachycardia / nonsustained VT:   patient is currently asymptomatic   no additional workup for now   continue beta-blocker     2  Hypertension, blood pressure well controlled     3   Moderate mitral regurgitation   will repeat echocardiogram in 1 year   currently asymptomatic     follow up with me in 4 months

## 2018-11-06 ENCOUNTER — APPOINTMENT (EMERGENCY)
Dept: CT IMAGING | Facility: HOSPITAL | Age: 83
End: 2018-11-06
Payer: MEDICARE

## 2018-11-06 ENCOUNTER — APPOINTMENT (EMERGENCY)
Dept: RADIOLOGY | Facility: HOSPITAL | Age: 83
End: 2018-11-06
Payer: MEDICARE

## 2018-11-06 ENCOUNTER — HOSPITAL ENCOUNTER (EMERGENCY)
Facility: HOSPITAL | Age: 83
Discharge: HOME | End: 2018-11-06
Attending: EMERGENCY MEDICINE | Admitting: EMERGENCY MEDICINE
Payer: MEDICARE

## 2018-11-06 VITALS
WEIGHT: 115.74 LBS | BODY MASS INDEX: 22.72 KG/M2 | HEIGHT: 60 IN | DIASTOLIC BLOOD PRESSURE: 71 MMHG | SYSTOLIC BLOOD PRESSURE: 145 MMHG | RESPIRATION RATE: 24 BRPM | HEART RATE: 83 BPM | OXYGEN SATURATION: 98 % | TEMPERATURE: 97.3 F

## 2018-11-06 DIAGNOSIS — R55 NEAR SYNCOPE: Primary | ICD-10-CM

## 2018-11-06 LAB
ALBUMIN SERPL BCP-MCNC: 3.6 G/DL (ref 3.5–5)
ALP SERPL-CCNC: 97 U/L (ref 46–116)
ALT SERPL W P-5'-P-CCNC: 22 U/L (ref 12–78)
ANION GAP SERPL CALCULATED.3IONS-SCNC: 9 MMOL/L (ref 4–13)
AST SERPL W P-5'-P-CCNC: 24 U/L (ref 5–45)
ATRIAL RATE: 85 BPM
BACTERIA UR QL AUTO: ABNORMAL /HPF
BASOPHILS # BLD AUTO: 0.06 THOUSANDS/ΜL (ref 0–0.1)
BASOPHILS NFR BLD AUTO: 1 % (ref 0–1)
BILIRUB DIRECT SERPL-MCNC: 0.13 MG/DL (ref 0–0.2)
BILIRUB SERPL-MCNC: 0.4 MG/DL (ref 0.2–1)
BILIRUB UR QL STRIP: NEGATIVE
BUN SERPL-MCNC: 31 MG/DL (ref 5–25)
CALCIUM SERPL-MCNC: 10.2 MG/DL (ref 8.3–10.1)
CHLORIDE SERPL-SCNC: 99 MMOL/L (ref 100–108)
CLARITY UR: CLEAR
CO2 SERPL-SCNC: 31 MMOL/L (ref 21–32)
COLOR UR: YELLOW
CREAT SERPL-MCNC: 0.99 MG/DL (ref 0.6–1.3)
EOSINOPHIL # BLD AUTO: 0.43 THOUSAND/ΜL (ref 0–0.61)
EOSINOPHIL NFR BLD AUTO: 6 % (ref 0–6)
ERYTHROCYTE [DISTWIDTH] IN BLOOD BY AUTOMATED COUNT: 12.3 % (ref 11.6–15.1)
GFR SERPL CREATININE-BSD FRML MDRD: 49 ML/MIN/1.73SQ M
GLUCOSE SERPL-MCNC: 99 MG/DL (ref 65–140)
GLUCOSE UR STRIP-MCNC: NEGATIVE MG/DL
HCT VFR BLD AUTO: 41.1 % (ref 34.8–46.1)
HGB BLD-MCNC: 13.2 G/DL (ref 11.5–15.4)
HGB UR QL STRIP.AUTO: NEGATIVE
IMM GRANULOCYTES # BLD AUTO: 0.02 THOUSAND/UL (ref 0–0.2)
IMM GRANULOCYTES NFR BLD AUTO: 0 % (ref 0–2)
INR PPP: 0.95 (ref 0.86–1.17)
KETONES UR STRIP-MCNC: NEGATIVE MG/DL
LACTATE SERPL-SCNC: 1.7 MMOL/L (ref 0.5–2)
LEUKOCYTE ESTERASE UR QL STRIP: ABNORMAL
LYMPHOCYTES # BLD AUTO: 1.89 THOUSANDS/ΜL (ref 0.6–4.47)
LYMPHOCYTES NFR BLD AUTO: 25 % (ref 14–44)
MCH RBC QN AUTO: 29.4 PG (ref 26.8–34.3)
MCHC RBC AUTO-ENTMCNC: 32.1 G/DL (ref 31.4–37.4)
MCV RBC AUTO: 92 FL (ref 82–98)
MONOCYTES # BLD AUTO: 0.54 THOUSAND/ΜL (ref 0.17–1.22)
MONOCYTES NFR BLD AUTO: 7 % (ref 4–12)
NEUTROPHILS # BLD AUTO: 4.51 THOUSANDS/ΜL (ref 1.85–7.62)
NEUTS SEG NFR BLD AUTO: 61 % (ref 43–75)
NITRITE UR QL STRIP: NEGATIVE
NON-SQ EPI CELLS URNS QL MICRO: ABNORMAL /HPF
NRBC BLD AUTO-RTO: 0 /100 WBCS
P AXIS: 83 DEGREES
PH UR STRIP.AUTO: 6.5 [PH] (ref 4.5–8)
PLATELET # BLD AUTO: 172 THOUSANDS/UL (ref 149–390)
PMV BLD AUTO: 11.4 FL (ref 8.9–12.7)
POTASSIUM SERPL-SCNC: 3.4 MMOL/L (ref 3.5–5.3)
PR INTERVAL: 160 MS
PROT SERPL-MCNC: 7.9 G/DL (ref 6.4–8.2)
PROT UR STRIP-MCNC: NEGATIVE MG/DL
PROTHROMBIN TIME: 12.6 SECONDS (ref 11.8–14.2)
QRS AXIS: 93 DEGREES
QRSD INTERVAL: 88 MS
QT INTERVAL: 390 MS
QTC INTERVAL: 464 MS
RBC # BLD AUTO: 4.49 MILLION/UL (ref 3.81–5.12)
RBC #/AREA URNS AUTO: ABNORMAL /HPF
SODIUM SERPL-SCNC: 139 MMOL/L (ref 136–145)
SP GR UR STRIP.AUTO: 1.01 (ref 1–1.03)
T WAVE AXIS: 68 DEGREES
TROPONIN I SERPL-MCNC: <0.02 NG/ML
UROBILINOGEN UR QL STRIP.AUTO: 0.2 E.U./DL
VENTRICULAR RATE: 85 BPM
WBC # BLD AUTO: 7.45 THOUSAND/UL (ref 4.31–10.16)
WBC #/AREA URNS AUTO: ABNORMAL /HPF

## 2018-11-06 PROCEDURE — 80076 HEPATIC FUNCTION PANEL: CPT | Performed by: EMERGENCY MEDICINE

## 2018-11-06 PROCEDURE — 36415 COLL VENOUS BLD VENIPUNCTURE: CPT | Performed by: EMERGENCY MEDICINE

## 2018-11-06 PROCEDURE — 93010 ELECTROCARDIOGRAM REPORT: CPT | Performed by: INTERNAL MEDICINE

## 2018-11-06 PROCEDURE — 99285 EMERGENCY DEPT VISIT HI MDM: CPT

## 2018-11-06 PROCEDURE — 83605 ASSAY OF LACTIC ACID: CPT | Performed by: EMERGENCY MEDICINE

## 2018-11-06 PROCEDURE — 71046 X-RAY EXAM CHEST 2 VIEWS: CPT

## 2018-11-06 PROCEDURE — 80048 BASIC METABOLIC PNL TOTAL CA: CPT | Performed by: EMERGENCY MEDICINE

## 2018-11-06 PROCEDURE — 93005 ELECTROCARDIOGRAM TRACING: CPT

## 2018-11-06 PROCEDURE — 84484 ASSAY OF TROPONIN QUANT: CPT | Performed by: EMERGENCY MEDICINE

## 2018-11-06 PROCEDURE — 70450 CT HEAD/BRAIN W/O DYE: CPT

## 2018-11-06 PROCEDURE — 85610 PROTHROMBIN TIME: CPT | Performed by: EMERGENCY MEDICINE

## 2018-11-06 PROCEDURE — 81001 URINALYSIS AUTO W/SCOPE: CPT | Performed by: EMERGENCY MEDICINE

## 2018-11-06 PROCEDURE — 85025 COMPLETE CBC W/AUTO DIFF WBC: CPT | Performed by: EMERGENCY MEDICINE

## 2018-11-06 NOTE — DISCHARGE INSTRUCTIONS
Normal diagnostic testing at this time  You are back to your baseline  Follow up with your physician    Syncope   WHAT YOU NEED TO KNOW:   Syncope is also called fainting or passing out  Syncope is a sudden, temporary loss of consciousness, followed by a fall from a standing or sitting position  Syncope ranges from not serious to a sign of a more serious condition that needs to be treated  You can control some health conditions that cause syncope  Your healthcare providers can help you create a plan to manage syncope and prevent episodes  DISCHARGE INSTRUCTIONS:   Seek care immediately if:   · You are bleeding because you hit your head when you fainted  · You suddenly have double vision, difficulty speaking, numbness, and cannot move your arms or legs  · You have chest pain and trouble breathing  · You vomit blood or material that looks like coffee grounds  · You see blood in your bowel movement  Contact your healthcare provider if:   · You have new or worsening symptoms  · You have another syncope episode  · You have a headache, fast heartbeat, or feel too dizzy to stand up  · You have questions or concerns about your condition or care  Follow up with your healthcare provider as directed:  Write down your questions so you remember to ask them during your visits  Manage syncope:   · Keep a record of your syncope episodes  Include your symptoms and your activity before and after the episode  The record can help your healthcare provider find the cause of your syncope and help you manage episodes  · Sit or lie down when needed  This includes when you feel dizzy, your throat is getting tight, and your vision changes  Raise your legs above the level of your heart  · Take slow, deep breaths if you start to breathe faster with anxiety or fear  This can help decrease dizziness and the feeling that you might faint  · Check your blood pressure often    This is important if you take medicine to lower your blood pressure  Check your blood pressure when you are lying down and when you are standing  Ask how often to check during the day  Keep a record of your blood pressure numbers  Your healthcare provider may use the record to help plan your treatment  Prevent a syncope episode:   · Move slowly and let yourself get used to one position before you move to another position  This is very important when you change from a lying or sitting position to a standing position  Take some deep breaths before you stand up from a lying position  Stand up slowly  Sudden movements may cause a fainting spell  Sit on the side of the bed or couch for a few minutes before you stand up  If you are on bedrest, try to be upright for about 2 hours each day, or as directed  Do not lock your legs if you are standing for a long period of time  Move your legs and bend your knees to keep blood flowing  · Follow your healthcare provider's recommendations  Your provider may  recommend that you drink more liquids to prevent dehydration  You may also need to have more salt to keep your blood pressure from dropping too low and causing syncope  Your provider will tell you how much liquid and sodium to have each day  · Watch for signs of low blood sugar  These include hunger, nervousness, sweating, and fast or fluttery heartbeats  Talk with your healthcare provider about ways to keep your blood sugar level steady  · Do not strain if you are constipated  You may faint if you strain to have a bowel movement  Walking is the best way to get your bowels moving  Eat foods high in fiber to make it easier to have a bowel movement  Good examples are high-fiber cereals, beans, vegetables, and whole-grain breads  Prune juice may help make bowel movements softer  · Be careful in hot weather  Heat can cause a syncope episode  Limit activity done outside on hot days   Physical activity in hot weather can lead to dehydration  This can cause an episode  © 2017 [employer] Information is for End User's use only and may not be sold, redistributed or otherwise used for commercial purposes  All illustrations and images included in CareNotes® are the copyrighted property of [employer]  or Daron Page  The above information is an  only  It is not intended as medical advice for individual conditions or treatments  Talk to your doctor, nurse or pharmacist before following any medical regimen to see if it is safe and effective for you

## 2018-11-06 NOTE — ED NOTES
Unable to give person to person report on patient to [facility]  Multiple attempts by phone with all calls going to voicemail  Voicemail left for RN, Ban Fan on patient discharge from our department and that she will be returning to the personal care home this evening         Gautam Courtney RN  11/06/18 [phone]

## 2018-11-06 NOTE — ED PROVIDER NOTES
History  Chief Complaint   Patient presents with    Weakness - Generalized     Per EMS patient had near syncopal episode earlier today and is feeling generally weak  HPI patient is a 80-year-old female, apparently was playing bingo at a personal care home and suddenly became diaphoretic and had a mental status change  Apparently was gray in color, apparently was sweaty and felt weak  Patient reports he did not completely lose consciousness  Patient is anxious currently because of what happened but reports no symptoms  She denies any chest pain or headache  She denies any focal weakness  She reports that she feels at baseline other than she is concerned about what happened  Past medical history of atrial fibrillation, metabolic encephalopathy, urinary tract infection  Family history noncontributory  Social history, lives at a personal care home nonsmoker      Prior to Admission Medications   Prescriptions Last Dose Informant Patient Reported? Taking?    LACTOBACILLUS PO  Self Yes No   Sig: Take by mouth   Pseudoephedrine-APAP  MG TABS  Self Yes No   Sig: Take by mouth   amLODIPine (NORVASC) 5 mg tablet   Yes No   Sig: Take 5 mg by mouth daily   aspirin 81 mg chewable tablet  Self Yes No   Sig: Chew 1 tablet daily   docusate sodium (COLACE) 100 mg capsule  Self No No   Sig: Take 1 capsule (100 mg total) by mouth 2 (two) times a day   Patient not taking: Reported on 10/9/2018    felodipine (PLENDIL) 5 mg 24 hr tablet  Self Yes No   Sig: Take 1 tablet by mouth daily   lisinopril-hydrochlorothiazide (PRINZIDE,ZESTORETIC) 20-25 MG per tablet   Yes No   Sig: Take 1 tablet by mouth daily   metoprolol succinate (TOPROL-XL) 50 mg 24 hr tablet   Yes No   Sig: Take 25 mg by mouth daily   polyethylene glycol (MIRALAX) 17 g packet  Self No No   Sig: Take 17 g by mouth daily   potassium chloride (K-DUR,KLOR-CON) 20 mEq tablet   Yes No   Sig: Take 20 mEq by mouth every other day   sodium phosphate-biphosphate (FLEET) 7-19 g 118 mL enema  Self Yes No   Sig: Insert 1 enema into the rectum   traZODone (DESYREL) 50 mg tablet  Self Yes No   Sig: Take 50 mg by mouth daily at bedtime      Facility-Administered Medications: None       Past Medical History:   Diagnosis Date    [facility]     Cancer [facility]     Hypertension     Lymphedema of arm     L arm       Past Surgical History:   Procedure Laterality Date    BLADDER REPAIR      BREAST SURGERY      HYSTERECTOMY      MASTECTOMY         History reviewed  No pertinent family history  I have reviewed and agree with the history as documented  Social History   Substance Use Topics    Smoking status: Never Smoker    Smokeless tobacco: Never Used    Alcohol use No        Review of Systems   Constitutional: Negative for diaphoresis, fatigue and fever  HENT: Negative for congestion, ear pain, nosebleeds and sore throat  Eyes: Negative for photophobia, pain, discharge and visual disturbance  Respiratory: Negative for cough, choking, chest tightness, shortness of breath and wheezing  Cardiovascular: Negative for chest pain and palpitations  Gastrointestinal: Negative for abdominal distention, abdominal pain, diarrhea and vomiting  Genitourinary: Negative for dysuria, flank pain and frequency  Musculoskeletal: Negative for back pain, gait problem and joint swelling  Skin: Negative for color change and rash  Neurological: Negative for dizziness, syncope, weakness and headaches  Psychiatric/Behavioral: Negative for behavioral problems and confusion  The patient is not nervous/anxious  All other systems reviewed and are negative  Episode of weakness and near-syncope -resolved    Physical Exam  Physical Exam   Constitutional: She is oriented to person, place, and time  She appears well-developed and well-nourished  HENT:   Head: Normocephalic     Right Ear: External ear normal    Left Ear: External ear normal    Nose: Nose normal  Mouth/Throat: Oropharynx is clear and moist    Eyes: Pupils are equal, round, and reactive to light  EOM and lids are normal    Neck: Normal range of motion  Neck supple  Cardiovascular: Normal rate, regular rhythm, normal heart sounds and intact distal pulses  Pulmonary/Chest: Effort normal and breath sounds normal  No respiratory distress  Abdominal: Soft  Bowel sounds are normal  She exhibits no distension  There is no tenderness  There is no guarding  Musculoskeletal: Normal range of motion  She exhibits no deformity  Chronic left arm swelling due to lymphedema secondary to mastectomy , good distal pulses normal sensation   Neurological: She is alert and oriented to person, place, and time  Skin: Skin is warm and dry  Psychiatric: She has a normal mood and affect  Nursing note and vitals reviewed     Pulse oximetry 95% on room air adequate oxygenation, there is no hypoxia    Vital Signs  ED Triage Vitals [11/06/18 1251]   Temperature Pulse Respirations Blood Pressure SpO2   (!) 97 3 °F (36 3 °C) 94 (!) 24 (!) 183/88 95 %      Temp Source Heart Rate Source Patient Position - Orthostatic VS BP Location FiO2 (%)   Oral Monitor Lying Right arm --      Pain Score       --           Vitals:    11/06/18 1251   BP: (!) 183/88   Pulse: 94   Patient Position - Orthostatic VS: Lying       Visual Acuity      ED Medications  Medications - No data to display    Diagnostic Studies  Results Reviewed     Procedure Component Value Units Date/Time    Urine Microscopic [[id number]]  (Abnormal) Collected:  11/06/18 1423    Lab Status:  Final result Specimen:  Urine from Urine, Clean Catch Updated:  11/06/18 1520     RBC, UA 0-1 (A) /hpf      WBC, UA 2-4 (A) /hpf      Epithelial Cells Moderate (A) /hpf      Bacteria, UA None Seen /hpf     UA w Reflex to Microscopic w Reflex to Culture [[id number]]  (Abnormal) Collected:  11/06/18 1423    Lab Status:  Final result Specimen:  Urine from Urine, Clean Catch Updated: 11/06/18 1444     Color, UA Yellow     Clarity, UA Clear     Specific Gravity, UA 1 010     pH, UA 6 5     Leukocytes, UA Trace (A)     Nitrite, UA Negative     Protein, UA Negative mg/dl      Glucose, UA Negative mg/dl      Ketones, UA Negative mg/dl      Urobilinogen, UA 0 2 E U /dl      Bilirubin, UA Negative     Blood, UA Negative    Lactic acid, plasma [[id number]]  (Normal) Collected:  11/06/18 1330    Lab Status:  Final result Specimen:  Blood from Arm, Right Updated:  11/06/18 1400     LACTIC ACID 1 7 mmol/L     Narrative:         Result may be elevated if tourniquet was used during collection  Troponin I [[id number]]  (Normal) Collected:  11/06/18 1330    Lab Status:  Final result Specimen:  Blood from Arm, Right Updated:  11/06/18 1359     Troponin I <0 02 ng/mL     Basic metabolic panel [[id number]]  (Abnormal) Collected:  11/06/18 1330    Lab Status:  Final result Specimen:  Blood from Arm, Right Updated:  11/06/18 1357     Sodium 139 mmol/L      Potassium 3 4 (L) mmol/L      Chloride 99 (L) mmol/L      CO2 31 mmol/L      ANION GAP 9 mmol/L      BUN 31 (H) mg/dL      Creatinine 0 99 mg/dL      Glucose 99 mg/dL      Calcium 10 2 (H) mg/dL      eGFR 49 ml/min/1 73sq m     Narrative:         National Kidney Disease Education Program recommendations are as follows:  GFR calculation is accurate only with a steady state creatinine  Chronic Kidney disease less than 60 ml/min/1 73 sq  meters  Kidney failure less than 15 ml/min/1 73 sq  meters      Hepatic function panel [[id number]]  (Normal) Collected:  11/06/18 1330    Lab Status:  Final result Specimen:  Blood from Arm, Right Updated:  11/06/18 1357     Total Bilirubin 0 40 mg/dL      Bilirubin, Direct 0 13 mg/dL      Alkaline Phosphatase 97 U/L      AST 24 U/L      ALT 22 U/L      Total Protein 7 9 g/dL      Albumin 3 6 g/dL     Protime-INR [[id number]]  (Normal) Collected:  11/06/18 1330    Lab Status:  Final result Specimen:  Blood from Arm, Right Updated: 11/06/18 1351     Protime 12 6 seconds      INR 0 95    CBC and differential [[id number]] Collected:  11/06/18 1330    Lab Status:  Final result Specimen:  Blood from Arm, Right Updated:  11/06/18 1337     WBC 7 45 Thousand/uL      RBC 4 49 Million/uL      Hemoglobin 13 2 g/dL      Hematocrit 41 1 %      MCV 92 fL      MCH 29 4 pg      MCHC 32 1 g/dL      RDW 12 3 %      MPV 11 4 fL      Platelets 671 Thousands/uL      nRBC 0 /100 WBCs      Neutrophils Relative 61 %      Immat GRANS % 0 %      Lymphocytes Relative 25 %      Monocytes Relative 7 %      Eosinophils Relative 6 %      Basophils Relative 1 %      Neutrophils Absolute 4 51 Thousands/µL      Immature Grans Absolute 0 02 Thousand/uL      Lymphocytes Absolute 1 89 Thousands/µL      Monocytes Absolute 0 54 Thousand/µL      Eosinophils Absolute 0 43 Thousand/µL      Basophils Absolute 0 06 Thousands/µL                  CT head without contrast   Final Result by Maggy Simental MD (11/06 1424)      No acute intracranial hemorrhage seen                  Workstation performed: [id number]         XR chest pa & lateral    (Results Pending)              Procedures  ECG 12 Lead Documentation  Date/Time: 11/6/2018 1:02 PM  Performed by: Fernando Kimble  Authorized by: Fernando Kimble     Indications / Diagnosis:  Weakness ,near syncope  ECG reviewed by me, the ED Provider: yes    Patient location:  ED  Previous ECG:     Previous ECG:  Compared to current    Comparison ECG info:  September 6, 2018    Similarity:  Changes noted  Interpretation:     Interpretation: non-specific    Rate:     ECG rate:  Eighty-five    ECG rate assessment: normal    Rhythm:     Rhythm: sinus rhythm    Comments:      History of prior atrial fibrillation, now in sinus rhythm, nonspecific ST-T wave changes, no acute ST elevations             Phone Contacts  ED Phone Contact    ED Course          I spoke with the patient's emergency contract Rolan, discussed with the patient is currently asymptomatic, patient had near syncope but no loss of consciousness  No indication for admission less the patient chooses to stay  Still currently working up  He understands, asked if we would look for infection  Discussed we would get some urine  Discussed outpatient follow-up      diagnostic testing showed normal lactate no signs severe sepsis, electrolytes were within normal limits no sign of dehydration,     patient's liver functions were normal no sign of hepatitis, patient's white count was normal at 7 45 no sign of inflammation, hemoglobin was normal at 13 no sign of anemia  CT scan of brain showed no intracranial hemorrhage  chest x-ray showed cardiomegaly, increased markings bilaterally, review of the old chest films showed no acute change, there is no definite infiltrate  Urinalysis showed no sign of infection  Discussed with patient, no obvious course of syncope now appears at baseline, discussed returning to her nursing home she agrees  MDM medical decision making 80year-old female, episode of diaphoresis and near loss of consciousness at her nursing home, now awake and alert, nonfocal exam, normal CT scan normal EKG and cardiac troponin, no sign of cardiac ischemia  Patient has no arrhythmia here  Discussed with patient, no clear etiology for her episode of near-syncope  At this point headache this age we discussed the risk benefit of hospitalization  Patient would rather return to her nursing home  We discussed outpatient follow-up  We discussed risk benefit  CritCare Time    Disposition  Final diagnoses:   Near syncope     Time reflects when diagnosis was documented in both MDM as applicable and the Disposition within this note     Time User Action Codes Description Comment    11/6/2018  2:39 PM [place] Robb Shetty [R55] Near syncope       ED Disposition     ED Disposition Condition Comment    Discharge  Sully Cintron discharge to home/self care      Condition at discharge: Good        Follow-up Information    None         Patient's Medications   Discharge Prescriptions    No medications on file     No discharge procedures on file      ED Provider  Electronically Signed by           Azra Lewis MD  11/06/18 4984

## 2018-11-06 NOTE — ED NOTES
Obtained urine sample  Reviewed plan of care with pt  Made comfortable in bed  Continued support provided       Isamar Forde RN  11/06/18 3738

## 2019-01-01 ENCOUNTER — HOSPITAL ENCOUNTER (INPATIENT)
Facility: HOSPITAL | Age: 84
LOS: 4 days | Discharge: HOME | DRG: 885 | End: 2019-11-21
Attending: INTERNAL MEDICINE | Admitting: PSYCHIATRY & NEUROLOGY
Payer: MEDICARE

## 2019-01-01 VITALS
BODY MASS INDEX: 19.27 KG/M2 | OXYGEN SATURATION: 93 % | DIASTOLIC BLOOD PRESSURE: 81 MMHG | SYSTOLIC BLOOD PRESSURE: 166 MMHG | RESPIRATION RATE: 16 BRPM | TEMPERATURE: 97.5 F | WEIGHT: 104.72 LBS | HEIGHT: 62 IN | HEART RATE: 87 BPM

## 2019-01-01 DIAGNOSIS — I10 ESSENTIAL HYPERTENSION: Chronic | ICD-10-CM

## 2019-01-01 DIAGNOSIS — Z72.0 TOBACCO ABUSE: ICD-10-CM

## 2019-01-01 DIAGNOSIS — I50.9 CONGESTIVE HEART FAILURE (CHF) (HCC): ICD-10-CM

## 2019-01-01 DIAGNOSIS — E55.9 VITAMIN D DEFICIENCY: ICD-10-CM

## 2019-01-01 DIAGNOSIS — F29 PSYCHOSIS (HCC): Primary | ICD-10-CM

## 2019-01-01 DIAGNOSIS — E86.0 DEHYDRATION: ICD-10-CM

## 2019-01-01 DIAGNOSIS — E53.8 VITAMIN B12 DEFICIENCY: ICD-10-CM

## 2019-01-01 DIAGNOSIS — R53.1 GENERALIZED WEAKNESS: ICD-10-CM

## 2019-01-01 LAB
25(OH)D3 SERPL-MCNC: 109.6 NG/ML (ref 30–100)
ALBUMIN SERPL BCP-MCNC: 3.8 G/DL (ref 3.5–5.7)
ALP SERPL-CCNC: 109 U/L (ref 55–165)
ALT SERPL W P-5'-P-CCNC: 27 U/L (ref 7–52)
AMPHETAMINES SERPL QL SCN: NEGATIVE
ANION GAP SERPL CALCULATED.3IONS-SCNC: 7 MMOL/L (ref 4–13)
AST SERPL W P-5'-P-CCNC: 25 U/L (ref 13–39)
ATRIAL RATE: 105 BPM
BACTERIA UR CULT: ABNORMAL
BACTERIA UR CULT: ABNORMAL
BACTERIA UR QL AUTO: ABNORMAL /HPF
BARBITURATES UR QL: NEGATIVE
BASOPHILS # BLD AUTO: 0.1 THOUSANDS/ΜL (ref 0–0.1)
BASOPHILS NFR BLD AUTO: 1 % (ref 0–2)
BENZODIAZ UR QL: NEGATIVE
BILIRUB SERPL-MCNC: 0.5 MG/DL (ref 0.2–1)
BILIRUB UR QL STRIP: NEGATIVE
BUN SERPL-MCNC: 26 MG/DL (ref 7–25)
CALCIUM SERPL-MCNC: 9.9 MG/DL (ref 8.6–10.5)
CHLORIDE SERPL-SCNC: 97 MMOL/L (ref 98–107)
CLARITY UR: ABNORMAL
CO2 SERPL-SCNC: 31 MMOL/L (ref 21–31)
COCAINE UR QL: NEGATIVE
COLOR UR: YELLOW
CREAT SERPL-MCNC: 0.95 MG/DL (ref 0.6–1.2)
DIGOXIN SERPL-MCNC: 0.9 NG/ML (ref 0.9–2)
EOSINOPHIL # BLD AUTO: 0.4 THOUSAND/ΜL (ref 0–0.61)
EOSINOPHIL NFR BLD AUTO: 5 % (ref 0–5)
ERYTHROCYTE [DISTWIDTH] IN BLOOD BY AUTOMATED COUNT: 14.6 % (ref 11.5–14.5)
GFR SERPL CREATININE-BSD FRML MDRD: 51 ML/MIN/1.73SQ M
GLUCOSE SERPL-MCNC: 115 MG/DL (ref 65–99)
GLUCOSE UR STRIP-MCNC: NEGATIVE MG/DL
HCT VFR BLD AUTO: 40.6 % (ref 42–47)
HGB BLD-MCNC: 13.2 G/DL (ref 12–16)
HGB UR QL STRIP.AUTO: ABNORMAL
KETONES UR STRIP-MCNC: NEGATIVE MG/DL
LEUKOCYTE ESTERASE UR QL STRIP: ABNORMAL
LYMPHOCYTES # BLD AUTO: 1.5 THOUSANDS/ΜL (ref 0.6–4.47)
LYMPHOCYTES NFR BLD AUTO: 17 % (ref 21–51)
MCH RBC QN AUTO: 29.6 PG (ref 26–34)
MCHC RBC AUTO-ENTMCNC: 32.6 G/DL (ref 31–37)
MCV RBC AUTO: 91 FL (ref 81–99)
METHADONE UR QL: NEGATIVE
MONOCYTES # BLD AUTO: 0.7 THOUSAND/ΜL (ref 0.17–1.22)
MONOCYTES NFR BLD AUTO: 8 % (ref 2–12)
NEUTROPHILS # BLD AUTO: 6.1 THOUSANDS/ΜL (ref 1.4–6.5)
NEUTS SEG NFR BLD AUTO: 70 % (ref 42–75)
NITRITE UR QL STRIP: POSITIVE
NON-SQ EPI CELLS URNS QL MICRO: ABNORMAL /HPF
OPIATES UR QL SCN: NEGATIVE
PCP UR QL: NEGATIVE
PH UR STRIP.AUTO: 7 [PH]
PLATELET # BLD AUTO: 170 THOUSANDS/UL (ref 149–390)
PMV BLD AUTO: 9.9 FL (ref 8.6–11.7)
POTASSIUM SERPL-SCNC: 4.1 MMOL/L (ref 3.5–5.5)
PROT SERPL-MCNC: 7.6 G/DL (ref 6.4–8.9)
PROT UR STRIP-MCNC: ABNORMAL MG/DL
QRS AXIS: 95 DEGREES
QRSD INTERVAL: 84 MS
QT INTERVAL: 316 MS
QTC INTERVAL: 390 MS
RBC # BLD AUTO: 4.46 MILLION/UL (ref 3.9–5.2)
RBC #/AREA URNS AUTO: ABNORMAL /HPF
SODIUM SERPL-SCNC: 135 MMOL/L (ref 134–143)
SP GR UR STRIP.AUTO: 1.01 (ref 1–1.03)
T WAVE AXIS: 269 DEGREES
THC UR QL: NEGATIVE
TSH SERPL DL<=0.05 MIU/L-ACNC: 1.62 UIU/ML (ref 0.45–5.33)
UROBILINOGEN UR QL STRIP.AUTO: 0.2 E.U./DL
VENTRICULAR RATE: 92 BPM
VIT B12 SERPL-MCNC: 388 PG/ML (ref 100–900)
WBC # BLD AUTO: 8.8 THOUSAND/UL (ref 4.8–10.8)
WBC #/AREA URNS AUTO: ABNORMAL /HPF

## 2019-01-01 PROCEDURE — 87107 FUNGI IDENTIFICATION MOLD: CPT | Performed by: INTERNAL MEDICINE

## 2019-01-01 PROCEDURE — 81001 URINALYSIS AUTO W/SCOPE: CPT | Performed by: INTERNAL MEDICINE

## 2019-01-01 PROCEDURE — 80162 ASSAY OF DIGOXIN TOTAL: CPT | Performed by: PHYSICIAN ASSISTANT

## 2019-01-01 PROCEDURE — G8979 MOBILITY GOAL STATUS: HCPCS

## 2019-01-01 PROCEDURE — 93010 ELECTROCARDIOGRAM REPORT: CPT | Performed by: INTERNAL MEDICINE

## 2019-01-01 PROCEDURE — 82306 VITAMIN D 25 HYDROXY: CPT | Performed by: PHYSICIAN ASSISTANT

## 2019-01-01 PROCEDURE — G8987 SELF CARE CURRENT STATUS: HCPCS

## 2019-01-01 PROCEDURE — 82607 VITAMIN B-12: CPT | Performed by: PHYSICIAN ASSISTANT

## 2019-01-01 PROCEDURE — G8988 SELF CARE GOAL STATUS: HCPCS

## 2019-01-01 PROCEDURE — G8978 MOBILITY CURRENT STATUS: HCPCS

## 2019-01-01 PROCEDURE — 99285 EMERGENCY DEPT VISIT HI MDM: CPT

## 2019-01-01 PROCEDURE — 84443 ASSAY THYROID STIM HORMONE: CPT | Performed by: INTERNAL MEDICINE

## 2019-01-01 PROCEDURE — 87086 URINE CULTURE/COLONY COUNT: CPT | Performed by: INTERNAL MEDICINE

## 2019-01-01 PROCEDURE — 99239 HOSP IP/OBS DSCHRG MGMT >30: CPT | Performed by: NURSE PRACTITIONER

## 2019-01-01 PROCEDURE — 80053 COMPREHEN METABOLIC PANEL: CPT | Performed by: INTERNAL MEDICINE

## 2019-01-01 PROCEDURE — 87077 CULTURE AEROBIC IDENTIFY: CPT | Performed by: INTERNAL MEDICINE

## 2019-01-01 PROCEDURE — 36415 COLL VENOUS BLD VENIPUNCTURE: CPT | Performed by: INTERNAL MEDICINE

## 2019-01-01 PROCEDURE — 80307 DRUG TEST PRSMV CHEM ANLYZR: CPT | Performed by: INTERNAL MEDICINE

## 2019-01-01 PROCEDURE — 97163 PT EVAL HIGH COMPLEX 45 MIN: CPT

## 2019-01-01 PROCEDURE — 87186 SC STD MICRODIL/AGAR DIL: CPT | Performed by: INTERNAL MEDICINE

## 2019-01-01 PROCEDURE — 97166 OT EVAL MOD COMPLEX 45 MIN: CPT

## 2019-01-01 PROCEDURE — 99232 SBSQ HOSP IP/OBS MODERATE 35: CPT | Performed by: PSYCHIATRY & NEUROLOGY

## 2019-01-01 PROCEDURE — 85025 COMPLETE CBC W/AUTO DIFF WBC: CPT | Performed by: INTERNAL MEDICINE

## 2019-01-01 PROCEDURE — 99232 SBSQ HOSP IP/OBS MODERATE 35: CPT | Performed by: NURSE PRACTITIONER

## 2019-01-01 PROCEDURE — 99285 EMERGENCY DEPT VISIT HI MDM: CPT | Performed by: INTERNAL MEDICINE

## 2019-01-01 PROCEDURE — 99222 1ST HOSP IP/OBS MODERATE 55: CPT | Performed by: PSYCHIATRY & NEUROLOGY

## 2019-01-01 PROCEDURE — 93005 ELECTROCARDIOGRAM TRACING: CPT

## 2019-01-01 PROCEDURE — G8980 MOBILITY D/C STATUS: HCPCS

## 2019-01-01 PROCEDURE — G8989 SELF CARE D/C STATUS: HCPCS

## 2019-01-01 RX ORDER — ARIPIPRAZOLE 2 MG/1
2 TABLET ORAL
Status: DISCONTINUED | OUTPATIENT
Start: 2019-01-01 | End: 2019-01-01 | Stop reason: HOSPADM

## 2019-01-01 RX ORDER — CHOLESTYRAMINE LIGHT 4 G/5.7G
4 POWDER, FOR SUSPENSION ORAL 2 TIMES DAILY
Status: CANCELLED | OUTPATIENT
Start: 2019-01-01

## 2019-01-01 RX ORDER — DIGOXIN 125 MCG
125 TABLET ORAL DAILY
Status: DISCONTINUED | OUTPATIENT
Start: 2019-01-01 | End: 2019-01-01 | Stop reason: HOSPADM

## 2019-01-01 RX ORDER — HALOPERIDOL 5 MG/ML
1 INJECTION INTRAMUSCULAR EVERY 8 HOURS PRN
Status: DISCONTINUED | OUTPATIENT
Start: 2019-01-01 | End: 2019-01-01 | Stop reason: HOSPADM

## 2019-01-01 RX ORDER — LISINOPRIL 20 MG/1
20 TABLET ORAL DAILY
Status: DISCONTINUED | OUTPATIENT
Start: 2019-01-01 | End: 2019-01-01 | Stop reason: HOSPADM

## 2019-01-01 RX ORDER — POTASSIUM CHLORIDE 750 MG/1
20 TABLET, EXTENDED RELEASE ORAL EVERY OTHER DAY
Qty: 30 TABLET | Refills: 0 | Status: SHIPPED | OUTPATIENT
Start: 2019-01-01 | End: 2020-01-01 | Stop reason: HOSPADM

## 2019-01-01 RX ORDER — AMLODIPINE BESYLATE 5 MG/1
5 TABLET ORAL DAILY
Status: DISCONTINUED | OUTPATIENT
Start: 2019-01-01 | End: 2019-01-01 | Stop reason: HOSPADM

## 2019-01-01 RX ORDER — POTASSIUM CHLORIDE 20 MEQ/1
20 TABLET, EXTENDED RELEASE ORAL EVERY OTHER DAY
Status: DISCONTINUED | OUTPATIENT
Start: 2019-01-01 | End: 2019-01-01 | Stop reason: HOSPADM

## 2019-01-01 RX ORDER — CYANOCOBALAMIN 1000 UG/ML
1000 INJECTION INTRAMUSCULAR; SUBCUTANEOUS
Qty: 1 ML | Refills: 0 | Status: SHIPPED | OUTPATIENT
Start: 2019-01-01 | End: 2020-01-01

## 2019-01-01 RX ORDER — FUROSEMIDE 20 MG/1
20 TABLET ORAL DAILY
Status: DISCONTINUED | OUTPATIENT
Start: 2019-01-01 | End: 2019-01-01 | Stop reason: HOSPADM

## 2019-01-01 RX ORDER — MAGNESIUM HYDROXIDE/ALUMINUM HYDROXICE/SIMETHICONE 120; 1200; 1200 MG/30ML; MG/30ML; MG/30ML
30 SUSPENSION ORAL EVERY 4 HOURS PRN
Status: DISCONTINUED | OUTPATIENT
Start: 2019-01-01 | End: 2019-01-01 | Stop reason: HOSPADM

## 2019-01-01 RX ORDER — AMLODIPINE BESYLATE 5 MG/1
5 TABLET ORAL DAILY
Qty: 30 TABLET | Refills: 0 | Status: SHIPPED | OUTPATIENT
Start: 2019-01-01 | End: 2020-01-01

## 2019-01-01 RX ORDER — ACETAMINOPHEN 325 MG/1
325 TABLET ORAL EVERY 8 HOURS PRN
Status: DISCONTINUED | OUTPATIENT
Start: 2019-01-01 | End: 2019-01-01 | Stop reason: HOSPADM

## 2019-01-01 RX ORDER — LISINOPRIL 20 MG/1
20 TABLET ORAL DAILY
Qty: 30 TABLET | Refills: 0 | Status: SHIPPED | OUTPATIENT
Start: 2019-01-01 | End: 2020-01-01

## 2019-01-01 RX ORDER — ASPIRIN 81 MG/1
81 TABLET, CHEWABLE ORAL DAILY
Qty: 30 TABLET | Refills: 0 | Status: SHIPPED | OUTPATIENT
Start: 2019-01-01 | End: 2020-01-01 | Stop reason: HOSPADM

## 2019-01-01 RX ORDER — CEPHALEXIN 500 MG/1
500 CAPSULE ORAL EVERY 12 HOURS SCHEDULED
Status: COMPLETED | OUTPATIENT
Start: 2019-01-01 | End: 2019-01-01

## 2019-01-01 RX ORDER — TRAZODONE HYDROCHLORIDE 50 MG/1
50 TABLET ORAL
Status: CANCELLED | OUTPATIENT
Start: 2019-01-01

## 2019-01-01 RX ORDER — ERGOCALCIFEROL 1.25 MG/1
50000 CAPSULE ORAL WEEKLY
Qty: 10 CAPSULE | Refills: 0 | Status: SHIPPED | OUTPATIENT
Start: 2019-01-01 | End: 2020-01-01 | Stop reason: HOSPADM

## 2019-01-01 RX ORDER — HALOPERIDOL 0.5 MG/1
0.5 TABLET ORAL EVERY 8 HOURS PRN
Status: DISCONTINUED | OUTPATIENT
Start: 2019-01-01 | End: 2019-01-01 | Stop reason: HOSPADM

## 2019-01-01 RX ORDER — CYANOCOBALAMIN 1000 UG/ML
1000 INJECTION INTRAMUSCULAR; SUBCUTANEOUS
Status: DISCONTINUED | OUTPATIENT
Start: 2019-01-01 | End: 2019-01-01 | Stop reason: HOSPADM

## 2019-01-01 RX ORDER — METOPROLOL SUCCINATE 25 MG/1
25 TABLET, EXTENDED RELEASE ORAL DAILY
Status: DISCONTINUED | OUTPATIENT
Start: 2019-01-01 | End: 2019-01-01 | Stop reason: HOSPADM

## 2019-01-01 RX ORDER — METOPROLOL SUCCINATE 25 MG/1
25 TABLET, EXTENDED RELEASE ORAL DAILY
Qty: 30 TABLET | Refills: 0 | Status: SHIPPED | OUTPATIENT
Start: 2019-01-01 | End: 2020-01-01 | Stop reason: HOSPADM

## 2019-01-01 RX ORDER — CEPHALEXIN 500 MG/1
500 CAPSULE ORAL ONCE
Status: COMPLETED | OUTPATIENT
Start: 2019-01-01 | End: 2019-01-01

## 2019-01-01 RX ORDER — DIGOXIN 125 MCG
125 TABLET ORAL DAILY
Qty: 30 TABLET | Refills: 0 | Status: SHIPPED | OUTPATIENT
Start: 2019-01-01 | End: 2020-01-01 | Stop reason: HOSPADM

## 2019-01-01 RX ORDER — OLANZAPINE 2.5 MG/1
2.5 TABLET ORAL
Qty: 30 TABLET | Refills: 0 | Status: SHIPPED | OUTPATIENT
Start: 2019-01-01 | End: 2020-01-01

## 2019-01-01 RX ORDER — FUROSEMIDE 20 MG/1
20 TABLET ORAL DAILY
Qty: 30 TABLET | Refills: 0 | Status: SHIPPED | OUTPATIENT
Start: 2019-01-01 | End: 2020-01-01 | Stop reason: HOSPADM

## 2019-01-01 RX ORDER — OLANZAPINE 2.5 MG/1
2.5 TABLET ORAL
Status: DISCONTINUED | OUTPATIENT
Start: 2019-01-01 | End: 2019-01-01 | Stop reason: HOSPADM

## 2019-01-01 RX ORDER — ASPIRIN 81 MG/1
81 TABLET, CHEWABLE ORAL DAILY
Status: DISCONTINUED | OUTPATIENT
Start: 2019-01-01 | End: 2019-01-01 | Stop reason: HOSPADM

## 2019-01-01 RX ORDER — LORAZEPAM 0.5 MG/1
0.5 TABLET ORAL EVERY 8 HOURS PRN
Status: DISCONTINUED | OUTPATIENT
Start: 2019-01-01 | End: 2019-01-01 | Stop reason: HOSPADM

## 2019-01-01 RX ADMIN — POTASSIUM CHLORIDE 20 MEQ: 1500 TABLET, EXTENDED RELEASE ORAL at 08:22

## 2019-01-01 RX ADMIN — CEPHALEXIN 500 MG: 500 CAPSULE ORAL at 20:27

## 2019-01-01 RX ADMIN — LISINOPRIL 20 MG: 20 TABLET ORAL at 09:59

## 2019-01-01 RX ADMIN — METOPROLOL SUCCINATE 25 MG: 25 TABLET, EXTENDED RELEASE ORAL at 08:21

## 2019-01-01 RX ADMIN — FUROSEMIDE 20 MG: 20 TABLET ORAL at 10:00

## 2019-01-01 RX ADMIN — Medication 400 MG: at 17:13

## 2019-01-01 RX ADMIN — ASPIRIN 81 MG 81 MG: 81 TABLET ORAL at 08:22

## 2019-01-01 RX ADMIN — Medication 400 MG: at 17:00

## 2019-01-01 RX ADMIN — Medication 400 MG: at 17:23

## 2019-01-01 RX ADMIN — POTASSIUM CHLORIDE 20 MEQ: 1500 TABLET, EXTENDED RELEASE ORAL at 10:00

## 2019-01-01 RX ADMIN — CEPHALEXIN 500 MG: 500 CAPSULE ORAL at 21:09

## 2019-01-01 RX ADMIN — AMLODIPINE BESYLATE 5 MG: 5 TABLET ORAL at 09:59

## 2019-01-01 RX ADMIN — CEPHALEXIN 500 MG: 500 CAPSULE ORAL at 09:25

## 2019-01-01 RX ADMIN — LISINOPRIL 20 MG: 20 TABLET ORAL at 08:21

## 2019-01-01 RX ADMIN — FUROSEMIDE 20 MG: 20 TABLET ORAL at 08:22

## 2019-01-01 RX ADMIN — LISINOPRIL 20 MG: 20 TABLET ORAL at 08:20

## 2019-01-01 RX ADMIN — CEPHALEXIN 500 MG: 500 CAPSULE ORAL at 09:59

## 2019-01-01 RX ADMIN — METOPROLOL SUCCINATE 25 MG: 25 TABLET, EXTENDED RELEASE ORAL at 09:26

## 2019-01-01 RX ADMIN — CYANOCOBALAMIN 1000 MCG: 1000 INJECTION, SOLUTION INTRAMUSCULAR at 11:37

## 2019-01-01 RX ADMIN — ASPIRIN 81 MG 81 MG: 81 TABLET ORAL at 09:26

## 2019-01-01 RX ADMIN — DIGOXIN 125 MCG: 125 TABLET ORAL at 09:26

## 2019-01-01 RX ADMIN — Medication 400 MG: at 08:20

## 2019-01-01 RX ADMIN — ASPIRIN 81 MG 81 MG: 81 TABLET ORAL at 08:21

## 2019-01-01 RX ADMIN — CEPHALEXIN 500 MG: 500 CAPSULE ORAL at 20:21

## 2019-01-01 RX ADMIN — CEPHALEXIN 500 MG: 500 CAPSULE ORAL at 08:21

## 2019-01-01 RX ADMIN — ARIPIPRAZOLE 2 MG: 2 TABLET ORAL at 21:09

## 2019-01-01 RX ADMIN — ASPIRIN 81 MG 81 MG: 81 TABLET ORAL at 09:59

## 2019-01-01 RX ADMIN — Medication 400 MG: at 09:26

## 2019-01-01 RX ADMIN — FUROSEMIDE 20 MG: 20 TABLET ORAL at 09:26

## 2019-01-01 RX ADMIN — Medication 400 MG: at 17:07

## 2019-01-01 RX ADMIN — POTASSIUM CHLORIDE 20 MEQ: 1500 TABLET, EXTENDED RELEASE ORAL at 08:20

## 2019-01-01 RX ADMIN — ARIPIPRAZOLE 2 MG: 2 TABLET ORAL at 20:21

## 2019-01-01 RX ADMIN — FUROSEMIDE 20 MG: 20 TABLET ORAL at 08:21

## 2019-01-01 RX ADMIN — DIGOXIN 125 MCG: 125 TABLET ORAL at 10:24

## 2019-01-01 RX ADMIN — CEPHALEXIN 500 MG: 500 CAPSULE ORAL at 21:24

## 2019-01-01 RX ADMIN — ARIPIPRAZOLE 2 MG: 2 TABLET ORAL at 20:27

## 2019-01-01 RX ADMIN — LORAZEPAM 0.5 MG: 0.5 TABLET ORAL at 10:00

## 2019-01-01 RX ADMIN — LISINOPRIL 20 MG: 20 TABLET ORAL at 09:26

## 2019-01-01 RX ADMIN — AMLODIPINE BESYLATE 5 MG: 5 TABLET ORAL at 08:22

## 2019-01-01 RX ADMIN — METOPROLOL SUCCINATE 25 MG: 25 TABLET, EXTENDED RELEASE ORAL at 09:59

## 2019-01-01 RX ADMIN — FUROSEMIDE 20 MG: 20 TABLET ORAL at 08:20

## 2019-01-01 RX ADMIN — DIGOXIN 125 MCG: 125 TABLET ORAL at 08:20

## 2019-01-01 RX ADMIN — CEPHALEXIN 500 MG: 500 CAPSULE ORAL at 08:20

## 2019-01-01 RX ADMIN — ASPIRIN 81 MG 81 MG: 81 TABLET ORAL at 08:20

## 2019-01-01 RX ADMIN — Medication 400 MG: at 08:22

## 2019-01-01 RX ADMIN — AMLODIPINE BESYLATE 5 MG: 5 TABLET ORAL at 08:20

## 2019-01-01 RX ADMIN — AMLODIPINE BESYLATE 5 MG: 5 TABLET ORAL at 09:26

## 2019-01-01 RX ADMIN — DIGOXIN 125 MCG: 125 TABLET ORAL at 08:22

## 2019-01-01 RX ADMIN — ARIPIPRAZOLE 2 MG: 2 TABLET ORAL at 21:25

## 2019-01-01 RX ADMIN — Medication 400 MG: at 09:59

## 2019-01-01 RX ADMIN — AMLODIPINE BESYLATE 5 MG: 5 TABLET ORAL at 08:21

## 2019-01-01 RX ADMIN — LORAZEPAM 0.5 MG: 0.5 TABLET ORAL at 00:55

## 2019-01-01 RX ADMIN — CEPHALEXIN 500 MG: 500 CAPSULE ORAL at 22:22

## 2019-01-01 RX ADMIN — Medication 400 MG: at 08:21

## 2019-01-01 RX ADMIN — DIGOXIN 125 MCG: 125 TABLET ORAL at 11:36

## 2019-02-27 ENCOUNTER — OFFICE VISIT (OUTPATIENT)
Dept: CARDIOLOGY CLINIC | Facility: CLINIC | Age: 84
End: 2019-02-27
Payer: MEDICARE

## 2019-02-27 VITALS
DIASTOLIC BLOOD PRESSURE: 70 MMHG | BODY MASS INDEX: 21.99 KG/M2 | OXYGEN SATURATION: 95 % | WEIGHT: 112 LBS | HEART RATE: 74 BPM | HEIGHT: 60 IN | SYSTOLIC BLOOD PRESSURE: 120 MMHG

## 2019-02-27 DIAGNOSIS — I47.2 WIDE-COMPLEX TACHYCARDIA (HCC): Primary | ICD-10-CM

## 2019-02-27 DIAGNOSIS — I10 ESSENTIAL HYPERTENSION: Chronic | ICD-10-CM

## 2019-02-27 DIAGNOSIS — I47.1 MULTIFOCAL ATRIAL TACHYCARDIA (HCC): ICD-10-CM

## 2019-02-27 DIAGNOSIS — I34.0 MODERATE MITRAL REGURGITATION: ICD-10-CM

## 2019-02-27 PROCEDURE — 99214 OFFICE O/P EST MOD 30 MIN: CPT | Performed by: INTERNAL MEDICINE

## 2019-02-27 RX ORDER — ERGOCALCIFEROL 1.25 MG/1
CAPSULE ORAL
Refills: 1 | Status: ON HOLD | COMMUNITY
Start: 2019-02-19 | End: 2019-01-01 | Stop reason: SDUPTHER

## 2019-02-27 NOTE — PROGRESS NOTES
Cardiology Consultation     Luzma Griffith  [id number]  12/6/1922  [address]  [facility] [place] [address]    Chief complaints:   follow-up of presyncope and wide complex tachycardia     History of present illness:  54-year-old female patient who is still very active with past medical history of breast cancer and hypertension is here for follow-up of narrow complex tachycardia and presyncopal episode  Since last visit patient denies having any episodes of dizziness  She does complain of occasional palpitations which is very brief and does not interfere with lifestyle  Patient is very active and tries to walk as much as possible  She denies having any exertional chest pain or shortness of breath  Patient Active Problem List   Diagnosis    Colitis, acute    Dehydration    HTN (hypertension)    [facility] ([facility] )    Hypokalemia    Anemia    Hypomagnesemia    Ambulatory dysfunction    Diarrhea    UTI (urinary tract infection)    Lymphedema of arm    Underweight    Moderate mitral regurgitation    Wide-complex tachycardia (HCC)    Multifocal atrial tachycardia (HCC)     Past Medical History:   Diagnosis Date    [facility] cancer ([facility] )     Cancer ([facility] )     Hypertension     Lymphedema of arm     L arm     Social History     Socioeconomic History    Marital status:       Spouse name: Not on file    Number of children: Not on file    Years of education: Not on file    Highest education level: Not on file   Occupational History    Not on file   Social Needs    Financial resource strain: Not on file    Food insecurity:     Worry: Not on file     Inability: Not on file    Transportation needs:     Medical: Not on file     Non-medical: Not on file   Tobacco Use    Smoking status: Never Smoker    Smokeless tobacco: Never Used   Substance and Sexual Activity    Alcohol use: No    Drug use: No    Sexual activity: Not on file   Lifestyle    Physical activity:     Days per week: Not on file     Minutes per session: Not on file    Stress: Not on file   Relationships    Social connections:     Talks on phone: Not on file     Gets together: Not on file     Attends Taoism service: Not on file     Active member of club or organization: Not on file     Attends meetings of clubs or organizations: Not on file     Relationship status: Not on file    Intimate partner violence:     Fear of current or ex partner: Not on file     Emotionally abused: Not on file     Physically abused: Not on file     Forced sexual activity: Not on file   Other Topics Concern    Not on file   Social History Narrative    Not on file      History reviewed  No pertinent family history    Past Surgical History:   Procedure Laterality Date    BLADDER REPAIR      BREAST SURGERY      HYSTERECTOMY      MASTECTOMY         Current Outpatient Medications:     amLODIPine (NORVASC) 5 mg tablet, Take 5 mg by mouth daily, Disp: , Rfl:     aspirin 81 mg chewable tablet, Chew 1 tablet daily, Disp: , Rfl:     ergocalciferol (VITAMIN D2) 50,000 units, , Disp: , Rfl: 1    lisinopril-hydrochlorothiazide (PRINZIDE,ZESTORETIC) 20-25 MG per tablet, Take 1 tablet by mouth daily, Disp: , Rfl:     metoprolol succinate (TOPROL-XL) 50 mg 24 hr tablet, Take 25 mg by mouth daily, Disp: , Rfl:     polyethylene glycol (MIRALAX) 17 g packet, Take 17 g by mouth daily, Disp: 14 each, Rfl: 0    potassium chloride (K-DUR,KLOR-CON) 20 mEq tablet, Take 20 mEq by mouth every other day, Disp: , Rfl:     Pseudoephedrine-APAP  MG TABS, Take by mouth, Disp: , Rfl:     docusate sodium (COLACE) 100 mg capsule, Take 1 capsule (100 mg total) by mouth 2 (two) times a day (Patient not taking: Reported on 10/9/2018 ), Disp: 10 capsule, Rfl: 0    felodipine (PLENDIL) 5 mg 24 hr tablet, Take 1 tablet by mouth daily, Disp: , Rfl:     LACTOBACILLUS PO, Take by mouth, Disp: , Rfl:     sodium phosphate-biphosphate (FLEET) 7-19 g 118 mL enema, Insert 1 enema into the rectum, Disp: , Rfl:     traZODone (DESYREL) 50 mg tablet, Take 50 mg by mouth daily at bedtime, Disp: , Rfl:   No Known Allergies  Vitals:    02/27/19 1059   BP: 120/70   BP Location: Left arm   Patient Position: Sitting   Cuff Size: Adult   Pulse: 74   SpO2: 95%   Weight: 50 8 kg (112 lb)   Height: 5' (1 524 m)       Labs:  No visits with results within 2 Month(s) from this visit     Latest known visit with results is:   Admission on 11/06/2018, Discharged on 11/06/2018   Component Date Value    WBC 11/06/2018 7 45     RBC 11/06/2018 4 49     Hemoglobin 11/06/2018 13 2     Hematocrit 11/06/2018 41 1     MCV 11/06/2018 92     MCH 11/06/2018 29 4     MCHC 11/06/2018 32 1     RDW 11/06/2018 12 3     MPV 11/06/2018 11 4     Platelets 13/82/7392 172     nRBC 11/06/2018 0     Neutrophils Relative 11/06/2018 61     Immat GRANS % 11/06/2018 0     Lymphocytes Relative 11/06/2018 25     Monocytes Relative 11/06/2018 7     Eosinophils Relative 11/06/2018 6     Basophils Relative 11/06/2018 1     Neutrophils Absolute 11/06/2018 4 51     Immature Grans Absolute 11/06/2018 0 02     Lymphocytes Absolute 11/06/2018 1 89     Monocytes Absolute 11/06/2018 0 54     Eosinophils Absolute 11/06/2018 0 43     Basophils Absolute 11/06/2018 0 06     Sodium 11/06/2018 139     Potassium 11/06/2018 3 4*    Chloride 11/06/2018 99*    CO2 11/06/2018 31     ANION GAP 11/06/2018 9     BUN 11/06/2018 31*    Creatinine 11/06/2018 0 99     Glucose 11/06/2018 99     Calcium 11/06/2018 10 2*    eGFR 11/06/2018 49     LACTIC ACID 11/06/2018 1 7     Total Bilirubin 11/06/2018 0 40     Bilirubin, Direct 11/06/2018 0 13     Alkaline Phosphatase 11/06/2018 97     AST 11/06/2018 24     ALT 11/06/2018 22     Total Protein 11/06/2018 7 9     Albumin 11/06/2018 3 6     Protime 11/06/2018 12 6     INR 11/06/2018 0 95     Troponin I 11/06/2018 <0 02     Color, UA 11/06/2018 Yellow     Clarity, UA 11/06/2018 Clear     Specific Gravity, UA 11/06/2018 1 010     pH, UA 11/06/2018 6 5     Leukocytes, UA 11/06/2018 Trace*    Nitrite, UA 11/06/2018 Negative     Protein, UA 11/06/2018 Negative     Glucose, UA 11/06/2018 Negative     Ketones, UA 11/06/2018 Negative     Urobilinogen, UA 11/06/2018 0 2     Bilirubin, UA 11/06/2018 Negative     Blood, UA 11/06/2018 Negative     Ventricular Rate 11/06/2018 85     Atrial Rate 11/06/2018 85     NM Interval 11/06/2018 160     QRSD Interval 11/06/2018 88     QT Interval 11/06/2018 390     QTC Interval 11/06/2018 464     P Axis 11/06/2018 83     QRS Axis 11/06/2018 93     T Wave Axis 11/06/2018 68     RBC, UA 11/06/2018 0-1*    WBC, UA 11/06/2018 2-4*    Epithelial Cells 11/06/2018 Moderate*    Bacteria, UA 11/06/2018 None Seen      Imaging: No results found  Review of Systems:  Review of Systems   Constitutional: Negative for diaphoresis and fatigue  HENT: Negative for congestion and facial swelling  Eyes: Negative for photophobia and visual disturbance  Respiratory: Negative for chest tightness and shortness of breath  Cardiovascular: Positive for palpitations  Negative for chest pain and leg swelling  Gastrointestinal: Negative for abdominal pain and nausea  Endocrine: Negative for cold intolerance and heat intolerance  Musculoskeletal: Negative for arthralgias and myalgias  Skin: Negative for pallor and rash  Neurological: Negative for dizziness and tremors  Psychiatric/Behavioral: Negative for sleep disturbance  The patient is not nervous/anxious  Physical Exam:  Physical Exam   Constitutional: She is oriented to person, place, and time  She appears well-developed and well-nourished  HENT:   Head: Normocephalic and atraumatic  Eyes: Pupils are equal, round, and reactive to light   Conjunctivae and EOM are normal  Neck: Normal range of motion  Neck supple  No JVD present  No thyromegaly present  Cardiovascular: Normal rate, regular rhythm, S1 normal, S2 normal and intact distal pulses  Exam reveals no gallop and no friction rub  Murmur heard  Pulses:       Carotid pulses are 2+ on the right side, and 2+ on the left side  Pulmonary/Chest: Effort normal and breath sounds normal  No respiratory distress  She has no wheezes  She has no rales  Abdominal: Soft  Bowel sounds are normal  She exhibits no distension  There is no tenderness  There is no rebound and no guarding  Musculoskeletal: Normal range of motion  She exhibits no edema  Neurological: She is alert and oriented to person, place, and time  She has normal reflexes  No cranial nerve deficit  Skin: Skin is warm and dry  Psychiatric: She has a normal mood and affect  Discussion/Summary:  1  Wide COMPLEX TACHYCARDIA suspicious for nonsustained VT versus SVT with aberrancy/narrow complex tachycardia:  PATIENT OCCASIONAL PALPITATIONS WHICH DO NOT INTERFERE WITH LIFESTYLE    continue beta-blocker for now      2  Hypertension, blood pressure well controlled      3   Moderate mitral regurgitation   will repeat echocardiogram annually   currently asymptomatic     Follow-up in 6 months

## 2019-03-04 ENCOUNTER — APPOINTMENT (EMERGENCY)
Dept: CT IMAGING | Facility: HOSPITAL | Age: 84
DRG: 872 | End: 2019-03-04
Payer: MEDICARE

## 2019-03-04 ENCOUNTER — HOSPITAL ENCOUNTER (INPATIENT)
Facility: HOSPITAL | Age: 84
LOS: 6 days | Discharge: NURSING, REHAB OR LONG-TERM CARE | DRG: 872 | End: 2019-03-11
Attending: EMERGENCY MEDICINE | Admitting: STUDENT IN AN ORGANIZED HEALTH CARE EDUCATION/TRAINING PROGRAM
Payer: MEDICARE

## 2019-03-04 DIAGNOSIS — K52.9 COLITIS: Primary | ICD-10-CM

## 2019-03-04 DIAGNOSIS — K92.2 GI BLEED: ICD-10-CM

## 2019-03-04 DIAGNOSIS — R79.89 ELEVATED LACTIC ACID LEVEL: ICD-10-CM

## 2019-03-04 DIAGNOSIS — R19.7 DIARRHEA: ICD-10-CM

## 2019-03-04 DIAGNOSIS — Z78.9 DNI (DO NOT INTUBATE): ICD-10-CM

## 2019-03-04 DIAGNOSIS — E83.42 HYPOMAGNESEMIA: ICD-10-CM

## 2019-03-04 DIAGNOSIS — R26.2 AMBULATORY DYSFUNCTION: ICD-10-CM

## 2019-03-04 DIAGNOSIS — I95.9 HYPOTENSION: ICD-10-CM

## 2019-03-04 DIAGNOSIS — E86.0 DEHYDRATION: ICD-10-CM

## 2019-03-04 DIAGNOSIS — Z66 DNR (DO NOT RESUSCITATE): ICD-10-CM

## 2019-03-04 LAB
ALBUMIN SERPL BCP-MCNC: 3.4 G/DL (ref 3.5–5)
ALP SERPL-CCNC: 86 U/L (ref 46–116)
ALT SERPL W P-5'-P-CCNC: 21 U/L (ref 12–78)
ANION GAP SERPL CALCULATED.3IONS-SCNC: 10 MMOL/L (ref 4–13)
APTT PPP: 24 SECONDS (ref 26–38)
AST SERPL W P-5'-P-CCNC: 33 U/L (ref 5–45)
BASOPHILS # BLD AUTO: 0.11 THOUSANDS/ΜL (ref 0–0.1)
BASOPHILS NFR BLD AUTO: 1 % (ref 0–1)
BILIRUB DIRECT SERPL-MCNC: 0.15 MG/DL (ref 0–0.2)
BILIRUB SERPL-MCNC: 0.4 MG/DL (ref 0.2–1)
BUN SERPL-MCNC: 42 MG/DL (ref 5–25)
CALCIUM SERPL-MCNC: 10.1 MG/DL (ref 8.3–10.1)
CHLORIDE SERPL-SCNC: 98 MMOL/L (ref 100–108)
CO2 SERPL-SCNC: 29 MMOL/L (ref 21–32)
CREAT SERPL-MCNC: 1.54 MG/DL (ref 0.6–1.3)
EOSINOPHIL # BLD AUTO: 0.46 THOUSAND/ΜL (ref 0–0.61)
EOSINOPHIL NFR BLD AUTO: 2 % (ref 0–6)
ERYTHROCYTE [DISTWIDTH] IN BLOOD BY AUTOMATED COUNT: 12.3 % (ref 11.6–15.1)
GFR SERPL CREATININE-BSD FRML MDRD: 28 ML/MIN/1.73SQ M
GLUCOSE SERPL-MCNC: 160 MG/DL (ref 65–140)
HCT VFR BLD AUTO: 41.5 % (ref 34.8–46.1)
HGB BLD-MCNC: 13 G/DL (ref 11.5–15.4)
IMM GRANULOCYTES # BLD AUTO: 0.15 THOUSAND/UL (ref 0–0.2)
IMM GRANULOCYTES NFR BLD AUTO: 1 % (ref 0–2)
INR PPP: 1.04 (ref 0.86–1.17)
LACTATE SERPL-SCNC: 3.4 MMOL/L (ref 0.5–2)
LIPASE SERPL-CCNC: 394 U/L (ref 73–393)
LYMPHOCYTES # BLD AUTO: 1.87 THOUSANDS/ΜL (ref 0.6–4.47)
LYMPHOCYTES NFR BLD AUTO: 8 % (ref 14–44)
MCH RBC QN AUTO: 29.2 PG (ref 26.8–34.3)
MCHC RBC AUTO-ENTMCNC: 31.3 G/DL (ref 31.4–37.4)
MCV RBC AUTO: 93 FL (ref 82–98)
MONOCYTES # BLD AUTO: 0.68 THOUSAND/ΜL (ref 0.17–1.22)
MONOCYTES NFR BLD AUTO: 3 % (ref 4–12)
NEUTROPHILS # BLD AUTO: 20.27 THOUSANDS/ΜL (ref 1.85–7.62)
NEUTS SEG NFR BLD AUTO: 85 % (ref 43–75)
NRBC BLD AUTO-RTO: 0 /100 WBCS
PLATELET # BLD AUTO: 217 THOUSANDS/UL (ref 149–390)
PMV BLD AUTO: 11.4 FL (ref 8.9–12.7)
POTASSIUM SERPL-SCNC: 4 MMOL/L (ref 3.5–5.3)
PROT SERPL-MCNC: 7.8 G/DL (ref 6.4–8.2)
PROTHROMBIN TIME: 13.5 SECONDS (ref 11.8–14.2)
RBC # BLD AUTO: 4.45 MILLION/UL (ref 3.81–5.12)
SODIUM SERPL-SCNC: 137 MMOL/L (ref 136–145)
WBC # BLD AUTO: 23.54 THOUSAND/UL (ref 4.31–10.16)

## 2019-03-04 PROCEDURE — 74176 CT ABD & PELVIS W/O CONTRAST: CPT

## 2019-03-04 PROCEDURE — 83605 ASSAY OF LACTIC ACID: CPT | Performed by: EMERGENCY MEDICINE

## 2019-03-04 PROCEDURE — 96360 HYDRATION IV INFUSION INIT: CPT

## 2019-03-04 PROCEDURE — 36415 COLL VENOUS BLD VENIPUNCTURE: CPT | Performed by: EMERGENCY MEDICINE

## 2019-03-04 PROCEDURE — 83690 ASSAY OF LIPASE: CPT | Performed by: EMERGENCY MEDICINE

## 2019-03-04 PROCEDURE — 80048 BASIC METABOLIC PNL TOTAL CA: CPT | Performed by: EMERGENCY MEDICINE

## 2019-03-04 PROCEDURE — 85610 PROTHROMBIN TIME: CPT | Performed by: EMERGENCY MEDICINE

## 2019-03-04 PROCEDURE — 99285 EMERGENCY DEPT VISIT HI MDM: CPT

## 2019-03-04 PROCEDURE — 85730 THROMBOPLASTIN TIME PARTIAL: CPT | Performed by: EMERGENCY MEDICINE

## 2019-03-04 PROCEDURE — 85025 COMPLETE CBC W/AUTO DIFF WBC: CPT | Performed by: EMERGENCY MEDICINE

## 2019-03-04 PROCEDURE — 80076 HEPATIC FUNCTION PANEL: CPT | Performed by: EMERGENCY MEDICINE

## 2019-03-04 RX ADMIN — SODIUM CHLORIDE 500 ML: 0.9 INJECTION, SOLUTION INTRAVENOUS at 22:50

## 2019-03-05 PROBLEM — A41.9 SEPSIS (HCC): Status: ACTIVE | Noted: 2019-03-05

## 2019-03-05 LAB
ALBUMIN SERPL BCP-MCNC: 2.9 G/DL (ref 3.5–5)
ALP SERPL-CCNC: 71 U/L (ref 46–116)
ALT SERPL W P-5'-P-CCNC: 23 U/L (ref 12–78)
ANION GAP SERPL CALCULATED.3IONS-SCNC: 14 MMOL/L (ref 4–13)
AST SERPL W P-5'-P-CCNC: 36 U/L (ref 5–45)
BILIRUB SERPL-MCNC: 0.6 MG/DL (ref 0.2–1)
BUN SERPL-MCNC: 43 MG/DL (ref 5–25)
C DIFF TOX GENS STL QL NAA+PROBE: NORMAL
CALCIUM SERPL-MCNC: 9.7 MG/DL (ref 8.3–10.1)
CAMPYLOBACTER DNA SPEC NAA+PROBE: NORMAL
CHLORIDE SERPL-SCNC: 102 MMOL/L (ref 100–108)
CO2 SERPL-SCNC: 24 MMOL/L (ref 21–32)
CREAT SERPL-MCNC: 1.47 MG/DL (ref 0.6–1.3)
ERYTHROCYTE [DISTWIDTH] IN BLOOD BY AUTOMATED COUNT: 12.5 % (ref 11.6–15.1)
GFR SERPL CREATININE-BSD FRML MDRD: 30 ML/MIN/1.73SQ M
GLUCOSE SERPL-MCNC: 180 MG/DL (ref 65–140)
HCT VFR BLD AUTO: 38.4 % (ref 34.8–46.1)
HGB BLD-MCNC: 12.4 G/DL (ref 11.5–15.4)
LACTATE SERPL-SCNC: 1.5 MMOL/L (ref 0.5–2)
LACTATE SERPL-SCNC: 2.2 MMOL/L (ref 0.5–2)
LACTATE SERPL-SCNC: 3.9 MMOL/L (ref 0.5–2)
LACTATE SERPL-SCNC: 4.6 MMOL/L (ref 0.5–2)
MAGNESIUM SERPL-MCNC: 1.3 MG/DL (ref 1.6–2.6)
MCH RBC QN AUTO: 29.5 PG (ref 26.8–34.3)
MCHC RBC AUTO-ENTMCNC: 32.3 G/DL (ref 31.4–37.4)
MCV RBC AUTO: 91 FL (ref 82–98)
PHOSPHATE SERPL-MCNC: 2.9 MG/DL (ref 2.3–4.1)
PLATELET # BLD AUTO: 177 THOUSANDS/UL (ref 149–390)
PMV BLD AUTO: 11.4 FL (ref 8.9–12.7)
POTASSIUM SERPL-SCNC: 3.8 MMOL/L (ref 3.5–5.3)
PROT SERPL-MCNC: 6.9 G/DL (ref 6.4–8.2)
RBC # BLD AUTO: 4.21 MILLION/UL (ref 3.81–5.12)
SALMONELLA DNA SPEC QL NAA+PROBE: NORMAL
SHIGA TOXIN STX GENE SPEC NAA+PROBE: NORMAL
SHIGELLA DNA SPEC QL NAA+PROBE: NORMAL
SODIUM SERPL-SCNC: 140 MMOL/L (ref 136–145)
WBC # BLD AUTO: 17.95 THOUSAND/UL (ref 4.31–10.16)

## 2019-03-05 PROCEDURE — 87040 BLOOD CULTURE FOR BACTERIA: CPT | Performed by: PHYSICIAN ASSISTANT

## 2019-03-05 PROCEDURE — 83605 ASSAY OF LACTIC ACID: CPT | Performed by: PHYSICIAN ASSISTANT

## 2019-03-05 PROCEDURE — 80053 COMPREHEN METABOLIC PANEL: CPT | Performed by: PHYSICIAN ASSISTANT

## 2019-03-05 PROCEDURE — 84100 ASSAY OF PHOSPHORUS: CPT | Performed by: PHYSICIAN ASSISTANT

## 2019-03-05 PROCEDURE — 87505 NFCT AGENT DETECTION GI: CPT | Performed by: PHYSICIAN ASSISTANT

## 2019-03-05 PROCEDURE — 83605 ASSAY OF LACTIC ACID: CPT | Performed by: FAMILY MEDICINE

## 2019-03-05 PROCEDURE — 83605 ASSAY OF LACTIC ACID: CPT | Performed by: EMERGENCY MEDICINE

## 2019-03-05 PROCEDURE — 36415 COLL VENOUS BLD VENIPUNCTURE: CPT | Performed by: EMERGENCY MEDICINE

## 2019-03-05 PROCEDURE — 85027 COMPLETE CBC AUTOMATED: CPT | Performed by: PHYSICIAN ASSISTANT

## 2019-03-05 PROCEDURE — 99223 1ST HOSP IP/OBS HIGH 75: CPT | Performed by: FAMILY MEDICINE

## 2019-03-05 PROCEDURE — 87493 C DIFF AMPLIFIED PROBE: CPT | Performed by: PHYSICIAN ASSISTANT

## 2019-03-05 PROCEDURE — 83735 ASSAY OF MAGNESIUM: CPT | Performed by: PHYSICIAN ASSISTANT

## 2019-03-05 RX ORDER — SODIUM CHLORIDE 9 MG/ML
75 INJECTION, SOLUTION INTRAVENOUS CONTINUOUS
Status: DISCONTINUED | OUTPATIENT
Start: 2019-03-05 | End: 2019-03-06

## 2019-03-05 RX ORDER — AMLODIPINE BESYLATE 10 MG/1
10 TABLET ORAL DAILY
Status: DISCONTINUED | OUTPATIENT
Start: 2019-03-05 | End: 2019-03-07

## 2019-03-05 RX ORDER — METOPROLOL SUCCINATE 25 MG/1
25 TABLET, EXTENDED RELEASE ORAL DAILY
Status: DISCONTINUED | OUTPATIENT
Start: 2019-03-05 | End: 2019-03-10

## 2019-03-05 RX ORDER — FELODIPINE 2.5 MG/1
5 TABLET, EXTENDED RELEASE ORAL DAILY
Status: DISCONTINUED | OUTPATIENT
Start: 2019-03-05 | End: 2019-03-05

## 2019-03-05 RX ORDER — AMLODIPINE BESYLATE 5 MG/1
5 TABLET ORAL DAILY
Status: DISCONTINUED | OUTPATIENT
Start: 2019-03-05 | End: 2019-03-05

## 2019-03-05 RX ORDER — TRAZODONE HYDROCHLORIDE 50 MG/1
50 TABLET ORAL
Status: DISCONTINUED | OUTPATIENT
Start: 2019-03-05 | End: 2019-03-11 | Stop reason: HOSPADM

## 2019-03-05 RX ORDER — ASPIRIN 81 MG/1
81 TABLET, CHEWABLE ORAL DAILY
Status: DISCONTINUED | OUTPATIENT
Start: 2019-03-05 | End: 2019-03-11 | Stop reason: HOSPADM

## 2019-03-05 RX ADMIN — TRAZODONE HYDROCHLORIDE 50 MG: 50 TABLET ORAL at 22:02

## 2019-03-05 RX ADMIN — PIPERACILLIN SODIUM,TAZOBACTAM SODIUM 3.38 G: 3; .375 INJECTION, POWDER, FOR SOLUTION INTRAVENOUS at 00:31

## 2019-03-05 RX ADMIN — ASPIRIN 81 MG 81 MG: 81 TABLET ORAL at 09:55

## 2019-03-05 RX ADMIN — METOPROLOL SUCCINATE 25 MG: 25 TABLET, EXTENDED RELEASE ORAL at 09:55

## 2019-03-05 RX ADMIN — SODIUM CHLORIDE 100 ML/HR: 0.9 INJECTION, SOLUTION INTRAVENOUS at 02:39

## 2019-03-05 RX ADMIN — SODIUM CHLORIDE 500 ML: 0.9 INJECTION, SOLUTION INTRAVENOUS at 00:32

## 2019-03-05 RX ADMIN — AMLODIPINE BESYLATE 10 MG: 10 TABLET ORAL at 09:55

## 2019-03-05 RX ADMIN — PIPERACILLIN SODIUM,TAZOBACTAM SODIUM 2.25 G: 2; .25 INJECTION, POWDER, FOR SOLUTION INTRAVENOUS at 13:03

## 2019-03-05 RX ADMIN — PIPERACILLIN SODIUM,TAZOBACTAM SODIUM 2.25 G: 2; .25 INJECTION, POWDER, FOR SOLUTION INTRAVENOUS at 17:36

## 2019-03-05 RX ADMIN — PIPERACILLIN SODIUM,TAZOBACTAM SODIUM 2.25 G: 2; .25 INJECTION, POWDER, FOR SOLUTION INTRAVENOUS at 06:40

## 2019-03-05 NOTE — ED NOTES
Pt assisted to stand, pivot to bedside commode  Pt passed urine  Pt's brief full of stool  Pt changed and cleaned up and returned to bed       Zenobia Lugo RN  03/05/19 2095

## 2019-03-05 NOTE — PLAN OF CARE
Problem: Potential for Falls  Goal: Patient will remain free of falls  Description  INTERVENTIONS:  - Assess patient frequently for physical needs  -  Identify cognitive and physical deficits and behaviors that affect risk of falls    -  [place] fall precautions as indicated by assessment   - Educate patient/family on patient safety including physical limitations  - Instruct patient to call for assistance with activity based on assessment  - Modify environment to reduce risk of injury  - Consider OT/PT consult to assist with strengthening/mobility  Outcome: Progressing     Problem: PAIN - ADULT  Goal: Verbalizes/displays adequate comfort level or baseline comfort level  Description  Interventions:  - Encourage patient to monitor pain and request assistance  - Assess pain using appropriate pain scale  - Administer analgesics based on type and severity of pain and evaluate response  - Implement non-pharmacological measures as appropriate and evaluate response  - Consider cultural and social influences on pain and pain management  - Notify physician/advanced practitioner if interventions unsuccessful or patient reports new pain  Outcome: Progressing     Problem: INFECTION - ADULT  Goal: Absence or prevention of progression during hospitalization  Description  INTERVENTIONS:  - Assess and monitor for signs and symptoms of infection  - Monitor lab/diagnostic results  - Monitor all insertion sites, i e  indwelling lines, tubes, and drains  - Monitor endotracheal (as able) and nasal secretions for changes in amount and color  - [place] appropriate cooling/warming therapies per order  - Administer medications as ordered  - Instruct and encourage patient and family to use good hand hygiene technique  - Identify and instruct in appropriate isolation precautions for identified infection/condition  Outcome: Progressing     Problem: SAFETY ADULT  Goal: Maintain or return to baseline ADL function  Description  INTERVENTIONS:  -  Assess patient's ability to carry out ADLs; assess patient's baseline for ADL function and identify physical deficits which impact ability to perform ADLs (bathing, care of mouth/teeth, toileting, grooming, dressing, etc )  - Assess/evaluate cause of self-care deficits   - Assess range of motion  - Assess patient's mobility; develop plan if impaired  - Assess patient's need for assistive devices and provide as appropriate  - Encourage maximum independence but intervene and supervise when necessary  ¯ Involve family in performance of ADLs  ¯ Assess for home care needs following discharge   ¯ Request OT consult to assist with ADL evaluation and planning for discharge  ¯ Provide patient education as appropriate  Outcome: Progressing  Goal: Maintain or return mobility status to optimal level  Description  INTERVENTIONS:  - Assess patient's baseline mobility status (ambulation, transfers, stairs, etc )    - Identify cognitive and physical deficits and behaviors that affect mobility  - Identify mobility aids required to assist with transfers and/or ambulation (gait belt, sit-to-stand, lift, walker, cane, etc )  - [place] fall precautions as indicated by assessment  - Record patient progress and toleration of activity level on Mobility SBAR; progress patient to next Phase/Stage  - Instruct patient to call for assistance with activity based on assessment  - Request Rehabilitation consult to assist with strengthening/weightbearing, etc   Outcome: Progressing     Problem: DISCHARGE PLANNING  Goal: Discharge to home or other facility with appropriate resources  Description  INTERVENTIONS:  - Identify barriers to discharge w/patient and caregiver  - Arrange for needed discharge resources and transportation as appropriate  - Identify discharge learning needs (meds, wound care, etc )  - Arrange for interpretive services to assist at discharge as needed  - Refer to Case Management Department for coordinating discharge planning if the patient needs post-hospital services based on physician/advanced practitioner order or complex needs related to functional status, cognitive ability, or social support system  Outcome: Progressing     Problem: Knowledge Deficit  Goal: Patient/family/caregiver demonstrates understanding of disease process, treatment plan, medications, and discharge instructions  Description  Complete learning assessment and assess knowledge base  Interventions:  - Provide teaching at level of understanding  - Provide teaching via preferred learning methods  Outcome: Progressing     Problem: Nutrition/Hydration-ADULT  Goal: Nutrient/Hydration intake appropriate for improving, restoring or maintaining nutritional needs  Description  Monitor and assess patient's nutrition/hydration status for malnutrition (ex- brittle hair, bruises, dry skin, pale skin and conjunctiva, muscle wasting, smooth red tongue, and disorientation)  Collaborate with interdisciplinary team and initiate plan and interventions as ordered  Monitor patient's weight and dietary intake as ordered or per policy  Utilize nutrition screening tool and intervene per policy  Determine patient's food preferences and provide high-protein, high-caloric foods as appropriate       INTERVENTIONS:  - Monitor oral intake, urinary output, labs, and treatment plans  - Assess nutrition and hydration status and recommend course of action  - Evaluate amount of meals eaten  - Assist patient with eating if necessary   - Allow adequate time for meals  - Recommend/ encourage appropriate diets, oral nutritional supplements, and vitamin/mineral supplements  - Order, calculate, and assess calorie counts as needed  - Recommend, monitor, and adjust tube feedings and TPN/PPN based on assessed needs  - Assess need for intravenous fluids  - Provide specific nutrition/hydration education as appropriate  - Include patient/family/caregiver in decisions related to nutrition  Outcome: Progressing

## 2019-03-05 NOTE — PROGRESS NOTES
Pt admitted from ED with no fluids running, restarted at 1130        Ashish Veliz RN  03/05/19  12:31 PM  --------------------------

## 2019-03-05 NOTE — ASSESSMENT & PLAN NOTE
· See above plan  · Check stool for C diff, stool cultures  · Follow-up of blood cultures  · Continue with Zosyn  · Follow-up with lactic acid which is currently 2 1  · Start gentle IV hydration  · Monitor volume status

## 2019-03-05 NOTE — ASSESSMENT & PLAN NOTE
Patient is a 71-year-old female WBC of 23, lactate 3 2 now down to 2 1, temperature is 95 8° patient will be admitted for sepsis in the setting of infectious colitis  CT scan of abdomen/pelvis-Pericolonic inflammatory change at the splenic flexure and descending colon, consistent with colitis of infectious or inflammatory etiology in appropriate clinical context  Appearance of this segment of colon is similar to CT examination of 4/3/2018    · Patient already received a dose of Zosyn  · Check stool for C diff, stool cultures  · Follow-up of blood cultures  · Monitor WBC  · Continue with Zosyn 2 25 g q 6 hours  · Follow-up with lactic acid which is currently 2 1  · Start gentle IV hydration normal saline at 100 mL/hour  · Monitor volume status-last echocardiogram done 09/2018 reveals EF 55-60%  · DNR/DNI

## 2019-03-05 NOTE — H&P
H&P- Alber Palomino 12/6/1922, 80 y o  female MRN: [id number]    Unit/Bed#: ED 10 Encounter: [id number]    Primary Care Provider: Jamir Clarke DO   Date and time admitted to hospital: 3/4/2019  9:17 PM        * Sepsis [facility])  Assessment & Plan  Patient is a 55-year-old female WBC of 23, lactate 3 2 now down to 2 1, temperature is 95 8° patient will be admitted for sepsis in the setting of infectious colitis  CT scan of abdomen/pelvis-Pericolonic inflammatory change at the splenic flexure and descending colon, consistent with colitis of infectious or inflammatory etiology in appropriate clinical context  Appearance of this segment of colon is similar to CT examination of 4/3/2018    · Patient already received a dose of Zosyn  · Check stool for C diff, stool cultures  · Follow-up of blood cultures  · Monitor WBC  · Continue with Zosyn 2 25 g q 6 hours  · Follow-up with lactic acid which is currently 2 1  · Start gentle IV hydration normal saline at 100 mL/hour  · Monitor volume status-last echocardiogram done 09/2018 reveals EF 55-60%  · DNR/DNI      Acute kidney injury [facility])  Assessment & Plan  · Baseline Creatinine is 0 9, today it is 1 5-Likely in the setting of diarrhea and dehydration  · Patient received 500ml bolus of normal saline x 2 in the ED   · Will start continuous fluids normal saline at 100 mL/hour  · Monitor volume status  · Follow up with creatinine  · Patient had an echo done September 8900 - Systolic function was normal  Ejection fraction was estimated in the range of 55 % to 65 %   · Consider getting a follow-up echo      Colitis, acute  Assessment & Plan  · See above plan  · Check stool for C diff, stool cultures  · Follow-up of blood cultures  · Continue with Zosyn  · Follow-up with lactic acid which is currently 2 1  · Start gentle IV hydration  · Monitor volume status        HTN (hypertension)  Assessment & Plan  · Stable  · Continue with Norvasc 5 mg daily  · Plendil 5 mg daily  · Lisinopril hydrochlorothiazide daily  · Metoprolol 25 mg daily  · Monitor hemodynamics      VTE Prophylaxis: Lovenox   / sequential compression device   Code Status:  DNR/DNI  POLST: POLST form is on file already (pre-hospital)  Discussion with family: patient     Anticipated Length of Stay:  Patient will be admitted on an Inpatient basis with an anticipated length of stay of   2 midnights  Justification for Hospital Stay:  Infectious colitis    Total Time for Visit, including Counseling / Coordination of Care: 1 hour  Greater than 50% of this total time spent on direct patient counseling and coordination of care  Chief Complaint:   Abdominal cramping, diarrhea, weakness    History of Present Illness:    Derek Espinosa is a 80 y o  female who presents with abdominal cramping, diarrhea and weakness for few hours  Patient states that her symptoms started early this evening, she states that she had multiple loose bowel movements about every 1-2 hours associated with abdominal cramping, nausea and  Progressive weakness  Patient is unsure whether there was any blood in her stool present  Patient is from [facility], she is unaware of any sick contacts, denies recent travel, denies recent antibiotic use  Patient states that she has never had symptoms like this before  She denies any fever, chest pain, dysuria, frequency, flank pain shortness of breath, focal weakness, she also has been experiencing decreased appetite with nausea but no vomiting  Review of Systems:    Review of Systems   Constitutional: Positive for activity change, appetite change, chills and fatigue  Negative for diaphoresis and fever  HENT: Negative  Eyes: Negative  Respiratory: Negative for apnea, cough, choking, chest tightness, shortness of breath, wheezing and stridor  Cardiovascular: Negative  Gastrointestinal: Positive for abdominal pain, blood in stool, diarrhea and nausea   Negative for abdominal distention, anal bleeding, constipation, rectal pain and vomiting  Endocrine: Negative  Genitourinary: Negative for difficulty urinating, dysuria, flank pain and frequency  Musculoskeletal: Negative  Skin: Negative  Neurological: Positive for weakness  Negative for dizziness, tremors, seizures, syncope, facial asymmetry, speech difficulty, light-headedness, numbness and headaches  Hematological: Negative  Psychiatric/Behavioral: Negative  Past Medical and Surgical History:     Past Medical History:   Diagnosis Date    [facility] )     Cancer ([facility] )     Hypertension     Lymphedema of arm     L arm       Past Surgical History:   Procedure Laterality Date    BLADDER REPAIR      BREAST SURGERY      HYSTERECTOMY      MASTECTOMY         Meds/Allergies:    Prior to Admission medications    Medication Sig Start Date End Date Taking?  Authorizing Provider   amLODIPine (NORVASC) 5 mg tablet Take 5 mg by mouth daily    Historical Provider, MD   aspirin 81 mg chewable tablet Chew 1 tablet daily 6/5/14   Historical Provider, MD   docusate sodium (COLACE) 100 mg capsule Take 1 capsule (100 mg total) by mouth 2 (two) times a day  Patient not taking: Reported on 10/9/2018  4/9/18   Christopher Sandoval MD   ergocalciferol (VITAMIN D2) 50,000 units  2/19/19   Historical Provider, MD   felodipine (PLENDIL) 5 mg 24 hr tablet Take 1 tablet by mouth daily 6/5/14   Historical Provider, MD   LACTOBACILLUS PO Take by mouth    Historical Provider, MD   lisinopril-hydrochlorothiazide (PRINZIDE,ZESTORETIC) 20-25 MG per tablet Take 1 tablet by mouth daily    Historical Provider, MD   metoprolol succinate (TOPROL-XL) 50 mg 24 hr tablet Take 25 mg by mouth daily    Historical Provider, MD   polyethylene glycol (MIRALAX) 17 g packet Take 17 g by mouth daily 4/10/18   Christopher Sandoval MD   potassium chloride (K-DUR,KLOR-CON) 20 mEq tablet Take 20 mEq by mouth every other day    Historical Provider, MD Pseudoephedrine-APAP  MG TABS Take by mouth    Historical Provider, MD   sodium phosphate-biphosphate (FLEET) 7-19 g 118 mL enema Insert 1 enema into the rectum    Historical Provider, MD   traZODone (DESYREL) 50 mg tablet Take 50 mg by mouth daily at bedtime    Historical Provider, MD     I have reviewed home medications with patient personally  Allergies: No Known Allergies    Social History:     Marital Status:    Occupation:   Patient Pre-hospital Living Situation: From [place] [id number]   Patient Pre-hospital Level of Mobility: ambulates independentl  Patient Pre-hospital Diet Restrictions:  No  Substance Use History:   Social History     Substance and Sexual Activity   Alcohol Use No     Social History     Tobacco Use   Smoking Status Never Smoker   Smokeless Tobacco Never Used     Social History     Substance and Sexual Activity   Drug Use No       Family History:    non-contributory    Physical Exam:     Vitals:   Blood Pressure: 122/60 (03/05/19 0045)  Pulse: 80 (03/05/19 0045)  Temperature: 98 1 °F (36 7 °C) (03/05/19 0333)  Temp Source: Oral (03/05/19 0333)  Respirations: 16 (03/05/19 0045)  Weight - Scale: 57 4 kg (126 lb 8 7 oz) (03/04/19 2129)  SpO2: 94 % (03/04/19 2245)    Physical Exam   Constitutional: She is oriented to person, place, and time  No distress  HENT:   Head: Normocephalic  Cardiovascular: Normal rate, regular rhythm and normal heart sounds  Exam reveals no gallop and no friction rub  No murmur heard  Pulmonary/Chest: Effort normal and breath sounds normal  No stridor  No respiratory distress  She has no wheezes  She has no rales  She exhibits no tenderness  Abdominal: Soft  Bowel sounds are normal  She exhibits no distension and no mass  There is no tenderness  There is no rebound and no guarding  No hernia  Musculoskeletal: Normal range of motion  She exhibits no edema, tenderness or deformity     Neurological: She is alert and oriented to person, place, and time  Skin: Skin is warm and dry  She is not diaphoretic  Psychiatric: She has a normal mood and affect  Her behavior is normal  Judgment and thought content normal            Additional Data:     Lab Results: I have personally reviewed pertinent reports  Results from last 7 days   Lab Units 03/04/19  2239   WBC Thousand/uL 23 54*   HEMOGLOBIN g/dL 13 0   HEMATOCRIT % 41 5   PLATELETS Thousands/uL 217   NEUTROS PCT % 85*   LYMPHS PCT % 8*   MONOS PCT % 3*   EOS PCT % 2     Results from last 7 days   Lab Units 03/04/19  2239   SODIUM mmol/L 137   POTASSIUM mmol/L 4 0   CHLORIDE mmol/L 98*   CO2 mmol/L 29   BUN mg/dL 42*   CREATININE mg/dL 1 54*   ANION GAP mmol/L 10   CALCIUM mg/dL 10 1   ALBUMIN g/dL 3 4*   TOTAL BILIRUBIN mg/dL 0 40   ALK PHOS U/L 86   ALT U/L 21   AST U/L 33   GLUCOSE RANDOM mg/dL 160*     Results from last 7 days   Lab Units 03/04/19  2239   INR  1 04             Results from last 7 days   Lab Units 03/05/19  0030 03/04/19  2239   LACTIC ACID mmol/L 2 2* 3 4*       Imaging: I have personally reviewed pertinent reports  CT abdomen pelvis wo contrast   Final Result by Hailee Adan MD (03/04 1577)      Pericolonic inflammatory change at the splenic flexure and descending colon, consistent with colitis of infectious or inflammatory etiology in appropriate clinical context  Appearance of this segment of colon is similar to CT examination of 4/3/2018  Workstation performed: [id number]             EKG, Pathology, and Other Studies Reviewed on Admission:   · EKG:     Allscripts / Epic Records Reviewed:  Yes

## 2019-03-05 NOTE — ED PROVIDER NOTES
History  Chief Complaint   Patient presents with    Diarrhea     Pt from [facility] personal care home with diarrhea x1 and weakness today  On toilet per EMS, care home could not get her up but she was able to stand and pivot for them  Incontinent on arrival      Sudden onset mild to moderate LLQ abd pain with nonbloody diarrhea and nausea  Started this afternoon  Still present  Associated sxs - generalized weakness  Denies cp, sob, and focal weakness  No aggravating or alleviating factors  No fever  No recent abx use  Pt denies h/o similar sxs  She declines analgesia  Prior to Admission Medications   Prescriptions Last Dose Informant Patient Reported? Taking?    LACTOBACILLUS PO  Self Yes No   Sig: Take by mouth   Pseudoephedrine-APAP  MG TABS  Self Yes No   Sig: Take by mouth   amLODIPine (NORVASC) 5 mg tablet   Yes No   Sig: Take 5 mg by mouth daily   aspirin 81 mg chewable tablet  Self Yes No   Sig: Chew 1 tablet daily   docusate sodium (COLACE) 100 mg capsule  Self No No   Sig: Take 1 capsule (100 mg total) by mouth 2 (two) times a day   Patient not taking: Reported on 10/9/2018    ergocalciferol (VITAMIN D2) 50,000 units   Yes No   felodipine (PLENDIL) 5 mg 24 hr tablet  Self Yes No   Sig: Take 1 tablet by mouth daily   lisinopril-hydrochlorothiazide (PRINZIDE,ZESTORETIC) 20-25 MG per tablet   Yes No   Sig: Take 1 tablet by mouth daily   metoprolol succinate (TOPROL-XL) 50 mg 24 hr tablet   Yes No   Sig: Take 25 mg by mouth daily   polyethylene glycol (MIRALAX) 17 g packet  Self No No   Sig: Take 17 g by mouth daily   potassium chloride (K-DUR,KLOR-CON) 20 mEq tablet   Yes No   Sig: Take 20 mEq by mouth every other day   sodium phosphate-biphosphate (FLEET) 7-19 g 118 mL enema  Self Yes No   Sig: Insert 1 enema into the rectum   traZODone (DESYREL) 50 mg tablet  Self Yes No   Sig: Take 50 mg by mouth daily at bedtime      Facility-Administered Medications: None       Past Medical History: Diagnosis Date    [facility] cancer ([facility] )     Cancer ([facility] )     Hypertension     Lymphedema of arm     L arm       Past Surgical History:   Procedure Laterality Date    BLADDER REPAIR      BREAST SURGERY      HYSTERECTOMY      MASTECTOMY         History reviewed  No pertinent family history  I have reviewed and agree with the history as documented  Social History     Tobacco Use    Smoking status: Never Smoker    Smokeless tobacco: Never Used   Substance Use Topics    Alcohol use: No    Drug use: No        Review of Systems   Constitutional: Negative for chills and fever  Gastrointestinal: Positive for abdominal pain and diarrhea  Negative for abdominal distention, anal bleeding, blood in stool and constipation  All other systems reviewed and are negative  Physical Exam  Physical Exam   Constitutional: She is oriented to person, place, and time  She appears well-developed and well-nourished  No distress  HENT:   Head: Normocephalic and atraumatic  Eyes: Pupils are equal, round, and reactive to light  Conjunctivae and EOM are normal  Right eye exhibits no discharge  Left eye exhibits no discharge  Neck: Normal range of motion  Neck supple  No JVD present  No tracheal deviation present  No thyromegaly present  Cardiovascular: Normal rate and intact distal pulses  Pulmonary/Chest: Effort normal and breath sounds normal  No stridor  No respiratory distress  She has no wheezes  Abdominal: Soft  She exhibits no distension and no mass  There is tenderness  There is no rebound and no guarding  Mild LLQ TTP   Musculoskeletal: She exhibits no edema, tenderness or deformity  Neurological: She is alert and oriented to person, place, and time  No cranial nerve deficit or sensory deficit  She exhibits normal muscle tone  Coordination normal    Skin: Skin is warm and dry  Capillary refill takes less than 2 seconds  She is not diaphoretic  No erythema  No pallor     Nursing note and vitals reviewed  Vital Signs  ED Triage Vitals [03/04/19 2129]   Temperature Pulse Respirations Blood Pressure SpO2   (!) 95 8 °F (35 4 °C) 75 20 (!) 72/39 96 %      Temp Source Heart Rate Source Patient Position - Orthostatic VS BP Location FiO2 (%)   Rectal -- -- -- --      Pain Score       No Pain           Vitals:    03/04/19 2129 03/04/19 2245   BP: (!) 72/39 143/66   Pulse: 75 87       Visual Acuity      ED Medications  Medications   piperacillin-tazobactam (ZOSYN) 3 375 g in sodium chloride 0 9 % 50 mL IVPB (has no administration in time range)   sodium chloride 0 9 % bolus 500 mL (has no administration in time range)   sodium chloride 0 9 % bolus 500 mL (500 mL Intravenous New Bag 3/4/19 2250)       Diagnostic Studies  Results Reviewed     Procedure Component Value Units Date/Time    Lactic acid, plasma [[id number]]  (Abnormal) Collected:  03/04/19 2239    Lab Status:  Final result Specimen:  Blood from Arm, Right Updated:  03/04/19 2318     LACTIC ACID 3 4 mmol/L     Narrative:       Result may be elevated if tourniquet was used during collection  Basic metabolic panel [[id number]]  (Abnormal) Collected:  03/04/19 2239    Lab Status:  Final result Specimen:  Blood from Arm, Right Updated:  03/04/19 2310     Sodium 137 mmol/L      Potassium 4 0 mmol/L      Chloride 98 mmol/L      CO2 29 mmol/L      ANION GAP 10 mmol/L      BUN 42 mg/dL      Creatinine 1 54 mg/dL      Glucose 160 mg/dL      Calcium 10 1 mg/dL      eGFR 28 ml/min/1 73sq m     Narrative:       National Kidney Disease Education Program recommendations are as follows:  GFR calculation is accurate only with a steady state creatinine  Chronic Kidney disease less than 60 ml/min/1 73 sq  meters  Kidney failure less than 15 ml/min/1 73 sq  meters      Lipase [[id number]]  (Abnormal) Collected:  03/04/19 2239    Lab Status:  Final result Specimen:  Blood from Arm, Right Updated:  03/04/19 2310     Lipase 394 u/L     Hepatic function panel [[id number]] (Abnormal) Collected:  03/04/19 2239    Lab Status:  Final result Specimen:  Blood from Arm, Right Updated:  03/04/19 2310     Total Bilirubin 0 40 mg/dL      Bilirubin, Direct 0 15 mg/dL      Alkaline Phosphatase 86 U/L      AST 33 U/L      ALT 21 U/L      Total Protein 7 8 g/dL      Albumin 3 4 g/dL     Protime-INR [[id number]]  (Normal) Collected:  03/04/19 2239    Lab Status:  Final result Specimen:  Blood from Arm, Right Updated:  03/04/19 2306     Protime 13 5 seconds      INR 1 04    APTT [[id number]]  (Abnormal) Collected:  03/04/19 2239    Lab Status:  Final result Specimen:  Blood from Arm, Right Updated:  03/04/19 2306     PTT 24 seconds     CBC and differential [[id number]]  (Abnormal) Collected:  03/04/19 2239    Lab Status:  Final result Specimen:  Blood from Arm, Right Updated:  03/04/19 2254     WBC 23 54 Thousand/uL      RBC 4 45 Million/uL      Hemoglobin 13 0 g/dL      Hematocrit 41 5 %      MCV 93 fL      MCH 29 2 pg      MCHC 31 3 g/dL      RDW 12 3 %      MPV 11 4 fL      Platelets 695 Thousands/uL      nRBC 0 /100 WBCs      Neutrophils Relative 85 %      Immat GRANS % 1 %      Lymphocytes Relative 8 %      Monocytes Relative 3 %      Eosinophils Relative 2 %      Basophils Relative 1 %      Neutrophils Absolute 20 27 Thousands/µL      Immature Grans Absolute 0 15 Thousand/uL      Lymphocytes Absolute 1 87 Thousands/µL      Monocytes Absolute 0 68 Thousand/µL      Eosinophils Absolute 0 46 Thousand/µL      Basophils Absolute 0 11 Thousands/µL                  CT abdomen pelvis wo contrast   Final Result by Namita Mcdermott MD (03/04 9395)      Pericolonic inflammatory change at the splenic flexure and descending colon, consistent with colitis of infectious or inflammatory etiology in appropriate clinical context  Appearance of this segment of colon is similar to CT examination of 4/3/2018                 Workstation performed: [id number]                    Procedures  Procedures       Phone Contacts  ED Phone Contact    ED Course                               MDM  Number of Diagnoses or Management Options  Colitis:   Diarrhea:   DNI (do not intubate):   DNR (do not resuscitate):   Elevated lactic acid level:   GI bleed:   Hypotension:   Diagnosis management comments: Pt with sudden onset diarrhea with cramping LLQ abd pain  Initially nonbloody diarrhea then had a bloody BM while in ED  Pt awake and conversant, clearly demonstrates capacity and requests no aggressive intervention  Plan - fluids, abx, admit to med surg given does not want aggressive resuscitative efforts  Amount and/or Complexity of Data Reviewed  Clinical lab tests: ordered and reviewed  Tests in the radiology section of CPT®: reviewed and ordered  Tests in the medicine section of CPT®: reviewed and ordered  Discuss the patient with other providers: yes  Independent visualization of images, tracings, or specimens: yes        Disposition  Final diagnoses:   Colitis   Diarrhea   Hypotension   GI bleed   Elevated lactic acid level   DNR (do not resuscitate)   DNI (do not intubate)     Time reflects when diagnosis was documented in both MDM as applicable and the Disposition within this note     Time User Action Codes Description Comment    3/5/2019 12:07 AM Shelly Pritchard Add [K52 9] Colitis     3/5/2019 12:07 AM RoldanRic Add [R19 7] Diarrhea     3/5/2019 12:07 AM Shelly Sago Add [I95 9] Hypotension     3/5/2019 12:07 AM Roldan Natalio Add [K92 2] GI bleed     3/5/2019 12:08 AM Roldan Natalio Add [R79 89] Elevated lactic acid level     3/5/2019 12:08 AM Roldan, [address] DNR (do not resuscitate)     3/5/2019 12:08 AM Shelly Sagteodoro Add [Z78 9] DNI (do not intubate)       ED Disposition     ED Disposition Condition Date/Time Comment    Admit Stable Tue Mar 5, 2019 12:08 AM Case was discussed with ELÍAS and the patient's admission status was agreed to be Admission Status: inpatient status to the service of Dr Jennifer Robles   Follow-up Information    None         Patient's Medications   Discharge Prescriptions    No medications on file     No discharge procedures on file      ED Provider  Electronically Signed by           Francisco Mata MD  03/05/19 Maddy Cochran MD  03/05/19 5495

## 2019-03-05 NOTE — ED NOTES
Pt assisted to stand and pass urine  Pt's brief noted to have scant stool and light red color  Pt cleaned up and assisted back to bed  Pt has been tolerating orange juice by mouth       Matias Ramirez RN  03/05/19 5186

## 2019-03-05 NOTE — ASSESSMENT & PLAN NOTE
· Baseline Creatinine is 0 9, today it is 1 5-Likely in the setting of diarrhea and dehydration  · Patient received 500ml bolus of normal saline x 2 in the ED   · Will start continuous fluids normal saline at 100 mL/hour  · Monitor volume status  · Follow up with creatinine  · Patient had an echo done September 1755 - Systolic function was normal  Ejection fraction was estimated in the range of 55 % to 65 %   · Consider getting a follow-up echo

## 2019-03-05 NOTE — UTILIZATION REVIEW
Initial Clinical Review    Admission: Date/Time/Statement: 3/5/19 @ 0007   Orders Placed This Encounter   Procedures    Inpatient Admission (expected length of stay for this patient Order details is greater than two midnights)     Standing Status:   Standing     Number of Occurrences:   1     Order Specific Question:   Admitting Physician     Answer:   Matilde Leal [id number]     Order Specific Question:   Level of Care     Answer:   Med Surg [16]     Order Specific Question:   Estimated length of stay     Answer:   More than 2 Midnights     Order Specific Question:   Certification     Answer:   I certify that inpatient services are medically necessary for this patient for a duration of greater than two midnights  See H&P and MD Progress Notes for additional information about the patient's course of treatment  ED: Date/Time/Mode of Arrival:   ED Arrival Information     Expected Arrival Acuity Means of Arrival Escorted By Service Admission Type    - 3/4/2019 21:16 Emergent Ambulance [facility] General Medicine Emergency    Arrival Complaint    -        Chief Complaint:   Chief Complaint   Patient presents with    Diarrhea     Pt from [facility] with diarrhea x1 and weakness today  On toilet per EMS, care home could not get her up but she was able to stand and pivot for them  Incontinent on arrival      Assessment/Plan: 81 yo female from personal care home to ED  w/ abd cramping , diarrhea and weakness for a few hours    Multiple loose BMs every 1-2 hours Sepsis [facility])  Assessment & Plan  Patient is a 59-year-old female WBC of 23, lactate 3 2 now down to 2 1, temperature is 95 8° patient will be admitted for sepsis in the setting of infectious colitis  CT scan of abdomen/pelvis-Pericolonic inflammatory change at the splenic flexure and descending colon, consistent with colitis of infectious or inflammatory etiology in appropriate clinical context   Appearance of this segment of colon is similar to CT examination of 4/3/2018  · Patient already received a dose of Zosyn  · Check stool for C diff, stool cultures  · Follow-up of blood cultures  · Monitor WBC  · Continue with Zosyn 2 25 g q 6 hours  · Follow-up with lactic acid which is currently 2 1  · Start gentle IV hydration normal saline at 100 mL/hour  · Monitor volume status-last echocardiogram done 09/2018 reveals EF 55-60%  · DNR/DNI   Acute kidney injury [facility])  Assessment & Plan  · Baseline Creatinine is 0 9, today it is 1 5-Likely in the setting of diarrhea and dehydration  · Patient received 500ml bolus of normal saline x 2 in the ED   · Will start continuous fluids normal saline at 100 mL/hour  · Monitor volume status  · Follow up with creatinine  · Patient had an echo done September 8993 - Systolic function was normal  Ejection fraction was estimated in the range of 55 % to 65 %   · Consider getting a follow-up echo   olitis, acute  Assessment & Plan  · See above plan  · Check stool for C diff, stool cultures  · Follow-up of blood cultures  · Continue with Zosyn  · Follow-up with lactic acid which is currently 2 1  · Start gentle IV hydration  · Monitor volume status   HTN (hypertension)  Assessment & Plan  · Stable  · Continue with Norvasc 5 mg daily  · Plendil 5 mg daily  · Lisinopril hydrochlorothiazide daily  · Metoprolol 25 mg daily  · Monitor hemodynamics    Anticipated Length of Stay:  Patient will be admitted on an Inpatient basis with an anticipated length of stay of   2 midnights     Justification for Hospital Stay:  Infectious colitis     ED Vital Signs:   ED Triage Vitals   Temperature Pulse Respirations Blood Pressure SpO2   03/04/19 2129 03/04/19 2129 03/04/19 2129 03/04/19 2129 03/04/19 2129   (!) 95 8 °F (35 4 °C) 75 20 (!) 72/39 96 %      Temp Source Heart Rate Source Patient Position - Orthostatic VS BP Location FiO2 (%)   03/04/19 2129 03/05/19 0552 03/05/19 0552 03/05/19 0552 --   Rectal Monitor Lying Right arm Pain Score       03/04/19 2129       No Pain        Wt Readings from Last 1 Encounters:   03/04/19 57 4 kg (126 lb 8 7 oz)     Vital Signs (abnormal):   03/05/19 0958 -- 117 18 162/76 97 % -- AA   03/05/19 0955 -- 122 -- 162/76 -- -- AA   03/05/19 0729 98 4 °F (36 9 °C) 113 18 143/68 96 % -- JAM   03/05/19 0552 98 6 °F (37 °C) 112 17 138/67 93 %         Pertinent Labs/Diagnostic Test Results: lactic acid  2 2  3 4  4 6  Cl  98, BUN creat   42  1 54, gluc  160, lipase  394, alb   3 4, ptt   24, wbc  23 54  CT abd-    Pericolonic inflammatory change at the splenic flexure and descending colon, consistent with colitis of infectious or inflammatory etiology in appropriate clinical context   Appearance of this segment of colon is similar to CT examination of 4/3/2018            ED Treatment:   Medication Administration from 03/04/2019 2116 to 03/05/2019 1108       Date/Time Order Dose Route Action Action by Comments     03/05/2019 0032 sodium chloride 0 9 % bolus 500 mL 0 mL Intravenous Stopped Eulalia Mitchell, MARY      03/04/2019 2250 sodium chloride 0 9 % bolus 500 mL 500 mL Intravenous New Bag Mendel Garrison, RN      03/05/2019 0129 piperacillin-tazobactam (ZOSYN) 3 375 g in sodium chloride 0 9 % 50 mL IVPB 0 g Intravenous Stopped Eulalia Mitchell, MARY      03/05/2019 0031 piperacillin-tazobactam (ZOSYN) 3 375 g in sodium chloride 0 9 % 50 mL IVPB 3 375 g Intravenous New Bag Eulalia Mitchell, RN      03/05/2019 0200 sodium chloride 0 9 % bolus 500 mL 0 mL Intravenous Stopped Eulalia Mitchell RN      03/05/2019 0032 sodium chloride 0 9 % bolus 500 mL 500 mL Intravenous [address] Eulalia Mitchell RN      03/05/2019 0332 piperacillin-tazobactam (ZOSYN) 3 375 g in sodium chloride 0 9 % 50 mL IVPB 3 375 g Intravenous Not Given Eulalia Mitchell, RN      03/05/2019 0710 piperacillin-tazobactam (ZOSYN) 2 25 g in sodium chloride 0 9 % 50 mL IVPB 0 g Intravenous Stopped Fabián Lunsford RN      03/05/2019 7825 piperacillin-tazobactam (ZOSYN) 2 25 g in sodium chloride 0 9 % 50 mL IVPB 2 25 g Intravenous New Bag Swetha Ruiz RN      03/05/2019 4081 aspirin chewable tablet 81 mg 81 mg Oral Given Aziza Ramos RN      03/05/2019 3103 metoprolol succinate (TOPROL-XL) 24 hr tablet 25 mg 25 mg Oral Given Aziza Ramos RN      03/05/2019 1006 sodium chloride 0 9 % infusion 75 mL/hr Intravenous Rate/Dose Change Aziza Ramos RN      03/05/2019 0239 sodium chloride 0 9 % infusion 100 mL/hr Intravenous New [address] Swetha Ruiz RN      03/05/2019 0955 amLODIPine (NORVASC) tablet 10 mg 10 mg Oral Given Aziza Ramos RN         Past Medical/Surgical History:    Active Ambulatory Problems     Diagnosis Date Noted    Colitis, acute 04/03/2018    Dehydration 04/03/2018    HTN (hypertension) 04/03/2018    [facility] ) 04/03/2018    Hypokalemia 04/04/2018    Anemia 04/04/2018    Hypomagnesemia 04/06/2018    Ambulatory dysfunction 04/07/2018    Diarrhea 04/19/2018    UTI (urinary tract infection) 09/05/2018    Lymphedema of arm 09/05/2018    Underweight 09/06/2018    Moderate mitral regurgitation 10/09/2018    Wide-complex tachycardia ([facility] ) 10/09/2018    Multifocal atrial tachycardia ([facility] ) 10/09/2018     Past Medical History:   Diagnosis Date    [facility] cancer ([facility] )     [facility]     Hypertension     Lymphedema of arm      Admitting Diagnosis: Diarrhea [R19 7]  Colitis [K52 9]  Hypotension [I95 9]  GI bleed [K92 2]  DNI (do not intubate) [Z78 9]  DNR (do not resuscitate) [Z66]  Elevated lactic acid level [R79 89]  Age/Sex: 80 y o  female  Admission Orders:  Scheduled Meds:   Current Facility-Administered Medications:  amLODIPine 10 mg Oral Daily     aspirin 81 mg Oral Daily     metoprolol succinate 25 mg Oral Daily     piperacillin-tazobactam 2 25 g Intravenous Q6H  Last Rate: 2 25 g (03/05/19 1303)   sodium chloride 75 mL/hr Intravenous Continuous  Last Rate: 75 mL/hr (03/05/19 1130)   traZODone 50 mg Oral HS       I&O   Reg diet   SCD  OT PT eval   Up w/ assist   3/5 bld cx, occult bld stool , c-diff , stool enteric bacterial ,

## 2019-03-06 LAB
ANION GAP SERPL CALCULATED.3IONS-SCNC: 9 MMOL/L (ref 4–13)
BASOPHILS # BLD AUTO: 0.03 THOUSANDS/ΜL (ref 0–0.1)
BASOPHILS NFR BLD AUTO: 0 % (ref 0–1)
BUN SERPL-MCNC: 27 MG/DL (ref 5–25)
CALCIUM SERPL-MCNC: 8.9 MG/DL (ref 8.3–10.1)
CHLORIDE SERPL-SCNC: 105 MMOL/L (ref 100–108)
CO2 SERPL-SCNC: 26 MMOL/L (ref 21–32)
CREAT SERPL-MCNC: 0.96 MG/DL (ref 0.6–1.3)
EOSINOPHIL # BLD AUTO: 0.36 THOUSAND/ΜL (ref 0–0.61)
EOSINOPHIL NFR BLD AUTO: 4 % (ref 0–6)
ERYTHROCYTE [DISTWIDTH] IN BLOOD BY AUTOMATED COUNT: 13 % (ref 11.6–15.1)
GFR SERPL CREATININE-BSD FRML MDRD: 50 ML/MIN/1.73SQ M
GLUCOSE SERPL-MCNC: 98 MG/DL (ref 65–140)
HCT VFR BLD AUTO: 32 % (ref 34.8–46.1)
HGB BLD-MCNC: 10.3 G/DL (ref 11.5–15.4)
LYMPHOCYTES # BLD AUTO: 1.84 THOUSANDS/ΜL (ref 0.6–4.47)
LYMPHOCYTES NFR BLD AUTO: 18 % (ref 14–44)
MCH RBC QN AUTO: 29.3 PG (ref 26.8–34.3)
MCHC RBC AUTO-ENTMCNC: 32.2 G/DL (ref 31.4–37.4)
MCV RBC AUTO: 91 FL (ref 82–98)
MONOCYTES # BLD AUTO: 0.81 THOUSAND/ΜL (ref 0.17–1.22)
MONOCYTES NFR BLD AUTO: 8 % (ref 4–12)
NEUTROPHILS # BLD AUTO: 7.02 THOUSANDS/ΜL (ref 1.85–7.62)
NEUTS SEG NFR BLD AUTO: 70 % (ref 43–75)
PLATELET # BLD AUTO: 142 THOUSANDS/UL (ref 149–390)
PMV BLD AUTO: 11.2 FL (ref 8.9–12.7)
POTASSIUM SERPL-SCNC: 2.8 MMOL/L (ref 3.5–5.3)
RBC # BLD AUTO: 3.52 MILLION/UL (ref 3.81–5.12)
SODIUM SERPL-SCNC: 140 MMOL/L (ref 136–145)
WBC # BLD AUTO: 10.06 THOUSAND/UL (ref 4.31–10.16)

## 2019-03-06 PROCEDURE — G8978 MOBILITY CURRENT STATUS: HCPCS

## 2019-03-06 PROCEDURE — 80048 BASIC METABOLIC PNL TOTAL CA: CPT | Performed by: FAMILY MEDICINE

## 2019-03-06 PROCEDURE — G8987 SELF CARE CURRENT STATUS: HCPCS

## 2019-03-06 PROCEDURE — 97163 PT EVAL HIGH COMPLEX 45 MIN: CPT

## 2019-03-06 PROCEDURE — 99232 SBSQ HOSP IP/OBS MODERATE 35: CPT | Performed by: FAMILY MEDICINE

## 2019-03-06 PROCEDURE — G8979 MOBILITY GOAL STATUS: HCPCS

## 2019-03-06 PROCEDURE — G8988 SELF CARE GOAL STATUS: HCPCS

## 2019-03-06 PROCEDURE — 97167 OT EVAL HIGH COMPLEX 60 MIN: CPT

## 2019-03-06 PROCEDURE — 85025 COMPLETE CBC W/AUTO DIFF WBC: CPT | Performed by: FAMILY MEDICINE

## 2019-03-06 RX ORDER — POTASSIUM CHLORIDE 20 MEQ/1
40 TABLET, EXTENDED RELEASE ORAL ONCE
Status: COMPLETED | OUTPATIENT
Start: 2019-03-06 | End: 2019-03-06

## 2019-03-06 RX ADMIN — POTASSIUM CHLORIDE 40 MEQ: 1500 TABLET, EXTENDED RELEASE ORAL at 13:52

## 2019-03-06 RX ADMIN — PIPERACILLIN SODIUM,TAZOBACTAM SODIUM 2.25 G: 2; .25 INJECTION, POWDER, FOR SOLUTION INTRAVENOUS at 14:12

## 2019-03-06 RX ADMIN — PIPERACILLIN SODIUM,TAZOBACTAM SODIUM 2.25 G: 2; .25 INJECTION, POWDER, FOR SOLUTION INTRAVENOUS at 05:41

## 2019-03-06 RX ADMIN — PIPERACILLIN SODIUM,TAZOBACTAM SODIUM 2.25 G: 2; .25 INJECTION, POWDER, FOR SOLUTION INTRAVENOUS at 20:20

## 2019-03-06 RX ADMIN — SODIUM CHLORIDE 75 ML/HR: 0.9 INJECTION, SOLUTION INTRAVENOUS at 03:02

## 2019-03-06 RX ADMIN — ASPIRIN 81 MG 81 MG: 81 TABLET ORAL at 09:46

## 2019-03-06 RX ADMIN — METOPROLOL SUCCINATE 25 MG: 25 TABLET, EXTENDED RELEASE ORAL at 09:50

## 2019-03-06 RX ADMIN — TRAZODONE HYDROCHLORIDE 50 MG: 50 TABLET ORAL at 20:20

## 2019-03-06 RX ADMIN — PIPERACILLIN SODIUM,TAZOBACTAM SODIUM 2.25 G: 2; .25 INJECTION, POWDER, FOR SOLUTION INTRAVENOUS at 00:01

## 2019-03-06 NOTE — PLAN OF CARE
Problem: PHYSICAL THERAPY ADULT  Goal: Performs mobility at highest level of function for planned discharge setting  See evaluation for individualized goals  Description  Treatment/Interventions: Functional transfer training, LE strengthening/ROM, Elevations, Therapeutic exercise, Endurance training, Patient/family training, Equipment eval/education, Bed mobility, Gait training, Spoke to nursing, OT  Equipment Recommended: Britt Eli       See flowsheet documentation for full assessment, interventions and recommendations  Note:   Prognosis: Good  Problem List: Decreased strength, Decreased endurance, Impaired balance, Decreased mobility  Assessment: Pt is 80 y o  female seen for PT evaluation on 3/6/2019 s/p admit to [facility] on 3/4/2019 w/ Sepsis ([facility] )  Pt presents c abdominal cramping, diarrhea, weakness  PT consulted to assess pt's functional mobility and d/c needs  Order placed for PT eval and tx, w/ up w/ A order  Performed at least 2 patient identifiers during session: Name and wristband  Comorbidities affecting pt's physical performance at time of assessment include: breast CA, HTN, lymphedema of L arm  PTA, pt was independent w/ all functional mobility w/ no AD/DME, ambulates unrestricted distances and all terrain and resident of [facility], has to navigate 14 stairs inside of [facility]  Personal factors affecting pt at time of IE include: inaccessible home environment, ambulating w/ assistive device, inability to navigate community distances and inability to navigate level surfaces w/o external assistance  Please find objective findings from PT assessment regarding body systems outlined above with impairments and limitations including weakness, impaired balance, decreased endurance, gait deviations, decreased activity tolerance and fall risk, as well as mobility assessment (need for SBA assist w/ all phases of mobility when usually ambulating independently and need for cueing for mobility technique)   The following objective measures performed on IE also reveal limitations: Barthel Index: 55/100 and Modified [place]: 4 (moderate/severe disability)  Pt's clinical presentation is currently unstable/unpredictable seen in pt's presentation of need for input for task focus and mobility technique, on telemetry monitoring and ongoing medical assessment  Pt to benefit from continued PT tx to address deficits as defined above and maximize level of functional independent mobility and consistency  From PT/mobility standpoint, recommendation at time of d/c would be anticipated return to [facility] with continued facility support and HHPT services pending progress in order to facilitate return to PLOF  Barriers to Discharge: Inaccessible home environment(has 14 steps up to 2nd floor)     Recommendation: Home PT, Other (Comment)(anticipated return to skilled nursing c continued support & PT services)     PT - OK to Discharge: No    See flowsheet documentation for full assessment

## 2019-03-06 NOTE — ASSESSMENT & PLAN NOTE
Sepsis related to gastroenteritis  Currently resolved as evidenced by resolved lactic acid and white blood cell elevations  Patient is tolerating diet and denies any abdominal pain or diarrhea    Continue with Zosyn

## 2019-03-06 NOTE — PLAN OF CARE
Problem: DISCHARGE PLANNING - CARE MANAGEMENT  Goal: Discharge to post-acute care or home with appropriate resources  Description  INTERVENTIONS:  - Conduct assessment to determine patient/family and health care team treatment goals, and need for post-acute services based on payer coverage, community resources, and patient preferences, and barriers to discharge  - Address psychosocial, clinical, and financial barriers to discharge as identified in assessment in conjunction with the patient/family and health care team  - Arrange appropriate level of post-acute services according to patient?s   needs and preference and payer coverage in collaboration with the physician and health care team  - Communicate with and update the patient/family, physician, and health care team regarding progress on the discharge plan  - Arrange appropriate transportation to post-acute venues  - Patient's goal is to return to [facility] upon discharge  Outcome: Progressing

## 2019-03-06 NOTE — PHYSICIAN ADVISOR
Current patient class: Inpatient  The patient is currently on Hospital Day: 2 at [address]      The patient was admitted to the hospital at [facility] on 3/5/19 for the following diagnosis:  Diarrhea [R19 7]  Colitis [K52 9]  Hypotension [I95 9]  GI bleed [K92 2]  DNI (do not intubate) [Z78 9]  DNR (do not resuscitate) Lyndle Gain  Elevated lactic acid level [R79 89]       There is documentation in the medical record of an expected length of stay of at least 2 midnights  The patient is therefore expected to satisfy the 2 midnight benchmark and given the 2 midnight presumption is appropriate for INPATIENT ADMISSION  Given this expectation of a satisfying stay, CMS instructs us that the patient is most often appropriate for inpatient admission under part A provided medical necessity is documented in the chart  After review of the relevant documentation, labs, vital signs and test results, the patient is appropriate for INPATIENT ADMISSION  Admission to the hospital as an inpatient is a complex decision making process which requires the practitioner to consider the patients presenting complaint, history and physical examination and all relevant testing  With this in mind, in this case, the patient was deemed appropriate for INPATIENT ADMISSION  After review of the documentation and testing available at the time of the admission I concur with this clinical determination of medical necessity  Rationale is as follows: The patient is a 80 yrs old Female who presented to the ED at 3/4/2019  9:17 PM with a chief complaint of Diarrhea (Pt from [facility] with diarrhea x1 and weakness today  On toilet per EMS, care home could not get her up but she was able to stand and pivot for them    Incontinent on arrival )     Given the need for further hospitalization, and along with the documentation of medical necessity present in the chart, the patient is appropriate for inpatient admission  The patient is expected to satisfy the 2 midnight benchmark, and will require further acute medical care  The patient does have comorbid conditions which increases the risk for significant adverse outcome  Given this the patient is appropriate for inpatient admission        The patients vitals on arrival were ED Triage Vitals   Temperature Pulse Respirations Blood Pressure SpO2   03/04/19 2129 03/04/19 2129 03/04/19 2129 03/04/19 2129 03/04/19 2129   (!) 95 8 °F (35 4 °C) 75 20 (!) 72/39 96 %      Temp Source Heart Rate Source Patient Position - Orthostatic VS BP Location FiO2 (%)   03/04/19 2129 03/05/19 0552 03/05/19 0552 03/05/19 0552 --   Rectal Monitor Lying Right arm       Pain Score       03/04/19 2129       No Pain           Past Medical History:   Diagnosis Date    [facility]     Cancer [facility]     Hypertension     Lymphedema of arm     L arm     Past Surgical History:   Procedure Laterality Date    BLADDER REPAIR      BREAST SURGERY      HYSTERECTOMY      MASTECTOMY             Consults have been placed to:   IP CONSULT TO CASE MANAGEMENT    Vitals:    03/05/19 0729 03/05/19 0955 03/05/19 0958 03/05/19 1520   BP: 143/68 162/76 162/76 145/90   BP Location: Right arm  Right arm    Pulse: (!) 113 (!) 122 (!) 117 92   Resp: 18  18 18   Temp: 98 4 °F (36 9 °C)   98 96 °F (37 2 °C)   TempSrc: Oral      SpO2: 96%  97% 96%   Weight:           Most recent labs:    Recent Labs     03/04/19 2239 03/05/19  0529   WBC 23 54* 17 95*   HGB 13 0 12 4   HCT 41 5 38 4    177   K 4 0 3 8   CALCIUM 10 1 9 7   BUN 42* 43*   CREATININE 1 54* 1 47*   LIPASE 394*  --    INR 1 04  --    AST 33 36   ALT 21 23   ALKPHOS 86 71       Scheduled Meds:  Current Facility-Administered Medications:  amLODIPine 10 mg Oral Daily Lalitha Sorenson MD    aspirin 81 mg Oral Daily Danielle Talbot PA-C    metoprolol succinate 25 mg Oral Daily Danielle Talbot PA-C    piperacillin-tazobactam 2 25 g Intravenous Q6H Danielle Talbot PA-C Last Rate: 2 25 g (03/05/19 7626)   sodium chloride 75 mL/hr Intravenous Continuous Phan Stevenson MD Last Rate: 75 mL/hr (03/05/19 1130)   traZODone 50 mg Oral HS Danielle Talbot PA-C      Continuous Infusions:  sodium chloride 75 mL/hr Last Rate: 75 mL/hr (03/05/19 1130)     PRN Meds:      Surgical procedures (if appropriate):

## 2019-03-06 NOTE — PHYSICAL THERAPY NOTE
Physical Therapy Evaluation     Patient's Name: Rob Vasquez    Admitting Diagnosis  Diarrhea [R19 7]  Colitis [K52 9]  Hypotension [I95 9]  GI bleed [K92 2]  DNI (do not intubate) [Z78 9]  DNR (do not resuscitate) Alena Foot  Elevated lactic acid level [R79 89]    Problem List  Patient Active Problem List   Diagnosis    Colitis, acute    Dehydration    HTN (hypertension)    [facility]    Hypokalemia    Anemia    Hypomagnesemia    Ambulatory dysfunction    Diarrhea    UTI (urinary tract infection)    Lymphedema of arm    Underweight    Moderate mitral regurgitation    Wide-complex tachycardia ([facility]    Multifocal atrial tachycardia ([facility]    Sepsis ([facility] )       Past Medical History  Past Medical History:   Diagnosis Date    [facility]     Cancer ([facility]     Hypertension     Lymphedema of arm     L arm       Past Surgical History  Past Surgical History:   Procedure Laterality Date    BLADDER REPAIR      BREAST SURGERY      HYSTERECTOMY      MASTECTOMY            03/06/19 1238   Note Type   Note type Eval only   Pain Assessment   Pain Assessment No/denies pain   Pain Score No Pain   Home Living   Type of Home Assisted living  [facility])   Home Layout Two level;Performs ADLs on one level  ([address] with rail, flight of steps for meals)   Bathroom Shower/Tub Walk-in shower   Bathroom Equipment Grab bars in shower   [address] Walker  ("I don't use it"- pt referencing walker)   Prior Function   Level of [place] Independent with ADLs and functional mobility  (except showers)   Lives With Facility staff   Receives Help From Other (Comment)  (facility staff)   ADL Assistance Independent  (except showers)   IADLs Needs assistance  (meals and medicines)   Falls in the last 6 months 0   Comments pt denies any recent therapy   Restrictions/Precautions   Weight Bearing Precautions Per Order No   Braces or Orthoses   (none per pt)   Other Precautions Contact/isolation; Chair Alarm; Bed Alarm;Limb alert;Multiple lines; Fall Risk  (back safety precautions)   General   Family/Caregiver Present No   Cognition   Overall Cognitive Status WFL   Arousal/Participation Alert   Orientation Level Oriented X4   Memory Within functional limits   Following Commands Follows all commands and directions without difficulty   Comments pt agreeable to PT evaluation   RUE Assessment   RUE Assessment   (defer to OT eval for comments)   LUE Assessment   LUE Assessment   (defer to OT eval for comments)   RLE Assessment   RLE Assessment WFL  (grossly 3+/5 assessed c functional mobility)   LLE Assessment   LLE Assessment WFL  (grossly 3+/5 assessed c functional mobility)   Coordination   Movements are Fluid and Coordinated 1   Sensation WFL   Light Touch   RLE Light Touch Grossly intact   LLE Light Touch Grossly intact   Bed Mobility   Supine to Sit 5  Supervision   Additional items Assist x 1;HOB elevated; Increased time required   Additional Comments HR upon arrival: 98-103bpm  HR during level surface mobility, increased to 130s bpm, decreased to 105bpm following 1 min seated rest break   Transfers   Sit to Stand 5  Supervision   Additional items Assist x 1; Increased time required;Verbal cues   Stand to Sit 5  Supervision   Additional items Assist x 1; Increased time required;Verbal cues   Ambulation/Elevation   Gait pattern Decreased foot clearance; Short stride   Gait Assistance 5  Supervision   Additional items Assist x 1;Verbal cues   Assistive Device Rolling walker   Distance 10' + 15'   Stair Management Assistance Not tested   Balance   Static Sitting Good   Dynamic Sitting Fair +   Static Standing Fair +   Dynamic Standing Fair   Ambulatory Fair   Endurance Deficit   Endurance Deficit Yes   Activity Tolerance   Activity Tolerance Patient limited by William Barragan requesting PT/OT assess pt this date   Nurse Made Aware MARY Ramirez verbalized pt appropriate to see  reports pt has been ambulating to/from BR since admission   Assessment   Prognosis Good   Problem List Decreased strength;Decreased endurance; Impaired balance;Decreased mobility   Assessment Pt is 80 y o  female seen for PT evaluation on 3/6/2019 s/p admit to [facility] on 3/4/2019 w/ Sepsis ([facility] )  Pt presents c abdominal cramping, diarrhea, weakness  PT consulted to assess pt's functional mobility and d/c needs  Order placed for PT eval and tx, w/ up w/ A order  Performed at least 2 patient identifiers during session: Name and wristband  Comorbidities affecting pt's physical performance at time of assessment include: breast CA, HTN, lymphedema of L arm  PTA, pt was independent w/ all functional mobility w/ no AD/DME, ambulates unrestricted distances and all terrain and [facility], has to navigate [facility] stairs [facility] of [facility]  Personal factors affecting pt at time of IE include: inaccessible home environment, ambulating w/ assistive device, inability to navigate community distances and inability to navigate level surfaces w/o external assistance  Please find objective findings from PT assessment regarding body systems outlined above with impairments and limitations including weakness, impaired balance, decreased endurance, gait deviations, decreased activity tolerance and fall risk, as well as mobility assessment (need for SBA assist w/ all phases of mobility when usually ambulating independently and need for cueing for mobility technique)  The following objective measures performed on IE also reveal limitations: Barthel Index: 55/100 and Modified [place]: 4 (moderate/severe disability)  Pt's clinical presentation is currently unstable/unpredictable seen in pt's presentation of need for input for task focus and mobility technique, on telemetry monitoring and ongoing medical assessment   Pt to benefit from continued PT tx to address deficits as defined above and maximize level of functional independent mobility and consistency  From PT/mobility standpoint, recommendation at time of d/c would be anticipated return to [facility] with continued facility support and HHPT services pending progress in order to facilitate return to PLOF  Barriers to Discharge Inaccessible home environment  (has 14 steps up to 2nd floor)   Goals   Patient Goals to return home   STG Expiration Date 03/16/19   Short Term Goal #1 In 7-10 days: Increase bilateral LE strength 1/2 grade to facilitate independent mobility, Perform all bed mobility tasks modified independent to decrease caregiver burden, Perform all transfers modified independent to improve independence, Ambulate > 150 ft  with least restrictive assistive device modified independent w/o LOB and w/ normalized gait pattern 100% of the time, Navigate 14 stairs modified independent with unilateral handrail to facilitate return to previous living environment, Increase all balance 1/2 grade to decrease risk for falls, Complete exercise program independently, Tolerate 4 hr OOB to faciliate upright tolerance, Improve Barthel Index score to 70 or greater to facilitate independence and PT provider will perform functional balance assessment to determine fall risk   Treatment Day 0   Plan   Treatment/Interventions Functional transfer training;LE strengthening/ROM; Elevations; Therapeutic exercise; Endurance training;Patient/family training;Equipment eval/education; Bed mobility;Gait training;Spoke to nursing;OT   PT Frequency   (3-5x/wk)   Recommendation   Recommendation Home PT; Other (Comment)  (anticipated return to [facility] c continued support & PT services)   Equipment Recommended Emanuel Alejo   PT - OK to Discharge No   Additional Comments pt to clear stairs prior to safe d/c disposition home   Modified [place] Scale   Modified [place] Scale 4   Barthel Index   Feeding 10   Bathing 0   Grooming Score 5   Dressing Score 5   Bladder Score 10   Bowels Score 10   Toilet Use Score 5   Transfers (Bed/Chair) Score 10   Mobility (Level Surface) Score 0   Stairs Score 0   Barthel Index Score 55         Oval Monroe, PT, DPT

## 2019-03-06 NOTE — ASSESSMENT & PLAN NOTE
· Stable, but actually noted being on the lower side  · Holding lisinopril  · Continue with Norvasc 10 mg daily and Toprol 25 mg daily  · If blood pressures remain low, will continue on the same regimen at home    ·

## 2019-03-06 NOTE — OCCUPATIONAL THERAPY NOTE
Occupational Therapy Evaluation        Patient Name: Evelyn Cunningham  DEYTJ'K Date: 3/6/2019       03/06/19 1218   Note Type   Note type Eval only   Restrictions/Precautions   Weight Bearing Precautions Per Order No   Braces or Orthoses   (none per pt)   Other Precautions Contact/isolation; Chair Alarm; Bed Alarm;Limb alert;Multiple lines; Fall Risk  (back safety precautions)   Pain Assessment   Pain Assessment No/denies pain   Pain Score No Pain   Home Living   Type of Home Assisted living  [facility])   Home Layout Two level;Performs ADLs on one level  ([address] with rail, flight of steps for meals)   Bathroom Shower/Tub Walk-in shower   Bathroom Equipment Grab bars in [facility] use it")   Prior Function   Level of [place] Independent with ADLs and functional mobility  (except showers)   Lives With Facility staff   Receives Help From Other (Comment)  (facility staff)   ADL Assistance Independent  (except showers)   IADLs Needs assistance  (meals and medicines)   Falls in the last 6 months 0   Comments pt denies any recent therapy   Lifestyle   Autonomy Patient reporting independent  with ADLs, ambulating with no AD,  patient resides at [facility], 2nd floor level with 14 steps to main level and dining room  patient receives help with medications, meals and showers  Reciprocal Relationships Supportive family and staff   Psychosocial   Psychosocial (WDL) WDL   ADL   Eating Assistance 5  Supervision/Setup   Grooming Assistance 5  Supervision/Setup   UB Bathing Assistance 4  Minimal Assistance   LB Bathing Assistance 4  Minimal Assistance   UB Dressing Assistance 4  Minimal Assistance   LB Dressing Assistance 4  Minimal Assistance   Toileting Assistance  4  [facility] 4  Minimal Assistance   Additional Comments patient ambulated to the [address] [place] [address] for added stability and decreased effort   HR averaged 110-114,  then patient ambulated to the doorway without AD, however HR increased  to 130 when not using AD  Patient was positioned OOB to Recliner, with HR of 91- 100  post 1 minute of recovery time  Bed Mobility   Supine to Sit 5  Supervision   Additional items Assist x 1;HOB elevated; Increased time required   Additional Comments HR upon arrival: 98-103bpm   Transfers   Sit to Stand 5  Supervision   Additional items Assist x 1; Increased time required;Verbal cues   Stand to Sit 5  Supervision   Additional items Assist x 1; Increased time required;Verbal cues   Balance   Static Sitting Good   Dynamic Sitting Fair +   Static Standing Fair +   Dynamic Standing Fair   Ambulatory Fair   Activity Tolerance   Activity Tolerance Patient limited by fatigue   Nurse Made Aware MARY Ruvalcaba verbalized pt appropriate to see  reports pt has been ambulating to/from BR since admission   RUE Assessment   RUE Assessment WFL   LUE Assessment   LUE Assessment WFL   Edema   LUE Edema +2   Hand Function   Gross Motor Coordination Functional   Fine Motor Coordination Functional   Sensation   Light Touch No apparent deficits   Vision-Basic Assessment   Current Vision Wears glasses only for reading   Perception   Inattention/Neglect Appears intact   Cognition   Overall Cognitive Status WFL   Arousal/Participation Alert; Responsive; Cooperative   Attention Within functional limits   Orientation Level Oriented X4   Memory Within functional limits   Following Commands Follows all commands and directions without difficulty   Assessment   Limitation Decreased ADL status; Decreased endurance;Decreased high-level ADLs   Prognosis Good   Assessment Patient is a 80 y o  female seen for OT evaluation s/p admit to [facility] on 3/4/2019 w/Sepsis [facility])  Commorbidities affecting patient's functional performance at time of assessment include: breast CA, HTN, lymphedema of L arm     Orders placed for OT evaluation and treatment   Performed at least two patient identifiers during session including name and wristband  Prior to admission, Patient reporting independent  with ADLs, ambulating with no AD,  patient resides at [facility], 2nd floor level with 14 steps to main level and dining room  patient receives help with medications, meals and showers  Personal factors affecting patient at time of initial evaluation include: decreased initiation and engagement, difficulty performing ADLs and difficulty performing IADLs  Upon evaluation, patient requires set up, contact guard and close supervision assist for UB ADLs, minimal  assist for LB ADLs, transfers and functional ambulation in room and bathroom with minimal  assist, with the use of Rolling Walker  Occupational performance is affected by the following deficits: dynamic sit/ stand balance deficit with poor standing tolerance time for self care and functional mobility, decreased activity tolerance and postural control and postural alignment deficit, requiring external assistance to complete transitional movements  Therapist completed  extensive additional review of medical records and additional review of physical, cognitive or psychosocial history, clinical examination identifying 5 or more performance deficits, clinical decision making of a high complexity   Patient to benefit from continued Occupational Therapy treatment while in the hospital to address deficits as defined above and maximize level of functional independence with ADLs and functional mobility  Occupational Performance areas to address include: dressing, transfer to all surfaces, functional mobility, health maintenance, IADLs: safety procedures, Leisure Participation and Social participation  From OT standpoint, recommendation at time of d/c would be [address] [place] [address] and [place] OT  Goals   Patient Goals to return home   Plan   Treatment Interventions ADL retraining;Functional transfer training; Endurance training;Patient/family training;Equipment evaluation/education; Compensatory technique education;Continued evaluation;Cardiac education; Energy conservation; Activityengagement   Goal Expiration Date 03/20/19   OT Frequency 3-5x/wk   Recommendation   OT Discharge Recommendation Short Term Rehab   Barthel Index   Feeding 10   Bathing 0   Grooming Score 5   Dressing Score 5   Bladder Score 10   Bowels Score 10   Toilet Use Score 5   Transfers (Bed/Chair) Score 10   Mobility (Level Surface) Score 0   Stairs Score 0   Barthel Index Score 55   Modified [place] Scale   Modified [place] Scale 4         Occupational therapy Goals: In 2-4 days    1- Patient will verbalize and demonstrate use of energy conservation/ deep breathing technique and work simplification skills during functional activity with no verbal cues  2- Patient will verbalize and demonstrate good body mechanics and joint protection techniques during ADLs/ IADLs with no verbal cues   3- Patient will increase OOB/ sitting tolerance to 4-6 hours per day for increased participation in self care and leisure tasks with no s/s of exertion  4- Patient will identify s/s of exertion during ADL and functional mobility with no verbal cues    5-Patient will verbalize/ demonstrate compensatory strategies to recover from exertion with no verbal cues  6-Patient will increase standing tolerance time to 10 minutes with No UE support to complete sink level ADLs @ Mod I level   7- Patient will increase sitting tolerance at edge of bed to 30 minutes to complete UB ADLs @ Indep  level     8-- Patient/ Family will demonstrate competency with UE Home Exercise Program

## 2019-03-06 NOTE — SOCIAL WORK
CM met with pt at bedside and explained role  Pt is a resident of [facility]  She does not use any assistive device to ambulate  She does not use any DME at the facility  Per pt and [facility] staff , pt is very independent with ADL's at the facility  Pt PCP is Dr Eartha Boas  The facility provides all medications for pt  Pt denies any history of MH or substacne abuse treatment  She has had in home PT at [place] in the past  Same was recommended by PT after evaluation  Discussed same with pt and [facility] staff  Pt can return to facility and have PT there  Staff requests script for PT be sent home with pt and they can arrange for same in house  Pt agreeable to same  CM left message for pt nephew Yesika Pedroza but same was not immediately returned  Remy Stanford had indicated to nurse earlier today that if pt is discharged tomorrow he would not be able to provide transportation  Pt will need w/c [facility] [employer] transport in that case  CM will continue to follow  CM reviewed d/c planning process including the following: identifying help at home, patient preference for d/c planning needs, availability of treatment team to discuss questions or concerns patient and/or family may have regarding understanding medications and recognizing signs and symptoms once discharged  CM also encouraged patient to follow up with all recommended appointments after discharge  Patient advised of importance for patient and family to participate in managing patients medical well being

## 2019-03-07 LAB
ANION GAP SERPL CALCULATED.3IONS-SCNC: 8 MMOL/L (ref 4–13)
BASOPHILS # BLD AUTO: 0.05 THOUSANDS/ΜL (ref 0–0.1)
BASOPHILS NFR BLD AUTO: 1 % (ref 0–1)
BUN SERPL-MCNC: 17 MG/DL (ref 5–25)
CALCIUM SERPL-MCNC: 9.5 MG/DL (ref 8.3–10.1)
CHLORIDE SERPL-SCNC: 104 MMOL/L (ref 100–108)
CO2 SERPL-SCNC: 27 MMOL/L (ref 21–32)
CREAT SERPL-MCNC: 0.95 MG/DL (ref 0.6–1.3)
EOSINOPHIL # BLD AUTO: 0.62 THOUSAND/ΜL (ref 0–0.61)
EOSINOPHIL NFR BLD AUTO: 7 % (ref 0–6)
ERYTHROCYTE [DISTWIDTH] IN BLOOD BY AUTOMATED COUNT: 12.4 % (ref 11.6–15.1)
GFR SERPL CREATININE-BSD FRML MDRD: 51 ML/MIN/1.73SQ M
GLUCOSE SERPL-MCNC: 100 MG/DL (ref 65–140)
HCT VFR BLD AUTO: 37.1 % (ref 34.8–46.1)
HGB BLD-MCNC: 11.9 G/DL (ref 11.5–15.4)
IMM GRANULOCYTES # BLD AUTO: 0.03 THOUSAND/UL (ref 0–0.2)
IMM GRANULOCYTES NFR BLD AUTO: 0 % (ref 0–2)
LYMPHOCYTES # BLD AUTO: 2.19 THOUSANDS/ΜL (ref 0.6–4.47)
LYMPHOCYTES NFR BLD AUTO: 24 % (ref 14–44)
MAGNESIUM SERPL-MCNC: 1 MG/DL (ref 1.6–2.6)
MAGNESIUM SERPL-MCNC: 1 MG/DL (ref 1.6–2.6)
MCH RBC QN AUTO: 29.2 PG (ref 26.8–34.3)
MCHC RBC AUTO-ENTMCNC: 32.1 G/DL (ref 31.4–37.4)
MCV RBC AUTO: 91 FL (ref 82–98)
MONOCYTES # BLD AUTO: 0.68 THOUSAND/ΜL (ref 0.17–1.22)
MONOCYTES NFR BLD AUTO: 7 % (ref 4–12)
NEUTROPHILS # BLD AUTO: 5.65 THOUSANDS/ΜL (ref 1.85–7.62)
NEUTS SEG NFR BLD AUTO: 61 % (ref 43–75)
NRBC BLD AUTO-RTO: 0 /100 WBCS
PLATELET # BLD AUTO: 160 THOUSANDS/UL (ref 149–390)
PMV BLD AUTO: 11 FL (ref 8.9–12.7)
POTASSIUM SERPL-SCNC: 3.4 MMOL/L (ref 3.5–5.3)
PROCALCITONIN SERPL-MCNC: 0.48 NG/ML
RBC # BLD AUTO: 4.08 MILLION/UL (ref 3.81–5.12)
SODIUM SERPL-SCNC: 139 MMOL/L (ref 136–145)
WBC # BLD AUTO: 9.22 THOUSAND/UL (ref 4.31–10.16)

## 2019-03-07 PROCEDURE — 80048 BASIC METABOLIC PNL TOTAL CA: CPT | Performed by: FAMILY MEDICINE

## 2019-03-07 PROCEDURE — 99232 SBSQ HOSP IP/OBS MODERATE 35: CPT | Performed by: GENERAL PRACTICE

## 2019-03-07 PROCEDURE — 85025 COMPLETE CBC W/AUTO DIFF WBC: CPT | Performed by: FAMILY MEDICINE

## 2019-03-07 PROCEDURE — 99222 1ST HOSP IP/OBS MODERATE 55: CPT | Performed by: INTERNAL MEDICINE

## 2019-03-07 PROCEDURE — 83735 ASSAY OF MAGNESIUM: CPT | Performed by: GENERAL PRACTICE

## 2019-03-07 PROCEDURE — 84145 PROCALCITONIN (PCT): CPT | Performed by: GENERAL PRACTICE

## 2019-03-07 RX ORDER — AMLODIPINE BESYLATE 5 MG/1
5 TABLET ORAL DAILY
Status: DISCONTINUED | OUTPATIENT
Start: 2019-03-08 | End: 2019-03-11 | Stop reason: HOSPADM

## 2019-03-07 RX ORDER — METRONIDAZOLE 500 MG/1
500 TABLET ORAL EVERY 8 HOURS SCHEDULED
Status: DISCONTINUED | OUTPATIENT
Start: 2019-03-07 | End: 2019-03-11 | Stop reason: HOSPADM

## 2019-03-07 RX ORDER — MAGNESIUM SULFATE HEPTAHYDRATE 40 MG/ML
2 INJECTION, SOLUTION INTRAVENOUS ONCE
Status: COMPLETED | OUTPATIENT
Start: 2019-03-07 | End: 2019-03-08

## 2019-03-07 RX ORDER — CIPROFLOXACIN 500 MG/1
500 TABLET, FILM COATED ORAL EVERY 12 HOURS SCHEDULED
Status: DISCONTINUED | OUTPATIENT
Start: 2019-03-07 | End: 2019-03-11 | Stop reason: HOSPADM

## 2019-03-07 RX ORDER — POTASSIUM CHLORIDE 20 MEQ/1
40 TABLET, EXTENDED RELEASE ORAL ONCE
Status: COMPLETED | OUTPATIENT
Start: 2019-03-07 | End: 2019-03-07

## 2019-03-07 RX ORDER — LISINOPRIL 20 MG/1
20 TABLET ORAL DAILY
Status: DISCONTINUED | OUTPATIENT
Start: 2019-03-07 | End: 2019-03-07

## 2019-03-07 RX ORDER — LISINOPRIL 20 MG/1
20 TABLET ORAL DAILY
Status: DISCONTINUED | OUTPATIENT
Start: 2019-03-08 | End: 2019-03-11 | Stop reason: HOSPADM

## 2019-03-07 RX ADMIN — MAGNESIUM SULFATE HEPTAHYDRATE 2 G: 40 INJECTION, SOLUTION INTRAVENOUS at 15:47

## 2019-03-07 RX ADMIN — PIPERACILLIN SODIUM,TAZOBACTAM SODIUM 2.25 G: 2; .25 INJECTION, POWDER, FOR SOLUTION INTRAVENOUS at 06:13

## 2019-03-07 RX ADMIN — METRONIDAZOLE 500 MG: 500 TABLET ORAL at 17:07

## 2019-03-07 RX ADMIN — ASPIRIN 81 MG 81 MG: 81 TABLET ORAL at 08:40

## 2019-03-07 RX ADMIN — PIPERACILLIN SODIUM,TAZOBACTAM SODIUM 2.25 G: 2; .25 INJECTION, POWDER, FOR SOLUTION INTRAVENOUS at 00:45

## 2019-03-07 RX ADMIN — PIPERACILLIN SODIUM,TAZOBACTAM SODIUM 2.25 G: 2; .25 INJECTION, POWDER, FOR SOLUTION INTRAVENOUS at 14:47

## 2019-03-07 RX ADMIN — TRAZODONE HYDROCHLORIDE 50 MG: 50 TABLET ORAL at 21:44

## 2019-03-07 RX ADMIN — AMLODIPINE BESYLATE 10 MG: 10 TABLET ORAL at 08:40

## 2019-03-07 RX ADMIN — METOPROLOL SUCCINATE 25 MG: 25 TABLET, EXTENDED RELEASE ORAL at 08:40

## 2019-03-07 RX ADMIN — METRONIDAZOLE 500 MG: 500 TABLET ORAL at 21:44

## 2019-03-07 RX ADMIN — POTASSIUM CHLORIDE 40 MEQ: 1500 TABLET, EXTENDED RELEASE ORAL at 14:47

## 2019-03-07 RX ADMIN — MAGNESIUM SULFATE HEPTAHYDRATE 2 G: 40 INJECTION, SOLUTION INTRAVENOUS at 22:10

## 2019-03-07 RX ADMIN — CIPROFLOXACIN HYDROCHLORIDE 500 MG: 500 TABLET, FILM COATED ORAL at 21:44

## 2019-03-07 NOTE — ASSESSMENT & PLAN NOTE
Sepsis is resolved  Clinically, patient is significantly improved  C diff result is still pending, but there is very low clinical suspicion for C diff infection  Reviewed CT scan findings  Noted that there is an area of colitis that can be infectious or inflammatory in etiology  It is commented that this finding has been also present about a year ago in April 2018  Discussed the case with GI provider on call about possible treatment or diagnostic options  They suggested colonoscopy  Patient defers colonoscopy or any invasive procedures at this time  No diarrhea was reported by the patient  Performed fecal occult test   It was nonconclusive at the bedside, but will send result for evaluation to the lab  Continue Zosyn for now  Will discharge home on ciprofloxacin and Flagyl

## 2019-03-07 NOTE — SOCIAL WORK
[facility] unable to accept at this time  CM sent referral to [facility] and can accept  SOC is 3/11/19  AVS updated

## 2019-03-07 NOTE — ASSESSMENT & PLAN NOTE
Sepsis related to gastroenteritis  Currently resolved as evidenced by resolved lactic acid and white blood cell elevations  Patient is tolerating diet and denies any abdominal pain or diarrhea    Continue with Zosyn  procalcitonin ordered

## 2019-03-07 NOTE — ASSESSMENT & PLAN NOTE
Baseline Creatinine is 0 9  Patient is back to the baseline today  Patient tolerate p o  Intake  Stopped fluids

## 2019-03-07 NOTE — CONSULTS
Consultation - [facility]) [facility]  Qing Knight 80 y o  female MRN: [id number]  Unit/Bed#: -01 Encounter: [id number]        Inpatient consult to gastroenterology  Consult performed by: Gina Caceres PA-C  Consult ordered by: Kirsty Reilly MD          Reason for Consult / Principal Problem: Abdominal Pain, Diarrhea    HPI: Ms Ralph Bell is a 81 yo F with a PMH of HTN, breast cancer with lymphedema, who presented with acute onset of abdominal pain, diarrhea, and weakness on Monday night  She denies any fever ot notable sick contacts but she does live at [facility]  She denies any associated nausea or vomiting  She was septic on arrival and was given IV antibiotics due to findings of colitis on CT scan  She reports she feels significantly improved and has minimal soreness in her abdomen and no further diarrhea  She denies any melena or hematochezia  She does not have a great appetite right now but when she is eating she is tolerating it  She had a colonoscopy more than 20 years ago and cannot recall the results  She denies any chronic GI symptoms  She denies any unintentional weight loss  REVIEW OF SYSTEMS: Negative except for as stated above    Historical Information   Past Medical History:   Diagnosis Date    [facility]     [facility]     Hypertension     Lymphedema of arm     L arm     Past Surgical History:   Procedure Laterality Date    BLADDER REPAIR      BREAST SURGERY      HYSTERECTOMY      MASTECTOMY       Social History   Social History     Substance and Sexual Activity   Alcohol Use No     Social History     Substance and Sexual Activity   Drug Use No     Social History     Tobacco Use   Smoking Status Never Smoker   Smokeless Tobacco Never Used     History reviewed  No pertinent family history      Meds/Allergies     Medications Prior to Admission   Medication    aspirin 81 mg chewable tablet    ergocalciferol (VITAMIN D2) 50,000 units    felodipine (PLENDIL) 5 mg 24 hr tablet    lisinopril-hydrochlorothiazide (PRINZIDE,ZESTORETIC) 20-25 MG per tablet    metoprolol succinate (TOPROL-XL) 50 mg 24 hr tablet    amLODIPine (NORVASC) 5 mg tablet    docusate sodium (COLACE) 100 mg capsule    LACTOBACILLUS PO    polyethylene glycol (MIRALAX) 17 g packet    potassium chloride (K-DUR,KLOR-CON) 20 mEq tablet    Pseudoephedrine-APAP  MG TABS    sodium phosphate-biphosphate (FLEET) 7-19 g 118 mL enema    traZODone (DESYREL) 50 mg tablet     Current Facility-Administered Medications   Medication Dose Route Frequency    [START ON 3/8/2019] amLODIPine (NORVASC) tablet 5 mg  5 mg Oral Daily    aspirin chewable tablet 81 mg  81 mg Oral Daily    [START ON 3/8/2019] lisinopril (ZESTRIL) tablet 20 mg  20 mg Oral Daily    magnesium sulfate 2 g/50 mL IVPB (premix) 2 g  2 g Intravenous Once    metoprolol succinate (TOPROL-XL) 24 hr tablet 25 mg  25 mg Oral Daily    piperacillin-tazobactam (ZOSYN) 2 25 g in sodium chloride 0 9 % 50 mL IVPB  2 25 g Intravenous Q6H    traZODone (DESYREL) tablet 50 mg  50 mg Oral HS       No Known Allergies        Objective     Blood pressure 138/81, pulse 98, temperature (!) 97 2 °F (36 2 °C), temperature source Oral, resp  rate 20, height 5' 4" (1 626 m), weight 57 4 kg (126 lb 8 7 oz), SpO2 95 %        Intake/Output Summary (Last 24 hours) at 3/7/2019 1511  Last data filed at 3/7/2019 1300  Gross per 24 hour   Intake 540 ml   Output 1775 ml   Net -1235 ml         PHYSICAL EXAM:      General Appearance:   Alert, oriented x3, no acute distress   HEENT:   Normocephalic, atraumatic    Neck:  Supple, symmetrical, trachea midline   Lungs:   Clear to auscultation bilaterally, no respiratory distress   Heart[de-identified]   RRR, no murmur   Abdomen:   Non-distended, soft, BS active, (+) mild bilateral lower quadrant TTP without any guarding   Rectal:   Deferred    Extremities:  No cyanosis or LE edema    Pulses:  2+ and symmetric all extremities    Skin:  No jaundice or pallor      Lab Results:   Results from last 7 days   Lab Units 03/07/19  0628   WBC Thousand/uL 9 22   HEMOGLOBIN g/dL 11 9   HEMATOCRIT % 37 1   PLATELETS Thousands/uL 160   NEUTROS PCT % 61   LYMPHS PCT % 24   MONOS PCT % 7   EOS PCT % 7*     Results from last 7 days   Lab Units 03/07/19  0628  03/05/19  0529   POTASSIUM mmol/L 3 4*   < > 3 8   CHLORIDE mmol/L 104   < > 102   CO2 mmol/L 27   < > 24   BUN mg/dL 17   < > 43*   CREATININE mg/dL 0 95   < > 1 47*   CALCIUM mg/dL 9 5   < > 9 7   ALK PHOS U/L  --   --  71   ALT U/L  --   --  23   AST U/L  --   --  36    < > = values in this interval not displayed  Results from last 7 days   Lab Units 03/04/19  2239   INR  1 04     Results from last 7 days   Lab Units 03/04/19  2239   LIPASE u/L 394*       Imaging Studies: I have personally reviewed pertinent imaging studies  Ct Abdomen Pelvis Wo Contrast  Result Date: 3/4/2019  Impression: Pericolonic inflammatory change at the splenic flexure and descending colon, consistent with colitis of infectious or inflammatory etiology in appropriate clinical context  Appearance of this segment of colon is similar to CT examination of 4/3/2018        ASSESSMENT and PLAN:      Acute Infectious Colitis  Diarrhea  - CT A/P on admission showed pericolonic inflammatory changes at the splenic flexure and descending colon consistent with colitis, the segment of colon affected if similar in appearance to the CT in April 2018 though both were noncontrasted studies  - This is likely an infectious colitis due to the acuity of onset and quick resolution of symptoms once antibiotics were given  - Cdiff and stool culture were negative, blood cultures were negative  - Leukocytosis of 23 54 on admission has resolved, lactic acid cleared, and CONSTANCE resolved  - Recommend transitioning from zosyn to oral cipro and flagyl to complete a 7 day course total of antibiotics  - Diet as tolerated, recommend limiting large amounts of fiber and lactose for the next 1-2 weeks  - Clinically, her symptoms have mostly resolved with no further diarrhea and minimal residual abdominal tenderness which is to be expected  - She is stable for discharge from a GI standpoint  - Due to the findings of the same segment of colon affected on current CT and the CT in April 2018, we discussed option of option colonoscopy to rule out IBD or malignancy which are less likely and she does not want to pursue and invasive procedures due to her age which is very reasonable      Patient will be seen and examined by Dr Uzma Rice  All alva medical decisions were made by Dr Uzma Rice  GI will sign off, call with questions

## 2019-03-07 NOTE — SOCIAL WORK
Pt not medically stable for discharge today  CM rescheduled wcv with [facility] to 3/9/19 at 1400  CM spoke with Jennifer Alonso via telephone to provide updates  CM informed him that [facility] is unable to accept but [facility] accepted  He said that [facility] only contracts with [facility]  CM informed him that CM will discuss this with [facility]  CM contacted [facility] and left vm providing updates about [facility] not accepting and [facility] accepting

## 2019-03-07 NOTE — ASSESSMENT & PLAN NOTE
No known history of chronic anemia  Currently, hemoglobin is dropped to 10 3  Most likely dilutional   No obvious source of bleeding  Fecal occult test was done today at the bedside  No gross blood noted  Sent the results to be evaluated by the lab  Will repeat hemoglobin tomorrow to assess for stability

## 2019-03-07 NOTE — PROGRESS NOTES
Progress Note - Severa Snell 12/6/1922, 80 y o  female MRN: [id number]    Unit/Bed#: -01 Encounter: [id number]    Primary Care Provider: Leatha Pride DO   Date and time admitted to hospital: 3/4/2019  9:17 PM        Diarrhea  Assessment & Plan  Resolved  Anemia  Assessment & Plan  No known history of chronic anemia  Currently, hemoglobin is dropped to 10 3  Most likely dilutional   No obvious source of bleeding  Fecal occult test was done today at the bedside  No gross blood noted  Sent the results to be evaluated by the lab  Will repeat hemoglobin tomorrow to assess for stability  Hypokalemia  Assessment & Plan  Provided with 1 dose of oral potassium 40 mEq daily  Will recheck CMP tomorrow  Acute kidney injury [facility])  Assessment & Plan  Baseline Creatinine is 0 9  Patient is back to the baseline today  Patient tolerate p o  Intake  Stopped fluids  HTN (hypertension)  Assessment & Plan  · Stable, but actually noted being on the lower side  · Holding lisinopril  · Continue with Norvasc 10 mg daily and Toprol 25 mg daily  · If blood pressures remain low, will continue on the same regimen at home  ·     Colitis, acute  Assessment & Plan  Sepsis is resolved  Clinically, patient is significantly improved  C diff result is still pending, but there is very low clinical suspicion for C diff infection  Reviewed CT scan findings  Noted that there is an area of colitis that can be infectious or inflammatory in etiology  It is commented that this finding has been also present about a year ago in April 2018  Discussed the case with GI provider on call about possible treatment or diagnostic options  They suggested colonoscopy  Patient defers colonoscopy or any invasive procedures at this time  No diarrhea was reported by the patient  Performed fecal occult test   It was nonconclusive at the bedside, but will send result for evaluation to the lab    Continue Zosyn for now   Will discharge home on ciprofloxacin and Flagyl  * Sepsis ([facility] )  Assessment & Plan  Sepsis related to gastroenteritis  Currently resolved as evidenced by resolved lactic acid and white blood cell elevations  Patient is tolerating diet and denies any abdominal pain or diarrhea  Continue with Zosyn        VTE Pharmacologic Prophylaxis:   Pharmacologic: Pharmacologic VTE Prophylaxis contraindicated due to Dropping hemoglobin and suspected bleeding  Mechanical VTE Prophylaxis in Place: Yes    Patient Centered Rounds: I have performed bedside rounds with nursing staff today  Discussions with Specialists or Other Care Team Provider:  GI    Education and Discussions with Family / Patient:  Patient's  over the phone    Time Spent for Care: 30 minutes  More than 50% of total time spent on counseling and coordination of care as described above  Current Length of Stay: 1 day(s)    Current Patient Status: Inpatient   Certification Statement: The patient will continue to require additional inpatient hospital stay due to Decreasing hemoglobin and need for physical therapy  Discharge Plan:  After hemoglobin is stabilized  Code Status: Level 3 - DNAR and DNI      Subjective:   Patient was seen and examined at the bedside  She denies any abdominal pain or diarrhea  She tolerates oral diet and denies any nausea  Patient states that she is not interested in any invasive diagnostic tests  I have also called patient's  to discuss her care and discharge planning  Objective:     Vitals:   Temp (24hrs), Av 3 °F (37 4 °C), Min:99 32 °F (37 4 °C), Max:99 32 °F (37 4 °C)    Temp:  [99 32 °F (37 4 °C)] 99 32 °F (37 4 °C)  HR:  [] 129  Resp:  [18] 18  BP: ()/(55-61) 106/61  SpO2:  [95 %] 95 %  Body mass index is 21 72 kg/m²  Input and Output Summary (last 24 hours):        Intake/Output Summary (Last 24 hours) at 3/6/2019 1905  Last data filed at 3/6/2019 1342  Gross per 24 hour Intake 980 ml   Output --   Net 980 ml       Physical Exam:     Physical Exam   Constitutional: She is oriented to person, place, and time  No distress  HENT:   Head: Normocephalic  Cardiovascular: Normal rate, regular rhythm and normal heart sounds  Exam reveals no gallop and no friction rub  No murmur heard  Pulmonary/Chest: Effort normal and breath sounds normal  No stridor  No respiratory distress  She has no wheezes  She has no rales  She exhibits no tenderness  Abdominal: Soft  Bowel sounds are normal  She exhibits no distension and no mass  There is no tenderness  There is no rebound and no guarding  Fecal occult test at the bedside was performed  It was nondiagnostic or bleeding  Musculoskeletal: Normal range of motion  She exhibits edema  She exhibits no tenderness or deformity  Left upper extremity nonpitting edema   Neurological: She is alert and oriented to person, place, and time  Skin: Skin is warm and dry  She is not diaphoretic  Psychiatric: She has a normal mood and affect   Her behavior is normal  Judgment and thought content normal          Additional Data:     Labs:    Results from last 7 days   Lab Units 03/06/19  0602   WBC Thousand/uL 10 06   HEMOGLOBIN g/dL 10 3*   HEMATOCRIT % 32 0*   PLATELETS Thousands/uL 142*   NEUTROS PCT % 70   LYMPHS PCT % 18   MONOS PCT % 8   EOS PCT % 4     Results from last 7 days   Lab Units 03/06/19  0602 03/05/19  0529   SODIUM mmol/L 140 140   POTASSIUM mmol/L 2 8* 3 8   CHLORIDE mmol/L 105 102   CO2 mmol/L 26 24   BUN mg/dL 27* 43*   CREATININE mg/dL 0 96 1 47*   ANION GAP mmol/L 9 14*   CALCIUM mg/dL 8 9 9 7   ALBUMIN g/dL  --  2 9*   TOTAL BILIRUBIN mg/dL  --  0 60   ALK PHOS U/L  --  71   ALT U/L  --  23   AST U/L  --  36   GLUCOSE RANDOM mg/dL 98 180*     Results from last 7 days   Lab Units 03/04/19  2239   INR  1 04             Results from last 7 days   Lab Units 03/05/19  1746 03/05/19  1016 03/05/19  0530 03/05/19  0030   LACTIC ACID mmol/L 1 5 4 6* 3 9* 2 2*           * I Have Reviewed All Lab Data Listed Above  * Additional Pertinent Lab Tests Reviewed: All Labs Within Last 24 Hours Reviewed    Imaging:    Imaging Reports Reviewed Today Include:  CT of the abdomen  Imaging Personally Reviewed by Myself Includes:  CT of the abdomen    Recent Cultures (last 7 days):     Results from last 7 days   Lab Units 03/05/19  0529 03/05/19  0512 03/05/19  0326   BLOOD CULTURE  No Growth at 24 hrs  No Growth at 24 hrs  --    C DIFF TOXIN B   --   --  NEGATIVE for C difficle toxin by PCR  Last 24 Hours Medication List:     Current Facility-Administered Medications:  amLODIPine 10 mg Oral Daily Pretty Beltran MD    aspirin 81 mg Oral Daily Sherrie Drop Ofoegbu, PA-C    metoprolol succinate 25 mg Oral Daily Danielle N Ofoegbu, PA-C    piperacillin-tazobactam 2 25 g Intravenous Q6H Danielle N Sarahoegbu, PA-C Last Rate: 2 25 g (03/06/19 1412)   traZODone 50 mg Oral HS Sherrie Drop Ofoeluis alberto, PA-C         Today, Patient Was Seen By: Pretty Beltran MD    ** Please Note: Dictation voice to text software may have been used in the creation of this document   **

## 2019-03-07 NOTE — ASSESSMENT & PLAN NOTE
Sepsis is resolved  Clinically, patient is significantly improved  C diff result negative  Reviewed CT scan findings  Noted that there is an area of colitis that can be infectious or inflammatory in etiology  It is commented that this finding has been also present about a year ago in April 2018  Discussed the case with GI provider on call about possible treatment or diagnostic options  They suggested colonoscopy  Patient defers colonoscopy or any invasive procedures at this time  No diarrhea was reported by the patient  Performed fecal occult test   It was nonconclusive at the bedside, but will send result for evaluation to the lab

## 2019-03-07 NOTE — PLAN OF CARE
Problem: Potential for Falls  Goal: Patient will remain free of falls  Description  INTERVENTIONS:  - Assess patient frequently for physical needs  -  Identify cognitive and physical deficits and behaviors that affect risk of falls    -  [place] fall precautions as indicated by assessment   - Educate patient/family on patient safety including physical limitations  - Instruct patient to call for assistance with activity based on assessment  - Modify environment to reduce risk of injury  - Consider OT/PT consult to assist with strengthening/mobility  Outcome: Progressing     Problem: PAIN - ADULT  Goal: Verbalizes/displays adequate comfort level or baseline comfort level  Description  Interventions:  - Encourage patient to monitor pain and request assistance  - Assess pain using appropriate pain scale  - Administer analgesics based on type and severity of pain and evaluate response  - Implement non-pharmacological measures as appropriate and evaluate response  - Consider cultural and social influences on pain and pain management  - Notify physician/advanced practitioner if interventions unsuccessful or patient reports new pain  Outcome: Progressing     Problem: INFECTION - ADULT  Goal: Absence or prevention of progression during hospitalization  Description  INTERVENTIONS:  - Assess and monitor for signs and symptoms of infection  - Monitor lab/diagnostic results  - Monitor all insertion sites, i e  indwelling lines, tubes, and drains  - Monitor endotracheal (as able) and nasal secretions for changes in amount and color  - [place] appropriate cooling/warming therapies per order  - Administer medications as ordered  - Instruct and encourage patient and family to use good hand hygiene technique  - Identify and instruct in appropriate isolation precautions for identified infection/condition  Outcome: Progressing     Problem: SAFETY ADULT  Goal: Maintain or return to baseline ADL function  Description  INTERVENTIONS:  -  Assess patient's ability to carry out ADLs; assess patient's baseline for ADL function and identify physical deficits which impact ability to perform ADLs (bathing, care of mouth/teeth, toileting, grooming, dressing, etc )  - Assess/evaluate cause of self-care deficits   - Assess range of motion  - Assess patient's mobility; develop plan if impaired  - Assess patient's need for assistive devices and provide as appropriate  - Encourage maximum independence but intervene and supervise when necessary  ¯ Involve family in performance of ADLs  ¯ Assess for home care needs following discharge   ¯ Request OT consult to assist with ADL evaluation and planning for discharge  ¯ Provide patient education as appropriate  Outcome: Progressing  Goal: Maintain or return mobility status to optimal level  Description  INTERVENTIONS:  - Assess patient's baseline mobility status (ambulation, transfers, stairs, etc )    - Identify cognitive and physical deficits and behaviors that affect mobility  - Identify mobility aids required to assist with transfers and/or ambulation (gait belt, sit-to-stand, lift, walker, cane, etc )  - [place] fall precautions as indicated by assessment  - Record patient progress and toleration of activity level on Mobility SBAR; progress patient to next Phase/Stage  - Instruct patient to call for assistance with activity based on assessment  - Request Rehabilitation consult to assist with strengthening/weightbearing, etc   Outcome: Progressing     Problem: DISCHARGE PLANNING  Goal: Discharge to home or other facility with appropriate resources  Description  INTERVENTIONS:  - Identify barriers to discharge w/patient and caregiver  - Arrange for needed discharge resources and transportation as appropriate  - Identify discharge learning needs (meds, wound care, etc )  - Arrange for interpretive services to assist at discharge as needed  - Refer to Case Management Department for coordinating discharge planning if the patient needs post-hospital services based on physician/advanced practitioner order or complex needs related to functional status, cognitive ability, or social support system  Outcome: Progressing     Problem: Knowledge Deficit  Goal: Patient/family/caregiver demonstrates understanding of disease process, treatment plan, medications, and discharge instructions  Description  Complete learning assessment and assess knowledge base  Interventions:  - Provide teaching at level of understanding  - Provide teaching via preferred learning methods  Outcome: Progressing     Problem: Nutrition/Hydration-ADULT  Goal: Nutrient/Hydration intake appropriate for improving, restoring or maintaining nutritional needs  Description  Monitor and assess patient's nutrition/hydration status for malnutrition (ex- brittle hair, bruises, dry skin, pale skin and conjunctiva, muscle wasting, smooth red tongue, and disorientation)  Collaborate with interdisciplinary team and initiate plan and interventions as ordered  Monitor patient's weight and dietary intake as ordered or per policy  Utilize nutrition screening tool and intervene per policy  Determine patient's food preferences and provide high-protein, high-caloric foods as appropriate       INTERVENTIONS:  - Monitor oral intake, urinary output, labs, and treatment plans  - Assess nutrition and hydration status and recommend course of action  - Evaluate amount of meals eaten  - Assist patient with eating if necessary   - Allow adequate time for meals  - Recommend/ encourage appropriate diets, oral nutritional supplements, and vitamin/mineral supplements  - Order, calculate, and assess calorie counts as needed  - Recommend, monitor, and adjust tube feedings and TPN/PPN based on assessed needs  - Assess need for intravenous fluids  - Provide specific nutrition/hydration education as appropriate  - Include patient/family/caregiver in decisions related to nutrition  Outcome: Progressing     Problem: DISCHARGE PLANNING - CARE MANAGEMENT  Goal: Discharge to post-acute care or home with appropriate resources  Description  INTERVENTIONS:  - Conduct assessment to determine patient/family and health care team treatment goals, and need for post-acute services based on payer coverage, community resources, and patient preferences, and barriers to discharge  - Address psychosocial, clinical, and financial barriers to discharge as identified in assessment in conjunction with the patient/family and health care team  - Arrange appropriate level of post-acute services according to patient?s   needs and preference and payer coverage in collaboration with the physician and health care team  - Communicate with and update the patient/family, physician, and health care team regarding progress on the discharge plan  - Arrange appropriate transportation to post-acute venues  Outcome: Progressing

## 2019-03-07 NOTE — PROGRESS NOTES
Progress Note - Tomy Manning 12/6/1922, 80 y o  female MRN: [id number]    Unit/Bed#: -01 Encounter: [id number]    Primary Care Provider: Dakota Lock DO   Date and time admitted to hospital: 3/4/2019  9:17 PM        Diarrhea  Assessment & Plan  Resolved  Hypomagnesemia  Assessment & Plan  Replace iv    Hypokalemia  Assessment & Plan  Provided with 1 dose of oral potassium 40 mEq daily  Check mag    Acute kidney injury [facility])  Assessment & Plan  Baseline Creatinine is 0 9  Patient is back to the baseline today  Patient tolerate p o  Intake  Stopped fluids  HTN (hypertension)  Assessment & Plan  · Stable, but actually noted being on the lower side  · Holding lisinopril  · Continue with Norvasc 10 mg daily and Toprol 25 mg daily  · If blood pressures remain low, will continue on the same regimen at home  ·     Colitis, acute  Assessment & Plan  Sepsis is resolved  Clinically, patient is significantly improved  C diff result negative  Reviewed CT scan findings  Noted that there is an area of colitis that can be infectious or inflammatory in etiology  It is commented that this finding has been also present about a year ago in April 2018  Discussed the case with GI provider on call about possible treatment or diagnostic options  They suggested colonoscopy  Patient defers colonoscopy or any invasive procedures at this time  No diarrhea was reported by the patient  Performed fecal occult test   It was nonconclusive at the bedside, but will send result for evaluation to the lab  * Sepsis ([facility] [address] )  Assessment & Plan  Sepsis related to gastroenteritis  Currently resolved as evidenced by resolved lactic acid and white blood cell elevations  Patient is tolerating diet and denies any abdominal pain or diarrhea  Continue with Zosyn  procalcitonin ordered      VTE Pharmacologic Prophylaxis:   Pharmacologic: Heparin  Mechanical VTE Prophylaxis in Place: Yes    Patient Centered Rounds:  I have performed bedside rounds with nursing staff today  Discussions with Specialists or Other Care Team Provider:    Education and Discussions with Family / Patient:     Time Spent for Care: 30 minutes  More than 50% of total time spent on counseling and coordination of care as described above  Current Length of Stay: 2 day(s)    Current Patient Status: Inpatient   Certification Statement: The patient will continue to require additional inpatient hospital stay due to sepsis    Discharge Plan: STR recommended    Code Status: Level 3 - DNAR and DNI      Subjective:   C/o  diarrhea and  abdominal pain    Objective:     Vitals:   Temp (24hrs), Av 2 °F (36 2 °C), Min:97 2 °F (36 2 °C), Max:97 2 °F (36 2 °C)    Temp:  [97 2 °F (36 2 °C)] 97 2 °F (36 2 °C)  HR:  [88-98] 98  Resp:  [20] 20  BP: (138)/(81) 138/81  SpO2:  [92 %-97 %] 95 %  Body mass index is 21 72 kg/m²  Input and Output Summary (last 24 hours): Intake/Output Summary (Last 24 hours) at 3/7/2019 1423  Last data filed at 3/7/2019 1300  Gross per 24 hour   Intake 540 ml   Output 1775 ml   Net -1235 ml       Physical Exam:     Physical Exam   Constitutional: She is oriented to person, place, and time  She appears well-developed  HENT:   Head: Normocephalic and atraumatic  Eyes: Pupils are equal, round, and reactive to light  EOM are normal    Neck: Neck supple  Cardiovascular: Normal rate and regular rhythm  Pulmonary/Chest: Effort normal and breath sounds normal    Abdominal: Soft  Bowel sounds are normal  She exhibits no distension  Musculoskeletal: She exhibits no edema  Neurological: She is alert and oriented to person, place, and time  Skin: Skin is warm and dry  She is not diaphoretic  Psychiatric: She has a normal mood and affect   Her behavior is normal        Additional Data:     Labs:    Results from last 7 days   Lab Units 19  0628   WBC Thousand/uL 9 22   HEMOGLOBIN g/dL 11 9   HEMATOCRIT % 37 1   PLATELETS Thousands/uL 160   NEUTROS PCT % 61   LYMPHS PCT % 24   MONOS PCT % 7   EOS PCT % 7*     Results from last 7 days   Lab Units 03/07/19  0628  03/05/19  0529   SODIUM mmol/L 139   < > 140   POTASSIUM mmol/L 3 4*   < > 3 8   CHLORIDE mmol/L 104   < > 102   CO2 mmol/L 27   < > 24   BUN mg/dL 17   < > 43*   CREATININE mg/dL 0 95   < > 1 47*   ANION GAP mmol/L 8   < > 14*   CALCIUM mg/dL 9 5   < > 9 7   ALBUMIN g/dL  --   --  2 9*   TOTAL BILIRUBIN mg/dL  --   --  0 60   ALK PHOS U/L  --   --  71   ALT U/L  --   --  23   AST U/L  --   --  36   GLUCOSE RANDOM mg/dL 100   < > 180*    < > = values in this interval not displayed  Results from last 7 days   Lab Units 03/04/19  2239   INR  1 04             Results from last 7 days   Lab Units 03/05/19  1746 03/05/19  1016 03/05/19  0530 03/05/19  0030   LACTIC ACID mmol/L 1 5 4 6* 3 9* 2 2*           * I Have Reviewed All Lab Data Listed Above  * Additional Pertinent Lab Tests Reviewed: All Labs Within Last 24 Hours Reviewed    Imaging:    Imaging Reports Reviewed Today Include:   Imaging Personally Reviewed by Myself Includes:      Recent Cultures (last 7 days):     Results from last 7 days   Lab Units 03/05/19  0529 03/05/19  0512 03/05/19  0326   BLOOD CULTURE  No Growth at 48 hrs  No Growth at 48 hrs  --    C DIFF TOXIN B   --   --  NEGATIVE for C difficle toxin by PCR          Last 24 Hours Medication List:     Current Facility-Administered Medications:  amLODIPine 10 mg Oral Daily Victoria Gutierrez MD    aspirin 81 mg Oral Daily Jacquie Talbot PA-C    magnesium sulfate 2 g Intravenous Once Mary Gomez MD    metoprolol succinate 25 mg Oral Daily Danielleriley Talbot PA-C    piperacillin-tazobactam 2 25 g Intravenous Q6H Danielle Talbot PA-C Last Rate: 2 25 g (03/07/19 5966)   potassium chloride 40 mEq Oral Once Malvin Oliver MD    traZODone 50 mg Oral HS Brayan Clemons PA-C         Today, Patient Was Seen By: Mary Gomez, MD    ** Please Note: Dictation voice to text software may have been used in the creation of this document   **

## 2019-03-08 LAB
ANION GAP SERPL CALCULATED.3IONS-SCNC: 8 MMOL/L (ref 4–13)
BASOPHILS # BLD AUTO: 0.08 THOUSANDS/ΜL (ref 0–0.1)
BASOPHILS NFR BLD AUTO: 1 % (ref 0–1)
BUN SERPL-MCNC: 13 MG/DL (ref 5–25)
CALCIUM SERPL-MCNC: 9.4 MG/DL (ref 8.3–10.1)
CHLORIDE SERPL-SCNC: 101 MMOL/L (ref 100–108)
CO2 SERPL-SCNC: 28 MMOL/L (ref 21–32)
CREAT SERPL-MCNC: 0.79 MG/DL (ref 0.6–1.3)
EOSINOPHIL # BLD AUTO: 0.77 THOUSAND/ΜL (ref 0–0.61)
EOSINOPHIL NFR BLD AUTO: 7 % (ref 0–6)
ERYTHROCYTE [DISTWIDTH] IN BLOOD BY AUTOMATED COUNT: 12.5 % (ref 11.6–15.1)
GFR SERPL CREATININE-BSD FRML MDRD: 63 ML/MIN/1.73SQ M
GLUCOSE SERPL-MCNC: 110 MG/DL (ref 65–140)
HCT VFR BLD AUTO: 34.5 % (ref 34.8–46.1)
HGB BLD-MCNC: 11 G/DL (ref 11.5–15.4)
IMM GRANULOCYTES # BLD AUTO: 0.03 THOUSAND/UL (ref 0–0.2)
IMM GRANULOCYTES NFR BLD AUTO: 0 % (ref 0–2)
LYMPHOCYTES # BLD AUTO: 1.36 THOUSANDS/ΜL (ref 0.6–4.47)
LYMPHOCYTES NFR BLD AUTO: 13 % (ref 14–44)
MAGNESIUM SERPL-MCNC: 1.2 MG/DL (ref 1.6–2.6)
MAGNESIUM SERPL-MCNC: 2.1 MG/DL (ref 1.6–2.6)
MCH RBC QN AUTO: 28.9 PG (ref 26.8–34.3)
MCHC RBC AUTO-ENTMCNC: 31.9 G/DL (ref 31.4–37.4)
MCV RBC AUTO: 91 FL (ref 82–98)
MONOCYTES # BLD AUTO: 0.77 THOUSAND/ΜL (ref 0.17–1.22)
MONOCYTES NFR BLD AUTO: 7 % (ref 4–12)
NEUTROPHILS # BLD AUTO: 7.58 THOUSANDS/ΜL (ref 1.85–7.62)
NEUTS SEG NFR BLD AUTO: 72 % (ref 43–75)
NRBC BLD AUTO-RTO: 0 /100 WBCS
PLATELET # BLD AUTO: 167 THOUSANDS/UL (ref 149–390)
PMV BLD AUTO: 10.9 FL (ref 8.9–12.7)
POTASSIUM SERPL-SCNC: 3.4 MMOL/L (ref 3.5–5.3)
RBC # BLD AUTO: 3.8 MILLION/UL (ref 3.81–5.12)
SODIUM SERPL-SCNC: 137 MMOL/L (ref 136–145)
WBC # BLD AUTO: 10.59 THOUSAND/UL (ref 4.31–10.16)

## 2019-03-08 PROCEDURE — 99232 SBSQ HOSP IP/OBS MODERATE 35: CPT | Performed by: GENERAL PRACTICE

## 2019-03-08 PROCEDURE — 85025 COMPLETE CBC W/AUTO DIFF WBC: CPT | Performed by: GENERAL PRACTICE

## 2019-03-08 PROCEDURE — 97110 THERAPEUTIC EXERCISES: CPT

## 2019-03-08 PROCEDURE — 80048 BASIC METABOLIC PNL TOTAL CA: CPT | Performed by: GENERAL PRACTICE

## 2019-03-08 PROCEDURE — 83735 ASSAY OF MAGNESIUM: CPT | Performed by: GENERAL PRACTICE

## 2019-03-08 PROCEDURE — 97116 GAIT TRAINING THERAPY: CPT

## 2019-03-08 RX ORDER — MAGNESIUM SULFATE HEPTAHYDRATE 40 MG/ML
2 INJECTION, SOLUTION INTRAVENOUS ONCE
Status: COMPLETED | OUTPATIENT
Start: 2019-03-08 | End: 2019-03-08

## 2019-03-08 RX ORDER — POTASSIUM CHLORIDE 750 MG/1
10 TABLET, EXTENDED RELEASE ORAL ONCE
Status: COMPLETED | OUTPATIENT
Start: 2019-03-08 | End: 2019-03-08

## 2019-03-08 RX ADMIN — METOPROLOL SUCCINATE 25 MG: 25 TABLET, EXTENDED RELEASE ORAL at 08:27

## 2019-03-08 RX ADMIN — MAGNESIUM SULFATE HEPTAHYDRATE 2 G: 40 INJECTION, SOLUTION INTRAVENOUS at 08:26

## 2019-03-08 RX ADMIN — TRAZODONE HYDROCHLORIDE 50 MG: 50 TABLET ORAL at 21:59

## 2019-03-08 RX ADMIN — ASPIRIN 81 MG 81 MG: 81 TABLET ORAL at 08:27

## 2019-03-08 RX ADMIN — AMLODIPINE BESYLATE 5 MG: 5 TABLET ORAL at 08:26

## 2019-03-08 RX ADMIN — METRONIDAZOLE 500 MG: 500 TABLET ORAL at 08:27

## 2019-03-08 RX ADMIN — CIPROFLOXACIN HYDROCHLORIDE 500 MG: 500 TABLET, FILM COATED ORAL at 08:27

## 2019-03-08 RX ADMIN — METRONIDAZOLE 500 MG: 500 TABLET ORAL at 21:59

## 2019-03-08 RX ADMIN — POTASSIUM CHLORIDE 10 MEQ: 750 TABLET, EXTENDED RELEASE ORAL at 08:27

## 2019-03-08 RX ADMIN — LISINOPRIL 20 MG: 20 TABLET ORAL at 08:27

## 2019-03-08 RX ADMIN — CIPROFLOXACIN HYDROCHLORIDE 500 MG: 500 TABLET, FILM COATED ORAL at 21:59

## 2019-03-08 RX ADMIN — POTASSIUM CHLORIDE 10 MEQ: 750 TABLET, EXTENDED RELEASE ORAL at 13:32

## 2019-03-08 RX ADMIN — METRONIDAZOLE 500 MG: 500 TABLET ORAL at 13:32

## 2019-03-08 NOTE — PLAN OF CARE
Problem: Potential for Falls  Goal: Patient will remain free of falls  Description  INTERVENTIONS:  - Assess patient frequently for physical needs  -  Identify cognitive and physical deficits and behaviors that affect risk of falls    -  [place] fall precautions as indicated by assessment   - Educate patient/family on patient safety including physical limitations  - Instruct patient to call for assistance with activity based on assessment  - Modify environment to reduce risk of injury  - Consider OT/PT consult to assist with strengthening/mobility  Outcome: Progressing     Problem: PAIN - ADULT  Goal: Verbalizes/displays adequate comfort level or baseline comfort level  Description  Interventions:  - Encourage patient to monitor pain and request assistance  - Assess pain using appropriate pain scale  - Administer analgesics based on type and severity of pain and evaluate response  - Implement non-pharmacological measures as appropriate and evaluate response  - Consider cultural and social influences on pain and pain management  - Notify physician/advanced practitioner if interventions unsuccessful or patient reports new pain  Outcome: Progressing     Problem: INFECTION - ADULT  Goal: Absence or prevention of progression during hospitalization  Description  INTERVENTIONS:  - Assess and monitor for signs and symptoms of infection  - Monitor lab/diagnostic results  - Monitor all insertion sites, i e  indwelling lines, tubes, and drains  - Monitor endotracheal (as able) and nasal secretions for changes in amount and color  - [place] appropriate cooling/warming therapies per order  - Administer medications as ordered  - Instruct and encourage patient and family to use good hand hygiene technique  - Identify and instruct in appropriate isolation precautions for identified infection/condition  Outcome: Progressing     Problem: SAFETY ADULT  Goal: Maintain or return to baseline ADL function  Description  INTERVENTIONS:  -  Assess patient's ability to carry out ADLs; assess patient's baseline for ADL function and identify physical deficits which impact ability to perform ADLs (bathing, care of mouth/teeth, toileting, grooming, dressing, etc )  - Assess/evaluate cause of self-care deficits   - Assess range of motion  - Assess patient's mobility; develop plan if impaired  - Assess patient's need for assistive devices and provide as appropriate  - Encourage maximum independence but intervene and supervise when necessary  ¯ Involve family in performance of ADLs  ¯ Assess for home care needs following discharge   ¯ Request OT consult to assist with ADL evaluation and planning for discharge  ¯ Provide patient education as appropriate  Outcome: Progressing  Goal: Maintain or return mobility status to optimal level  Description  INTERVENTIONS:  - Assess patient's baseline mobility status (ambulation, transfers, stairs, etc )    - Identify cognitive and physical deficits and behaviors that affect mobility  - Identify mobility aids required to assist with transfers and/or ambulation (gait belt, sit-to-stand, lift, walker, cane, etc )  - [place] fall precautions as indicated by assessment  - Record patient progress and toleration of activity level on Mobility SBAR; progress patient to next Phase/Stage  - Instruct patient to call for assistance with activity based on assessment  - Request Rehabilitation consult to assist with strengthening/weightbearing, etc   Outcome: Progressing     Problem: DISCHARGE PLANNING  Goal: Discharge to home or other facility with appropriate resources  Description  INTERVENTIONS:  - Identify barriers to discharge w/patient and caregiver  - Arrange for needed discharge resources and transportation as appropriate  - Identify discharge learning needs (meds, wound care, etc )  - Arrange for interpretive services to assist at discharge as needed  - Refer to Case Management Department for coordinating discharge planning if the patient needs post-hospital services based on physician/advanced practitioner order or complex needs related to functional status, cognitive ability, or social support system  Outcome: Progressing     Problem: Knowledge Deficit  Goal: Patient/family/caregiver demonstrates understanding of disease process, treatment plan, medications, and discharge instructions  Description  Complete learning assessment and assess knowledge base  Interventions:  - Provide teaching at level of understanding  - Provide teaching via preferred learning methods  Outcome: Progressing     Problem: Nutrition/Hydration-ADULT  Goal: Nutrient/Hydration intake appropriate for improving, restoring or maintaining nutritional needs  Description  Monitor and assess patient's nutrition/hydration status for malnutrition (ex- brittle hair, bruises, dry skin, pale skin and conjunctiva, muscle wasting, smooth red tongue, and disorientation)  Collaborate with interdisciplinary team and initiate plan and interventions as ordered  Monitor patient's weight and dietary intake as ordered or per policy  Utilize nutrition screening tool and intervene per policy  Determine patient's food preferences and provide high-protein, high-caloric foods as appropriate       INTERVENTIONS:  - Monitor oral intake, urinary output, labs, and treatment plans  - Assess nutrition and hydration status and recommend course of action  - Evaluate amount of meals eaten  - Assist patient with eating if necessary   - Allow adequate time for meals  - Recommend/ encourage appropriate diets, oral nutritional supplements, and vitamin/mineral supplements  - Order, calculate, and assess calorie counts as needed  - Recommend, monitor, and adjust tube feedings and TPN/PPN based on assessed needs  - Assess need for intravenous fluids  - Provide specific nutrition/hydration education as appropriate  - Include patient/family/caregiver in decisions related to nutrition  Outcome: Progressing     Problem: DISCHARGE PLANNING - CARE MANAGEMENT  Goal: Discharge to post-acute care or home with appropriate resources  Description  INTERVENTIONS:  - Conduct assessment to determine patient/family and health care team treatment goals, and need for post-acute services based on payer coverage, community resources, and patient preferences, and barriers to discharge  - Address psychosocial, clinical, and financial barriers to discharge as identified in assessment in conjunction with the patient/family and health care team  - Arrange appropriate level of post-acute services according to patient?s   needs and preference and payer coverage in collaboration with the physician and health care team  - Communicate with and update the patient/family, physician, and health care team regarding progress on the discharge plan  - Arrange appropriate transportation to post-acute venues  Outcome: Progressing     Problem: Prexisting or High Potential for Compromised Skin Integrity  Goal: Skin integrity is maintained or improved  Description  INTERVENTIONS:  - Identify patients at risk for skin breakdown  - Assess and monitor skin integrity  - Assess and monitor nutrition and hydration status  - Monitor labs (i e  albumin)  - Assess for incontinence   - Turn and reposition patient  - Assist with mobility/ambulation  - Relieve pressure over bony prominences  - Avoid friction and shearing  - Provide appropriate hygiene as needed including keeping skin clean and dry  - Evaluate need for skin moisturizer/barrier cream  - Collaborate with interdisciplinary team (i e  Nutrition, Rehabilitation, etc )   - Patient/family teaching  Outcome: Progressing

## 2019-03-08 NOTE — PHYSICAL THERAPY NOTE
Physical Therapy Treatment Note       03/08/19 1000   Pain Assessment   Pain Assessment No/denies pain   Pain Score No Pain   Restrictions/Precautions   Weight Bearing Precautions Per Order No   Braces or Orthoses   (none per pt)   Other Precautions Chair Alarm; Bed Alarm;Limb alert;Multiple lines; Fall Risk  (back safety precautions)   General   Chart Reviewed Yes   Response to Previous Treatment Patient with no complaints from previous session  Family/Caregiver Present No   Cognition   Overall Cognitive Status WFL   Arousal/Participation Alert; Responsive; Cooperative   Attention Within functional limits   Orientation Level Oriented to person;Oriented to place;Oriented to situation;Oriented to time  (cues for time)   Memory Within functional limits   Following Commands Follows all commands and directions without difficulty   Comments pt agreeable to PT session   Subjective   Subjective "I feel fine, they are making a fuss over that this morning"   Bed Mobility   Sit to Supine 5  Supervision   Additional items Assist x 1; Increased time required;Verbal cues   Additional Comments pt received seated at EOB upon arrival   Transfers   Sit to Stand 5  Supervision   Additional items Assist x 1; Increased time required;Verbal cues   Stand to Sit 5  Supervision   Additional items Assist x 1; Increased time required;Verbal cues   Toilet transfer 5  Supervision   Additional items Assist x 1; Increased time required;Verbal cues;Standard toilet   Ambulation/Elevation   Gait pattern Decreased foot clearance; Short stride   Gait Assistance 5  Supervision   Additional items Assist x 1;Verbal cues   Assistive Device None   Distance 20' x2, did not test for distance   Stair Management Assistance 5  Supervision   Additional items Assist x 1;Verbal cues   Stair Management Technique Step to pattern; Foreward  (B UE support of RW,  step up)   Number of Stairs 10  (pt expressing no concerns for stairs upon return home)   Balance   Static Sitting Good   Dynamic Sitting Fair +   Static Standing Fair +   Dynamic Standing Fair   Ambulatory Fair   Endurance Deficit   Endurance Deficit Yes   Activity Tolerance   Activity Tolerance Patient limited by fatigue   Medical Staff Made Aware IRA Rosas   Nurse Made Aware RN David verbalized pt appropriate to see  reports pt has been ambulating to/from BR since admission   Exercises   Hip Abduction Sitting;10 reps;AROM; Bilateral   Knee AROM Long Arc Quad Sitting;10 reps;AROM; Bilateral   Marching Sitting;10 reps;AROM; Bilateral   Assessment   Prognosis Good   Problem List Decreased strength;Decreased endurance; Impaired balance;Decreased mobility   Assessment Pt seen for PT treatment session this date with interventions consisting of gait training w/ emphasis on improving pt's ability to ambulate level surfaces x 20' x2 with SBA provided by therapist with no AD, Therapeutic exercise consisting of: AROM 10 reps B LE in sitting position and navigating 10  step up stairs w/ B UE support of RW with step to pattern with SBA  Pt agreeable to PT treatment session upon arrival, pt found supine in bed w/ HOB elevated, in no apparent distress, A&O x 4 and responsive  In comparison to previous session, pt with improvements in decreased physical A for all phases of mobility, initiation of seated ther ex  Post session: pt returned BTB, bed alarm engaged, all needs in reach and RN notified of session findings/recommendations  Continue to recommend return to [facility] with continued staff support and HHPT at time of d/c in order to maximize pt's functional independence and safety w/ mobility  Pt continues to be functioning below baseline level, and remains limited 2* factors listed above  PT will continue to see pt while here in order to address the deficits listed above and provide interventions consistent w/ POC in effort to achieve STGs     Barriers to Discharge Inaccessible home environment  (has 14 steps up to 2nd floor bedroom) Goals   Patient Goals to return home   STG Expiration Date 03/16/19   Short Term Goal #1 STGs remain appropriate   Treatment Day 1   Plan   Treatment/Interventions Functional transfer training;LE strengthening/ROM; Elevations; Therapeutic exercise; Endurance training;Patient/family training;Equipment eval/education; Bed mobility;Gait training;Spoke to nursing;Spoke to case management   Progress Progressing toward goals   PT Frequency   (3-5x/wk)   Recommendation   Recommendation Home PT; Other (Comment)  (anticipated return to [facility] c continued support & PT services)   Equipment Recommended   (TBD)       Onofre Arora, PT, DPT    Time of PT treatment session: 0539-9425

## 2019-03-08 NOTE — SOCIAL WORK
CM contacted [facility] and spoke with Teto Wharton  She stated that pt is okay to return  CM informed her that [facility] accepted  She stated that they will arrange [facility] because it depends on what agency pt's PCP will want to sign orders for  CM notified [facility] to cancel

## 2019-03-08 NOTE — SOCIAL WORK
Pt not medically stable  EMS crew came but had to cancel  CM contacted Julio Jules via telephone to provide updates  He stated that he will visit pt tomorrow

## 2019-03-08 NOTE — ASSESSMENT & PLAN NOTE
Sepsis is resolved  Clinically, patient is significantly improved  C diff result negative  Reviewed CT scan findings  Noted that there is an area of colitis that can be infectious or inflammatory in etiology  It is commented that this finding has been also present about a year ago in April 2018  Discussed the case with GI provider on call about possible treatment or diagnostic options  They suggested colonoscopy  Patient defers colonoscopy or any invasive procedures at this time      GI consult appreciated conservative rx; cipro and flagyl

## 2019-03-08 NOTE — PROGRESS NOTES
Call from RN patient has bed alarm was going off PCA related to the room to find patient getting out of bed PCA reach patient however her foot became entrapped in her bed cover and almost had a near fall however was protected by the PCA and ended up sustaining a mild his to her head  Patient had no loss of conscious no significant injury seen and neuro check negative  On examination no swelling patient is alert and oriented answering questions appropriately  No need for diagnostic evaluation at this time continue to monitor patient on current regiment

## 2019-03-08 NOTE — PROGRESS NOTES
Progress Note - Brittanie Dodge 12/6/1922, 80 y o  female MRN: [id number]    Unit/Bed#: -01 Encounter: [id number]    Primary Care Provider: Timmy Graves DO   Date and time admitted to hospital: 3/4/2019  9:17 PM        Diarrhea  Assessment & Plan  See GI note  resolved    Hypomagnesemia  Assessment & Plan  Replace iv    Hypokalemia  Assessment & Plan  replace    Acute kidney injury [facility])  Assessment & Plan  resolved    HTN (hypertension)  Assessment & Plan  · Stable, resumed ace    ·     Colitis, acute  Assessment & Plan  Sepsis is resolved  Clinically, patient is significantly improved  C diff result negative  Reviewed CT scan findings  Noted that there is an area of colitis that can be infectious or inflammatory in etiology  It is commented that this finding has been also present about a year ago in April 2018  Discussed the case with GI provider on call about possible treatment or diagnostic options  They suggested colonoscopy  Patient defers colonoscopy or any invasive procedures at this time  GI consult appreciated conservative rx; cipro and flagyl      * Sepsis ([facility] [address] )  Assessment & Plan  Sepsis related to gastroenteritis  Currently resolved as evidenced by resolved lactic acid and white blood cell elevations  Patient is tolerating diet and denies any abdominal pain or diarrhea  Previously on zosyn now to cipro and flagyl  procalcitonin mildly elevated        VTE Pharmacologic Prophylaxis:   Pharmacologic: Pharmacologic VTE Prophylaxis contraindicated due to monitoring hgb from colitis  Mechanical VTE Prophylaxis in Place: Yes    Patient Centered Rounds: I have performed bedside rounds with nursing staff today  Discussions with Specialists or Other Care Team Provider:     Education and Discussions with Family / Patient:     Time Spent for Care: 30 minutes  More than 50% of total time spent on counseling and coordination of care as described above      Current Length of Stay: 3 day(s)    Current Patient Status: Inpatient   Certification Statement: The patient will continue to require additional inpatient hospital stay due to d/c after replace k    Discharge Plan: n[place]    Code Status: Level 3 - DNAR and DNI      Subjective:   No c/o-denies diarrhea or abdominal pain    Objective:     Vitals:   Temp (24hrs), Av °F (37 2 °C), Min:99 °F (37 2 °C), Max:99 °F (37 2 °C)    Temp:  [99 °F (37 2 °C)] 99 °F (37 2 °C)  HR:  [101-104] 104  Resp:  [18] 18  BP: (140)/(82) 140/82  SpO2:  [93 %-95 %] 95 %  Body mass index is 21 72 kg/m²  Input and Output Summary (last 24 hours): Intake/Output Summary (Last 24 hours) at 3/8/2019 1214  Last data filed at 3/8/2019 1000  Gross per 24 hour   Intake 660 ml   Output 650 ml   Net 10 ml       Physical Exam:     Physical Exam   Constitutional: She is oriented to person, place, and time  She appears well-developed and well-nourished  HENT:   Head: Normocephalic and atraumatic  Eyes: Pupils are equal, round, and reactive to light  Neck: Neck supple  Cardiovascular: Normal rate and regular rhythm  Pulmonary/Chest: Effort normal and breath sounds normal    Abdominal: Soft  Bowel sounds are normal    Musculoskeletal: She exhibits edema  Left arm with edema   Neurological: She is alert and oriented to person, place, and time  Skin: Skin is warm and dry  Psychiatric: She has a normal mood and affect   Her behavior is normal          Additional Data:     Labs:    Results from last 7 days   Lab Units 19  0557   WBC Thousand/uL 10 59*   HEMOGLOBIN g/dL 11 0*   HEMATOCRIT % 34 5*   PLATELETS Thousands/uL 167   NEUTROS PCT % 72   LYMPHS PCT % 13*   MONOS PCT % 7   EOS PCT % 7*     Results from last 7 days   Lab Units 19  0557  19  0529   SODIUM mmol/L 137   < > 140   POTASSIUM mmol/L 3 4*   < > 3 8   CHLORIDE mmol/L 101   < > 102   CO2 mmol/L 28   < > 24   BUN mg/dL 13   < > 43*   CREATININE mg/dL 0 79   < > 1 47*   ANION GAP mmol/L 8   < > 14*   CALCIUM mg/dL 9 4   < > 9 7   ALBUMIN g/dL  --   --  2 9*   TOTAL BILIRUBIN mg/dL  --   --  0 60   ALK PHOS U/L  --   --  71   ALT U/L  --   --  23   AST U/L  --   --  36   GLUCOSE RANDOM mg/dL 110   < > 180*    < > = values in this interval not displayed  Results from last 7 days   Lab Units 03/04/19  2239   INR  1 04             Results from last 7 days   Lab Units 03/07/19  0628 03/05/19  1746 03/05/19  1016 03/05/19  0530 03/05/19  0030   LACTIC ACID mmol/L  --  1 5 4 6* 3 9* 2 2*   PROCALCITONIN ng/ml 0 48*  --   --   --   --            * I Have Reviewed All Lab Data Listed Above  * Additional Pertinent Lab Tests Reviewed: All Labs Within Last 24 Hours Reviewed    Imaging:    Imaging Reports Reviewed Today Include:   Imaging Personally Reviewed by Myself Includes:      Recent Cultures (last 7 days):     Results from last 7 days   Lab Units 03/05/19  0529 03/05/19  0512 03/05/19  0326   BLOOD CULTURE  No Growth at 72 hrs  No Growth at 72 hrs  --    C DIFF TOXIN B   --   --  NEGATIVE for C difficle toxin by PCR  Last 24 Hours Medication List:     Current Facility-Administered Medications:  amLODIPine 5 mg Oral Daily Nisreen Gomez MD   aspirin 81 mg Oral Daily Jay Talbot PA-C   ciprofloxacin 500 mg Oral Q12H [facility] Gillian Lemus PA-C   lisinopril 20 mg Oral Daily Sue Gomez MD   metoprolol succinate 25 mg Oral Daily Danielle Talbot PA-C   metroNIDAZOLE 500 mg Oral Q8H [facility] Gillian Lemus PA-C   potassium chloride 10 mEq Oral Once Portillo Douglass MD   traZODone 50 mg Oral HS Blair Britton PA-C        Today, Patient Was Seen By: Portillo Douglass MD    ** Please Note: Dictation voice to text software may have been used in the creation of this document   **

## 2019-03-08 NOTE — PLAN OF CARE
Problem: PHYSICAL THERAPY ADULT  Goal: Performs mobility at highest level of function for planned discharge setting  See evaluation for individualized goals  Description  Treatment/Interventions: Functional transfer training, LE strengthening/ROM, Elevations, Therapeutic exercise, Endurance training, Patient/family training, Equipment eval/education, Bed mobility, Gait training, Spoke to nursing, OT  Equipment Recommended: Cameron Galaviz       See flowsheet documentation for full assessment, interventions and recommendations     Outcome: Progressing

## 2019-03-08 NOTE — ASSESSMENT & PLAN NOTE
Sepsis related to gastroenteritis  Currently resolved as evidenced by resolved lactic acid and white blood cell elevations  Patient is tolerating diet and denies any abdominal pain or diarrhea    Previously on zosyn now to cipro and flagyl  procalcitonin mildly elevated

## 2019-03-08 NOTE — PROGRESS NOTES
Patient was attempting to get out of bed to use the bathroom without ringing the call bell  She is a fall risk and had the bed alarm set  When the bed alarm sounded, a PCA responded immediately  The PCA told the patient to wait to get up until after she turned the bed alarm off so she could provide her with assistance  The patient continued to get up and one of legs got caught in the sheets  As a result, the patient started to fall forward  Though the PCA was able to catch her before she fell completely, the patient did hit her head on the wall  The patient was immediately assessed by the nursing staff  She is alerted and oriented x4 with no outward signs of distress  SLIM came and evaluated her  They requested to keep an eye on the patient and agreed with the nurse's findings  Will continue to monitor the patient and stay close to the room in case she attempts to go to the bathroom again without ringing the call bell  The staff will also offer toileting to the patient every two hours while she is awake      Roman Warren RN  03/08/19  3:30 AM

## 2019-03-09 ENCOUNTER — APPOINTMENT (INPATIENT)
Dept: ULTRASOUND IMAGING | Facility: HOSPITAL | Age: 84
DRG: 872 | End: 2019-03-09
Payer: MEDICARE

## 2019-03-09 PROBLEM — M79.604 RIGHT LEG PAIN: Status: ACTIVE | Noted: 2019-03-09

## 2019-03-09 LAB
ANION GAP SERPL CALCULATED.3IONS-SCNC: 8 MMOL/L (ref 4–13)
BASOPHILS # BLD AUTO: 0.06 THOUSANDS/ΜL (ref 0–0.1)
BASOPHILS NFR BLD AUTO: 1 % (ref 0–1)
BUN SERPL-MCNC: 20 MG/DL (ref 5–25)
CALCIUM SERPL-MCNC: 9 MG/DL (ref 8.3–10.1)
CHLORIDE SERPL-SCNC: 100 MMOL/L (ref 100–108)
CO2 SERPL-SCNC: 27 MMOL/L (ref 21–32)
CREAT SERPL-MCNC: 1.07 MG/DL (ref 0.6–1.3)
EOSINOPHIL # BLD AUTO: 0.58 THOUSAND/ΜL (ref 0–0.61)
EOSINOPHIL NFR BLD AUTO: 6 % (ref 0–6)
ERYTHROCYTE [DISTWIDTH] IN BLOOD BY AUTOMATED COUNT: 12.5 % (ref 11.6–15.1)
GFR SERPL CREATININE-BSD FRML MDRD: 44 ML/MIN/1.73SQ M
GLUCOSE SERPL-MCNC: 105 MG/DL (ref 65–140)
HCT VFR BLD AUTO: 34 % (ref 34.8–46.1)
HGB BLD-MCNC: 10.9 G/DL (ref 11.5–15.4)
IMM GRANULOCYTES # BLD AUTO: 0.03 THOUSAND/UL (ref 0–0.2)
IMM GRANULOCYTES NFR BLD AUTO: 0 % (ref 0–2)
LYMPHOCYTES # BLD AUTO: 1.52 THOUSANDS/ΜL (ref 0.6–4.47)
LYMPHOCYTES NFR BLD AUTO: 17 % (ref 14–44)
MAGNESIUM SERPL-MCNC: 1.6 MG/DL (ref 1.6–2.6)
MCH RBC QN AUTO: 29.1 PG (ref 26.8–34.3)
MCHC RBC AUTO-ENTMCNC: 32.1 G/DL (ref 31.4–37.4)
MCV RBC AUTO: 91 FL (ref 82–98)
MONOCYTES # BLD AUTO: 0.75 THOUSAND/ΜL (ref 0.17–1.22)
MONOCYTES NFR BLD AUTO: 8 % (ref 4–12)
NEUTROPHILS # BLD AUTO: 6.07 THOUSANDS/ΜL (ref 1.85–7.62)
NEUTS SEG NFR BLD AUTO: 68 % (ref 43–75)
NRBC BLD AUTO-RTO: 0 /100 WBCS
PLATELET # BLD AUTO: 180 THOUSANDS/UL (ref 149–390)
PMV BLD AUTO: 11 FL (ref 8.9–12.7)
POTASSIUM SERPL-SCNC: 3.9 MMOL/L (ref 3.5–5.3)
RBC # BLD AUTO: 3.74 MILLION/UL (ref 3.81–5.12)
SODIUM SERPL-SCNC: 135 MMOL/L (ref 136–145)
WBC # BLD AUTO: 9.01 THOUSAND/UL (ref 4.31–10.16)

## 2019-03-09 PROCEDURE — 80048 BASIC METABOLIC PNL TOTAL CA: CPT | Performed by: GENERAL PRACTICE

## 2019-03-09 PROCEDURE — 83735 ASSAY OF MAGNESIUM: CPT | Performed by: GENERAL PRACTICE

## 2019-03-09 PROCEDURE — 93971 EXTREMITY STUDY: CPT

## 2019-03-09 PROCEDURE — 99232 SBSQ HOSP IP/OBS MODERATE 35: CPT | Performed by: GENERAL PRACTICE

## 2019-03-09 PROCEDURE — 85025 COMPLETE CBC W/AUTO DIFF WBC: CPT | Performed by: GENERAL PRACTICE

## 2019-03-09 RX ORDER — HEPARIN SODIUM 5000 [USP'U]/ML
5000 INJECTION, SOLUTION INTRAVENOUS; SUBCUTANEOUS EVERY 8 HOURS SCHEDULED
Status: DISCONTINUED | OUTPATIENT
Start: 2019-03-09 | End: 2019-03-11 | Stop reason: HOSPADM

## 2019-03-09 RX ADMIN — TRAZODONE HYDROCHLORIDE 50 MG: 50 TABLET ORAL at 22:06

## 2019-03-09 RX ADMIN — CIPROFLOXACIN HYDROCHLORIDE 500 MG: 500 TABLET, FILM COATED ORAL at 22:06

## 2019-03-09 RX ADMIN — METRONIDAZOLE 500 MG: 500 TABLET ORAL at 22:06

## 2019-03-09 RX ADMIN — HEPARIN SODIUM 5000 UNITS: 5000 INJECTION INTRAVENOUS; SUBCUTANEOUS at 22:07

## 2019-03-09 RX ADMIN — HEPARIN SODIUM 5000 UNITS: 5000 INJECTION INTRAVENOUS; SUBCUTANEOUS at 13:46

## 2019-03-09 RX ADMIN — METOPROLOL SUCCINATE 25 MG: 25 TABLET, EXTENDED RELEASE ORAL at 10:16

## 2019-03-09 RX ADMIN — CIPROFLOXACIN HYDROCHLORIDE 500 MG: 500 TABLET, FILM COATED ORAL at 10:11

## 2019-03-09 RX ADMIN — LISINOPRIL 20 MG: 20 TABLET ORAL at 10:14

## 2019-03-09 RX ADMIN — METRONIDAZOLE 500 MG: 500 TABLET ORAL at 13:46

## 2019-03-09 RX ADMIN — ASPIRIN 81 MG 81 MG: 81 TABLET ORAL at 10:11

## 2019-03-09 RX ADMIN — AMLODIPINE BESYLATE 5 MG: 5 TABLET ORAL at 10:14

## 2019-03-09 RX ADMIN — METRONIDAZOLE 500 MG: 500 TABLET ORAL at 06:33

## 2019-03-09 NOTE — PROGRESS NOTES
Progress Note - Angella Vera 1922, 80 y o  female MRN: [id number]    Unit/Bed#: -01 Encounter: [id number]    Primary Care Provider: Noelle Snellen, DO   Date and time admitted to hospital: 3/4/2019  9:17 PM        Right leg pain  Assessment & Plan  Appears swollen-will do venous doppler    Hypomagnesemia  Assessment & Plan  Replaced      Hypokalemia  Assessment & Plan  replace    HTN (hypertension)  Assessment & Plan  · Stable, resumed ace    ·     Colitis, acute  Assessment & Plan  Sepsis is resolved  Clinically, patient is significantly improved  C diff result negative  Reviewed CT scan findings  Noted that there is an area of colitis that can be infectious or inflammatory in etiology  It is commented that this finding has been also present about a year ago in 2018  Discussed the case with GI provider on call about possible treatment or diagnostic options  They suggested colonoscopy  Patient defers colonoscopy or any invasive procedures at this time  GI consult appreciated conservative rx; cipro and flagyl        VTE Pharmacologic Prophylaxis:   Pharmacologic: Heparin  Mechanical VTE Prophylaxis in Place: Yes    Patient Centered Rounds: I have performed bedside rounds with nursing staff today  Discussions with Specialists or Other Care Team Provider:     Education and Discussions with Family / Patient:     Time Spent for Care: 30 minutes  More than 50% of total time spent on counseling and coordination of care as described above  Current Length of Stay: 4 day(s)    Current Patient Status: Inpatient   Certification Statement: The patient will continue to require additional inpatient hospital stay due to colitis    Discharge Plan: STR    Code Status: Level 3 - DNAR and DNI      Subjective:   C/o right ankle pain      Objective:     Vitals:   Temp (24hrs), Av 8 °F (37 1 °C), Min:98 2 °F (36 8 °C), Max:99 9 °F (37 7 °C)    Temp:  [98 2 °F (36 8 °C)-99 9 °F (37 7 °C)] 98 2 °F (36 8 °C)  HR:  [] 108  Resp:  [16-18] 16  BP: (107-118)/(58-68) 118/68  SpO2:  [95 %-98 %] 98 %  Body mass index is 21 72 kg/m²  Input and Output Summary (last 24 hours): Intake/Output Summary (Last 24 hours) at 3/9/2019 1320  Last data filed at 3/9/2019 1058  Gross per 24 hour   Intake 730 ml   Output 701 ml   Net 29 ml       Physical Exam:     Physical Exam   Constitutional: She is oriented to person, place, and time  She appears well-developed and well-nourished  HENT:   Head: Normocephalic and atraumatic  Eyes: Pupils are equal, round, and reactive to light  Neck: Neck supple  Cardiovascular: Normal rate and regular rhythm  Rate 100   Pulmonary/Chest: Effort normal and breath sounds normal    Abdominal: Soft  Bowel sounds are normal    Musculoskeletal: She exhibits edema  +rle edema   Neurological: She is alert and oriented to person, place, and time  Skin: Skin is warm and dry  Psychiatric: She has a normal mood and affect  Her behavior is normal          Additional Data:     Labs:    Results from last 7 days   Lab Units 03/08/19  0557   WBC Thousand/uL 10 59*   HEMOGLOBIN g/dL 11 0*   HEMATOCRIT % 34 5*   PLATELETS Thousands/uL 167   NEUTROS PCT % 72   LYMPHS PCT % 13*   MONOS PCT % 7   EOS PCT % 7*     Results from last 7 days   Lab Units 03/09/19  0446  03/05/19  0529   SODIUM mmol/L 135*   < > 140   POTASSIUM mmol/L 3 9   < > 3 8   CHLORIDE mmol/L 100   < > 102   CO2 mmol/L 27   < > 24   BUN mg/dL 20   < > 43*   CREATININE mg/dL 1 07   < > 1 47*   ANION GAP mmol/L 8   < > 14*   CALCIUM mg/dL 9 0   < > 9 7   ALBUMIN g/dL  --   --  2 9*   TOTAL BILIRUBIN mg/dL  --   --  0 60   ALK PHOS U/L  --   --  71   ALT U/L  --   --  23   AST U/L  --   --  36   GLUCOSE RANDOM mg/dL 105   < > 180*    < > = values in this interval not displayed       Results from last 7 days   Lab Units 03/04/19  2239   INR  1 04             Results from last 7 days   Lab Units 03/07/19  3024 03/05/19  1746 03/05/19  1016 03/05/19  0530 03/05/19  0030   LACTIC ACID mmol/L  --  1 5 4 6* 3 9* 2 2*   PROCALCITONIN ng/ml 0 48*  --   --   --   --            * I Have Reviewed All Lab Data Listed Above  * Additional Pertinent Lab Tests Reviewed: All Labs Within Last 24 Hours Reviewed    Imaging:    Imaging Reports Reviewed Today Include:   Imaging Personally Reviewed by Myself Includes:      Recent Cultures (last 7 days):     Results from last 7 days   Lab Units 03/05/19  0529 03/05/19  0512 03/05/19  0326   BLOOD CULTURE  No Growth After 4 Days  No Growth After 4 Days  --    C DIFF TOXIN B   --   --  NEGATIVE for C difficle toxin by PCR  Last 24 Hours Medication List:     Current Facility-Administered Medications:  amLODIPine 5 mg Oral Daily Kym Gomez MD   aspirin 81 mg Oral Daily Sherrie Talbot PA-C   ciprofloxacin 500 mg Oral Q12H [address] Gillian Lemus PA-C   heparin (porcine) 5,000 Units Subcutaneous Q8H [address] A MD Patricia   lisinopril 20 mg Oral Daily Sue A MD Patricia   metoprolol succinate 25 mg Oral Daily Jules Harman PA-C   metroNIDAZOLE 500 mg Oral Q8H [address] Gillian Lemus PA-C   traZODone 50 mg Oral HS Jules Harman PA-C        Today, Patient Was Seen By: Lc Beard MD    ** Please Note: Dictation voice to text software may have been used in the creation of this document   **

## 2019-03-09 NOTE — PHYSICAL THERAPY NOTE
Physical Therapy Cancellation Note    PT approached by MARY Piper and CM Steffany Ruelas, per their report: pt is c/o increased weakness and concern to return back to [place] and manage the stairs, may need to recommend STR pending pt's performance with PT  Chart review performed  At this time, PT treatment session cancelled per pt request, refusing participation in all therapy interventions this date  PT explained the benefits of mobility and exercise activities in the prevention of secondary complications of immobility  Pt also reporting lateral R ankle pain that started this AM, RN David present and aware & assessing such  Discussed findings with Dr Ajay Ramirez  PT will follow and provide PT interventions as able/ appropriate      Naz Don, PT, DPT

## 2019-03-10 PROBLEM — I12.9 HYPERTENSIVE KIDNEY DISEASE WITH STAGE 3 CHRONIC KIDNEY DISEASE (HCC): Status: ACTIVE | Noted: 2019-03-10

## 2019-03-10 PROBLEM — N18.30 STAGE 3 CHRONIC KIDNEY DISEASE (HCC): Status: ACTIVE | Noted: 2019-03-10

## 2019-03-10 PROBLEM — N18.30 HYPERTENSIVE KIDNEY DISEASE WITH STAGE 3 CHRONIC KIDNEY DISEASE (HCC): Status: ACTIVE | Noted: 2019-03-10

## 2019-03-10 LAB
ANION GAP SERPL CALCULATED.3IONS-SCNC: 7 MMOL/L (ref 4–13)
BACTERIA BLD CULT: NORMAL
BASOPHILS # BLD AUTO: 0.07 THOUSANDS/ΜL (ref 0–0.1)
BASOPHILS NFR BLD AUTO: 1 % (ref 0–1)
BUN SERPL-MCNC: 23 MG/DL (ref 5–25)
CALCIUM SERPL-MCNC: 9.3 MG/DL (ref 8.3–10.1)
CHLORIDE SERPL-SCNC: 102 MMOL/L (ref 100–108)
CO2 SERPL-SCNC: 28 MMOL/L (ref 21–32)
CREAT SERPL-MCNC: 0.97 MG/DL (ref 0.6–1.3)
EOSINOPHIL # BLD AUTO: 0.84 THOUSAND/ΜL (ref 0–0.61)
EOSINOPHIL NFR BLD AUTO: 11 % (ref 0–6)
ERYTHROCYTE [DISTWIDTH] IN BLOOD BY AUTOMATED COUNT: 12.6 % (ref 11.6–15.1)
GFR SERPL CREATININE-BSD FRML MDRD: 49 ML/MIN/1.73SQ M
GLUCOSE SERPL-MCNC: 111 MG/DL (ref 65–140)
HCT VFR BLD AUTO: 31 % (ref 34.8–46.1)
HGB BLD-MCNC: 9.8 G/DL (ref 11.5–15.4)
IMM GRANULOCYTES # BLD AUTO: 0.02 THOUSAND/UL (ref 0–0.2)
IMM GRANULOCYTES NFR BLD AUTO: 0 % (ref 0–2)
LYMPHOCYTES # BLD AUTO: 1.98 THOUSANDS/ΜL (ref 0.6–4.47)
LYMPHOCYTES NFR BLD AUTO: 26 % (ref 14–44)
MAGNESIUM SERPL-MCNC: 1.4 MG/DL (ref 1.6–2.6)
MCH RBC QN AUTO: 29.3 PG (ref 26.8–34.3)
MCHC RBC AUTO-ENTMCNC: 31.6 G/DL (ref 31.4–37.4)
MCV RBC AUTO: 93 FL (ref 82–98)
MONOCYTES # BLD AUTO: 0.72 THOUSAND/ΜL (ref 0.17–1.22)
MONOCYTES NFR BLD AUTO: 10 % (ref 4–12)
NEUTROPHILS # BLD AUTO: 3.96 THOUSANDS/ΜL (ref 1.85–7.62)
NEUTS SEG NFR BLD AUTO: 52 % (ref 43–75)
NRBC BLD AUTO-RTO: 0 /100 WBCS
PLATELET # BLD AUTO: 157 THOUSANDS/UL (ref 149–390)
PMV BLD AUTO: 11.5 FL (ref 8.9–12.7)
POTASSIUM SERPL-SCNC: 3.9 MMOL/L (ref 3.5–5.3)
RBC # BLD AUTO: 3.35 MILLION/UL (ref 3.81–5.12)
SODIUM SERPL-SCNC: 137 MMOL/L (ref 136–145)
WBC # BLD AUTO: 7.59 THOUSAND/UL (ref 4.31–10.16)

## 2019-03-10 PROCEDURE — 80048 BASIC METABOLIC PNL TOTAL CA: CPT | Performed by: GENERAL PRACTICE

## 2019-03-10 PROCEDURE — 99222 1ST HOSP IP/OBS MODERATE 55: CPT | Performed by: INTERNAL MEDICINE

## 2019-03-10 PROCEDURE — 97530 THERAPEUTIC ACTIVITIES: CPT

## 2019-03-10 PROCEDURE — 83735 ASSAY OF MAGNESIUM: CPT | Performed by: GENERAL PRACTICE

## 2019-03-10 PROCEDURE — 93971 EXTREMITY STUDY: CPT | Performed by: SURGERY

## 2019-03-10 PROCEDURE — 85025 COMPLETE CBC W/AUTO DIFF WBC: CPT | Performed by: GENERAL PRACTICE

## 2019-03-10 PROCEDURE — 99232 SBSQ HOSP IP/OBS MODERATE 35: CPT | Performed by: GENERAL PRACTICE

## 2019-03-10 RX ORDER — METOPROLOL SUCCINATE 25 MG/1
37.5 TABLET, EXTENDED RELEASE ORAL DAILY
Status: DISCONTINUED | OUTPATIENT
Start: 2019-03-11 | End: 2019-03-11 | Stop reason: HOSPADM

## 2019-03-10 RX ORDER — CHOLESTYRAMINE LIGHT 4 G/5.7G
4 POWDER, FOR SUSPENSION ORAL 2 TIMES DAILY
Status: DISCONTINUED | OUTPATIENT
Start: 2019-03-10 | End: 2019-03-11 | Stop reason: HOSPADM

## 2019-03-10 RX ORDER — MAGNESIUM SULFATE HEPTAHYDRATE 40 MG/ML
2 INJECTION, SOLUTION INTRAVENOUS ONCE
Status: COMPLETED | OUTPATIENT
Start: 2019-03-10 | End: 2019-03-10

## 2019-03-10 RX ADMIN — ASPIRIN 81 MG 81 MG: 81 TABLET ORAL at 09:58

## 2019-03-10 RX ADMIN — LISINOPRIL 20 MG: 20 TABLET ORAL at 09:58

## 2019-03-10 RX ADMIN — TRAZODONE HYDROCHLORIDE 50 MG: 50 TABLET ORAL at 21:08

## 2019-03-10 RX ADMIN — CIPROFLOXACIN HYDROCHLORIDE 500 MG: 500 TABLET, FILM COATED ORAL at 09:58

## 2019-03-10 RX ADMIN — CHOLESTYRAMINE 4 G: 4 POWDER, FOR SUSPENSION ORAL at 17:31

## 2019-03-10 RX ADMIN — CHOLESTYRAMINE 4 G: 4 POWDER, FOR SUSPENSION ORAL at 12:26

## 2019-03-10 RX ADMIN — HEPARIN SODIUM 5000 UNITS: 5000 INJECTION INTRAVENOUS; SUBCUTANEOUS at 05:29

## 2019-03-10 RX ADMIN — METRONIDAZOLE 500 MG: 500 TABLET ORAL at 05:29

## 2019-03-10 RX ADMIN — CIPROFLOXACIN HYDROCHLORIDE 500 MG: 500 TABLET, FILM COATED ORAL at 21:08

## 2019-03-10 RX ADMIN — METRONIDAZOLE 500 MG: 500 TABLET ORAL at 14:42

## 2019-03-10 RX ADMIN — METRONIDAZOLE 500 MG: 500 TABLET ORAL at 21:08

## 2019-03-10 RX ADMIN — MAGNESIUM SULFATE HEPTAHYDRATE 2 G: 40 INJECTION, SOLUTION INTRAVENOUS at 12:26

## 2019-03-10 RX ADMIN — HEPARIN SODIUM 5000 UNITS: 5000 INJECTION INTRAVENOUS; SUBCUTANEOUS at 21:16

## 2019-03-10 RX ADMIN — HEPARIN SODIUM 5000 UNITS: 5000 INJECTION INTRAVENOUS; SUBCUTANEOUS at 14:42

## 2019-03-10 RX ADMIN — AMLODIPINE BESYLATE 5 MG: 5 TABLET ORAL at 09:58

## 2019-03-10 RX ADMIN — METOPROLOL SUCCINATE 25 MG: 25 TABLET, EXTENDED RELEASE ORAL at 09:58

## 2019-03-10 RX ADMIN — Medication 800 MG: at 17:31

## 2019-03-10 NOTE — ASSESSMENT & PLAN NOTE
Sepsis related to gastroenteritis  Currently resolved as evidenced by resolved lactic acid and white blood cell elevations  Patient is tolerating diet and denies any abdominal pain or diarrhea    Previously on zosyn now to cipro and flagyl  D/w son-conservative treatment recommended

## 2019-03-10 NOTE — SOCIAL WORK
PT eval rec STR  Cm spoke to pt contact/nephew Brennon Todd  They are agreeable to STR  [facility], but agreeable to referrals being sent out all over [facility]   Cm to follow

## 2019-03-10 NOTE — PLAN OF CARE
Problem: PHYSICAL THERAPY ADULT  Goal: Performs mobility at highest level of function for planned discharge setting  See evaluation for individualized goals  Description  Treatment/Interventions: Functional transfer training, LE strengthening/ROM, Elevations, Therapeutic exercise, Endurance training, Patient/family training, Equipment eval/education, Bed mobility, Gait training, Spoke to nursing, OT  Equipment Recommended: Cameron Galaviz       See flowsheet documentation for full assessment, interventions and recommendations  3/10/2019 1110 by Eri Javier, PT  Outcome: Not Progressing  Note:   Prognosis: Good  Problem List: Decreased strength, Decreased endurance, Impaired balance, Decreased mobility(back safety precautions)  Assessment: Pt seen for PT treatment session this date with interventions consisting of gait training w/ emphasis on improving pt's ability to ambulate level surfaces x 3' (bed>chair) with min A provided by therapist with RW, Therapeutic exercise consisting of: AROM 10 reps B LE in sitting position and therapeutic activity consisting of training: bed mobility, supine>sit transfers and sit<>stand transfers  Pt agreeable to PT treatment session upon arrival, pt found supine in bed w/ HOB elevated, in no apparent distress, A&O x 4 and responsive  In comparison to previous session, pt with no improvements as evidenced by pt currently requires MIN A x 1 to complete all functional mobility tasks safely; reviewed all back safety precautions with verbal understanding; however, pt unable to recall precautions at end of session  Post session: pt returned back to recliner, chair alarm engaged, all needs in reach and RN notified of session findings/recommendations  Recommend STR at time of d/c in order to maximize pt's functional independence and safety w/ mobility  Pt continues to be functioning below baseline level, and remains limited 2* factors listed above   PT will continue to see pt while here in order to address the deficits listed above and provide interventions consistent w/ POC in effort to achieve STGs  Barriers to Discharge: Inaccessible home environment     Recommendation: Short-term skilled PT     PT - OK to Discharge: Yes(when medically cleared; if to STR)    See flowsheet documentation for full assessment       3/10/2019 1110 by Alyssa Blandon, PT  Reactivated

## 2019-03-10 NOTE — PHYSICAL THERAPY NOTE
Physical Therapy Treatment Note       03/10/19 0903   Pain Assessment   Pain Assessment No/denies pain   Pain Score No Pain   Restrictions/Precautions   Weight Bearing Precautions Per Order No   Braces or Orthoses   (none per chart review)   Other Precautions Limb alert; Bed Alarm; Chair Alarm;Multiple lines; Fall Risk  (back safety precautions)   General   Chart Reviewed Yes   Additional Pertinent History back safety precautions   Response to Previous Treatment Patient with no complaints from previous session  Family/Caregiver Present No   Cognition   Overall Cognitive Status WFL   Arousal/Participation Alert; Responsive; Cooperative   Attention Within functional limits   Orientation Level Oriented to person;Oriented to place;Oriented to time;Oriented to situation  (Forgetful)   Memory Decreased recall of precautions   Following Commands Follows all commands and directions without difficulty   Comments pt agreeable to participate in skilled PT treatment session    Subjective   Subjective "I'm just tired"   Bed Mobility   Rolling R 4  Minimal assistance   Additional items Assist x 1; Increased time required;LE management;Verbal cues   Additional items Assist x 1; Increased time required;Verbal cues;LE management;HOB elevated   Additional Comments pt educated on back safety precautions including LOG ROLL TECHNIQUE; pt with verbal understanding; however, pt unable to recall precautions at end of session   Transfers   Sit to Stand 4  Minimal assistance   Additional items Assist x 1; Increased time required;Verbal cues   Stand to Sit 4  Minimal assistance   Additional items Assist x 1; Increased time required;Verbal cues;Armrests   Ambulation/Elevation   Gait pattern Excessively slow; Short stride;Decreased foot clearance; Forward Flexion   Gait Assistance 4  Minimal assist   Additional items Assist x 1;Verbal cues   Assistive Device Rolling walker   Distance 2' bed>chair  (limited by fatigue)   Stair Management Assistance Not tested   Balance   Static Sitting Good   Dynamic Sitting Fair +   Static Standing Fair   Dynamic Standing Fair -   Ambulatory Poor +   Endurance Deficit   Endurance Deficit Yes   Activity Tolerance   Activity Tolerance Patient limited by fatigue   Medical Staff Made Aware KENNY Nguyen present throughout PT treatment   Nurse Made Aware David, RN verbalized pt appropriate for PT; made aware of session outcomes   Exercises   Knee AROM Long Arc Quad Sitting;10 reps;AROM; Bilateral   Marching Sitting;10 reps;AROM; Bilateral   Assessment   Prognosis Good   Problem List Decreased strength;Decreased endurance; Impaired balance;Decreased mobility  (back safety precautions)   Assessment Pt seen for PT treatment session this date with interventions consisting of gait training w/ emphasis on improving pt's ability to ambulate level surfaces x 2' (bed>chair) with min A provided by therapist with RW, Therapeutic exercise consisting of: AROM 10 reps B LE in sitting position and therapeutic activity consisting of training: bed mobility, supine>sit transfers and sit<>stand transfers  Pt agreeable to PT treatment session upon arrival, pt found supine in bed w/ HOB elevated, in no apparent distress, A&O x 4 and responsive  In comparison to previous session, pt with no improvements as evidenced by pt currently requires MIN A x 1 to complete all functional mobility tasks safely; reviewed all back safety precautions with verbal understanding; however, pt unable to recall precautions at end of session  Post session: pt returned back to recliner, chair alarm engaged, all needs in reach and RN notified of session findings/recommendations  Recommend STR at time of d/c in order to maximize pt's functional independence and safety w/ mobility  Pt continues to be functioning below baseline level, and remains limited 2* factors listed above   PT will continue to see pt while here in order to address the deficits listed above and provide interventions consistent w/ POC in effort to achieve STGs  Barriers to Discharge Inaccessible home environment   Goals   Patient Goals to return home   STG Expiration Date 03/16/19   Treatment Day 2   Plan   Treatment/Interventions Functional transfer training;LE strengthening/ROM; Elevations; Therapeutic exercise; Endurance training;Patient/family training;Equipment eval/education; Bed mobility;Gait training;Spoke to nursing;Spoke to case management   Progress No functional improvements   PT Frequency Other (Comment)  (3-5x/wk)   Recommendation   Recommendation Short-term skilled PT   PT - OK to Discharge Yes  (when medically cleared; if to STR)       Teddy Monsivais, PT, DPT    Time of PT treatment session: 08:39-09:03  24 minutes

## 2019-03-10 NOTE — CONSULTS
NEPHROLOGY CONSULTATION NOTE    Patient: Rob Vasquez               Sex: female          DOA: 3/4/2019  9:17 PM   YOB: 1922        Age:  80 y o         LOS:  LOS: 5 days     REFERRING PHYSICIAN: Jose Wesley MD      REASON FOR THE REFERRAL / CONSULTATION:  Further management of hypomagnesemia    DATE OF CONSULTATION / SERVICE: 3/10/2019    ADMISSION DIAGNOSIS: Sepsis ([facility] [address] )     CHIEF COMPLAINT     I have abdominal pain and diarrhea    HPI     This is 80-year-old female initially admitted with abdominal pain with diarrhea  During the hospital stay patient was found to have persistent hypomagnesemia and Nephrology were consulted for further evaluation  Patient is poor historian  Upon review of old medical record patient was also found to have hypomagnesemia in the past during previous hospital stay  Patient has been receiving IV magnesium supplementation and current magnesium is 1 4  Patient was initially admitted with abdominal pain and diarrhea and was also seen by GI and suspected to have foot borne infectious colitis  Patient also found to have CONSTANCE on admission and with IV fluid her renal function has also improved  Currently patient is receiving Cipro and Flagyl for colitis  Patient also has hypertension which seems under well controlled with the use of amlodipine and lisinopril  Currently patient denies nausea, vomiting, headache, dizziness, abdominal pain, constipation or rash      PAST MEDICAL HISTORY     Past Medical History:   Diagnosis Date    Breast cancer ([facility] [address] )     [facility]     Hypertension     Lymphedema of arm     L arm       PAST SURGICAL HISTORY     Past Surgical History:   Procedure Laterality Date    BLADDER REPAIR      BREAST SURGERY      HYSTERECTOMY      MASTECTOMY         ALLERGIES     No Known Allergies    SOCIAL HISTORY     Social History     Substance and Sexual Activity   Alcohol Use No     Social History     Substance and Sexual Activity   Drug Use No     Social History     Tobacco Use   Smoking Status Never Smoker   Smokeless Tobacco Never Used       FAMILY HISTORY     History reviewed  No pertinent family history  CURRENT MEDICATIONS       Current Facility-Administered Medications:     amLODIPine (NORVASC) tablet 5 mg, 5 mg, Oral, Daily, Ceci Gomez MD, 5 mg at 03/10/19 5745    aspirin chewable tablet 81 mg, 81 mg, Oral, Daily, Principal Niranjan PA-C, 81 mg at 03/10/19 3769    cholestyramine sugar free (QUESTRAN LIGHT) packet 4 g, 4 g, Oral, BID, Ceci Gomez MD, 4 g at 03/10/19 1226    ciprofloxacin (CIPRO) tablet 500 mg, 500 mg, Oral, Q12H [address], Gillian Lemus PA-C, 500 mg at 03/10/19 9399    heparin (porcine) subcutaneous injection 5,000 Units, 5,000 Units, Subcutaneous, Q8H [address], Keren Ortiz MD, 5,000 Units at 03/10/19 0529    lisinopril (ZESTRIL) tablet 20 mg, 20 mg, Oral, Daily, Ceci Gomez MD, 20 mg at 03/10/19 0958    magnesium oxide (MAG-OX) tablet 800 mg, 800 mg, Oral, BID, Jen Huizar MD    magnesium sulfate 2 g/50 mL IVPB (premix) 2 g, 2 g, Intravenous, Once, Keren Ortiz MD, Last Rate: 25 mL/hr at 03/10/19 1226, 2 g at 03/10/19 1226    [START ON 3/11/2019] metoprolol succinate (TOPROL-XL) 24 hr tablet 37 5 mg, 37 5 mg, Oral, Daily, Ceci Gomez MD    metroNIDAZOLE (FLAGYL) tablet 500 mg, 500 mg, Oral, Q8H MARCIE, Gillian Lemus PA-C, 500 mg at 03/10/19 0529    traZODone (DESYREL) tablet 50 mg, 50 mg, Oral, HS, Danielle Talbot PA-C, 50 mg at 03/09/19 2206    REVIEW OF SYSTEMS     Complete 10 points of review of systems were obtained and discussed in length with patient today  Complete 10 points of review of systems were negative/unremarkable except mentioned in the HPI section        OBJECTIVE     Current Weight: Weight - Scale: 57 4 kg (126 lb 8 7 oz)  Vitals:    03/10/19 0735   BP: 147/78   Pulse: 101   Resp: 18   Temp: 98 4 °F (36 9 °C) SpO2: 95%     Body mass index is 21 72 kg/m²  Intake/Output Summary (Last 24 hours) at 3/10/2019 1229  Last data filed at 3/10/2019 0547  Gross per 24 hour   Intake 200 ml   Output 1200 ml   Net -1000 ml       PHYSICAL EXAMINATION     Physical Exam   Constitutional: She appears well-nourished  No distress  HENT:   Head: Normocephalic and atraumatic  Eyes: No scleral icterus  Neck: Neck supple  No JVD present  Cardiovascular: Normal rate, S1 normal and S2 normal    Pulmonary/Chest: Effort normal  No accessory muscle usage  No respiratory distress  She has no wheezes  Abdominal: Soft  She exhibits no distension  Musculoskeletal: She exhibits no edema  Right ankle: She exhibits no swelling  Left ankle: She exhibits no swelling  Right hand: She exhibits no laceration  Left hand: She exhibits no laceration  Lymphadenopathy:        Right cervical: No superficial cervical adenopathy present  Left cervical: No superficial cervical adenopathy present  Neurological: She is alert  She is not disoriented  Skin: Skin is warm  No cyanosis  Psychiatric: She is not combative  She does not exhibit a depressed mood  She expresses no suicidal ideation           LAB RESULTS        Results from last 7 days   Lab Units 03/10/19  0528 03/09/19  1344 03/09/19  0446 03/08/19  1611 03/08/19  0557 03/07/19  2054 03/07/19  4058 03/06/19  0602 03/05/19  0529 03/04/19  2239   WBC Thousand/uL 7 59 9 01  --   --  10 59*  --  9 22 10 06 17 95* 23 54*   HEMOGLOBIN g/dL 9 8* 10 9*  --   --  11 0*  --  11 9 10 3* 12 4 13 0   HEMATOCRIT % 31 0* 34 0*  --   --  34 5*  --  37 1 32 0* 38 4 41 5   PLATELETS Thousands/uL 157 180  --   --  167  --  160 142* 177 217   POTASSIUM mmol/L 3 9  --  3 9  --  3 4*  --  3 4* 2 8* 3 8 4 0   CHLORIDE mmol/L 102  --  100  --  101  --  104 105 102 98*   CO2 mmol/L 28  --  27  --  28  --  27 26 24 29   BUN mg/dL 23  --  20  --  13  --  17 27* 43* 42*   CREATININE mg/dL 0 97  --  1 07  --  0 79  --  0 95 0 96 1 47* 1 54*   EGFR ml/min/1 73sq m 49  --  44  --  63  --  51 50 30 28   CALCIUM mg/dL 9 3  --  9 0  --  9 4  --  9 5 8 9 9 7 10 1   MAGNESIUM mg/dL 1 4*  --  1 6 2 1 1 2* 1 0* 1 0*  --  1 3*  --    PHOSPHORUS mg/dL  --   --   --   --   --   --   --   --  2 9  --        RADIOLOGY RESULTS     Results for orders placed during the hospital encounter of 03/04/19   CT abdomen pelvis wo contrast    Narrative CT ABDOMEN AND PELVIS WITHOUT IV CONTRAST    INDICATION:   LLQ abd pain  COMPARISON:  CT chest/abdomen/pelvis 4/3/18  TECHNIQUE:  CT examination of the abdomen and pelvis was performed without intravenous contrast   Axial, sagittal, and coronal 2D reformatted images were created from the source data and submitted for interpretation  Radiation dose length product (DLP) for this visit:  611 mGy-cm   This examination, like all CT scans performed in the [facility], was performed utilizing techniques to minimize radiation dose exposure, including the use of iterative   reconstruction and automated exposure control  Enteric contrast was administered  FINDINGS:    ABDOMEN    LOWER CHEST:  No clinically significant abnormality identified in the visualized lower chest     LIVER/BILIARY TREE:  Unremarkable  GALLBLADDER:  No calcified gallstones  No pericholecystic inflammatory change  SPLEEN:  Unremarkable  PANCREAS:  Unremarkable  ADRENAL GLANDS:  Unremarkable  KIDNEYS/URETERS:  12 mm hyperdense lesion at the inferior pole left kidney, consistent with a hemorrhagic cyst  No hydronephrosis  STOMACH AND BOWEL:  Pericolonic inflammatory change at the splenic flexure and descending colon, consistent with colitis in appropriate clinical context  Appearance of this segment of colon is similar to CT examination of 4/3/2018    APPENDIX:  No findings to suggest appendicitis      ABDOMINOPELVIC CAVITY:  No ascites or free intraperitoneal air  No lymphadenopathy  VESSELS:  Atherosclerotic changes are present  No evidence of aneurysm  PELVIS    REPRODUCTIVE ORGANS:  Patient is status post hysterectomy  URINARY BLADDER:  Unremarkable  ABDOMINAL WALL/INGUINAL REGIONS:  Unremarkable  OSSEOUS STRUCTURES:  There are age appropriate degenerative changes  No acute fracture or destructive osseous lesion  Smooth widening of the right S1 neural foramina, suggesting dural ectasia  Impression Pericolonic inflammatory change at the splenic flexure and descending colon, consistent with colitis of infectious or inflammatory etiology in appropriate clinical context  Appearance of this segment of colon is similar to CT examination of 4/3/2018  Workstation performed: [id number]       2D echocardiogram done on 9/9/2018 showed EF of 55-65% with grade 1 diastolic dysfunction  PLAN / RECOMMENDATIONS      1  Hypomagnesemia  Suspect GI loss in the setting of diarrhea  Patient was admitted with abdominal pain and diarrhea and also been seen by GI and suspected to have food borne colitis and currently receiving Cipro and Flagyl  Patient has received magnesium supplementation during the course but her magnesium level has remained low with current magnesium of 1 4  Patient is scheduled to received magnesium sulfate IV 2 g x1 dose now  I will also plan to start patient on magnesium oxide 800 mg PO BID and recheck magnesium level with AM labs  Looking at overall clinical picture I personally believe that replacing magnesium level to keep magnesium > 1 7 would be best course of action/plan for the time being  Patient is currently not on PPI and will plan to avoid PPI in the light of hypomagnesemia for time being  2  CKD stage 2-3  Multifactorial and suspected due to advanced age on top of hypertensive nephrosclerosis      Patient was initially admitted with CONSTANCE with admission creatinine of 1 54 and during the hospital course patient's renal function has improved  Patient's baseline creatinine is around 1 1 and current creatinine is 0 97 which is at baseline  Consider avoidance of NSAIDs  3  Hypertension in chronic kidney disease  Currently blood pressure is under well control and plan to monitor hypertension with amlodipine 5 mg PO daily, lisinopril 20 mg PO daily and metoprolol XL 37 5 mg PO daily  Thank you for the consultation to participate in patient's care  I have personally discussed my plan with the referring physician  Jerry Pate MD  Nephrology  3/10/2019        Portions of the record may have been created with voice recognition software  Occasional wrong word or "sound a like" substitutions may have occurred due to the inherent limitations of voice recognition software  Read the chart carefully and recognize, using context, where substitutions have occurred

## 2019-03-10 NOTE — PROGRESS NOTES
Progress Note - Qing Knight 12/6/1922, 80 y o  female MRN: [id number]    Unit/Bed#: -01 Encounter: [id number]    Primary Care Provider: Lena Doe DO   Date and time admitted to hospital: 3/4/2019  9:17 PM        Diarrhea  Assessment & Plan  See GI note  resolved    Hypomagnesemia  Assessment & Plan  Replace      Anemia  Assessment & Plan  No known history of chronic anemia  Currently, hemoglobin is dropped to 10 3  Most likely dilutional   No obvious source of bleeding  Fecal occult test was done today at the bedside  No gross blood noted  Sent the results to be evaluated by the lab  Will repeat hemoglobin tomorrow to assess for stability  Acute kidney injury [facility])  Assessment & Plan  resolved    HTN (hypertension)  Assessment & Plan  · Stable, resumed ace and on toprol    ·     Colitis, acute  Assessment & Plan  Sepsis is resolved  Clinically, patient is significantly improved  C diff result negative  Reviewed CT scan findings  Noted that there is an area of colitis that can be infectious or inflammatory in etiology  It is commented that this finding has been also present about a year ago in April 2018  Discussed the case with GI provider on call about possible treatment or diagnostic options  They suggested colonoscopy  Patient defers colonoscopy or any invasive procedures at this time  GI consult appreciated conservative rx; cipro and flagyl      * Sepsis ([facility] [address] )  Assessment & Plan  Sepsis related to gastroenteritis  Currently resolved as evidenced by resolved lactic acid and white blood cell elevations  Patient is tolerating diet and denies any abdominal pain or diarrhea  Previously on zosyn now to cipro and flagyl  D/w son-conservative treatment recommended        VTE Pharmacologic Prophylaxis:   Pharmacologic: Heparin  Mechanical VTE Prophylaxis in Place: Yes    Patient Centered Rounds: I have performed bedside rounds with nursing staff today      Discussions with Specialists or Other Care Team Provider:     Education and Discussions with Family / Patient:     Time Spent for Care: 30 minutes  More than 50% of total time spent on counseling and coordination of care as described above  Current Length of Stay: 5 day(s)    Current Patient Status: Inpatient   Certification Statement: The patient will continue to require additional inpatient hospital stay due to awaiting STR    Discharge Plan: STR    Code Status: Level 3 - DNAR and DNI      Subjective:   No c/o-eating well and denies diarrhea; denies ankle pain today    Objective:     Vitals:   Temp (24hrs), Av 5 °F (36 9 °C), Min:97 7 °F (36 5 °C), Max:99 5 °F (37 5 °C)    Temp:  [97 7 °F (36 5 °C)-99 5 °F (37 5 °C)] 98 4 °F (36 9 °C)  HR:  [] 101  Resp:  [18-19] 18  BP: (103-147)/(54-78) 147/78  SpO2:  [94 %-96 %] 95 %  Body mass index is 21 72 kg/m²  Input and Output Summary (last 24 hours): Intake/Output Summary (Last 24 hours) at 3/10/2019 1102  Last data filed at 3/10/2019 0547  Gross per 24 hour   Intake 200 ml   Output 1200 ml   Net -1000 ml       Physical Exam:     Physical Exam   Constitutional: She appears well-developed  HENT:   Head: Normocephalic and atraumatic  Eyes: Pupils are equal, round, and reactive to light  EOM are normal    Neck: Normal range of motion  Neck supple  Cardiovascular: Normal rate and regular rhythm  Pulmonary/Chest: Effort normal and breath sounds normal    Abdominal: Soft  Bowel sounds are normal    Musculoskeletal: She exhibits edema  Trace lower ext edema   Neurological: She is alert  No cranial nerve deficit  Skin: Skin is warm and dry  She is not diaphoretic           Additional Data:     Labs:    Results from last 7 days   Lab Units 03/10/19  0528   WBC Thousand/uL 7 59   HEMOGLOBIN g/dL 9 8*   HEMATOCRIT % 31 0*   PLATELETS Thousands/uL 157   NEUTROS PCT % 52   LYMPHS PCT % 26   MONOS PCT % 10   EOS PCT % 11*     Results from last 7 days   Lab Units 03/10/19  0528  03/05/19  0529   SODIUM mmol/L 137   < > 140   POTASSIUM mmol/L 3 9   < > 3 8   CHLORIDE mmol/L 102   < > 102   CO2 mmol/L 28   < > 24   BUN mg/dL 23   < > 43*   CREATININE mg/dL 0 97   < > 1 47*   ANION GAP mmol/L 7   < > 14*   CALCIUM mg/dL 9 3   < > 9 7   ALBUMIN g/dL  --   --  2 9*   TOTAL BILIRUBIN mg/dL  --   --  0 60   ALK PHOS U/L  --   --  71   ALT U/L  --   --  23   AST U/L  --   --  36   GLUCOSE RANDOM mg/dL 111   < > 180*    < > = values in this interval not displayed  Results from last 7 days   Lab Units 03/04/19  2239   INR  1 04             Results from last 7 days   Lab Units 03/07/19  0628 03/05/19  1746 03/05/19  1016 03/05/19  0530 03/05/19  0030   LACTIC ACID mmol/L  --  1 5 4 6* 3 9* 2 2*   PROCALCITONIN ng/ml 0 48*  --   --   --   --            * I Have Reviewed All Lab Data Listed Above  * Additional Pertinent Lab Tests Reviewed: All Labs Within Last 24 Hours Reviewed    Imaging:    Imaging Reports Reviewed Today Include:   Imaging Personally Reviewed by Myself Includes:      Recent Cultures (last 7 days):     Results from last 7 days   Lab Units 03/05/19  0529 03/05/19  0512 03/05/19  0326   BLOOD CULTURE  No Growth After 5 Days  No Growth After 5 Days  --    C DIFF TOXIN B   --   --  NEGATIVE for C difficle toxin by PCR          Last 24 Hours Medication List:     Current Facility-Administered Medications:  amLODIPine 5 mg Oral Daily Regis Gomez MD   aspirin 81 mg Oral Daily Daryle Bumpers Ofoegbu, PA-C   ciprofloxacin 500 mg Oral Q12H [facility] Gillian Lemus PA-C   heparin (porcine) 5,000 Units Subcutaneous Q8H [facility] Regis Gomez MD   lisinopril 20 mg Oral Daily Sueflavia Gomez MD   magnesium sulfate 2 g Intravenous Once Carlos Buckner MD   [START ON 3/11/2019] metoprolol succinate 37 5 mg Oral Daily Regis Gomez MD   metroNIDAZOLE 500 mg Oral Q8H [facility] Gillian Lemus PA-C   traZODone 50 mg Oral HS Daryle Bumpers Ofoegbu, PA-C Today, Patient Was Seen By: Sandro Stock MD    ** Please Note: Dictation voice to text software may have been used in the creation of this document   **

## 2019-03-11 VITALS
DIASTOLIC BLOOD PRESSURE: 99 MMHG | WEIGHT: 126.54 LBS | OXYGEN SATURATION: 96 % | SYSTOLIC BLOOD PRESSURE: 165 MMHG | HEART RATE: 100 BPM | TEMPERATURE: 98.1 F | HEIGHT: 64 IN | RESPIRATION RATE: 18 BRPM | BODY MASS INDEX: 21.6 KG/M2

## 2019-03-11 PROBLEM — E83.42 HYPOMAGNESEMIA: Status: RESOLVED | Noted: 2018-04-06 | Resolved: 2019-03-11

## 2019-03-11 PROBLEM — E87.6 HYPOKALEMIA: Status: RESOLVED | Noted: 2018-04-04 | Resolved: 2019-03-11

## 2019-03-11 PROBLEM — R19.7 DIARRHEA: Status: RESOLVED | Noted: 2018-04-19 | Resolved: 2019-03-11

## 2019-03-11 PROBLEM — N17.9 ACUTE KIDNEY INJURY (HCC): Status: RESOLVED | Noted: 2018-04-03 | Resolved: 2019-03-11

## 2019-03-11 PROBLEM — A41.9 SEPSIS (HCC): Status: RESOLVED | Noted: 2019-03-05 | Resolved: 2019-03-11

## 2019-03-11 PROBLEM — K52.9 COLITIS, ACUTE: Status: RESOLVED | Noted: 2018-04-03 | Resolved: 2019-03-11

## 2019-03-11 PROBLEM — M79.604 RIGHT LEG PAIN: Status: RESOLVED | Noted: 2019-03-09 | Resolved: 2019-03-11

## 2019-03-11 LAB
ANION GAP SERPL CALCULATED.3IONS-SCNC: 6 MMOL/L (ref 4–13)
BACTERIA BLD CULT: NORMAL
BUN SERPL-MCNC: 21 MG/DL (ref 5–25)
CALCIUM SERPL-MCNC: 9.3 MG/DL (ref 8.3–10.1)
CHLORIDE SERPL-SCNC: 103 MMOL/L (ref 100–108)
CO2 SERPL-SCNC: 30 MMOL/L (ref 21–32)
CREAT SERPL-MCNC: 0.92 MG/DL (ref 0.6–1.3)
GFR SERPL CREATININE-BSD FRML MDRD: 53 ML/MIN/1.73SQ M
GLUCOSE SERPL-MCNC: 105 MG/DL (ref 65–140)
MAGNESIUM SERPL-MCNC: 1.6 MG/DL (ref 1.6–2.6)
POTASSIUM SERPL-SCNC: 3.7 MMOL/L (ref 3.5–5.3)
SODIUM SERPL-SCNC: 139 MMOL/L (ref 136–145)

## 2019-03-11 PROCEDURE — 99232 SBSQ HOSP IP/OBS MODERATE 35: CPT | Performed by: INTERNAL MEDICINE

## 2019-03-11 PROCEDURE — 80048 BASIC METABOLIC PNL TOTAL CA: CPT | Performed by: GENERAL PRACTICE

## 2019-03-11 PROCEDURE — 83735 ASSAY OF MAGNESIUM: CPT | Performed by: GENERAL PRACTICE

## 2019-03-11 PROCEDURE — 99239 HOSP IP/OBS DSCHRG MGMT >30: CPT | Performed by: GENERAL PRACTICE

## 2019-03-11 RX ORDER — METRONIDAZOLE 500 MG/1
500 TABLET ORAL EVERY 8 HOURS SCHEDULED
Qty: 12 TABLET | Refills: 0 | Status: SHIPPED | OUTPATIENT
Start: 2019-03-11 | End: 2019-03-15

## 2019-03-11 RX ORDER — LISINOPRIL 20 MG/1
20 TABLET ORAL DAILY
Qty: 30 TABLET | Refills: 0 | Status: ON HOLD | OUTPATIENT
Start: 2019-03-12 | End: 2019-01-01 | Stop reason: SDUPTHER

## 2019-03-11 RX ORDER — CHOLESTYRAMINE LIGHT 4 G/5.7G
4 POWDER, FOR SUSPENSION ORAL 2 TIMES DAILY
Qty: 40 PACKET | Refills: 0 | Status: SHIPPED | OUTPATIENT
Start: 2019-03-11 | End: 2019-01-01 | Stop reason: HOSPADM

## 2019-03-11 RX ORDER — CIPROFLOXACIN 500 MG/1
500 TABLET, FILM COATED ORAL EVERY 12 HOURS SCHEDULED
Qty: 8 TABLET | Refills: 0 | Status: SHIPPED | OUTPATIENT
Start: 2019-03-11 | End: 2019-03-15

## 2019-03-11 RX ADMIN — AMLODIPINE BESYLATE 5 MG: 5 TABLET ORAL at 09:50

## 2019-03-11 RX ADMIN — METRONIDAZOLE 500 MG: 500 TABLET ORAL at 06:23

## 2019-03-11 RX ADMIN — CHOLESTYRAMINE 4 G: 4 POWDER, FOR SUSPENSION ORAL at 09:54

## 2019-03-11 RX ADMIN — ASPIRIN 81 MG 81 MG: 81 TABLET ORAL at 09:50

## 2019-03-11 RX ADMIN — METRONIDAZOLE 500 MG: 500 TABLET ORAL at 13:22

## 2019-03-11 RX ADMIN — HEPARIN SODIUM 5000 UNITS: 5000 INJECTION INTRAVENOUS; SUBCUTANEOUS at 06:23

## 2019-03-11 RX ADMIN — HEPARIN SODIUM 5000 UNITS: 5000 INJECTION INTRAVENOUS; SUBCUTANEOUS at 13:21

## 2019-03-11 RX ADMIN — LISINOPRIL 20 MG: 20 TABLET ORAL at 10:20

## 2019-03-11 RX ADMIN — Medication 800 MG: at 09:50

## 2019-03-11 RX ADMIN — CIPROFLOXACIN HYDROCHLORIDE 500 MG: 500 TABLET, FILM COATED ORAL at 09:50

## 2019-03-11 RX ADMIN — METOPROLOL SUCCINATE 37.5 MG: 25 TABLET, EXTENDED RELEASE ORAL at 09:50

## 2019-03-11 NOTE — SOCIAL WORK
Pt accepted at [facility]  CM scheduled wcv with [facility] and received a 1400  time  CM contacted pt's nephew, Adriano Figueroa via telephone to provide updates  CM reviewed IMM with Adriano Figueroa and placed IMM in medical records  CM contacted [facility] at [phone] and left vm providing updates  All aware of  time

## 2019-03-11 NOTE — PROGRESS NOTES
NEPHROLOGY PROGRESS NOTE    Patient: Gonzalo Meehan               Sex: female          DOA: 3/4/2019  9:17 PM   YOB: 1922        Age:  80 y o         LOS:  LOS: 6 days   3/11/2019    REASON FOR THE CONSULTATION:  Further management of hypomagnesemia    HPI     This is a 80 y o  female admitted for Sepsis ([facility] [address] )     SUBJECTIVE     - overnight remained nonoliguric and afebrile    Patient denies nausea, vomiting, headache or dizziness today    - Reviewed last 24 hrs events     CURRENT MEDICATIONS       Current Facility-Administered Medications:     amLODIPine (NORVASC) tablet 5 mg, 5 mg, Oral, Daily, Kateryna Gomez MD, 5 mg at 03/11/19 0950    aspirin chewable tablet 81 mg, 81 mg, Oral, Daily, Danielle Talbot PA-C, 81 mg at 03/11/19 0950    cholestyramine sugar free (QUESTRAN LIGHT) packet 4 g, 4 g, Oral, BID, Kateryna Gomez MD, 4 g at 03/11/19 0954    ciprofloxacin (CIPRO) tablet 500 mg, 500 mg, Oral, Q12H [address], Gillian Lemus PA-C, 500 mg at 03/11/19 0950    heparin (porcine) subcutaneous injection 5,000 Units, 5,000 Units, Subcutaneous, Q8H [address], Kateryna Gomez MD, 5,000 Units at 03/11/19 1321    lisinopril (ZESTRIL) tablet 20 mg, 20 mg, Oral, Daily, Sueflavia Gomez MD, 20 mg at 03/11/19 1020    magnesium oxide (MAG-OX) tablet 800 mg, 800 mg, Oral, BID, Rachael Jolley MD, 800 mg at 03/11/19 0950    metoprolol succinate (TOPROL-XL) 24 hr tablet 37 5 mg, 37 5 mg, Oral, Daily, Kateryna Gomez MD, 37 5 mg at 03/11/19 0950    metroNIDAZOLE (FLAGYL) tablet 500 mg, 500 mg, Oral, Q8H [address], Gillian Lemus PA-C, 500 mg at 03/11/19 1322    traZODone (DESYREL) tablet 50 mg, 50 mg, Oral, HS, Danielle Talbot PA-C, 50 mg at 03/10/19 2108    OBJECTIVE     Current Weight: Weight - Scale: 57 4 kg (126 lb 8 7 oz)  Vitals:    03/11/19 0723   BP: 165/99   Pulse: 100   Resp: 18   Temp: 98 1 °F (36 7 °C)   SpO2: 96%     Body mass index is 21 72 kg/m²  Intake/Output Summary (Last 24 hours) at 3/11/2019 1418  Last data filed at 3/11/2019 1301  Gross per 24 hour   Intake 430 ml   Output 901 ml   Net -471 ml       PHYSICAL EXAMINATION     Physical Exam   Constitutional: No distress  HENT:   Head: Normocephalic and atraumatic  Eyes: No scleral icterus  Neck: Neck supple  No JVD present  Cardiovascular: Normal rate  Pulmonary/Chest: No accessory muscle usage  No respiratory distress  She has no wheezes  Abdominal: Soft  She exhibits no distension  Musculoskeletal:        Right hand: She exhibits no laceration  Left hand: She exhibits no laceration  Neurological: She is alert  Skin: Skin is warm  No cyanosis  Psychiatric: She is not combative  She expresses no suicidal ideation           LAB RESULTS     Results from last 7 days   Lab Units 03/11/19  0457 03/10/19  0528 03/09/19  1344 03/09/19  0446 03/08/19  1611 03/08/19  0557 03/07/19  2054 03/07/19  4413 03/06/19  0602 03/05/19  0529  03/04/19  2239   WBC Thousand/uL  --  7 59 9 01  --   --  10 59*  --  9 22 10 06 17 95*  --  23 54*   HEMOGLOBIN g/dL  --  9 8* 10 9*  --   --  11 0*  --  11 9 10 3* 12 4  --  13 0   HEMATOCRIT %  --  31 0* 34 0*  --   --  34 5*  --  37 1 32 0* 38 4  --  41 5   PLATELETS Thousands/uL  --  157 180  --   --  167  --  160 142* 177  --  217   POTASSIUM mmol/L 3 7 3 9  --  3 9  --  3 4*  --  3 4* 2 8* 3 8  --  4 0   CHLORIDE mmol/L 103 102  --  100  --  101  --  104 105 102  --  98*   CO2 mmol/L 30 28  --  27  --  28  --  27 26 24  --  29   BUN mg/dL 21 23  --  20  --  13  --  17 27* 43*  --  42*   CREATININE mg/dL 0 92 0 97  --  1 07  --  0 79  --  0 95 0 96 1 47*  --  1 54*   EGFR ml/min/1 73sq m 53 49  --  44  --  63  --  51 50 30  --  28   CALCIUM mg/dL 9 3 9 3  --  9 0  --  9 4  --  9 5 8 9 9 7  --  10 1   MAGNESIUM mg/dL 1 6 1 4*  --  1 6 2 1 1 2* 1 0* 1 0*  --  1 3*   < >  --    PHOSPHORUS mg/dL  --   --   --   --   --   --   --   --   --  2 9  -- --     < > = values in this interval not displayed  I have personally reviewed the old medical records and patient's previously known baseline creatinine level is ~ 1 0-1 1    PLAN / RECOMMENDATIONS      1  Hypomagnesemia  Multifactorial and was suspected due to GI loss of magnesium in the setting of diarrhea  Patient has received IV magnesium supplementation yesterday and currently on magnesium oxide 800 mg PO BID with current magnesium of 1 6  Continue magnesium oxide 800 mg PO BID and monitor magnesium level today  2  CKD stage 2-3  Multifactorial and suspected due to advanced age on top of hypertensive nephrosclerosis  Patient was found to have CONSTANCE on admission which has resolved  Current creatinine is 0 92 which is at baseline  Continue to avoid NSAIDs  3  Hypertension in chronic kidney disease  Currently blood pressure is under well controlled and monitor hypertension with amlodipine 5 mg PO daily, lisinopril 20 mg PO daily and metoprolol XL 37 5 mg PO daily  Disposition:  Stable from renal standpoint for discharge  Continue magnesium oxide 800 mg PO BID and check magnesium level in 3-4 days  Plan was also d/w Derick Chavez MD  Nephrology  3/11/2019        Portions of the record may have been created with voice recognition software  Occasional wrong word or "sound a like" substitutions may have occurred due to the inherent limitations of voice recognition software  Read the chart carefully and recognize, using context, where substitutions have occurred

## 2019-03-11 NOTE — PROGRESS NOTES
Progress Note - Oly Wilson 12/6/1922, 80 y o  female MRN: [id number]    Unit/Bed#: -01 Encounter: [id number]    Primary Care Provider: Teto Nunez DO   Date and time admitted to hospital: 3/4/2019  9:17 PM        Hypomagnesemia  Assessment & Plan  Replace      Acute kidney injury [facility])  Assessment & Plan  resolved    HTN (hypertension)  Assessment & Plan  · Stable, resumed ace and on toprol    ·     Colitis, acute  Assessment & Plan  Sepsis is resolved  Clinically, patient is significantly improved  C diff result negative  Reviewed CT scan findings  Noted that there is an area of colitis that can be infectious or inflammatory in etiology  It is commented that this finding has been also present about a year ago in April 2018  Discussed the case with GI provider on call about possible treatment or diagnostic options  They suggested colonoscopy  Patient defers colonoscopy or any invasive procedures at this time  GI consult appreciated conservative rx; cipro and flagyl      * Sepsis ([address] )  Assessment & Plan  Sepsis related to gastroenteritis  Currently resolved as evidenced by resolved lactic acid and white blood cell elevations  Patient is tolerating diet and denies any abdominal pain or diarrhea  Previously on zosyn now to cipro and flagyl  D/w son-conservative treatment recommended        VTE Pharmacologic Prophylaxis:   Pharmacologic: Heparin  Mechanical VTE Prophylaxis in Place: Yes    Patient Centered Rounds: I have performed bedside rounds with nursing staff today  Discussions with Specialists or Other Care Team Provider:     Education and Discussions with Family / Patient:     Time Spent for Care: 30 minutes  More than 50% of total time spent on counseling and coordination of care as described above      Current Length of Stay: 6 day(s)    Current Patient Status: Inpatient   Certification Statement: The patient will continue to require additional inpatient hospital stay due to awaiting str    Discharge Plan: str    Code Status: Level 3 - DNAR and DNI      Subjective:   No c/o-feeling better    Objective:     Vitals:   Temp (24hrs), Av 1 °F (36 7 °C), Min:98 1 °F (36 7 °C), Max:98 1 °F (36 7 °C)    Temp:  [98 1 °F (36 7 °C)] 98 1 °F (36 7 °C)  HR:  [] 100  Resp:  [18] 18  BP: (130-165)/(67-99) 165/99  SpO2:  [96 %] 96 %  Body mass index is 21 72 kg/m²  Input and Output Summary (last 24 hours): Intake/Output Summary (Last 24 hours) at 3/11/2019 1037  Last data filed at 3/11/2019 0858  Gross per 24 hour   Intake 250 ml   Output 801 ml   Net -551 ml       Physical Exam:     Physical Exam   Constitutional: She appears well-developed  HENT:   Head: Normocephalic and atraumatic  Eyes: Pupils are equal, round, and reactive to light  EOM are normal    Neck: Normal range of motion  Cardiovascular: Normal rate and regular rhythm  Pulmonary/Chest: Effort normal and breath sounds normal    Abdominal: Soft  Bowel sounds are normal  There is no tenderness  Musculoskeletal: She exhibits no edema  Neurological: She is alert  Skin: Skin is warm and dry  She is not diaphoretic  Psychiatric: She has a normal mood and affect   Her behavior is normal          Additional Data:     Labs:    Results from last 7 days   Lab Units 03/10/19  0528   WBC Thousand/uL 7 59   HEMOGLOBIN g/dL 9 8*   HEMATOCRIT % 31 0*   PLATELETS Thousands/uL 157   NEUTROS PCT % 52   LYMPHS PCT % 26   MONOS PCT % 10   EOS PCT % 11*     Results from last 7 days   Lab Units 19  0457  19  0529   SODIUM mmol/L 139   < > 140   POTASSIUM mmol/L 3 7   < > 3 8   CHLORIDE mmol/L 103   < > 102   CO2 mmol/L 30   < > 24   BUN mg/dL 21   < > 43*   CREATININE mg/dL 0 92   < > 1 47*   ANION GAP mmol/L 6   < > 14*   CALCIUM mg/dL 9 3   < > 9 7   ALBUMIN g/dL  --   --  2 9*   TOTAL BILIRUBIN mg/dL  --   --  0 60   ALK PHOS U/L  --   --  71   ALT U/L  --   --  23   AST U/L  --   --  36   GLUCOSE RANDOM mg/dL 105   < > 180*    < > = values in this interval not displayed  Results from last 7 days   Lab Units 03/04/19  2239   INR  1 04             Results from last 7 days   Lab Units 03/07/19  0628 03/05/19  1746 03/05/19  1016 03/05/19  0530 03/05/19  0030   LACTIC ACID mmol/L  --  1 5 4 6* 3 9* 2 2*   PROCALCITONIN ng/ml 0 48*  --   --   --   --            * I Have Reviewed All Lab Data Listed Above  * Additional Pertinent Lab Tests Reviewed: All Labs Within Last 24 Hours Reviewed    Imaging:    Imaging Reports Reviewed Today Include:   Imaging Personally Reviewed by Myself Includes:      Recent Cultures (last 7 days):     Results from last 7 days   Lab Units 03/05/19  0529 03/05/19  0512 03/05/19  0326   BLOOD CULTURE  No Growth After 5 Days  No Growth After 5 Days  --    C DIFF TOXIN B   --   --  NEGATIVE for C difficle toxin by PCR  Last 24 Hours Medication List:     Current Facility-Administered Medications:  amLODIPine 5 mg Oral Daily Nicolasa Gomez MD   aspirin 81 mg Oral Daily Rob Talbot PA-C   cholestyramine sugar free 4 g Oral BID Nicolasa Gomez MD   ciprofloxacin 500 mg Oral Q12H [facility] Gillian Lemus PA-C   heparin (porcine) 5,000 Units Subcutaneous Q8H [facility] Nicolasa Gomez MD   lisinopril 20 mg Oral Daily Sueflavia Gomez MD   magnesium oxide 800 mg Oral BID Khadar Gomez MD   metoprolol succinate 37 5 mg Oral Daily Nicolasa Gomez MD   metroNIDAZOLE 500 mg Oral Q8H [facility] Gillian Lemus PA-C   traZODone 50 mg Oral HS Luis Angel Cardoso PA-C        Today, Patient Was Seen By: Castillo Moran MD    ** Please Note: Dictation voice to text software may have been used in the creation of this document   **

## 2019-03-11 NOTE — DISCHARGE SUMMARY
Discharge- Antonina Andrade 12/6/1922, 80 y o  female MRN: [id number]    Unit/Bed#: -01 Encounter: [id number]    Primary Care Provider: Juliana Sharp DO   Date and time admitted to hospital: 3/4/2019  9:17 PM        Hypertensive kidney disease with stage 3 chronic kidney disease ([facility] )  Assessment & Plan  stable    Stage 3 chronic kidney disease ([facility] )  Assessment & Plan  stable    Diarrhea  Assessment & Plan  See GI note  resolved    Hypomagnesemia  Assessment & Plan  Resolved  No ppi    Acute kidney injury ([facility] )  Assessment & Plan  resolved    HTN (hypertension)  Assessment & Plan  · Stable, resumed ace and on toprol    ·     Colitis, acute  Assessment & Plan  Sepsis is resolved  Clinically, patient is significantly improved  C diff result negative  Reviewed CT scan findings  Noted that there is an area of colitis that can be infectious or inflammatory in etiology  It is commented that this finding has been also present about a year ago in April 2018  Discussed the case with GI provider on call about possible treatment or diagnostic options  They suggested colonoscopy  Patient defers colonoscopy or any invasive procedures at this time      GI consult appreciated conservative rx; cipro and flagyl            Discharging Physician / Practitioner: Tom Burnham MD  PCP: Juliana Sharp DO  Admission Date:   Admission Orders (From admission, onward)    Ordered        03/05/19 0007  Inpatient Admission (expected length of stay for this patient Order details is greater than two midnights)  Once     Order ID Start Status   [id number] 03/05/19 0008 Completed              Discharge Date: 03/11/19    Resolved Problems  Date Reviewed: 3/7/2019    None          Consultations During Hospital Stay:  · Gastroenterology, Nephrology    Procedures Performed:       Significant Findings / Test Results:   · Colitis but managed conservatively per GI and family    Incidental Findings:   · Hypomagnesemia hypokalemia     Test Results Pending at Discharge (will require follow up):   ·      Outpatient Tests Requested:  · BMP and magnesium 4 days    Complications:      Reason for Admission:  Abdominal cramping diarrhea and weakness    Hospital Course:     Derek Espinosa is a 80 y o  female patient who originally presented to the hospital on 3/4/2019 due to abdominal cramping diarrhea and weakness, colitis        Please see above list of diagnoses and related plan for additional information  Condition at Discharge: stable     Discharge Day Visit / Exam:     * Please refer to separate progress note for these details *    Discussion with Family:     Discharge instructions/Information to patient and family:   See after visit summary for information provided to patient and family  Provisions for Follow-Up Care:  See after visit summary for information related to follow-up care and any pertinent home health orders  Disposition:     Acute Rehab at acute rehab    For Discharges to [facility]:   · Not Applicable to this Patient - Not Applicable to this Patient    Planned Readmission:      Discharge Statement:  I spent 40 minutes discharging the patient  This time was spent on the day of discharge  I had direct contact with the patient on the day of discharge  Greater than 50% of the total time was spent examining patient, answering all patient questions, arranging and discussing plan of care with patient as well as directly providing post-discharge instructions  Additional time then spent on discharge activities  Discharge Medications:  See after visit summary for reconciled discharge medications provided to patient and family        ** Please Note: This note has been constructed using a voice recognition system **

## 2019-04-19 ENCOUNTER — HOSPITAL ENCOUNTER (INPATIENT)
Facility: HOSPITAL | Age: 84
LOS: 3 days | Discharge: NURSING, REHAB OR LONG-TERM CARE | DRG: 291 | End: 2019-04-22
Attending: EMERGENCY MEDICINE | Admitting: HOSPITALIST
Payer: MEDICARE

## 2019-04-19 ENCOUNTER — APPOINTMENT (EMERGENCY)
Dept: CT IMAGING | Facility: HOSPITAL | Age: 84
DRG: 291 | End: 2019-04-19
Payer: MEDICARE

## 2019-04-19 DIAGNOSIS — I50.9 ACUTE CONGESTIVE HEART FAILURE, UNSPECIFIED HEART FAILURE TYPE (HCC): ICD-10-CM

## 2019-04-19 DIAGNOSIS — R53.1 GENERALIZED WEAKNESS: Primary | ICD-10-CM

## 2019-04-19 DIAGNOSIS — R06.00 DYSPNEA: ICD-10-CM

## 2019-04-19 DIAGNOSIS — I34.0 MODERATE MITRAL REGURGITATION: ICD-10-CM

## 2019-04-19 DIAGNOSIS — I48.91 A-FIB (HCC): ICD-10-CM

## 2019-04-19 DIAGNOSIS — I50.9 NEW ONSET OF CONGESTIVE HEART FAILURE (HCC): ICD-10-CM

## 2019-04-19 DIAGNOSIS — F32.A DEPRESSION: ICD-10-CM

## 2019-04-19 LAB
ALBUMIN SERPL BCP-MCNC: 3.4 G/DL (ref 3.5–5)
ALP SERPL-CCNC: 130 U/L (ref 46–116)
ALT SERPL W P-5'-P-CCNC: 150 U/L (ref 12–78)
ANION GAP SERPL CALCULATED.3IONS-SCNC: 7 MMOL/L (ref 4–13)
APTT PPP: 30 SECONDS (ref 26–38)
AST SERPL W P-5'-P-CCNC: 92 U/L (ref 5–45)
BASOPHILS # BLD AUTO: 0.08 THOUSANDS/ΜL (ref 0–0.1)
BASOPHILS NFR BLD AUTO: 1 % (ref 0–1)
BILIRUB DIRECT SERPL-MCNC: 0.08 MG/DL (ref 0–0.2)
BILIRUB SERPL-MCNC: 0.5 MG/DL (ref 0.2–1)
BUN SERPL-MCNC: 29 MG/DL (ref 5–25)
CALCIUM SERPL-MCNC: 9.8 MG/DL (ref 8.3–10.1)
CHLORIDE SERPL-SCNC: 101 MMOL/L (ref 100–108)
CO2 SERPL-SCNC: 27 MMOL/L (ref 21–32)
CREAT SERPL-MCNC: 0.8 MG/DL (ref 0.6–1.3)
DIGOXIN SERPL-MCNC: 1.2 NG/ML (ref 0.8–2)
EOSINOPHIL # BLD AUTO: 0.36 THOUSAND/ΜL (ref 0–0.61)
EOSINOPHIL NFR BLD AUTO: 5 % (ref 0–6)
ERYTHROCYTE [DISTWIDTH] IN BLOOD BY AUTOMATED COUNT: 13.5 % (ref 11.6–15.1)
GFR SERPL CREATININE-BSD FRML MDRD: 62 ML/MIN/1.73SQ M
GLUCOSE SERPL-MCNC: 93 MG/DL (ref 65–140)
HCT VFR BLD AUTO: 41.1 % (ref 34.8–46.1)
HGB BLD-MCNC: 12.8 G/DL (ref 11.5–15.4)
IMM GRANULOCYTES # BLD AUTO: 0.03 THOUSAND/UL (ref 0–0.2)
IMM GRANULOCYTES NFR BLD AUTO: 0 % (ref 0–2)
INR PPP: 0.97 (ref 0.86–1.17)
LIPASE SERPL-CCNC: 265 U/L (ref 73–393)
LYMPHOCYTES # BLD AUTO: 1.58 THOUSANDS/ΜL (ref 0.6–4.47)
LYMPHOCYTES NFR BLD AUTO: 21 % (ref 14–44)
MAGNESIUM SERPL-MCNC: 1.9 MG/DL (ref 1.6–2.6)
MCH RBC QN AUTO: 28.9 PG (ref 26.8–34.3)
MCHC RBC AUTO-ENTMCNC: 31.1 G/DL (ref 31.4–37.4)
MCV RBC AUTO: 93 FL (ref 82–98)
MONOCYTES # BLD AUTO: 0.63 THOUSAND/ΜL (ref 0.17–1.22)
MONOCYTES NFR BLD AUTO: 8 % (ref 4–12)
NEUTROPHILS # BLD AUTO: 4.93 THOUSANDS/ΜL (ref 1.85–7.62)
NEUTS SEG NFR BLD AUTO: 65 % (ref 43–75)
NRBC BLD AUTO-RTO: 0 /100 WBCS
NT-PROBNP SERPL-MCNC: 4870 PG/ML
PLATELET # BLD AUTO: 231 THOUSANDS/UL (ref 149–390)
PLATELET # BLD AUTO: 235 THOUSANDS/UL (ref 149–390)
PMV BLD AUTO: 10.3 FL (ref 8.9–12.7)
PMV BLD AUTO: 10.9 FL (ref 8.9–12.7)
POTASSIUM SERPL-SCNC: 4.7 MMOL/L (ref 3.5–5.3)
PROT SERPL-MCNC: 7.7 G/DL (ref 6.4–8.2)
PROTHROMBIN TIME: 12.8 SECONDS (ref 11.8–14.2)
RBC # BLD AUTO: 4.43 MILLION/UL (ref 3.81–5.12)
SODIUM SERPL-SCNC: 135 MMOL/L (ref 136–145)
TROPONIN I SERPL-MCNC: <0.02 NG/ML
TROPONIN I SERPL-MCNC: <0.02 NG/ML
TSH SERPL DL<=0.05 MIU/L-ACNC: 2.01 UIU/ML (ref 0.36–3.74)
WBC # BLD AUTO: 7.61 THOUSAND/UL (ref 4.31–10.16)

## 2019-04-19 PROCEDURE — 74177 CT ABD & PELVIS W/CONTRAST: CPT

## 2019-04-19 PROCEDURE — 85049 AUTOMATED PLATELET COUNT: CPT | Performed by: HOSPITALIST

## 2019-04-19 PROCEDURE — 84484 ASSAY OF TROPONIN QUANT: CPT | Performed by: EMERGENCY MEDICINE

## 2019-04-19 PROCEDURE — 85730 THROMBOPLASTIN TIME PARTIAL: CPT | Performed by: EMERGENCY MEDICINE

## 2019-04-19 PROCEDURE — 99285 EMERGENCY DEPT VISIT HI MDM: CPT

## 2019-04-19 PROCEDURE — 84443 ASSAY THYROID STIM HORMONE: CPT | Performed by: EMERGENCY MEDICINE

## 2019-04-19 PROCEDURE — 36415 COLL VENOUS BLD VENIPUNCTURE: CPT | Performed by: EMERGENCY MEDICINE

## 2019-04-19 PROCEDURE — 83735 ASSAY OF MAGNESIUM: CPT | Performed by: EMERGENCY MEDICINE

## 2019-04-19 PROCEDURE — 99223 1ST HOSP IP/OBS HIGH 75: CPT | Performed by: HOSPITALIST

## 2019-04-19 PROCEDURE — 80076 HEPATIC FUNCTION PANEL: CPT | Performed by: EMERGENCY MEDICINE

## 2019-04-19 PROCEDURE — 99285 EMERGENCY DEPT VISIT HI MDM: CPT | Performed by: EMERGENCY MEDICINE

## 2019-04-19 PROCEDURE — 70450 CT HEAD/BRAIN W/O DYE: CPT

## 2019-04-19 PROCEDURE — 96360 HYDRATION IV INFUSION INIT: CPT

## 2019-04-19 PROCEDURE — 84484 ASSAY OF TROPONIN QUANT: CPT | Performed by: HOSPITALIST

## 2019-04-19 PROCEDURE — 85025 COMPLETE CBC W/AUTO DIFF WBC: CPT | Performed by: EMERGENCY MEDICINE

## 2019-04-19 PROCEDURE — 93005 ELECTROCARDIOGRAM TRACING: CPT

## 2019-04-19 PROCEDURE — 83690 ASSAY OF LIPASE: CPT | Performed by: EMERGENCY MEDICINE

## 2019-04-19 PROCEDURE — 80048 BASIC METABOLIC PNL TOTAL CA: CPT | Performed by: EMERGENCY MEDICINE

## 2019-04-19 PROCEDURE — 85610 PROTHROMBIN TIME: CPT | Performed by: EMERGENCY MEDICINE

## 2019-04-19 PROCEDURE — 1124F ACP DISCUSS-NO DSCNMKR DOCD: CPT | Performed by: EMERGENCY MEDICINE

## 2019-04-19 PROCEDURE — 96361 HYDRATE IV INFUSION ADD-ON: CPT

## 2019-04-19 PROCEDURE — 71275 CT ANGIOGRAPHY CHEST: CPT

## 2019-04-19 PROCEDURE — 83880 ASSAY OF NATRIURETIC PEPTIDE: CPT | Performed by: EMERGENCY MEDICINE

## 2019-04-19 PROCEDURE — 80162 ASSAY OF DIGOXIN TOTAL: CPT | Performed by: EMERGENCY MEDICINE

## 2019-04-19 RX ORDER — CHOLESTYRAMINE LIGHT 4 G/5.7G
4 POWDER, FOR SUSPENSION ORAL 2 TIMES DAILY
Status: DISCONTINUED | OUTPATIENT
Start: 2019-04-19 | End: 2019-04-22 | Stop reason: HOSPADM

## 2019-04-19 RX ORDER — ASPIRIN 81 MG/1
81 TABLET, CHEWABLE ORAL DAILY
Status: DISCONTINUED | OUTPATIENT
Start: 2019-04-20 | End: 2019-04-22 | Stop reason: HOSPADM

## 2019-04-19 RX ORDER — DIGOXIN 125 MCG
125 TABLET ORAL DAILY
Status: ON HOLD | COMMUNITY
End: 2019-01-01 | Stop reason: SDUPTHER

## 2019-04-19 RX ORDER — FUROSEMIDE 10 MG/ML
20 INJECTION INTRAMUSCULAR; INTRAVENOUS
Status: DISCONTINUED | OUTPATIENT
Start: 2019-04-20 | End: 2019-04-20

## 2019-04-19 RX ORDER — LISINOPRIL 20 MG/1
20 TABLET ORAL DAILY
Status: DISCONTINUED | OUTPATIENT
Start: 2019-04-20 | End: 2019-04-22 | Stop reason: HOSPADM

## 2019-04-19 RX ORDER — HEPARIN SODIUM 5000 [USP'U]/ML
5000 INJECTION, SOLUTION INTRAVENOUS; SUBCUTANEOUS EVERY 8 HOURS SCHEDULED
Status: DISCONTINUED | OUTPATIENT
Start: 2019-04-19 | End: 2019-04-22 | Stop reason: HOSPADM

## 2019-04-19 RX ORDER — ONDANSETRON 2 MG/ML
4 INJECTION INTRAMUSCULAR; INTRAVENOUS EVERY 6 HOURS PRN
Status: DISCONTINUED | OUTPATIENT
Start: 2019-04-19 | End: 2019-04-22 | Stop reason: HOSPADM

## 2019-04-19 RX ORDER — AMLODIPINE BESYLATE 5 MG/1
5 TABLET ORAL DAILY
Status: ON HOLD | COMMUNITY
End: 2019-01-01 | Stop reason: SDUPTHER

## 2019-04-19 RX ORDER — SACCHAROMYCES BOULARDII 250 MG
250 CAPSULE ORAL 2 TIMES DAILY
Status: DISCONTINUED | OUTPATIENT
Start: 2019-04-19 | End: 2019-04-22 | Stop reason: HOSPADM

## 2019-04-19 RX ORDER — METOPROLOL SUCCINATE 25 MG/1
25 TABLET, EXTENDED RELEASE ORAL DAILY
Status: DISCONTINUED | OUTPATIENT
Start: 2019-04-20 | End: 2019-04-22 | Stop reason: HOSPADM

## 2019-04-19 RX ORDER — FUROSEMIDE 10 MG/ML
20 INJECTION INTRAMUSCULAR; INTRAVENOUS ONCE
Status: COMPLETED | OUTPATIENT
Start: 2019-04-19 | End: 2019-04-19

## 2019-04-19 RX ORDER — ACETAMINOPHEN 325 MG/1
650 TABLET ORAL EVERY 6 HOURS PRN
Status: DISCONTINUED | OUTPATIENT
Start: 2019-04-19 | End: 2019-04-22 | Stop reason: HOSPADM

## 2019-04-19 RX ORDER — TRAZODONE HYDROCHLORIDE 50 MG/1
50 TABLET ORAL
Status: DISCONTINUED | OUTPATIENT
Start: 2019-04-19 | End: 2019-04-22 | Stop reason: HOSPADM

## 2019-04-19 RX ORDER — DIGOXIN 125 MCG
125 TABLET ORAL DAILY
Status: DISCONTINUED | OUTPATIENT
Start: 2019-04-20 | End: 2019-04-22 | Stop reason: HOSPADM

## 2019-04-19 RX ADMIN — IOHEXOL 85 ML: 350 INJECTION, SOLUTION INTRAVENOUS at 19:41

## 2019-04-19 RX ADMIN — TRAZODONE HYDROCHLORIDE 50 MG: 50 TABLET ORAL at 22:00

## 2019-04-19 RX ADMIN — FUROSEMIDE 20 MG: 10 INJECTION, SOLUTION INTRAMUSCULAR; INTRAVENOUS at 21:05

## 2019-04-19 RX ADMIN — Medication 400 MG: at 22:00

## 2019-04-19 RX ADMIN — HEPARIN SODIUM 5000 UNITS: 5000 INJECTION INTRAVENOUS; SUBCUTANEOUS at 22:00

## 2019-04-19 RX ADMIN — Medication 250 MG: at 22:00

## 2019-04-19 RX ADMIN — SODIUM CHLORIDE 500 ML: 0.9 INJECTION, SOLUTION INTRAVENOUS at 18:01

## 2019-04-19 RX ADMIN — CHOLESTYRAMINE 4 G: 4 POWDER, FOR SUSPENSION ORAL at 22:30

## 2019-04-20 LAB
ANION GAP SERPL CALCULATED.3IONS-SCNC: 6 MMOL/L (ref 4–13)
ATRIAL RATE: 326 BPM
ATRIAL RATE: 82 BPM
BILIRUB UR QL STRIP: NEGATIVE
BUN SERPL-MCNC: 25 MG/DL (ref 5–25)
CALCIUM SERPL-MCNC: 9.3 MG/DL (ref 8.3–10.1)
CHLORIDE SERPL-SCNC: 100 MMOL/L (ref 100–108)
CHOLEST SERPL-MCNC: 133 MG/DL (ref 50–200)
CLARITY UR: CLEAR
CO2 SERPL-SCNC: 30 MMOL/L (ref 21–32)
COLOR UR: NORMAL
CREAT SERPL-MCNC: 1.01 MG/DL (ref 0.6–1.3)
DIGOXIN SERPL-MCNC: 1 NG/ML (ref 0.8–2)
GFR SERPL CREATININE-BSD FRML MDRD: 47 ML/MIN/1.73SQ M
GLUCOSE SERPL-MCNC: 108 MG/DL (ref 65–140)
GLUCOSE UR STRIP-MCNC: NEGATIVE MG/DL
HDLC SERPL-MCNC: 43 MG/DL (ref 40–60)
HGB UR QL STRIP.AUTO: NEGATIVE
KETONES UR STRIP-MCNC: NEGATIVE MG/DL
LDLC SERPL CALC-MCNC: 74 MG/DL (ref 0–100)
LEUKOCYTE ESTERASE UR QL STRIP: NEGATIVE
MAGNESIUM SERPL-MCNC: 1.6 MG/DL (ref 1.6–2.6)
NITRITE UR QL STRIP: NEGATIVE
NONHDLC SERPL-MCNC: 90 MG/DL
PH UR STRIP.AUTO: 5.5 [PH]
POTASSIUM SERPL-SCNC: 3.5 MMOL/L (ref 3.5–5.3)
PROT UR STRIP-MCNC: NEGATIVE MG/DL
QRS AXIS: 73 DEGREES
QRS AXIS: 73 DEGREES
QRSD INTERVAL: 84 MS
QRSD INTERVAL: 86 MS
QT INTERVAL: 360 MS
QT INTERVAL: 402 MS
QTC INTERVAL: 420 MS
QTC INTERVAL: 463 MS
SODIUM SERPL-SCNC: 136 MMOL/L (ref 136–145)
SP GR UR STRIP.AUTO: 1.01 (ref 1–1.03)
T WAVE AXIS: -70 DEGREES
T WAVE AXIS: -76 DEGREES
TRIGL SERPL-MCNC: 80 MG/DL
TROPONIN I SERPL-MCNC: 0.02 NG/ML
UROBILINOGEN UR QL STRIP.AUTO: 0.2 E.U./DL
VENTRICULAR RATE: 80 BPM
VENTRICULAR RATE: 82 BPM
VIT B12 SERPL-MCNC: 490 PG/ML (ref 100–900)

## 2019-04-20 PROCEDURE — 82607 VITAMIN B-12: CPT | Performed by: GENERAL PRACTICE

## 2019-04-20 PROCEDURE — 80061 LIPID PANEL: CPT | Performed by: HOSPITALIST

## 2019-04-20 PROCEDURE — 99222 1ST HOSP IP/OBS MODERATE 55: CPT | Performed by: INTERNAL MEDICINE

## 2019-04-20 PROCEDURE — 83735 ASSAY OF MAGNESIUM: CPT | Performed by: HOSPITALIST

## 2019-04-20 PROCEDURE — 99232 SBSQ HOSP IP/OBS MODERATE 35: CPT | Performed by: GENERAL PRACTICE

## 2019-04-20 PROCEDURE — 80048 BASIC METABOLIC PNL TOTAL CA: CPT | Performed by: HOSPITALIST

## 2019-04-20 PROCEDURE — 84484 ASSAY OF TROPONIN QUANT: CPT | Performed by: HOSPITALIST

## 2019-04-20 PROCEDURE — 80162 ASSAY OF DIGOXIN TOTAL: CPT | Performed by: GENERAL PRACTICE

## 2019-04-20 PROCEDURE — 93010 ELECTROCARDIOGRAM REPORT: CPT | Performed by: INTERNAL MEDICINE

## 2019-04-20 PROCEDURE — 81003 URINALYSIS AUTO W/O SCOPE: CPT | Performed by: HOSPITALIST

## 2019-04-20 RX ORDER — FUROSEMIDE 20 MG/1
20 TABLET ORAL DAILY
Status: DISCONTINUED | OUTPATIENT
Start: 2019-04-21 | End: 2019-04-22 | Stop reason: HOSPADM

## 2019-04-20 RX ORDER — POTASSIUM CHLORIDE 20 MEQ/1
20 TABLET, EXTENDED RELEASE ORAL 2 TIMES DAILY
Status: DISCONTINUED | OUTPATIENT
Start: 2019-04-20 | End: 2019-04-22

## 2019-04-20 RX ADMIN — Medication 400 MG: at 09:18

## 2019-04-20 RX ADMIN — HEPARIN SODIUM 5000 UNITS: 5000 INJECTION INTRAVENOUS; SUBCUTANEOUS at 21:30

## 2019-04-20 RX ADMIN — LISINOPRIL 20 MG: 20 TABLET ORAL at 09:19

## 2019-04-20 RX ADMIN — FUROSEMIDE 20 MG: 10 INJECTION, SOLUTION INTRAMUSCULAR; INTRAVENOUS at 09:20

## 2019-04-20 RX ADMIN — CHOLESTYRAMINE 4 G: 4 POWDER, FOR SUSPENSION ORAL at 09:29

## 2019-04-20 RX ADMIN — HEPARIN SODIUM 5000 UNITS: 5000 INJECTION INTRAVENOUS; SUBCUTANEOUS at 13:08

## 2019-04-20 RX ADMIN — Medication 250 MG: at 17:09

## 2019-04-20 RX ADMIN — HEPARIN SODIUM 5000 UNITS: 5000 INJECTION INTRAVENOUS; SUBCUTANEOUS at 05:27

## 2019-04-20 RX ADMIN — POTASSIUM CHLORIDE 20 MEQ: 1500 TABLET, EXTENDED RELEASE ORAL at 09:29

## 2019-04-20 RX ADMIN — POTASSIUM CHLORIDE 20 MEQ: 1500 TABLET, EXTENDED RELEASE ORAL at 17:09

## 2019-04-20 RX ADMIN — METOPROLOL SUCCINATE 25 MG: 25 TABLET, EXTENDED RELEASE ORAL at 09:18

## 2019-04-20 RX ADMIN — DIGOXIN 125 MCG: 125 TABLET ORAL at 09:19

## 2019-04-20 RX ADMIN — CHOLESTYRAMINE 4 G: 4 POWDER, FOR SUSPENSION ORAL at 17:09

## 2019-04-20 RX ADMIN — TRAZODONE HYDROCHLORIDE 50 MG: 50 TABLET ORAL at 21:30

## 2019-04-20 RX ADMIN — Medication 400 MG: at 17:09

## 2019-04-20 RX ADMIN — Medication 250 MG: at 09:18

## 2019-04-20 RX ADMIN — ASPIRIN 81 MG 81 MG: 81 TABLET ORAL at 09:19

## 2019-04-21 ENCOUNTER — APPOINTMENT (INPATIENT)
Dept: NON INVASIVE DIAGNOSTICS | Facility: HOSPITAL | Age: 84
DRG: 291 | End: 2019-04-21
Payer: MEDICARE

## 2019-04-21 LAB
ANION GAP SERPL CALCULATED.3IONS-SCNC: 5 MMOL/L (ref 4–13)
BUN SERPL-MCNC: 32 MG/DL (ref 5–25)
CALCIUM SERPL-MCNC: 9.3 MG/DL (ref 8.3–10.1)
CHLORIDE SERPL-SCNC: 104 MMOL/L (ref 100–108)
CO2 SERPL-SCNC: 28 MMOL/L (ref 21–32)
CREAT SERPL-MCNC: 1.02 MG/DL (ref 0.6–1.3)
GFR SERPL CREATININE-BSD FRML MDRD: 47 ML/MIN/1.73SQ M
GLUCOSE SERPL-MCNC: 102 MG/DL (ref 65–140)
POTASSIUM SERPL-SCNC: 3.8 MMOL/L (ref 3.5–5.3)
SODIUM SERPL-SCNC: 137 MMOL/L (ref 136–145)

## 2019-04-21 PROCEDURE — G8987 SELF CARE CURRENT STATUS: HCPCS

## 2019-04-21 PROCEDURE — G8988 SELF CARE GOAL STATUS: HCPCS

## 2019-04-21 PROCEDURE — 99232 SBSQ HOSP IP/OBS MODERATE 35: CPT | Performed by: GENERAL PRACTICE

## 2019-04-21 PROCEDURE — G0515 COGNITIVE SKILLS DEVELOPMENT: HCPCS

## 2019-04-21 PROCEDURE — 97167 OT EVAL HIGH COMPLEX 60 MIN: CPT

## 2019-04-21 PROCEDURE — NC001 PR NO CHARGE: Performed by: INTERNAL MEDICINE

## 2019-04-21 PROCEDURE — 99232 SBSQ HOSP IP/OBS MODERATE 35: CPT | Performed by: INTERNAL MEDICINE

## 2019-04-21 PROCEDURE — 80048 BASIC METABOLIC PNL TOTAL CA: CPT | Performed by: GENERAL PRACTICE

## 2019-04-21 RX ADMIN — ASPIRIN 81 MG 81 MG: 81 TABLET ORAL at 10:19

## 2019-04-21 RX ADMIN — Medication 250 MG: at 10:19

## 2019-04-21 RX ADMIN — TRAZODONE HYDROCHLORIDE 50 MG: 50 TABLET ORAL at 21:49

## 2019-04-21 RX ADMIN — Medication 400 MG: at 10:19

## 2019-04-21 RX ADMIN — HEPARIN SODIUM 5000 UNITS: 5000 INJECTION INTRAVENOUS; SUBCUTANEOUS at 06:52

## 2019-04-21 RX ADMIN — HEPARIN SODIUM 5000 UNITS: 5000 INJECTION INTRAVENOUS; SUBCUTANEOUS at 21:49

## 2019-04-21 RX ADMIN — Medication 250 MG: at 17:12

## 2019-04-21 RX ADMIN — CHOLESTYRAMINE 4 G: 4 POWDER, FOR SUSPENSION ORAL at 17:12

## 2019-04-21 RX ADMIN — LISINOPRIL 20 MG: 20 TABLET ORAL at 10:19

## 2019-04-21 RX ADMIN — FUROSEMIDE 20 MG: 20 TABLET ORAL at 10:20

## 2019-04-21 RX ADMIN — HEPARIN SODIUM 5000 UNITS: 5000 INJECTION INTRAVENOUS; SUBCUTANEOUS at 13:59

## 2019-04-21 RX ADMIN — DIGOXIN 125 MCG: 125 TABLET ORAL at 10:20

## 2019-04-21 RX ADMIN — POTASSIUM CHLORIDE 20 MEQ: 1500 TABLET, EXTENDED RELEASE ORAL at 10:19

## 2019-04-21 RX ADMIN — POTASSIUM CHLORIDE 20 MEQ: 1500 TABLET, EXTENDED RELEASE ORAL at 17:11

## 2019-04-21 RX ADMIN — CHOLESTYRAMINE 4 G: 4 POWDER, FOR SUSPENSION ORAL at 10:26

## 2019-04-21 RX ADMIN — Medication 400 MG: at 17:12

## 2019-04-21 RX ADMIN — METOPROLOL SUCCINATE 25 MG: 25 TABLET, EXTENDED RELEASE ORAL at 10:19

## 2019-04-22 ENCOUNTER — APPOINTMENT (INPATIENT)
Dept: NON INVASIVE DIAGNOSTICS | Facility: HOSPITAL | Age: 84
DRG: 291 | End: 2019-04-22
Payer: MEDICARE

## 2019-04-22 VITALS
WEIGHT: 110.01 LBS | BODY MASS INDEX: 20.77 KG/M2 | HEIGHT: 61 IN | TEMPERATURE: 97.88 F | HEART RATE: 106 BPM | DIASTOLIC BLOOD PRESSURE: 69 MMHG | OXYGEN SATURATION: 96 % | SYSTOLIC BLOOD PRESSURE: 126 MMHG | RESPIRATION RATE: 18 BRPM

## 2019-04-22 PROBLEM — I50.9 ACUTE CONGESTIVE HEART FAILURE (HCC): Status: RESOLVED | Noted: 2019-04-19 | Resolved: 2019-04-22

## 2019-04-22 LAB
ANION GAP SERPL CALCULATED.3IONS-SCNC: 7 MMOL/L (ref 4–13)
BASOPHILS # BLD AUTO: 0.05 THOUSANDS/ΜL (ref 0–0.1)
BASOPHILS NFR BLD AUTO: 1 % (ref 0–1)
BUN SERPL-MCNC: 21 MG/DL (ref 5–25)
CALCIUM SERPL-MCNC: 9.3 MG/DL (ref 8.3–10.1)
CHLORIDE SERPL-SCNC: 102 MMOL/L (ref 100–108)
CO2 SERPL-SCNC: 28 MMOL/L (ref 21–32)
CREAT SERPL-MCNC: 0.98 MG/DL (ref 0.6–1.3)
EOSINOPHIL # BLD AUTO: 0.61 THOUSAND/ΜL (ref 0–0.61)
EOSINOPHIL NFR BLD AUTO: 8 % (ref 0–6)
ERYTHROCYTE [DISTWIDTH] IN BLOOD BY AUTOMATED COUNT: 13.3 % (ref 11.6–15.1)
GFR SERPL CREATININE-BSD FRML MDRD: 49 ML/MIN/1.73SQ M
GLUCOSE SERPL-MCNC: 97 MG/DL (ref 65–140)
HCT VFR BLD AUTO: 34.3 % (ref 34.8–46.1)
HGB BLD-MCNC: 10.8 G/DL (ref 11.5–15.4)
IMM GRANULOCYTES # BLD AUTO: 0.02 THOUSAND/UL (ref 0–0.2)
IMM GRANULOCYTES NFR BLD AUTO: 0 % (ref 0–2)
LYMPHOCYTES # BLD AUTO: 1.94 THOUSANDS/ΜL (ref 0.6–4.47)
LYMPHOCYTES NFR BLD AUTO: 26 % (ref 14–44)
MCH RBC QN AUTO: 29.4 PG (ref 26.8–34.3)
MCHC RBC AUTO-ENTMCNC: 31.5 G/DL (ref 31.4–37.4)
MCV RBC AUTO: 94 FL (ref 82–98)
MONOCYTES # BLD AUTO: 0.58 THOUSAND/ΜL (ref 0.17–1.22)
MONOCYTES NFR BLD AUTO: 8 % (ref 4–12)
NEUTROPHILS # BLD AUTO: 4.28 THOUSANDS/ΜL (ref 1.85–7.62)
NEUTS SEG NFR BLD AUTO: 57 % (ref 43–75)
NRBC BLD AUTO-RTO: 0 /100 WBCS
PLATELET # BLD AUTO: 177 THOUSANDS/UL (ref 149–390)
PMV BLD AUTO: 10.3 FL (ref 8.9–12.7)
POTASSIUM SERPL-SCNC: 4.1 MMOL/L (ref 3.5–5.3)
RBC # BLD AUTO: 3.67 MILLION/UL (ref 3.81–5.12)
SODIUM SERPL-SCNC: 137 MMOL/L (ref 136–145)
WBC # BLD AUTO: 7.48 THOUSAND/UL (ref 4.31–10.16)

## 2019-04-22 PROCEDURE — G8978 MOBILITY CURRENT STATUS: HCPCS

## 2019-04-22 PROCEDURE — 85025 COMPLETE CBC W/AUTO DIFF WBC: CPT | Performed by: GENERAL PRACTICE

## 2019-04-22 PROCEDURE — 99239 HOSP IP/OBS DSCHRG MGMT >30: CPT | Performed by: GENERAL PRACTICE

## 2019-04-22 PROCEDURE — 97163 PT EVAL HIGH COMPLEX 45 MIN: CPT

## 2019-04-22 PROCEDURE — G8979 MOBILITY GOAL STATUS: HCPCS

## 2019-04-22 PROCEDURE — 80048 BASIC METABOLIC PNL TOTAL CA: CPT | Performed by: GENERAL PRACTICE

## 2019-04-22 RX ORDER — TRAZODONE HYDROCHLORIDE 50 MG/1
50 TABLET ORAL
Qty: 3 TABLET | Refills: 0 | Status: SHIPPED | OUTPATIENT
Start: 2019-04-22 | End: 2019-01-01 | Stop reason: HOSPADM

## 2019-04-22 RX ORDER — FUROSEMIDE 20 MG/1
20 TABLET ORAL DAILY
Qty: 30 TABLET | Refills: 0 | Status: ON HOLD | OUTPATIENT
Start: 2019-04-23 | End: 2019-01-01 | Stop reason: SDUPTHER

## 2019-04-22 RX ORDER — POTASSIUM CHLORIDE 750 MG/1
10 TABLET, EXTENDED RELEASE ORAL DAILY
Qty: 30 TABLET | Refills: 0 | Status: ON HOLD | OUTPATIENT
Start: 2019-04-22 | End: 2019-01-01 | Stop reason: SDUPTHER

## 2019-04-22 RX ADMIN — DIGOXIN 125 MCG: 125 TABLET ORAL at 09:15

## 2019-04-22 RX ADMIN — LISINOPRIL 20 MG: 20 TABLET ORAL at 09:15

## 2019-04-22 RX ADMIN — ASPIRIN 81 MG 81 MG: 81 TABLET ORAL at 09:15

## 2019-04-22 RX ADMIN — POTASSIUM CHLORIDE 20 MEQ: 1500 TABLET, EXTENDED RELEASE ORAL at 09:16

## 2019-04-22 RX ADMIN — METOPROLOL SUCCINATE 25 MG: 25 TABLET, EXTENDED RELEASE ORAL at 09:15

## 2019-04-22 RX ADMIN — Medication 250 MG: at 09:15

## 2019-04-22 RX ADMIN — HEPARIN SODIUM 5000 UNITS: 5000 INJECTION INTRAVENOUS; SUBCUTANEOUS at 06:33

## 2019-04-22 RX ADMIN — Medication 400 MG: at 09:15

## 2019-04-22 RX ADMIN — FUROSEMIDE 20 MG: 20 TABLET ORAL at 09:15

## 2019-04-22 RX ADMIN — HEPARIN SODIUM 5000 UNITS: 5000 INJECTION INTRAVENOUS; SUBCUTANEOUS at 14:31

## 2019-04-22 RX ADMIN — CHOLESTYRAMINE 4 G: 4 POWDER, FOR SUSPENSION ORAL at 09:16

## 2019-07-20 ENCOUNTER — APPOINTMENT (EMERGENCY)
Dept: CT IMAGING | Facility: HOSPITAL | Age: 84
End: 2019-07-20
Payer: MEDICARE

## 2019-07-20 ENCOUNTER — HOSPITAL ENCOUNTER (EMERGENCY)
Facility: HOSPITAL | Age: 84
Discharge: HOME | End: 2019-07-20
Attending: EMERGENCY MEDICINE | Admitting: EMERGENCY MEDICINE
Payer: MEDICARE

## 2019-07-20 VITALS
WEIGHT: 112.66 LBS | SYSTOLIC BLOOD PRESSURE: 166 MMHG | TEMPERATURE: 98.3 F | HEART RATE: 78 BPM | DIASTOLIC BLOOD PRESSURE: 80 MMHG | BODY MASS INDEX: 21.27 KG/M2 | HEIGHT: 61 IN | OXYGEN SATURATION: 92 % | RESPIRATION RATE: 16 BRPM

## 2019-07-20 DIAGNOSIS — R19.7 ACUTE DIARRHEA: Primary | ICD-10-CM

## 2019-07-20 LAB
ALBUMIN SERPL BCP-MCNC: 3.7 G/DL (ref 3.5–5)
ALP SERPL-CCNC: 156 U/L (ref 46–116)
ALT SERPL W P-5'-P-CCNC: 56 U/L (ref 12–78)
ANION GAP SERPL CALCULATED.3IONS-SCNC: 10 MMOL/L (ref 4–13)
AST SERPL W P-5'-P-CCNC: 57 U/L (ref 5–45)
BASOPHILS # BLD AUTO: 0.07 THOUSANDS/ΜL (ref 0–0.1)
BASOPHILS NFR BLD AUTO: 1 % (ref 0–1)
BILIRUB SERPL-MCNC: 0.7 MG/DL (ref 0.2–1)
BUN SERPL-MCNC: 21 MG/DL (ref 5–25)
CALCIUM SERPL-MCNC: 10.3 MG/DL (ref 8.3–10.1)
CHLORIDE SERPL-SCNC: 98 MMOL/L (ref 100–108)
CO2 SERPL-SCNC: 30 MMOL/L (ref 21–32)
CREAT SERPL-MCNC: 0.99 MG/DL (ref 0.6–1.3)
EOSINOPHIL # BLD AUTO: 0.31 THOUSAND/ΜL (ref 0–0.61)
EOSINOPHIL NFR BLD AUTO: 4 % (ref 0–6)
ERYTHROCYTE [DISTWIDTH] IN BLOOD BY AUTOMATED COUNT: 13.1 % (ref 11.6–15.1)
GFR SERPL CREATININE-BSD FRML MDRD: 48 ML/MIN/1.73SQ M
GLUCOSE SERPL-MCNC: 91 MG/DL (ref 65–140)
HCT VFR BLD AUTO: 45.9 % (ref 34.8–46.1)
HGB BLD-MCNC: 14.3 G/DL (ref 11.5–15.4)
IMM GRANULOCYTES # BLD AUTO: 0.01 THOUSAND/UL (ref 0–0.2)
IMM GRANULOCYTES NFR BLD AUTO: 0 % (ref 0–2)
LIPASE SERPL-CCNC: 153 U/L (ref 73–393)
LYMPHOCYTES # BLD AUTO: 1.55 THOUSANDS/ΜL (ref 0.6–4.47)
LYMPHOCYTES NFR BLD AUTO: 21 % (ref 14–44)
MCH RBC QN AUTO: 28.4 PG (ref 26.8–34.3)
MCHC RBC AUTO-ENTMCNC: 31.2 G/DL (ref 31.4–37.4)
MCV RBC AUTO: 91 FL (ref 82–98)
MONOCYTES # BLD AUTO: 0.68 THOUSAND/ΜL (ref 0.17–1.22)
MONOCYTES NFR BLD AUTO: 9 % (ref 4–12)
NEUTROPHILS # BLD AUTO: 4.79 THOUSANDS/ΜL (ref 1.85–7.62)
NEUTS SEG NFR BLD AUTO: 65 % (ref 43–75)
NRBC BLD AUTO-RTO: 0 /100 WBCS
PLATELET # BLD AUTO: 175 THOUSANDS/UL (ref 149–390)
PMV BLD AUTO: 11.5 FL (ref 8.9–12.7)
POTASSIUM SERPL-SCNC: 4.1 MMOL/L (ref 3.5–5.3)
PROT SERPL-MCNC: 8.5 G/DL (ref 6.4–8.2)
RBC # BLD AUTO: 5.04 MILLION/UL (ref 3.81–5.12)
SODIUM SERPL-SCNC: 138 MMOL/L (ref 136–145)
WBC # BLD AUTO: 7.41 THOUSAND/UL (ref 4.31–10.16)

## 2019-07-20 PROCEDURE — 74177 CT ABD & PELVIS W/CONTRAST: CPT

## 2019-07-20 PROCEDURE — 85025 COMPLETE CBC W/AUTO DIFF WBC: CPT | Performed by: EMERGENCY MEDICINE

## 2019-07-20 PROCEDURE — 80053 COMPREHEN METABOLIC PANEL: CPT | Performed by: EMERGENCY MEDICINE

## 2019-07-20 PROCEDURE — 99284 EMERGENCY DEPT VISIT MOD MDM: CPT | Performed by: EMERGENCY MEDICINE

## 2019-07-20 PROCEDURE — 36415 COLL VENOUS BLD VENIPUNCTURE: CPT

## 2019-07-20 PROCEDURE — 99285 EMERGENCY DEPT VISIT HI MDM: CPT

## 2019-07-20 PROCEDURE — 96360 HYDRATION IV INFUSION INIT: CPT

## 2019-07-20 PROCEDURE — 83690 ASSAY OF LIPASE: CPT | Performed by: EMERGENCY MEDICINE

## 2019-07-20 RX ADMIN — SODIUM CHLORIDE 500 ML: 0.9 INJECTION, SOLUTION INTRAVENOUS at 13:35

## 2019-07-20 RX ADMIN — IODIXANOL 100 ML: 320 INJECTION, SOLUTION INTRAVASCULAR at 13:22

## 2019-07-20 NOTE — ED PROVIDER NOTES
History  Chief Complaint   Patient presents with    Diarrhea     pt presents via EMS from [address] [place] [address], c/o diarrhea since last night  HPI    Prior to Admission Medications   Prescriptions Last Dose Informant Patient Reported? Taking? amLODIPine (NORVASC) 5 mg tablet   Yes Yes   Sig: Take 5 mg by mouth daily   aspirin 81 mg chewable tablet  Self Yes Yes   Sig: Chew 1 tablet daily   cholestyramine sugar free (QUESTRAN LIGHT) 4 g packet   No No   Sig: Take 1 packet (4 g total) by mouth 2 (two) times a day   digoxin (LANOXIN) 0 125 mg tablet   Yes Yes   Sig: Take 125 mcg by mouth daily   ergocalciferol (VITAMIN D2) 50,000 units   Yes Yes   furosemide (LASIX) 20 mg tablet   No Yes   Sig: Take 1 tablet (20 mg total) by mouth daily   lisinopril (ZESTRIL) 20 mg tablet   No Yes   Sig: Take 1 tablet (20 mg total) by mouth daily   magnesium oxide (MAG-OX) 400 mg   No Yes   Sig: Take 1 tablet (400 mg total) by mouth 2 (two) times a day   metoprolol succinate (TOPROL-XL) 25 mg 24 hr tablet   Yes No   Sig: Take 25 mg by mouth daily    potassium chloride (K-DUR,KLOR-CON) 10 mEq tablet   No No   Sig: Take 1 tablet (10 mEq total) by mouth daily   Patient taking differently: Take 20 mEq by mouth every other day    traZODone (DESYREL) 50 mg tablet   No No   Sig: Take 1 tablet (50 mg total) by mouth daily at bedtime      Facility-Administered Medications: None       Past Medical History:   Diagnosis Date    Breast cancer ([facility] )     Cancer ([facility] )     Hypertension     Lymphedema of arm     L arm       Past Surgical History:   Procedure Laterality Date    BLADDER REPAIR      BREAST SURGERY      HYSTERECTOMY      MASTECTOMY         History reviewed  No pertinent family history  I have reviewed and agree with the history as documented      Social History     Tobacco Use    Smoking status: Never Smoker    Smokeless tobacco: Never Used   Substance Use Topics    Alcohol use: No    Drug use: No        Review of Systems    Physical Exam  Physical Exam   Constitutional: She is oriented to person, place, and time  She appears well-developed and well-nourished  No distress  frail   HENT:   Head: Normocephalic and atraumatic  Mouth/Throat: Oropharynx is clear and moist    Eyes: Pupils are equal, round, and reactive to light  Conjunctivae are normal    Neck: Normal range of motion  No tracheal deviation present  Cardiovascular: Normal rate, regular rhythm, normal heart sounds and intact distal pulses  Pulmonary/Chest: Effort normal and breath sounds normal  No respiratory distress  Abdominal: Soft  Bowel sounds are normal  She exhibits no distension  There is no tenderness  Neurological: She is alert and oriented to person, place, and time  She has normal strength  GCS eye subscore is 4  GCS verbal subscore is 5  GCS motor subscore is 6  Skin: Skin is warm and dry  Psychiatric: She has a normal mood and affect  Her behavior is normal    Nursing note and vitals reviewed        Vital Signs  ED Triage Vitals   Temperature Pulse Respirations Blood Pressure SpO2   07/20/19 1059 07/20/19 1059 07/20/19 1059 07/20/19 1059 07/20/19 1059   98 3 °F (36 8 °C) 78 16 169/86 94 %      Temp Source Heart Rate Source Patient Position - Orthostatic VS BP Location FiO2 (%)   07/20/19 1059 07/20/19 1059 07/20/19 1059 07/20/19 1059 --   Oral Monitor Lying Right arm       Pain Score       07/20/19 1054       No Pain           Vitals:    07/20/19 1206 07/20/19 1230 07/20/19 1300 07/20/19 1345   BP: 170/82 159/82 161/91 166/80   Pulse: 84 87 82 78   Patient Position - Orthostatic VS: Lying Lying Lying Lying         Visual Acuity      ED Medications  Medications   sodium chloride 0 9 % bolus 500 mL (500 mL Intravenous New Bag 7/20/19 1335)   iodixanol (VISIPAQUE) 320 MG/ML injection 100 mL (100 mL Intravenous Given 7/20/19 1322)       Diagnostic Studies  Results Reviewed     Procedure Component Value Units Date/Time    Comprehensive metabolic panel [[id number]]  (Abnormal) Collected:  07/20/19 1149    Lab Status:  Final result Specimen:  Blood from Hand, Right Updated:  07/20/19 1234     Sodium 138 mmol/L      Potassium 4 1 mmol/L      Chloride 98 mmol/L      CO2 30 mmol/L      ANION GAP 10 mmol/L      BUN 21 mg/dL      Creatinine 0 99 mg/dL      Glucose 91 mg/dL      Calcium 10 3 mg/dL      AST 57 U/L      ALT 56 U/L      Alkaline Phosphatase 156 U/L      Total Protein 8 5 g/dL      Albumin 3 7 g/dL      Total Bilirubin 0 70 mg/dL      eGFR 48 ml/min/1 73sq m     Narrative:       National Kidney Disease Foundation guidelines for Chronic Kidney Disease (CKD):     Stage 1 with normal or high GFR (GFR > 90 mL/min/1 73 square meters)    Stage 2 Mild CKD (GFR = 60-89 mL/min/1 73 square meters)    Stage 3A Moderate CKD (GFR = 45-59 mL/min/1 73 square meters)    Stage 3B Moderate CKD (GFR = 30-44 mL/min/1 73 square meters)    Stage 4 Severe CKD (GFR = 15-29 mL/min/1 73 square meters)    Stage 5 End Stage CKD (GFR <15 mL/min/1 73 square meters)  Note: GFR calculation is accurate only with a steady state creatinine    Lipase [[id number]]  (Normal) Collected:  07/20/19 1149    Lab Status:  Final result Specimen:  Blood from Hand, Right Updated:  07/20/19 1234     Lipase 153 u/L     CBC and differential [[id number]]  (Abnormal) Collected:  07/20/19 1149    Lab Status:  Final result Specimen:  Blood from Arm, Right Updated:  07/20/19 1155     WBC 7 41 Thousand/uL      RBC 5 04 Million/uL      Hemoglobin 14 3 g/dL      Hematocrit 45 9 %      MCV 91 fL      MCH 28 4 pg      MCHC 31 2 g/dL      RDW 13 1 %      MPV 11 5 fL      Platelets 917 Thousands/uL      nRBC 0 /100 WBCs      Neutrophils Relative 65 %      Immat GRANS % 0 %      Lymphocytes Relative 21 %      Monocytes Relative 9 %      Eosinophils Relative 4 %      Basophils Relative 1 %      Neutrophils Absolute 4 79 Thousands/µL      Immature Grans Absolute 0 01 Thousand/uL      Lymphocytes Absolute 1 55 Thousands/µL      Monocytes Absolute 0 68 Thousand/µL      Eosinophils Absolute 0 31 Thousand/µL      Basophils Absolute 0 07 Thousands/µL                  CT abdomen pelvis with contrast   Final Result by Magda Velasquez MD (07/20 3670)      1  Increased stool within the colon without evidence of bowel obstruction   2  Perianal inflammatory changes identified without evidence of abscess  3  Cardiomegaly               Workstation performed: [id number]                    Procedures  Procedures       ED Course           Identification of Seniors at Risk      Most Recent Value   (ISAR) Identification of Seniors at Risk   Before the illness or injury that brought you to the Emergency, did you need someone to help you on a regular basis? 0 Filed at: 07/20/2019 1056   In the last 24 hours, have you needed more help than usual?  0 Filed at: 07/20/2019 1056   Have you been hospitalized for one or more nights during the past 6 months? 0 Filed at: 07/20/2019 1056   In general, do you see well?  0 Filed at: 07/20/2019 1056   In general, do you have serious problems with your memory? 0 Filed at: 07/20/2019 1056   Do you take more than three different medications every day? 1 Filed at: 07/20/2019 1056   ISAR Score  1 Filed at: 07/20/2019 1056                          MDM  Number of Diagnoses or Management Options  Acute diarrhea: new and requires workup  Diagnosis management comments: This is a 14-year-old female presents here today from her nursing home for evaluation of diarrhea  The patient states starting at about midnight last night she developed diarrhea, would go to the bathroom, get back into bed, and have to get back up to move her bowels  She states her last bowel movement was shortly prior to being sent to the emergency department  She denies any nausea, vomiting, difficulties tolerating fluids, abdominal pain, fevers, urinary symptoms  She states she has urinated several times this morning    She thinks that she ate something that upset her stomach and caused her symptoms, but is unsure of what exactly would have caused her symptoms  She is not sure of the anybody else at the nursing facility has similar symptoms  She denies any recent antibiotics or changes in medications  The patient apologizes for being in the hospital, as she denies any significant concerns about her symptoms, but was told that she needed to be evaluated  According to the note from the facility, they stated that she seemed weak, so sent her for evaluation  She has been seen for diarrhea several times previously, and has been diagnosed with colitis  She has been admitted previously for CONSTANCE and dehydration related to this  ROS: Otherwise negative, unless stated as above  She is well-appearing, in no acute distress  Her exam is unremarkable  Lab work was done by nursing prior to the patient being seen by myself  This is unremarkable  She is not having any abdominal pain or tenderness, so I am not sure why lipase was ordered, as there are no concerns based on history or exam for pancreatitis  I do not feel that she needs any additional lab work based on her symptoms  However, given her age in history we will get a CT scan to ensure there are no acute findings, such as colitis, diverticulitis, obstruction, but he contributing to her symptoms  CT scan shows no acute abnormalities  I discussed with the patient findings, treatment at home, follow-up, and indications for return, and she expresses understanding this plan         Amount and/or Complexity of Data Reviewed  Clinical lab tests: ordered and reviewed  Tests in the radiology section of CPT®: ordered and reviewed  Decide to obtain previous medical records or to obtain history from someone other than the patient: yes  Review and summarize past medical records: yes  Independent visualization of images, tracings, or specimens: yes        Disposition  Final diagnoses:   Acute diarrhea     Time reflects when diagnosis was documented in both MDM as applicable and the Disposition within this note     Time User Action Codes Description Comment    7/20/2019  2:11 PM Beena Stein Add [R19 7] Acute diarrhea       ED Disposition     ED Disposition Condition Date/Time Comment    Discharge Good Sat Jul 20, 2019  2:11 PM Christian Ghosh discharge to home/self care  Follow-up Information     Follow up With Specialties Details Why Contact Info    Berto Allison,  Internal Medicine Schedule an appointment as soon as possible for a visit in 3 days As needed, to follow up on your symptoms [address]  [phone]            Patient's Medications   Discharge Prescriptions    No medications on file     No discharge procedures on file      ED Provider  Electronically Signed by           Toni Caldwell MD  07/20/19 2211

## 2019-07-20 NOTE — DISCHARGE INSTRUCTIONS
Make sure you drink plenty of fluids, and eat as you are able to tolerate  Follow up with your primary care doctor to make sure you are doing better  Return for development of severe pain, persistent vomiting and inability to tolerate fluids, fevers, or for any other concerns

## 2019-07-20 NOTE — ED NOTES
Spencer Johnson 24 attempted to be called at this time  No answer at this time       Ramona Landis RN  07/20/19 6368

## 2019-07-20 NOTE — ED NOTES
Spencer Johnson [address] attempted to be called at this time  No answer at this time       Dorcas Vasques, MARY  07/20/19 9610

## 2019-11-17 PROBLEM — F29 PSYCHOSIS (HCC): Status: ACTIVE | Noted: 2019-01-01

## 2019-11-17 NOTE — PROGRESS NOTES
Pt was admitted to [facility] with the following items   Next to bed for use  Upper dentures  1 white nightgown with reds flowers on it  1 pair of black socks  1 pair of slip on shoes  1 yellow necklace with a pink stone which is on Pt  1 white watch on her wrist   In locked contraband in room  1 yellow bathrobe

## 2019-11-17 NOTE — PLAN OF CARE
Problem: Alteration in Thoughts and Perception  Goal: Treatment Goal: Gain control of psychotic behaviors/thinking, reduce/eliminate presenting symptoms and demonstrate improved reality functioning upon discharge  Outcome: Progressing  Goal: Verbalize thoughts and feelings  Description  Interventions:  - Promote a nonjudgmental and trusting relationship with the patient through active listening and therapeutic communication  - Assess patient's level of functioning, behavior and potential for risk  - Engage patient in 1 on 1 interactions  - Encourage patient to express fears, feelings, frustrations, and discuss symptoms    - [place] patient to reality, help patient recognize reality-based thinking   - Administer medications as ordered and assess for potential side effects  - Provide the patient education related to the signs and symptoms of the illness and desired effects of prescribed medications  Outcome: Progressing  Goal: Refrain from acting on delusional thinking/internal stimuli  Description  Interventions:  - Monitor patient closely, per order   - Utilize least restrictive measures   - Set reasonable limits, give positive feedback for acceptable   - Administer medications as ordered and monitor of potential side effects  Outcome: Progressing  Goal: Agree to be compliant with medication regime, as prescribed and report medication side effects  Description  Interventions:  - Offer appropriate PRN medication and supervise ingestion; conduct AIMS, as needed   Outcome: Progressing  Goal: Attend and participate in unit activities, including therapeutic, recreational, and educational groups  Description  Interventions:  -Encourage Visitation and family involvement in care  Outcome: Progressing  Goal: Recognize dysfunctional thoughts, communicate reality-based thoughts at the time of discharge  Description  Interventions:  - Provide medication and psycho-education to assist patient in compliance and developing insight into his/her illness   Outcome: Progressing  Goal: Complete daily ADLs, including personal hygiene independently, as able  Description  Interventions:  - Observe, teach, and assist patient with ADLS  - Monitor and promote a balance of rest/activity, with adequate nutrition and elimination   Outcome: Progressing     Problem: Anxiety  Goal: Anxiety is at manageable level  Description  Interventions:  - Assess and monitor patient's anxiety level  - Monitor for signs and symptoms (heart palpitations, chest pain, shortness of breath, headaches, nausea, feeling jumpy, restlessness, irritable, apprehensive)  - Collaborate with interdisciplinary team and initiate plan and interventions as ordered    - [place] patient to unit/surroundings  - Explain treatment plan  - Encourage participation in care  - Encourage verbalization of concerns/fears  - Identify coping mechanisms  - Assist in developing anxiety-reducing skills  - Administer/offer alternative therapies  - Limit or eliminate stimulants  Outcome: Progressing     Problem: Alteration in Orientation  Goal: Treatment Goal: Demonstrate a reduction of confusion and improved orientation to person, place, time and/or situation upon discharge, according to optimum baseline/ability  Outcome: Progressing  Goal: Interact with staff daily  Description  Interventions:  - Assess and re-assess patient's level of orientation  - Engage patient in 1 on 1 interactions, daily, for a minimum of 15 minutes   - Establish rapport/trust with patient   Outcome: Progressing  Goal: Express concerns related to confused thinking related to:  Description  Interventions:  - Encourage patient to express feelings, fears, frustrations, hopes  - Assign consistent caregivers   - [place]/re-orient patient as needed  - Allow comfort items, as appropriate  - Provide visual cues, signs, etc    Outcome: Progressing  Goal: Allow medical examinations, as recommended  Description  Interventions:  - Provide physical/neurological exams and/or referrals, per provider   Outcome: Progressing  Goal: Cooperate with recommended testing/procedures  Description  Interventions:  - Determine need for ancillary testing  - Observe for mental status changes  - Implement falls/precaution protocol   Outcome: Progressing  Goal: Attend and participate in unit activities, including therapeutic, recreational, and educational groups  Description  Interventions:  - Provide therapeutic and educational activities daily, encourage attendance and participation, and document same in the medical record   - Provide appropriate opportunities for reminiscence   - Provide a consistent daily routine   - Encourage family contact/visitation   Outcome: Progressing  Goal: Complete daily ADLs, including personal hygiene independently, as able  Description  Interventions:  - Observe, teach, and assist patient with ADLS  Outcome: Progressing     Problem: PSYCHOSIS  Goal: Will report no hallucinations or delusions  Description  Interventions:  - Administer medication as  ordered  - Every waking shifts and PRN assess for the presence of hallucinations and or delusions  - Assist with reality testing to support increasing orientation  - Assess if patient's hallucinations or delusions are encouraging self-harm or harm to others and intervene as appropriate  Outcome: Progressing     Problem: BEHAVIOR  Goal: Pt/Family maintain appropriate behavior and adhere to behavioral management agreement, if implemented  Description  INTERVENTIONS:  - Assess the family dynamic   - Encourage verbalization of thoughts and concerns in a socially appropriate manner  - Assess patient/family's coping skills and non-compliant behavior (including use of illegal substances)    - Utilize positive, consistent limit setting strategies supporting safety of patient, staff and others  - Initiate consult with Case Management, Spiritual Care or other ancillary services as appropriate  - If a patient's/visitor's behavior jeopardizes the safety of the patient, staff, or others, refer to organization procedure     - Notify Security of behavior or suspected illegal substances which indicate the need for search of the patient and/or belongings  - Encourage participation in the decision making process about a behavioral management agreement; implement if patient meets criteria  Outcome: Progressing     Problem: ANXIETY  Goal: Will report anxiety at manageable levels  Description  INTERVENTIONS:  - Administer medication as ordered  - Teach and encourage coping skills  - Provide emotional support  - Assess patient/family for anxiety and ability to cope  Outcome: Progressing  Goal: By discharge: Patient will verbalize 2 strategies to deal with anxiety  Description  Interventions:  - Identify any obvious source/trigger to anxiety  - Staff will assist patient in applying identified coping technique/skills  - Encourage attendance of scheduled groups and activities  Outcome: Progressing     Problem: SLEEP DISTURBANCE  Goal: Will exhibit normal sleeping pattern  Description  Interventions:  -  Assess the patients sleep pattern, noting recent changes  - Administer medication as ordered  - Decrease environmental stimuli, including noise, as appropriate during the night  - Encourage the patient to actively participate in unit groups and or exercise during the day to enhance ability to achieve adequate sleep at night  - Assess the patient, in the morning, encouraging a description of sleep experience  Outcome: Progressing     Problem: INVOLUNTARY ADMIT  Goal: Will cooperate with staff recommendations and doctor's orders and will demonstrate appropriate behavior  Description  INTERVENTIONS:  - Treat underlying conditions and offer medication as ordered  - Educate regarding involuntary admission procedures and rules  - Utilize positive consistent limit setting strategies to support patient and staff safety  Outcome: Progressing     Problem: PAIN - ADULT  Goal: Verbalizes/displays adequate comfort level or baseline comfort level  Description  Interventions:  - Encourage patient to monitor pain and request assistance  - Assess pain using appropriate pain scale  - Administer analgesics based on type and severity of pain and evaluate response  - Implement non-pharmacological measures as appropriate and evaluate response  - Consider cultural and social influences on pain and pain management  - Notify physician/advanced practitioner if interventions unsuccessful or patient reports new pain  Outcome: Progressing     Problem: INFECTION - ADULT  Goal: Absence or prevention of progression during hospitalization  Description  INTERVENTIONS:  - Assess and monitor for signs and symptoms of infection  - Monitor lab/diagnostic results  - Monitor all insertion sites, i e  indwelling lines, tubes, and drains  - Monitor endotracheal if appropriate and nasal secretions for changes in amount and color  - [place] appropriate cooling/warming therapies per order  - Administer medications as ordered  - Instruct and encourage patient and family to use good hand hygiene technique  - Identify and instruct in appropriate isolation precautions for identified infection/condition  Outcome: Progressing  Goal: Absence of fever/infection during neutropenic period  Description  INTERVENTIONS:  - Monitor WBC    Outcome: Progressing     Problem: SAFETY ADULT  Goal: Patient will remain free of falls  Description  INTERVENTIONS:  - Assess patient frequently for physical needs  -  Identify cognitive and physical deficits and behaviors that affect risk of falls    -  [place] fall precautions as indicated by assessment   - Educate patient/family on patient safety including physical limitations  - Instruct patient to call for assistance with activity based on assessment  - Modify environment to reduce risk of injury  - Consider OT/PT consult to assist with strengthening/mobility  Outcome: Progressing  Goal: Maintain or return to baseline ADL function  Description  INTERVENTIONS:  -  Assess patient's ability to carry out ADLs; assess patient's baseline for ADL function and identify physical deficits which impact ability to perform ADLs (bathing, care of mouth/teeth, toileting, grooming, dressing, etc )  - Assess/evaluate cause of self-care deficits   - Assess range of motion  - Assess patient's mobility; develop plan if impaired  - Assess patient's need for assistive devices and provide as appropriate  - Encourage maximum independence but intervene and supervise when necessary  - Involve family in performance of ADLs  - Assess for home care needs following discharge   - Consider OT consult to assist with ADL evaluation and planning for discharge  - Provide patient education as appropriate  Outcome: Progressing  Goal: Maintain or return mobility status to optimal level  Description  INTERVENTIONS:  - Assess patient's baseline mobility status (ambulation, transfers, stairs, etc )    - Identify cognitive and physical deficits and behaviors that affect mobility  - Identify mobility aids required to assist with transfers and/or ambulation (gait belt, sit-to-stand, lift, walker, cane, etc )  - [place] fall precautions as indicated by assessment  - Record patient progress and toleration of activity level on Mobility SBAR; progress patient to next Phase/Stage  - Instruct patient to call for assistance with activity based on assessment  - Consider rehabilitation consult to assist with strengthening/weightbearing, etc   Outcome: Progressing     Problem: DISCHARGE PLANNING  Goal: Discharge to home or other facility with appropriate resources  Description  INTERVENTIONS:  - Identify barriers to discharge w/patient and caregiver  - Arrange for needed discharge resources and transportation as appropriate  - Identify discharge learning needs (meds, wound care, etc )  - Arrange for interpretive services to assist at discharge as needed  - Refer to Case Management Department for coordinating discharge planning if the patient needs post-hospital services based on physician/advanced practitioner order or complex needs related to functional status, cognitive ability, or social support system  Outcome: Progressing

## 2019-11-17 NOTE — TREATMENT PLAN
Treatment Plan Naa Mitchell 80 y o  female MRN: [id number]    Spencer Morton  Encounter: [id number]          Admit Date/Time:  11/16/2019  7:32 PM    Treatment Team: Attending Provider: Mauri Almanza MD; Consulting Physician: Ryder Santana MD; Patient Care Assistant: Reyes Lopez; Patient Care Assistant: Brenda Morgan; Patient Care Assistant: Miri Nicole;  Registered Nurse: Rain Kruse RN; Physician Assistant: Romana Watkins PA-C; Patient Care Assistant: Kraig Garner; Patient Care Technician: Tio Ospina    Diagnosis: Principal Problem:    Psychosis [facility])        Patient Strengths: compliant with medication, special hobby/interest, supportive family/friends     Patient Barriers: self-care deficit    Progress Toward Goals: ongoing    Recommended Treatment: discharge planning     Treatment Frequency: 1-2 times per week     Expected Discharge Date: 11/17/2019    Discharge Plan: referrals as indicated     Treatment Plan Created/Updated By: Arturo Pak MD

## 2019-11-17 NOTE — PLAN OF CARE
Problem: PAIN - ADULT  Goal: Verbalizes/displays adequate comfort level or baseline comfort level  Description  Interventions:  - Encourage patient to monitor pain and request assistance  - Assess pain using appropriate pain scale  - Administer analgesics based on type and severity of pain and evaluate response  - Implement non-pharmacological measures as appropriate and evaluate response  - Consider cultural and social influences on pain and pain management  - Notify physician/advanced practitioner if interventions unsuccessful or patient reports new pain  Outcome: Progressing     Problem: SAFETY ADULT  Goal: Patient will remain free of falls  Description  INTERVENTIONS:  - Assess patient frequently for physical needs  -  Identify cognitive and physical deficits and behaviors that affect risk of falls    -  [place] fall precautions as indicated by assessment   - Educate patient/family on patient safety including physical limitations  - Instruct patient to call for assistance with activity based on assessment  - Modify environment to reduce risk of injury  - Consider OT/PT consult to assist with strengthening/mobility  Outcome: Progressing

## 2019-11-17 NOTE — ED NOTES
Pt agitated that she has to stay  Explained that we have to wait for all test results  Pt stating she is hungry  Bag lunch, applesauce and milk provided  Pt thankful       Khadar Alcantara RN  11/16/19 6184

## 2019-11-17 NOTE — ED NOTES
No pre-cert necessary Medicare A & B is primary  COB completed w/ Mariam at [facility], [phone] requested that UR fax upon D/C  EVS called, Patient is eligible for M/A - Behavioral health services, [facility]   Rec# [id number]

## 2019-11-17 NOTE — PROGRESS NOTES
Patient initially verbally aggressive with raised voice, irritable due to being in hospital  Patient repeatedly stated, "I'm here for a bladder infection, it doesn't make sense"  "I want to go home", pacing, following numerous staff members yelling, difficult to redirect  Alert to person, place, time, confusion to situation  Patient denied anxiety/depression, SI/HI, hallucinations  PRN Ativan given as ordered with positive effect, patient more calm and pleasant  Patient on Keflex series for UTI, voiding without difficulty  Denies urinary frequency, urgency, dysuria  Patient took medication with much encouragement  Nephew/Edwar MORGAN, updated on patient's condition  Nephew currently residing in [place] until beginning of January 2020  Contact number for nephew is [phone]  Nephew stated patient has history of "bad anxiety", depression, feels her Dementia is worsening  Nephew also stated that patient would remark how she feels lonely  Patient remains on 7" checks for safety and behaviors
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
## 2019-11-17 NOTE — ED NOTES
CIS faxed [phone] to [facility]  CIS scanned call-ahead form to [employer]

## 2019-11-17 NOTE — PROGRESS NOTES
Patient continues to want to go home, less agitated  Needs encouragement to take medication as patient questions all pills  Remains medication compliant and on 7" checks for safety and behaviors

## 2019-11-17 NOTE — H&P
Mayda Mitchell#  [id number] F  EGS:[id number]    [facility]:[id number]  Adm Date: 11/16/2019 1932  7:32 PM   ATT PHY: Miriam Herrera, [facility]       Chief Complaint: Altered mental status    History of Presenting Illness: Juno Mcclendon is a(n) 80y o  year old female  presenting to [address] [place] [address] for altered mental status  Patient reportedly has a history of underlying dementia  Her UA in the ED was also significant for positive nitrites, 1+ Leukocyte, 2-4 RBC, 10-20 WBC and moderate bacteria  We are asked to follow along with reference to the patient's medical co-morbidities    The patient was seen after reviewing the chart and discussing the case with caring staff  Today during our encounter, the patient reported no acute concerns  She was pleasant but frustrated about being admitted to the psych unit for a "simple bladder infection "    Other labs were significant for BUN of 26, creatinine of 0 95 and GFR of 51  No Known Allergies    No current facility-administered medications on file prior to encounter        Current Outpatient Medications on File Prior to Encounter   Medication Sig Dispense Refill    amLODIPine (NORVASC) 5 mg tablet Take 5 mg by mouth daily      aspirin 81 mg chewable tablet Chew 1 tablet daily      digoxin (LANOXIN) 0 125 mg tablet Take 125 mcg by mouth daily      ergocalciferol (VITAMIN D2) 50,000 units   1    furosemide (LASIX) 20 mg tablet Take 1 tablet (20 mg total) by mouth daily 30 tablet 0    lisinopril (ZESTRIL) 20 mg tablet Take 1 tablet (20 mg total) by mouth daily 30 tablet 0    magnesium oxide (MAG-OX) 400 mg Take 1 tablet (400 mg total) by mouth 2 (two) times a day 60 tablet 0    metoprolol succinate (TOPROL-XL) 25 mg 24 hr tablet Take 25 mg by mouth daily       potassium chloride (K-DUR,KLOR-CON) 10 mEq tablet Take 1 tablet (10 mEq total) by mouth daily (Patient taking differently: Take 20 mEq by mouth every other day ) 30 tablet 0    traZODone (DESYREL) 50 mg tablet Take 1 tablet (50 mg total) by mouth daily at bedtime 3 tablet 0    cholestyramine sugar free (QUESTRAN LIGHT) 4 g packet Take 1 packet (4 g total) by mouth 2 (two) times a day (Patient not taking: Reported on 11/17/2019) 40 packet 0       Active Ambulatory Problems     Diagnosis Date Noted    Dehydration 04/03/2018    HTN (hypertension) 04/03/2018    Anemia 04/04/2018    Ambulatory dysfunction 04/07/2018    UTI (urinary tract infection) 09/05/2018    Lymphedema of arm 09/05/2018    Underweight 09/06/2018    Moderate mitral regurgitation 10/09/2018    Wide-complex tachycardia ([facility] ) 10/09/2018    Multifocal atrial tachycardia ([facility] ) 10/09/2018    Stage 3 chronic kidney disease ([facility] ) 03/10/2019    Hypertensive kidney disease with stage 3 chronic kidney disease ([facility] ) 03/10/2019    Weakness 04/19/2019    Atrial fibrillation ([facility] ) 04/19/2019     Resolved Ambulatory Problems     Diagnosis Date Noted    Colitis, acute 04/03/2018    Acute kidney injury ([address] ) 04/03/2018    Hypokalemia 04/04/2018    Hypomagnesemia 04/06/2018    Diarrhea 04/19/2018    Altered mental status 09/05/2018    Fall 09/05/2018    Acute metabolic encephalopathy 86/13/4216    Tachycardia 09/08/2018    Sepsis ([facility] ) 03/05/2019    Right leg pain 03/09/2019    Acute congestive heart failure ([facility] ) 04/19/2019     Past Medical History:   Diagnosis Date    Anxiety     Breast cancer ([facility]     Cancer ([address]     Depression     Hypertension     Psychosis ([address] )        Past Surgical History:   Procedure Laterality Date    BLADDER REPAIR      BREAST SURGERY      HYSTERECTOMY      MASTECTOMY         Social History     Socioeconomic History    Marital status:       Spouse name: None    Number of children: None    Years of education: None    Highest education level: None   Occupational History    None   Social Needs    Financial resource strain: None   Price-Courtney insecurity:     Worry: None     Inability: None    Transportation needs:     Medical: None     Non-medical: None   Tobacco Use    Smoking status: Never Smoker    Smokeless tobacco: Never Used   Substance and Sexual Activity    Alcohol use: No    Drug use: No    Sexual activity: Not Currently   Lifestyle    Physical activity:     Days per week: None     Minutes per session: None    Stress: None   Relationships    Social connections:     Talks on phone: None     Gets together: None     Attends Mandaen service: None     Active member of club or organization: None     Attends meetings of clubs or organizations: None     Relationship status: None    Intimate partner violence:     Fear of current or ex partner: None     Emotionally abused: None     Physically abused: None     Forced sexual activity: None   Other Topics Concern    None   Social History Narrative    None       Family History: non-contributory at this stage  Review of Systems   All other systems reviewed and are negative  Vitals:    11/17/19 0741   BP: (!) 179/95   Pulse: 83   Resp: 19   Temp: 97 6 °F (36 4 °C)   SpO2: 95%       Physical Exam   Constitutional: Awake and Alert  Well-developed and well-nourished  No distress  HENT:   Head: Normocephalic and atraumatic  Mouth/Throat: Oropharynx is clear and moist     Cardiovascular: Regular rate with irregular rhythm with normal heart sounds  Exam reveals no friction rub  No murmur heard  Pulmonary/Chest: Effort normal and breath sounds normal  No respiratory distress  Patient has no wheezes, rales, or rhonchi  Abdominal: Soft  Bowel sounds are normal  No distension  There is no tenderness  There is no rebound and no guarding  Neurological: Cranial Nerves grossly intact  No sensory deficit  Coordination normal    Skin: Skin is warm and dry  No rash noted  No diaphoresis  No erythema  No edema  No cyanosis      Assessment     Jamaal Horn is a(n) 80y o  year old female with Psychosis  1  Cardiac with history of Hypertension/Edema  Amlodipine 5mg, aspirin 81mg, digoxin 125mcg, Lasix 20mg, lisinopril 20mg, metoprolol 25mg, and KCL 20MEq every other day  2  CKD III  Chronic and stable  3  UTI  Keflex 500mg every 12 hours for 4 more days  Urine culture pending to guide treatment  4  DJD/OA  Tylenol as needed  5  Gait Disturbance  PT/OT  6  Psychosis  Defer to psychiatry  Prognosis: Fair  Discharge Plan: In progress  Advanced Directives: I have discussed in detail the patient the advanced directives  The patient does have a POA and a living will  POA/First contact is Mariajose Cousin, who can be reached at [phone]  With reference to cardiac and pulmonary resuscitation efforts, per living will patient will be a DNR  I have spent more than 50 minutes gathering data, doing physical examination, and discussing the advanced directives, which was witnessed by caring staff      The patient was discussed with Dr Cynthia Arora and he is in agreement with the above note '

## 2019-11-17 NOTE — ED NOTES
Patient is accepted at [address]  Patient is accepted by Dr Nicol Chanel per Alivia  Nurse report is to be called prior to patient transfer

## 2019-11-17 NOTE — ED PROVIDER NOTES
History  Chief Complaint   Patient presents with    Altered Mental Status     Patient thinks that there is a man out to get her and wants to see her dead  Set 80year-old female is resident of a personal care home  Apparently over the last several days she has had increasing paranoia  Increasing depressive issues  She is not quite sure why she is here  She has some underlying dementia  Hypertension  Denies any pain  She is tangential in thought  Disoriented to person place and time  She has had increasing self injury with scratching herself unintentionally, increasingly paranoid throughout the home  Crisis been notified about her for last 2-3 days, apparently the addition of Haldol seemed to help but then that seemed to stop working  Prior to Admission Medications   Prescriptions Last Dose Informant Patient Reported? Taking?    amLODIPine (NORVASC) 5 mg tablet   Yes No   Sig: Take 5 mg by mouth daily   aspirin 81 mg chewable tablet  Self Yes No   Sig: Chew 1 tablet daily   cholestyramine sugar free (QUESTRAN LIGHT) 4 g packet   No No   Sig: Take 1 packet (4 g total) by mouth 2 (two) times a day   digoxin (LANOXIN) 0 125 mg tablet   Yes No   Sig: Take 125 mcg by mouth daily   ergocalciferol (VITAMIN D2) 50,000 units   Yes No   furosemide (LASIX) 20 mg tablet   No No   Sig: Take 1 tablet (20 mg total) by mouth daily   lisinopril (ZESTRIL) 20 mg tablet   No No   Sig: Take 1 tablet (20 mg total) by mouth daily   magnesium oxide (MAG-OX) 400 mg   No No   Sig: Take 1 tablet (400 mg total) by mouth 2 (two) times a day   metoprolol succinate (TOPROL-XL) 25 mg 24 hr tablet   Yes No   Sig: Take 25 mg by mouth daily    potassium chloride (K-DUR,KLOR-CON) 10 mEq tablet   No No   Sig: Take 1 tablet (10 mEq total) by mouth daily   Patient taking differently: Take 20 mEq by mouth every other day    traZODone (DESYREL) 50 mg tablet   No No   Sig: Take 1 tablet (50 mg total) by mouth daily at bedtime Facility-Administered Medications: None       Past Medical History:   Diagnosis Date    [facility] [address] [facility]     [facility]     Hypertension     Lymphedema of arm     L arm       Past Surgical History:   Procedure Laterality Date    BLADDER REPAIR      BREAST SURGERY      HYSTERECTOMY      MASTECTOMY         No family history on file  I have reviewed and agree with the history as documented  Social History     Tobacco Use    Smoking status: Never Smoker    Smokeless tobacco: Never Used   Substance Use Topics    Alcohol use: No    Drug use: No        Review of Systems   Constitutional: Negative for chills and fever  HENT: Negative for rhinorrhea and sore throat  Eyes: Negative for visual disturbance  Respiratory: Negative for cough and shortness of breath  Cardiovascular: Negative for chest pain and leg swelling  Gastrointestinal: Negative for abdominal pain, diarrhea, nausea and vomiting  Genitourinary: Negative for dysuria  Musculoskeletal: Negative for back pain and myalgias  Skin: Negative for rash  Neurological: Negative for dizziness and headaches  Psychiatric/Behavioral: Positive for confusion and hallucinations  The patient is nervous/anxious  Paranoia  All other systems reviewed and are negative  Physical Exam  Physical Exam   Constitutional: She appears well-developed and well-nourished  Appropriate for age   HENT:   Nose: Nose normal    Mouth/Throat: Oropharynx is clear and moist  No oropharyngeal exudate  Eyes: Pupils are equal, round, and reactive to light  Conjunctivae and EOM are normal  No scleral icterus  Neck: Normal range of motion  Neck supple  No JVD present  No tracheal deviation present  Cardiovascular: Normal rate, regular rhythm and normal heart sounds  No murmur heard  Pulmonary/Chest: Effort normal and breath sounds normal  No respiratory distress  She has no wheezes  She has no rales  Abdominal: Soft   Bowel sounds are normal  There is no tenderness  There is no guarding  Musculoskeletal: Normal range of motion  She exhibits edema  She exhibits no tenderness  Chronic lymphedema left upper extremity due to past mastectomy and lymph node dissection  Neurological: She is alert  She is disoriented  No cranial nerve deficit or sensory deficit  She exhibits normal muscle tone  5/5 motor, nl sens   Skin: Skin is warm and dry  Psychiatric: Her behavior is normal  Her mood appears anxious  Nursing note and vitals reviewed        Vital Signs  ED Triage Vitals   Temperature Pulse Respirations Blood Pressure SpO2   11/1933 11/1933 11/1933 11/16/19 1936 11/1933   97 6 °F (36 4 °C) 88 16 147/74 97 %      Temp Source Heart Rate Source Patient Position - Orthostatic VS BP Location FiO2 (%)   11/1933 11/1933 11/16/19 1936 11/16/19 1936 --   Temporal Monitor Sitting Right arm       Pain Score       11/1933       No Pain           Vitals:    11/1933 11/16/19 1936   BP:  147/74   Pulse: 88    Patient Position - Orthostatic VS:  Sitting         Visual Acuity      ED Medications  Medications - No data to display    Diagnostic Studies  Results Reviewed     None                 No orders to display              Procedures  ECG 12 Lead Documentation Only  Date/Time: 11/16/2019 8:46 PM  Performed by: Татьяна Rapp DO  Authorized by: Татьяна Rapp DO     Indications / Diagnosis:  Psychiatric evaluation, chronic AFib  ECG reviewed by me, the ED Provider: yes    Patient location:  ED  Previous ECG:     Previous ECG:  Compared to current    Similarity:  No change    Comparison to cardiac monitor: No    Interpretation:     Interpretation: abnormal    Quality:     Tracing quality:  Limited by artifact  Rate:     ECG rate:  92    ECG rate assessment: normal    Rhythm:     Rhythm: atrial fibrillation    Ectopy:     Ectopy: none    QRS:     QRS axis:  Normal  Conduction:     Conduction: normal    ST segments:     ST segments:  Non-specific  T waves:     T waves: non-specific             ED Course  ED Course as of Nov 16 2223   Sat Nov 16, 2019 2205 Protime-INR   2207 Patient's evaluation negative except for suspected UTI given the positive nitrate  Fair amount of epithelial cells noted, given change in mental status will empirically treat for UTI pending culture  Otherwise she is being 36 and is currently medically stable for transfer to inpatient [facility]  MDM    Disposition  Final diagnoses:   None     ED Disposition     None      Follow-up Information    None         Patient's Medications   Discharge Prescriptions    No medications on file     No discharge procedures on file      ED Provider  Electronically Signed by           Lana Blake DO  11/16/19 2227

## 2019-11-17 NOTE — PROGRESS NOTES
Patient is being admitted from [facility] ED to [facility] under a 302 for altered mental status  Patient arrived to the unit via w/c accompanied by this writer and a staff aide  Patient is pleasant and cooperative; she is alert and oriented to person, place and situation  She is mildly forgetful at times but is otherwise coherent and appropriate during assessment  She ambulates with a steady gait  She denies any pain  She endorses anxiety as she is concerned as to why she is being admitted "to a mental health floor for a bladder infection " Verbal support provided by staff  She denies depression, denies SI, HI and hallucinations  She is a low risk for SAI at time of assessment so one to one observation is not indicated nor was she on one to one observation in the ED  Patient was coherent and in agreement to sign admission paperwork  Fluids and snack were provided to patient per patient request  VS taken and charted accordingly  Skin assessment performed  This writer did observe significant left arm lymphedema  Pink limb alert band is in place  Patient does have a hx of breast CA with left mastectomy  Patient utilized the bathroom twice then was assisted to her bed  She is currently lying in bed; appears comfortable with no obvious signs of distress  Bed is in lowest position with side rails up x2  Bed alarm in place for patient safety (patient is aware the alarm is set and verbally accepts alarm in place)  Yellow tread socks on b/l feet  Tap bell provided to patient and she verbalizes use of bell for staff assistance  Will CTM via q7 minute safety checks

## 2019-11-17 NOTE — ED NOTES
Pt calm, cooperative  Resting, no distress  Occasionally ambulates to restroom with steady gait       Issac Estevez RN  11/17/19 0992
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
## 2019-11-18 NOTE — PROGRESS NOTES
Patient has been out to dining room for breakfast and then sits out by her room  She is pleasant with this nurse and cooperative in taking her medications  Nurse assisted her with changing pants this morning and assisted with washing buttocks  Patient asked for lotion for her bottom and when nurse assisted her she was not clean   She had just washed herself in the bathroom   Nurse washed her bottom  due to BM  Skin intact  Patient alert to person place and knows she is in a "mental Hospital"  She was tearful this morning stating I don't belong here  Left arm and hand swollen due to lymphedema  Elevation of arm stressed  Denies pain or depression  States she has some anxiety from being here but couldn't rate it  Denies SI/HI or hallucinations  Q 7 minute safety checks maintained

## 2019-11-18 NOTE — PLAN OF CARE
Problem: PAIN - ADULT  Goal: Verbalizes/displays adequate comfort level or baseline comfort level  Description  Interventions:  - Encourage patient to monitor pain and request assistance  - Assess pain using appropriate pain scale  - Administer analgesics based on type and severity of pain and evaluate response  - Implement non-pharmacological measures as appropriate and evaluate response  - Consider cultural and social influences on pain and pain management  - Notify physician/advanced practitioner if interventions unsuccessful or patient reports new pain  Outcome: Progressing     Problem: SAFETY ADULT  Goal: Patient will remain free of falls  Description  INTERVENTIONS:  - Assess patient frequently for physical needs  -  Identify cognitive and physical deficits and behaviors that affect risk of falls    -  [place] fall precautions as indicated by assessment   - Educate patient/family on patient safety including physical limitations  - Instruct patient to call for assistance with activity based on assessment  - Modify environment to reduce risk of injury  - Consider OT/PT consult to assist with strengthening/mobility  Outcome: Progressing  Goal: Maintain or return to baseline ADL function  Description  INTERVENTIONS:  -  Assess patient's ability to carry out ADLs; assess patient's baseline for ADL function and identify physical deficits which impact ability to perform ADLs (bathing, care of mouth/teeth, toileting, grooming, dressing, etc )  - Assess/evaluate cause of self-care deficits   - Assess range of motion  - Assess patient's mobility; develop plan if impaired  - Assess patient's need for assistive devices and provide as appropriate  - Encourage maximum independence but intervene and supervise when necessary  - Involve family in performance of ADLs  - Assess for home care needs following discharge   - Consider OT consult to assist with ADL evaluation and planning for discharge  - Provide patient education as appropriate  Outcome: Progressing  Goal: Maintain or return mobility status to optimal level  Description  INTERVENTIONS:  - Assess patient's baseline mobility status (ambulation, transfers, stairs, etc )    - Identify cognitive and physical deficits and behaviors that affect mobility  - Identify mobility aids required to assist with transfers and/or ambulation (gait belt, sit-to-stand, lift, walker, cane, etc )  - [place] fall precautions as indicated by assessment  - Record patient progress and toleration of activity level on Mobility SBAR; progress patient to next Phase/Stage  - Instruct patient to call for assistance with activity based on assessment  - Consider rehabilitation consult to assist with strengthening/weightbearing, etc   Outcome: Progressing

## 2019-11-18 NOTE — PROGRESS NOTES
Mayda Mitchell#  TSK:[id number] F  YJE:[id number]    FQK:[id number]  Adm Date: 11/16/2019 1932  7:32 PM   ATT PHY: Wong Curry, [address] [place] [address]     The patient was seen after reviewing the chart and discussing the case with caring staff  Today during our encounter, the patient reported no acute concerns  Vitamin D, B12, and urine culture are pending  Objective     Vitals:    11/18/19 0738   BP: 147/65   Pulse: 79   Resp: 18   Temp: (!) 97 2 °F (36 2 °C)   SpO2: 96%       General Appearance: Awake and Alert  No acute distress  HEENT: Normocephalic, atraumatic  PERRLA, EOMI, MMM  Heart: RRR, no murmurs  Normal S1 and S2   Lungs: CTA bilaterally with fair air entry  Assessment     Tran Swanson is a(n) 80y o  year old female with Psychosis      1  Cardiac with history of Hypertension/Edema  Amlodipine 5mg, aspirin 81mg, digoxin 125mcg, Lasix 20mg, lisinopril 20mg, metoprolol 25mg, and KCL 20MEq every other day  2  CKD III  Chronic and stable  3  UTI  Keflex 500mg every 12 hours for 3 more days  Urine culture pending to guide treatment  4  DJD/OA  Tylenol as needed  5  Gait Disturbance  PT/OT  The patient was discussed with Dr Alivia Arciniega and he is in agreement with the above note

## 2019-11-18 NOTE — PLAN OF CARE
Problem: Ineffective Coping  Goal: Participates in unit activities  Description  Interventions:  - Provide therapeutic environment   - Provide required programming   - Redirect inappropriate behaviors   Outcome: Not Progressing   Patient unable to to tolerate groups currently better 1:1 with staff

## 2019-11-18 NOTE — PROGRESS NOTES
Progress Note - Flaquito [age] y o  female MRN: [id number]   Unit/Bed#: [facility] Lopez 206-02 Encounter: [id number]    Behavior over the last 24 hours: slowly improving  Bhavin Cooley was seen today  Pt reports she is doing well and wants to go back to her residence soon  She is easily redirectable and pleasant upon approach  Although she appears mildly guarded, she denies any current paranoia or hallucinations  She has been compliant with meds in the unit, including antibiotic for UTI  Staff report limited particdipation in groups and on the unit  Sleep: slept off and on  Appetite: fair  Medication side effects: denies  ROS: no complaints, all other systems are negative    Mental Status Evaluation:    Appearance:  age appropriate   Behavior:  pleasant   Speech:  normal rate and volume   Mood:  improved   Affect:  constricted   Thought Process:  goal directed   Associations: intact associations   Thought Content:  mild paranoia   Perceptual Disturbances: none   Risk Potential: Suicidal ideation - None  Homicidal ideation - None   Sensorium:  oriented to person   Memory:  short term memory moderately impaired   Consciousness:  alert and awake   Attention: attention span and concentration are age appropriate   Insight:  limited   Judgment: limited   Gait/Station: slow gait   Motor Activity: no abnormal movements     Vital signs in last 24 hours:    Temp:  [97 2 °F (36 2 °C)-98 9 °F (37 2 °C)] 97 2 °F (36 2 °C)  HR:  [55-81] 79  Resp:  [18] 18  BP: (131-147)/(62-66) 147/65    Laboratory results: I have personally reviewed all pertinent laboratory/tests results  Assessment/Plan   Principal Problem:    Psychosis ([facility] )  H/O neurocognitive disorder      Recommended Treatment:     Planned medication and treatment changes:     All current active medications have been reviewed  Encourage group therapy, milieu therapy and occupational therapy  Behavioral Health checks every 7 minutes   Ct with current Abilify 2 mg daily  Current Facility-Administered Medications:  acetaminophen 325 mg Oral Q8H PRN Eliseo Driscoll MD   aluminum-magnesium hydroxide-simethicone 30 mL Oral Q4H PRN Eliseo Driscoll MD   amLODIPine 5 mg Oral Daily Eliseo Driscoll MD   ARIPiprazole 2 mg Oral HS Teresa Valle MD   aspirin 81 mg Oral Daily Eliseo Driscoll MD   cephalexin 500 mg Oral Q12H [address] Adán Hough PA-C   digoxin 125 mcg Oral Daily Eliseo Driscoll MD   furosemide 20 mg Oral Daily Eliseo Driscoll MD   haloperidol 0 5 mg Oral Q8H PRN Eliseo Driscoll MD   haloperidol lactate 1 mg Intramuscular Q8H PRN Eliseo Driscoll MD   lisinopril 20 mg Oral Daily Eliseo Driscoll MD   LORazepam 0 5 mg Oral Q8H PRN Eliseo Driscoll MD   magnesium hydroxide 30 mL Oral Daily PRN Eliseo Driscoll MD   magnesium oxide 400 mg Oral BID Eliseo Driscoll MD   metoprolol succinate 25 mg Oral Daily Eliseo Driscoll MD   nicotine polacrilex 2 mg Oral Q2H PRN Eliseo Driscoll MD   potassium chloride 20 mEq Oral Every Other Day Eliseo Driscoll MD       Risks / Benefits of Treatment:    Risks, benefits, and possible side effects of medications explained to patient and patient verbalizes understanding and agreement for treatment  Counseling / Coordination of Care:    Patient's progress discussed with staff in treatment team meeting  Medications, treatment progress and treatment plan reviewed with patient      Brooke Potter MD 11/18/19

## 2019-11-18 NOTE — SOCIAL WORK
SW met with patient in small group room;  Pt AOx3; pt denies SI/HI/AH/VH, dep and anx; pt states "I just want to go home"; Pt able to provide assessment information without assistance     Pt is 79 yo  female - ; pt admitted due to bizarre behaviors - UTI;  Pt strengths include support system, stable housing; limitations include limited local supports; coping skills include socializing with peers, meditating; no hx MH or AIP; no hx SI/SA/violence/HI; pt denies current and hx psychological trauma; pt denies family hx MH, SA, substance abuse and dementia; pt denies current and hx personal substance abuse including tobacco, etoh, and drug use; pt denies current and hx legal issues; pt is ;   Pt had 1 daughter -  at 62 yo Lydia Dean); pt parents: Jordan Paz and Messi Dobbs (both ); pt has 4 brothers and 5 sisters (only 2 siblings remain); pt lives at [facility] - can return; pt graduated h/s - no collge; pt worked as a factory ; no  hx; pt uses upper dentures (on unit); pt identifies as Adventism - no Nondenominational or cultural needs to be met while on unit; pt POA: nephew in law Rachel Alessia; PCP Dr Claudean Lade at facility; no current psych provider     Pt signed ROIs for nephew and [facility]

## 2019-11-18 NOTE — PROGRESS NOTES
11/18/19 1456   Team Meeting   Meeting Type Tx Team Meeting   Initial Conference Date 11/18/19   Next Conference Date 12/17/19   Team Members Present   Team Members Present Physician;Nurse;;Occupational Therapist   Physician Team Member Dr Arslan Barragan MD    Nursing Team Member Matt Tang, RN    Social Work Team Member Shauna Fontanez [facility]    OT Team Member Noyola Euless, [place]    Patient/Family Present   Patient Present Yes   Patient's Family Present No     Pt and treatment met to review treatment plan/goals; Pt goals include 1) "to get back home";   Pt agreeable to goals as discussed and signed tx plan - copy on chart

## 2019-11-18 NOTE — PROGRESS NOTES
Patient appears to be sleeping throughout the night during 7 minute safety checks  No behavioral issues  No complaint or concerns  Will continue to monitor safety and behaviors every 7 minutes

## 2019-11-18 NOTE — PHYSICAL THERAPY NOTE
Physical Therapy Evaluation     Patient's Name: Woodrow Al    Admitting Diagnosis  Altered mental state [R41 82]  [facility] ([facility] ) [F29]    Problem List  Patient Active Problem List   Diagnosis    Dehydration    HTN (hypertension)    Anemia    Ambulatory dysfunction    UTI (urinary tract infection)    Lymphedema of arm    Underweight    Moderate mitral regurgitation    Wide-complex tachycardia (HCC)    Multifocal atrial tachycardia (HCC)    Stage 3 [facility] )    Hypertensive kidney disease with stage 3 chronic kidney disease (HCC)    Weakness    Atrial fibrillation (HCC)    Psychosis ([facility] )       Past Medical History  Past Medical History:   Diagnosis Date    Anxiety     Breast cancer ([facility] )     Cancer ([place] [address] )     Depression     Hypertension     Lymphedema of arm     L arm    Psychosis ([facility] )        Past Surgical History  Past Surgical History:   Procedure Laterality Date    BLADDER REPAIR      BREAST SURGERY      HYSTERECTOMY      MASTECTOMY            11/18/19 1149   Note Type   Note type Eval only   Pain Assessment   Pain Assessment No/denies pain   Pain Score No Pain   Home Living   Type of Home Assisted living  [facility])   Home Layout Two level;Bed/bath upstairs;Stairs to enter with rails  (14 steps to 2nd floor, 2-3 MARQUISE)   Bathroom Shower/Tub Walk-in shower   Bathroom Equipment Grab bars in shower; Shower chair   [address]   (no AD at baseline)   Prior Function   Level of [place] Independent with ADLs and functional mobility; Needs assistance with IADLs   Lives With Facility staff   Receives Help From   (staff at [facility])   ADL Assistance Independent   IADLs Needs assistance   Falls in the last 6 months 0  (pt denies any falls)   Restrictions/Precautions   Weight Bearing Precautions Per Order No   Other Precautions Cognitive;Limb alert  (L UE limb alert)   General   Family/Caregiver Present No   Cognition Overall Cognitive Status Impaired   Arousal/Participation Alert   Orientation Level Oriented to person;Disoriented to place; Disoriented to time;Disoriented to situation   Memory Decreased recall of biographical information;Decreased recall of recent events;Decreased recall of precautions   Following Commands Follows one step commands without difficulty   Comments pt agreeable to PT session   RLE Assessment   RLE Assessment WFL   LLE Assessment   LLE Assessment WFL   Coordination   Movements are Fluid and Coordinated 1   Sensation WFL   Bed Mobility   Supine to Sit Unable to assess  (pt received OOB in chair in hallway)   Transfers   Sit to Stand 7  Independent   Stand to Sit 7  Independent   Ambulation/Elevation   Gait pattern Shuffling; Short stride   Gait Assistance 7  Independent   Assistive Device None   Distance 200 ft   Stair Management Assistance Not tested   Balance   Static Sitting Normal   Dynamic Sitting Normal   Static Standing Good   Dynamic Standing Good   Ambulatory Good   Activity Tolerance   Activity Tolerance Patient tolerated treatment well   Nurse Made Aware Yes, RN verbalized pt appropriate for therapy interventions   Assessment   Prognosis Good   Assessment Pt is 80 y o  female seen for PT evaluation [facility]osis ([facility] )  PT consulted to assess pt's functional mobility and d/c needs  Order placed for PT eval and tx, w/ up and OOB as tolerated order  Comorbidities affecting pt's physical performance at time of assessment include: HTN, CKD stage 3, UTI, DJD/OA, gait disturbance  PTA, pt was independent w/ all functional mobility w/ no AD, has (+) MARQUISE and resident of [facility]  Personal factors affecting pt at time of IE include: stairs to enter home, unable to perform dynamic tasks in community, decreased cognition, decreased initiation and engagement, limited insight into impairments and inability to perform IADLs   Please find objective findings from PT assessment regarding body systems outlined above  The following objective measures performed on IE: Barthel Index: 80/100  Pt's clinical presentation is currently unstable/unpredictable seen in pt's presentation of ongoing psychiatric assessment  From PT/mobility standpoint, pt appears to be functioning close to or at mobility baseline, therefore no further immediate skilled PT needs are warranted at this time  Pt currently performing all phases of functional mobility at independent level without need for AD  Recommend pt continue to mobilize ad jett while here  Recommend pt return to previous living environment once medically cleared  Will sign off, D/C PT  Please reconsult if further immediate skilled PT needs are warranted     Barriers to Discharge None   Goals   Patient Goals none expressed at this time   PT Treatment Day 0   Plan   Treatment/Interventions Spoke to nursing   PT Frequency One time visit   Recommendation   Recommendation 24 hour supervision/assist  (return to [facility] c continued support from staff)   Equipment Recommended   (none at this time)   PT - OK to Discharge Yes  (when medically cleared)   Additional Comments pt would benefit from compression sleeve for L UE swelling vs lymphedema therapy   Modified Jonas Scale   Modified Jonas Scale 2   Barthel Index   Feeding 10   Bathing 0   Grooming Score 5   Dressing Score 10   Bladder Score 10   Bowels Score 10   Toilet Use Score 5   Transfers (Bed/Chair) Score 15   Mobility (Level Surface) Score 15   Stairs Score 0  (DNT)   Barthel Index Score 80         Tom Franco, PT

## 2019-11-18 NOTE — OCCUPATIONAL THERAPY NOTE
Occupational Therapy Evaluation      Bruce Salcedo    11/18/2019    Patient Active Problem List   Diagnosis    Dehydration    HTN (hypertension)    Anemia    Ambulatory dysfunction    UTI (urinary tract infection)    Lymphedema of arm    Underweight    Moderate mitral regurgitation    Wide-complex tachycardia (HCC)    Multifocal atrial tachycardia (HCC)    [facility]    Hypertensive kidney disease with stage 3 chronic kidney disease (HCC)    Weakness    Atrial fibrillation (HCC)    Psychosis ([facility] )       Past Medical History:   Diagnosis Date    Anxiety     Breast cancer ([facility] )     Cancer ([place] [address] )     Depression     Hypertension     Lymphedema of arm     L arm    Psychosis ([facility] )        Past Surgical History:   Procedure Laterality Date    BLADDER REPAIR      BREAST SURGERY      HYSTERECTOMY      MASTECTOMY          11/18/19 1150   Note Type   Note type Eval only   Restrictions/Precautions   Weight Bearing Precautions Per Order No   Other Precautions Cognitive;Limb alert  (L UE limb alert)   Pain Assessment   Pain Assessment No/denies pain   Pain Score No Pain   Home Living   Type of Home Assisted living  [facility])   Home Layout Two level;Bed/bath upstairs;Stairs to enter with rails  (14 steps to 2nd floor, 2-3 MARQUISE)   Bathroom Shower/Tub Walk-in shower   Bathroom Toilet Standard   Bathroom Equipment Grab bars in shower; Shower chair;Grab bars around toilet   [address]   (no AD used at baseline )   Prior Function   Level of [place] Independent with ADLs and functional mobility; Needs assistance with IADLs   Lives With Facility staff   Receives Help From Personal care attendant  (staff at [facility])   ADL Assistance Independent   IADLs Needs assistance   Falls in the last 6 months 0  (pt denies any falls)   Vocational Retired   Lifestyle   Autonomy Patient reporting being independent with ADLs, ambulatory with no AD, and lives [facility]   Reciprocal Relationships facility staff    Intrinsic Gratification enjoys listening to music    ADL   Eating Assistance 6  Modified independent   Grooming Assistance 6  Modified Independent   UB Bathing Assistance 5  Supervision/Setup   LB Bathing Assistance 5  Supervision/Setup   UB Dressing Assistance 5  Supervision/Setup   LB Dressing Assistance 5  Supervision/Setup   Toileting Assistance  5  Supervision/Setup   Bed Mobility   Additional Comments Pt OOB and seated in chair in hallway prior to eval    Transfers   Sit to [address] to [facility]   Functional Mobility 7  Independent  (with no AD)   Balance   Static Sitting Normal   Dynamic Sitting Normal   Static Standing Good   Dynamic Standing Good   Activity Tolerance   Activity Tolerance Patient tolerated treatment well   Nurse Made Aware RN verbalized pt appropriate for therapy    RUE Assessment   RUE Assessment WFL   LUE Assessment   LUE Assessment WFL   Edema   LUE Edema Other (Comment)  (lymphedema left upper extremity noted)   Hand Function   Gross Motor Coordination Functional   Fine Motor Coordination Functional   Cognition   Overall Cognitive Status Impaired   Arousal/Participation Alert; Responsive; Cooperative   Attention Within functional limits   Orientation Level Oriented to person;Disoriented to place; Disoriented to time;Disoriented to situation   Memory Decreased recall of biographical information;Decreased recall of recent events;Decreased recall of precautions   Following Commands Follows one step commands without difficulty   Comments Pt agreeable to OT eval    Assessment   Prognosis Good   Assessment Pt is a 80 y o  female who was admitted to [address] [place] [address] on 11/16/2019 with Psychosis ([facility] )  At this time, patient is also affected by the comorbidities of: dehydration, HTN, anemia, ambulatory dysfunction, CKD, weakness and A-fib   Additionally, patient is affected by the following personal factors:steps to enter environment  Orders placed for OT evaluation/treatment with up and OOB as tolerated orders  Prior to admission, Patient reporting being independent with ADLs, ambulatory with no AD, and lives [facility]  Upon OT evaluation, patient requires supervision/set-up for UB ADLs and supervision/set-up for LB ADLs  Patient presents with functional use of BUEs, with intact prehension and fine motor coordination, and symmetrical muscle strength  Patient appears to be functioning at baseline, no further Acute OT needs identified at this time to warrant OT services  D/C OT and from OT standpoint, recommendation at time of d/c would be return to [facility]     Goals   Patient Goals to go home soon    Recommendation   OT Discharge Recommendation Other (Comment)  (Return to [facility])   OT - OK to Discharge Yes  (Once medically cleared)   Barthel Index   Feeding 10   Bathing 5   Grooming Score 5   Dressing Score 5   Bladder Score 10   Bowels Score 10   Toilet Use Score 5   Transfers (Bed/Chair) Score 15   Mobility (Level Surface) Score 15   Stairs Score 0  (DNT)   Barthel Index Score 80     [facility],
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
## 2019-11-18 NOTE — SOCIAL WORK
SW placed phone call to [facility] [phone] - spoke to Rohith Killian;  Pt can return upon stabilization with reassess;   Needs 48 hour notice prior to discharge; pt at baseline is pleasant, social, not paranoid; pt ADLs - assist with care due to confusion at times; independent with ambulation - must be able to walk and climb stairs to return as her room is on the second floor; pt PCP is Dr Ama Rogel at the facility; no current psych provider

## 2019-11-18 NOTE — PROGRESS NOTES
Patient pleasant and cooperative and took all medications this shift as ordered  Stressed left arm elevation to decrease edema  No acute behaviors  Continue to monitor

## 2019-11-18 NOTE — PROGRESS NOTES
Patient withdrawn to her room resting with intermittent naps  Refused HS snack and group  Upon approach patient's mood and affect is labile with moments of anxiety  Patient is alert to self, place, and situation  Has mild confusion  Calm and cooperative  No outbursts noted  Took medication without difficulty  Will continue to monitor safety and behaviors every 7 minutes

## 2019-11-19 NOTE — PLAN OF CARE
Pt progressing; no discharge date at this time; pt will return to [facility] upon stabilization

## 2019-11-19 NOTE — PROGRESS NOTES
Patient visible in milieu  She does socialize with select peers and staff  Pleasant and cooperative with interactions with nurse  Medication compliant  She states she did not sleep well last night  Denies pain or burning on urination  Rated anxiety 5/10  Denies depression,SI/HI, or hallucinations  Continue with Q 7 minute safety checks

## 2019-11-19 NOTE — PROGRESS NOTES
Upon reassessment patient is sleeping comfortably  PRN Ativan affective  Will continue to monitor safety and behaviors every 7 minutes

## 2019-11-19 NOTE — PROGRESS NOTES
Progress Note - Flaquito [age] y o  female MRN: [id number]   Unit/Bed#: [facility] 206-02 Encounter: [id number]    Behavior over the last 24 hours: improved  Sadie Gonzales continues with repetitive questions about discharge  She has limited insight into need for admission and unaware of her behaviors and paranoia leading to admission  She has had minimal paranoia here on the unit and has been calm and cooperative on the unit  She is now denying any suicidal thoughts or passive death wish  Does not interact with peers  Does not come to groups  Sleep: improved  Appetite: fair  Medication side effects: No   ROS: all other systems are negative, lymphedema    Mental Status Evaluation:    Appearance:  age appropriate   Behavior:  guarded   Speech:  soft   Mood:  anxious   Affect:  flat   Thought Process:  organized   Associations: intact associations   Thought Content:  mild paranoia   Perceptual Disturbances: none   Risk Potential: Suicidal ideation - None  Homicidal ideation - None  Potential for aggression - No   Sensorium:  oriented to person and place   Memory:  recent memory mildly impaired   Consciousness:  alert and awake   Attention: attention span and concentration are age appropriate   Insight:  fair   Judgment: fair   Gait/Station: normal gait/station, normal balance   Motor Activity: no abnormal movements     Vital signs in last 24 hours:    Temp:  [97 6 °F (36 4 °C)-98 1 °F (36 7 °C)] 98 1 °F (36 7 °C)  HR:  [68-79] 79  Resp:  [18] 18  BP: (138-146)/(68-80) 138/80    Laboratory results: I have personally reviewed all pertinent laboratory/tests results      Suicide/Homicide Risk Assessment:    Risk of Harm to Self:   Current Specific Risk Factors include: recent suicidal ideation  Protective Factors: no current suicidal ideation, ability to communicate with staff on the unit, taking medications as ordered on the unit, responds to redirection  Based on today's assessment, Darus Pencil presents the following risk of harm to self: minimal    Risk of Harm to Others:  Current Specific Risk Factors include: none  Protective Factors: no current homicidal ideation  Based on today's assessment, Darus Pencil presents the following risk of harm to others: none    The following interventions are recommended: behavioral checks every 7 minutes, continued hospitalization on locked unit     Progress Toward Goals: some improvement, less agitated, less paranoid    Assessment/Plan   Principal Problem:    Psychosis ([address] )    Recommended Treatment:     Planned medication and treatment changes:     All current active medications have been reviewed  Encourage group therapy, milieu therapy and occupational therapy  Behavioral Health checks every 7 minutes  Continue current medications:    Current Facility-Administered Medications:  acetaminophen 325 mg Oral Q8H PRN Rell Andres MD   aluminum-magnesium hydroxide-simethicone 30 mL Oral Q4H PRN Rell Andres MD   amLODIPine 5 mg Oral Daily Rell Andres MD   ARIPiprazole 2 mg Oral HS Nasir Medrano MD   aspirin 81 mg Oral Daily Rell Andres MD   cephalexin 500 mg Oral Q12H [facility] Fortunato Kussmaul, PA-C   digoxin 125 mcg Oral Daily Rell Andres MD   furosemide 20 mg Oral Daily Rell Andres MD   haloperidol 0 5 mg Oral Q8H PRN Rell Andres MD   haloperidol lactate 1 mg Intramuscular Q8H PRN Rell Andres MD   lisinopril 20 mg Oral Daily Rell Andres MD   LORazepam 0 5 mg Oral Q8H PRN Rell Andres MD   magnesium hydroxide 30 mL Oral Daily PRN Rell Andres MD   magnesium oxide 400 mg Oral BID Rell Andres MD   metoprolol succinate 25 mg Oral Daily Rell Andres MD   nicotine polacrilex 2 mg Oral Q2H PRN Rell Andres MD   potassium chloride 20 mEq Oral Every Other Day Rell Andres MD       Risks / Benefits of Treatment:    Risks, benefits, and possible side effects of medications explained to patient and patient verbalizes understanding and agreement for treatment  Counseling / Coordination of Care:    Patient's progress discussed with staff in treatment team meeting  Medications, treatment progress and treatment plan reviewed with patient      RED Tay 11/19/19

## 2019-11-19 NOTE — PROGRESS NOTES
Patient visible in milieu, socializes with staff only  Pleasant and cooperative in interaction  Patient stated she felt "happy" because she was getting ice cream as she was smiling  Patient denies anxiety/depression, SI/HI, hallucinations  Remains on Keflex series for UTI, voiding without difficulty  Denies urinary frequency, urgency, dysuria  Remains on 7" checks for safety and behaviors

## 2019-11-19 NOTE — PROGRESS NOTES
Patient has been medication compliant this shift  She does come to the desk and ask why she can't go home yet  She is redirectable  She is pleasant  She ambulates around the unit  She keeps to herself  Continue to monitor

## 2019-11-19 NOTE — PROGRESS NOTES
Mayda Mitchell#  [id number] F  [id number]:[id number]    BIV:[id number]  Adm Date: 11/16/2019 1932  7:32 PM   ATT PHY: Yareli Golden, [address]     The patient was seen after reviewing the chart and discussing the case with caring staff  Today during our encounter, the patient reported no acute concerns  Urine culture is still pending  Objective     Vitals:    11/19/19 0700   BP: 138/80   Pulse: 79   Resp: 18   Temp: 98 1 °F (36 7 °C)   SpO2: 98%       General Appearance: Awake and Alert  No acute distress  HEENT: Normocephalic, atraumatic  PERRLA, EOMI, MMM  Heart: RRR, no murmurs  Normal S1 and S2   Lungs: CTA bilaterally with fair air entry  Assessment     Alessandra Garcia is a(n) 80y o  year old female with Psychosis      1  Cardiac with history of Hypertension/Edema  Amlodipine 5mg, aspirin 81mg, digoxin 125mcg, Lasix 20mg, lisinopril 20mg, metoprolol 25mg, and KCL 20MEq every other day  Will check digoxin level  2  CKD III  Chronic and stable  3  UTI  Keflex 500mg every 12 hours for 2 more days  Urine culture pending to guide treatment  4  DJD/OA  Tylenol as needed  5  Gait Disturbance  PT/OT  The patient was discussed with Dr Casey Blanton and he is in agreement with the above note

## 2019-11-19 NOTE — PLAN OF CARE
Problem: Ineffective Coping  Goal: Participates in unit activities  Description  Interventions:  - Provide therapeutic environment   - Provide required programming   - Redirect inappropriate behaviors   Outcome: Not Progressing   Patient not joining groups with RT; she is reminded and encouraged to join

## 2019-11-19 NOTE — PROGRESS NOTES
11/19/19 1002   Team Meeting   Meeting Type Daily Rounds   Team Members Present   Team Members Present Physician;Nurse;;; Occupational Therapist   Physician Team Member Dr Marysue Ahumada, MD; Jennifer Whaley, [address] [place] [address]    Nursing Team Member Ginette Connolly, RN    Care Management Team Member Samantha Jauregui MS, [facility]    Social Work Team Member Letty Burris [facility]    OT Team Member Franco Hough [place]    Patient/Family Present   Patient Present No   Patient's Family Present No     Med compliant; pleasant; restless/anxious/insomnia last night - prn utilized; no confusion/paranoia noted; slept after PRN

## 2019-11-19 NOTE — PROGRESS NOTES
Patient is having restless anxiety and difficulty falling asleep  Comfort measures were implemented such as repositioning in bed, fluids, and lotion applied to c/o dry skin and tingling in her feet prior  Gave PRN PO Ativan at 0055 for relief of symptoms  Quijano score is "18 " Will continue to monitor safety and behaviors every 7 minutes

## 2019-11-19 NOTE — SOCIAL WORK
SW placed phone call to pt POA, Jackie Giacomo, [phone] to provide update;  Pt POA asked if patient should be considered for a prophylactic antibiotic as she is prone to UTIs - SW suggested contacting patient PCP to discuss; Pt POA expressed concern re: pt ongoing anxiety and paranoia;  SW reviewed medications with patient POA and uses - pt POA expressed understanding;  Pt POA eluded to the idea that [facility] may not accept patient back due to increased paranoia and behaviors at the [facility] - SW will reach out to [facility] for confirmation;   No additional questions or concerns for SW at this time; call mutually ended

## 2019-11-20 NOTE — SOCIAL WORK
NAHUM reviewed chart - pt 302 expires tomorrow, 11/21;  NAHUM placed phone call to Theresa at [facility] - still agreeable to discharge Thursday; NAHUM placed phone call to Gilbert at [facility] to change discharge transport to tomorrow - left message on voicemail - awaiting response

## 2019-11-20 NOTE — DISCHARGE INSTR - OTHER ORDERS
You are being discharged to your personal care home, [facility], [address], Phone: [phone]  Triggers you have identified during your hospitalization that led to your distressed mood include acute UTI and symptoms related to such  Coping skills you have identified during your hospitalization include talking to your family, meditating, and socializing with select peers  If you are unable to deal with your distressed mood alone please contact your nephew, Ana Brown, or speak to a staff member at the facility  If that is not effective and you continue to have distressed mood,  please contact [address] [place] [address] at [phone], dial [phone], or go to the nearest emergency center  *National Suicide Prevention Lifeline:  0-234.411.8708  *[facility] Mental Illness ([place]) HELPLINE: 486.821.2033/Website: www veronica org  *Substance Abuse and Mental Health Services Administration([facility]) [employer], which is a confidential, free, 24-hour-a-day, 365-day-a-year, information service for individuals and family members facing mental health and/or substance use disorders  This service provides referrals to local treatment facilities, support groups, and community-based organizations  Callers can also order free publications and other information  Call 5-493.984.7170/Website: www [facility] gov  *[facility] 2-1-1: This is a toll free, confidential, 24-hour-a-day service which connects you to a community  in your area who can help you find services and resources that are available to you locally and provide critical services that can improve and save lives    Call: 211  /Website: https://sallyCR2xander okeefe/

## 2019-11-20 NOTE — DISCHARGE INSTR - APPOINTMENTS
The treatment team recommends ongoing medication management upon discharge  Your psychiatric medications will be managed by the facility medical provider, Dr Gerry Rubin  You will be seen within 3 days of return to facility  Your discharge will be faxed to [facility] for continuity of care

## 2019-11-20 NOTE — PLAN OF CARE
Problem: Alteration in Thoughts and Perception  Goal: Verbalize thoughts and feelings  Description  Interventions:  - Promote a nonjudgmental and trusting relationship with the patient through active listening and therapeutic communication  - Assess patient's level of functioning, behavior and potential for risk  - Engage patient in 1 on 1 interactions  - Encourage patient to express fears, feelings, frustrations, and discuss symptoms    - [place] patient to reality, help patient recognize reality-based thinking   - Administer medications as ordered and assess for potential side effects  - Provide the patient education related to the signs and symptoms of the illness and desired effects of prescribed medications  Outcome: Progressing

## 2019-11-20 NOTE — SOCIAL WORK
NAHUM received phone call from Theresa at [facility];  NAHUM reviewed pt behaviors over LOS;  No need to reassess; Theresa agreeable to discharge Friday; report to be called to [phone] option 1 - Darcy Nickerson RN;  Dr Jamilah Sykes will follow psych med management at facility    SW placed phone call to [facility] - non emergent transport scheduled for Friday at 11am

## 2019-11-20 NOTE — PROGRESS NOTES
Patient was isolative to her room this evening but did attend group and snack time then retreated back to her room  She is pleasant and appropriate during conversation  She informs she wants to go home, back to [facility]  She rates her anxiety 2/10, denies depression, denies SI,  HI and hallucinations  She denies any pain  She was medication compliant at HS  Patient ambulates with a steady gait  No acute behaviors exhibited  Will CTM via q7 minute safety checks  Bed remains in lowest position with side rails up x 2  Bed alarm in place for patient safety

## 2019-11-20 NOTE — PROGRESS NOTES
Mayda Mitchell#  [id number] F  FHJ:[id number]    [id number]  Adm Date: 11/16/2019 1932  7:32 PM   ATT PHY: Navarro Rangel, [facility]     The patient was seen after reviewing the chart and discussing the case with caring staff  Patient examined at bedside  Patient has no acute issues  On review of patient's labs it was found that patient's vitamin B12 levels were very low 388  Patient's urine culture results showed significant growth with Klebsiella pneumoniae sensitive to all oral antibiotics including cephalosporins  Patient has given 72 hours notice and is scheduled to be discharge 11/21/2019  Patient will be going back to her personal care home and will be needing all prescriptions  I reviewed and reconciled patient's problem list and medications  Objective     Vitals:    11/20/19 0719   BP: 130/68   Pulse: 78   Resp: 16   Temp: 99 3 °F (37 4 °C)   SpO2: 95%       General Appearance: Awake and Alert  No acute distress  HEENT: Normocephalic, atraumatic  PERRLA, EOMI, MMM  Heart: RRR, no murmurs  Normal S1 and S2   Lungs: CTA bilaterally with fair air entry  Assessment     Leeanne Garcia is a(n) 80y o  year old female with Psychosis  MEDICAL CLEARANCE  Patient is medically cleared for discharge  All scripts have been written for the patient      1  Cardiac with history of Hypertension/Edema  Amlodipine 5mg, aspirin 81mg, digoxin 125mcg, Lasix 20mg, lisinopril 20mg, metoprolol 25mg, and KCL 20MEq every other day  Will check digoxin level  2  CKD III  Chronic and stable  3  UTI  Patient has Klebsiella pneumoniae UTI sensitive to cephalosporins  Keflex 500mg every 12 hours for 1 more days  4  DJD/OA  Tylenol as needed  5  Gait Disturbance  PT/OT  6  New vitamin B12 deficiency  Patient has been put on monthly B12 injections  7  Vitamin-D deficiency  Patient is getting vitamin-D bolus doses

## 2019-11-20 NOTE — PLAN OF CARE
Pt progressing; No discharge date at this time;  Pt will dc to [facility] upon stabilization

## 2019-11-20 NOTE — SOCIAL WORK
[facility] to update Aby Bedoya on patient status;   Pt 302 expires Friday - no current grounds for 303;  SW will attempt to obtain 201 - if patient refuses, pt would need to discharge Friday; NAHUM left message on Aby Bedoya voicemail requesting return phone call to discuss

## 2019-11-20 NOTE — PROGRESS NOTES
Patient was observed in her room at the outset of shift  Alert, quiet, pleasant and receptive to approach  Cooperative with bloodwork draw for Digoxin Level  Denies psychotic or lethal sx  Did express depression/anxiety at a "medium level", attributing it to lack of awareness of "what is going to happen to me?" Stated she still does not know why she had to be admitted to a "unit like this with just a bladder infection "  Reassurance provided that she would be cared for appropriately and competently and returned to her former residence following completion of treatment  Patient indicated agreement  Has been compliant with medications and treatment requests  Will continue to provide support and encouragment

## 2019-11-20 NOTE — PROGRESS NOTES
11/20/19 1011   Team Meeting   Meeting Type Daily Rounds   Team Members Present   Team Members Present Physician;Nurse;;; Occupational Therapist   Physician Team Member Dr Michale Collins MD; Siva Callie, [address] [place] [address]   Nursing Team Member Dimitris Altman RN   Care Management Team Member Jed [facility]   Social Work Team Member Brittanie Valladares [facility]   OT Team Member Yossi Figueroa [place]   Patient/Family Present   Patient Present No   Patient's Family Present No     Continues on keflix for uti, medication compliant, cooperative, rates anxiety a 2, denies SI/HI/AH/VH  Potential D/C on Friday

## 2019-11-20 NOTE — PROGRESS NOTES
Progress Note - [address] 80 y o  female MRN: [id number]   Unit/Bed#: [facility] 206-02 Encounter: [id number]    Behavior over the last 24 hours: unchanged  Lorraine Wilson continues cooperative and no overt psychosis noted here in the hospital   When I reviewed with her events leading to admission  She does admit to feeling someone takes her money out of her room  She is also fixated on her niece's  Chris Simon, and does believe he comes into [place] and goes into her room and she is fearful of him  She states she feels someone tap her on the shoulder and thinks it must be him  She states she has not had any of these experiences since being here in the hospital, but still expresses some paranoia toward "[place]"  Completed MOCA cognitive assessment, she scored 20/30, her delayred recall was only 1/5, but was able to recall all 5 with cues  Minimal participation in milieu      Sleep: normal  Appetite: normal  Medication side effects: No   ROS: all other systems are negative    Mental Status Evaluation:    Appearance:  age appropriate   Behavior:  pleasant, cooperative   Speech:  normal rate, normal volume   Mood:  anxious   Affect:  flat   Thought Process:  organized   Associations: intact associations   Thought Content:  some paranoia   Perceptual Disturbances: none   Risk Potential: Suicidal ideation - None  Homicidal ideation - None  Potential for aggression - No   Sensorium:  oriented to person, time/date and situation   Memory:  recent and remote memory grossly intact   Consciousness:  alert and awake   Attention: attention span and concentration are age appropriate   Insight:  age appropriate   Judgment: age appropriate   Gait/Station: normal gait/station, normal balance   Motor Activity: no abnormal movements     Vital signs in last 24 hours:    Temp:  [97 5 °F (36 4 °C)-99 3 °F (37 4 °C)] 99 3 °F (37 4 °C)  HR:  [78-82] 78  Resp:  [16-19] 16  BP: (120-131)/(58-72) 130/68    Laboratory results: I have personally reviewed all pertinent laboratory/tests results  Suicide/Homicide Risk Assessment:    Risk of Harm to Self:   Current Specific Risk Factors include: diagnosis of mood disorder  Protective Factors: no current suicidal ideation, ability to communicate with staff on the unit, able to contract for safety on the unit  Based on today's assessment, Hillary Pettyufmann presents the following risk of harm to self: none    Risk of Harm to Others:  Current Specific Risk Factors include: none  Protective Factors: no current homicidal ideation  Based on today's assessment, Deinceanna PettyConcepción presents the following risk of harm to others: none    The following interventions are recommended: behavioral checks every 7 minutes, continued hospitalization on locked unit     Progress Toward Goals: some improvement    Assessment/Plan   Principal Problem:    Psychosis ([facility] [address] )    Recommended Treatment:     Planned medication and treatment changes:     All current active medications have been reviewed  Encourage group therapy, milieu therapy and occupational therapy  Behavioral Health checks every 7 minutes  Continue current medications:    Current Facility-Administered Medications:  acetaminophen 325 mg Oral Q8H PRN Cole Mayers MD   aluminum-magnesium hydroxide-simethicone 30 mL Oral Q4H PRN Cole Mayers MD   amLODIPine 5 mg Oral Daily Cole Mayers MD   ARIPiprazole 2 mg Oral HS Arturo Pak MD   aspirin 81 mg Oral Daily Cole Mayers MD   cephalexin 500 mg Oral Q12H [address] Romana Watkins PA-C   cyanocobalamin 1,000 mcg Intramuscular Q30 Days Ryder Santana MD   digoxin 125 mcg Oral Daily Cole Mayers MD   furosemide 20 mg Oral Daily Cole Mayers MD   haloperidol 0 5 mg Oral Q8H PRN Cole Mayers MD   haloperidol lactate 1 mg Intramuscular Q8H PRN Cole Mayers MD   lisinopril 20 mg Oral Daily Cole Mayers MD   LORazepam 0 5 mg Oral Q8H PRN Cole Mayers MD   magnesium hydroxide 30 mL Oral Daily PRN Taniya Navarro Elizabeth Us MD   magnesium oxide 400 mg Oral BID Shayla Major MD   metoprolol succinate 25 mg Oral Daily Shayla Major MD   nicotine polacrilex 2 mg Oral Q2H PRN Shayla Major MD   potassium chloride 20 mEq Oral Every Other Day Shayla Major MD       Risks / Benefits of Treatment:    Risks, benefits, and possible side effects of medications explained to patient and patient verbalizes understanding and agreement for treatment  Counseling / Coordination of Care:    Patient's progress discussed with staff in treatment team meeting  Medications, treatment progress and treatment plan reviewed with patient      RED Pedraza 11/20/19

## 2019-11-21 NOTE — PROGRESS NOTES
Patient visible in milieu, mood pleasant and cooperative  Patient denies anxiety/depression, SI/HI, hallucinations  Participated in group, socializing with staff and peers  Remains medication compliant and on 7" checks for safety and behaviors

## 2019-11-21 NOTE — PROGRESS NOTES
Belongings sent home with patient:    1 yellow necklace with pink stone, 1 white nightgown with red flowers, 1 yellow bathrobe, 1 pair of blue shoes, 1 pair of black socks, 1 white watch

## 2019-11-21 NOTE — PROGRESS NOTES
11/21/19 0958   Team Meeting   Meeting Type Daily Rounds   Patient/Family Present   Patient Present No   Patient's Family Present No     Daily Psychiatric Rounds    Team Members Present:    Jennifer Cuevas, RED Rodriguez, MS,NCC,LPC  Sanna Fontanez, MSW  Marcos sandoval, CTRS  Malcolm Moran, MARY Montalvo, MARY    Discussion:     Discharge today pushed back to 1300, to [facility]  Abilify discontinued prior to admit because of lack of coverage  Will dc on Zyprexa

## 2019-11-21 NOTE — DISCHARGE INSTRUCTIONS
Psychotic Disorder   WHAT YOU NEED TO KNOW:   What is a psychotic disorder? A psychotic disorder is a medical condition that causes hallucinations and delusions  Schizophrenia and schizoaffective disorder are examples of psychotic disorders  What are the signs and symptoms of a psychotic disorder? You may have hallucinations and delusions  Hallucinations are seeing, hearing, tasting, or feeling things that are not real  Delusions are beliefs that something is real, true, or right when it is not  These false beliefs do not go away even if there is proof that they are not true  You may believe someone is spying on you, chasing after you, or controlling your mind  You may also believe there is something wrong with how your body works  You may also have any of the following:  · Feeling scared or confused    · Speaking quickly, loudly, or slowly, or saying things that do not make sense    · Feeling restless or irritable    · Quick changes in your mood    · Avoiding others or acting nervous around others    · Trouble concentrating or thinking clearly    · Thoughts of self-harm, suicide, or your own death    · Poor school or work performance    · Poor personal hygiene  What causes or increases my risk for a psychotic disorder? The cause of a psychotic disorder may not be known  A psychotic disorder may happen with other mental illnesses, such as borderline personality disorder or bipolar disorder   Your risk for a psychotic disorder may be higher with any of the following:  · Drug or alcohol abuse    · Stressful events such as the death of a loved one, abuse, parents' divorce, or loss of a friendship    · Certain medicines, such as steroids, antidepressants, or digoxin    · Parents, siblings, or other family members with a history of a psychotic disorder    · Medical conditions such as Parkinson's disease, HIV, stroke, or epilepsy    · Infection, a brain injury, or a brain tumor  How is a psychotic disorder diagnosed? Your healthcare provider will ask about your symptoms and how long you have had them  He or she will ask if you have any family members with a mental illness  Tell your healthcare provider about any stressful events in your life  He or she may ask about any other health conditions or medicines you take  You may need blood tests to get information about your overall health  CT or MRI pictures may show problems in your brain that can cause a psychotic disorder  How is a psychotic disorder treated? A psychotic disorder can be treated  Treatment can help decrease your symptoms and make you feel better  You may need any of the following:  · Medicines  may be given to decrease your symptoms  You may need 1 or more medicines  You may need to take your medicine for several weeks before you begin to feel better  Tell your healthcare provider about any side effects or problems you have with your medicines  The type or amount of medicine may need to be changed  · Cognitive behavioral therapy  (CBT) helps you create more realistic, appropriate thoughts about yourself and your behaviors  You may work individually with a mental health provider  CBT may also be done with a group of people that have a psychotic disorder  CBT may be combined with medicines that help treat your psychotic disorder  · Family counseling  can help you and your family work together to understand and manage your illness  What can I do to manage my symptoms? · Get support  It may be difficult to cope with your illness  You may feel lonely, anxious, or depressed  It may help to join a support group  A support group lets you talk with others who have a mental illness  For information and more support visit:  [facility]  [address]  [facility]  , [address] [facility] , [address] [place] [address]  Phone: [phone]  Phone: [phone]  Web Address: http://www yang com/  uTrail me      · Do not drink alcohol or use illegal drugs  Alcohol and illegal drugs can make your symptoms worse  Ask your healthcare provider for information if you currently drink alcohol or use illegal drugs and need help to quit  · Do not smoke  Nicotine and other chemicals in cigarettes and cigars can cause lung damage  They can also decrease how well your medicine works  Ask your healthcare provider for information if you currently smoke and need help to quit  E-cigarettes or smokeless tobacco still contain nicotine  Talk to your healthcare provider before you use these products  · Exercise regularly  Exercise can help improve your mood and decrease symptoms  Ask about the best exercise plan for you  · Manage your stress  Stress can make your condition worse  Ask your healthcare provider for more information about practicing mindfulness and deep breathing exercises to help decrease your stress  You may learn other ways to manage stress during therapy  Call 911 if:   · You feel like you could harm yourself or someone else  When should I contact my healthcare provider? · Your symptoms do not improve  · You cannot make it to your next appointment  · You have new symptoms  · You have questions or concerns about your condition or care  CARE AGREEMENT:   You have the right to help plan your care  Learn about your health condition and how it may be treated  Discuss treatment options with your caregivers to decide what care you want to receive  You always have the right to refuse treatment  The above information is an  only  It is not intended as medical advice for individual conditions or treatments  Talk to your doctor, nurse or pharmacist before following any medical regimen to see if it is safe and effective for you  © 2017 [facility] Information is for End User's use only and may not be sold, redistributed or otherwise used for commercial purposes   All illustrations and images included in [facility] are the copyrighted property of A D A M [employer]  or Daron Page  Metabolic Syndrome X   WHAT YOU NEED TO KNOW:   What is metabolic syndrome? Metabolic syndrome is a group of medical conditions that can increase your risk for heart disease, stroke, or type 2 diabetes  You may have metabolic syndrome if you have at least 3 of the following medical conditions:  · High triglycerides (a type of fat in your blood)    · Low HDL cholesterol (good cholesterol)    · High blood pressure    · High blood sugar levels    · Extra abdominal fat  What increases my risk for metabolic syndrome? The exact cause of metabolic syndrome is not known  Your risk for metabolic syndrome increases if you have insulin resistance  Insulin is a hormone that helps your body take sugar out of your blood and use it for energy  Insulin resistance means your pancreas keeps making insulin but your body cannot use it correctly  Your risk for metabolic syndrome also increases as you age, if you are overweight or obese, or you do not exercise  What are the signs and symptoms of metabolic syndrome? Most people with metabolic syndrome do not have any signs or symptoms  You may have more thirst or hunger than usual, urinate more often, or have blurred vision or headaches  How is metabolic syndrome diagnosed? Your healthcare provider will examine you and ask about other medical conditions you may have  He may ask if you have any family members with metabolic syndrome, diabetes, obesity, or heart disease  · Blood tests: These are used to check your glucose and cholesterol levels  · Oral glucose tolerance test:  Your blood sugar level is tested after you fast for 8 hours, then again after you are given a glucose drink  The test measures how high your blood sugar level rises from the glucose drink  How is metabolic syndrome treated? · Cholesterol medicine:   This type of medicine is given to help decrease (lower) the amount of cholesterol (fat) in your blood  Cholesterol medicine works best if you also exercise and eat a healthy diet that is low in certain kinds of fats  Some cholesterol medicines may cause liver problems  You may need to have blood taken for tests while using this medicine  · Blood pressure medicine: This is given to lower your blood pressure  A controlled blood pressure helps protect your organs, such as your heart, lungs, brain, and kidneys  Take your blood pressure medicine exactly as directed  · Hypoglycemic medicine: This medicine may be given to decrease the amount of sugar in your blood  Hypoglycemic medicine helps your body move the sugar to your cells, where it is needed for energy  What are the risks of metabolic syndrome? If untreated, metabolic syndrome increases your risk of heart disease, stroke, and diabetes  These conditions lead to life-threatening illness  How can I manage metabolic syndrome? · Maintain a healthy weight:  Weight loss helps lower cholesterol, triglycerides, blood pressure, and blood glucose levels  It can also raise HDL (good cholesterol)  Ask your healthcare provider how much you should weigh  Ask him to help you create a weight loss plan if you are overweight  · Eat a variety of healthy foods:  Healthy foods include fruits, vegetables, whole-grain breads, low-fat dairy products, beans, lean meats, and fish  Eat foods that are low in fat and sodium (salt)  A dietitian can help you plan healthy meals  · Exercise:  Ask your healthcare provider to help you create an exercise plan  Exercise can help lower your blood pressure, cholesterol, and blood sugar levels  Exercise can also help raise your HDL level and help you to lose weight  Get at least 30 minutes of exercise, 5 days each week  · Check your blood pressure as directed: You may be asked to keep a record of your blood pressure and bring it with you to follow-up visits   Ask your healthcare provider what your blood pressure should be and how to check it  · Limit alcohol:  Women should limit alcohol to 1 drink a day  Men should limit alcohol to 2 drinks a day  A drink of alcohol is 12 ounces of beer, 5 ounces of wine, or 1½ ounces of liquor  · Do not smoke: If you smoke, it is never too late to quit  Smoking further increases your risk for heart disease and stroke  Ask your healthcare provider for information if you need help quitting  When should I contact my healthcare provider? · You have more thirst or hunger than usual      · You urinate more frequently  · You have blurred vision  · You have questions or concerns about your condition or care  When should I seek immediate care or call 911? · Your blood pressure is higher than your healthcare provider told you it should be  · You have chest pain or discomfort that spreads to your arms, jaw, or back  · You have a severe headache or dizziness  · You have trouble thinking, speaking, or understanding others  CARE AGREEMENT:   You have the right to help plan your care  Learn about your health condition and how it may be treated  Discuss treatment options with your caregivers to decide what care you want to receive  You always have the right to refuse treatment  The above information is an  only  It is not intended as medical advice for individual conditions or treatments  Talk to your doctor, nurse or pharmacist before following any medical regimen to see if it is safe and effective for you  © 2017 [address] Information is for End User's use only and may not be sold, redistributed or otherwise used for commercial purposes  All illustrations and images included in CareNotes® are the copyrighted property of [employer]  or Daron Page  Digoxin (By mouth)   Digoxin (di-JOX-in)  Treats heart rhythm problems and heart failure  This medicine is also called digitalis     Brand Name(s): Digitek, Digox, Lanoxin   There may be other brand names for this medicine  When This Medicine Should Not Be Used:   Do not use this medicine if you have had an allergic reaction to digoxin or other forms of digitalis or if you have ventricular fibrillation  How to Use This Medicine:   Capsule, Liquid, Tablet  · Take your medicine as directed  Your dose may need to be changed several times to find what works best for you  · It is best to take this medicine on an empty stomach  If the medicine upsets your stomach, you may take it with food or milk  · Measure the oral liquid medicine with the marked measuring dropper that comes with the package or with an oral syringe  Do not use teaspoons or tablespoons that are used for serving and eating food  These will not measure the correct amount  · To keep your heart working properly, take this medicine exactly as directed even though you may feel well  Do not take more of this medicine than your doctor ordered and do not miss any doses  Take the medicine at the same time each day  This medicine works best when there is a constant amount in the blood  If a dose is missed:   · If you miss a dose or forget to use your medicine, use it as soon as you can  If it has been more than 12 hours from your last dose, skip the missed dose and take your next dose at the regular time  Do not take 2 doses at the same time  · If you miss your doses for 2 days or longer, call your doctor  How to Store and Dispose of This Medicine:   · Store the medicine in a closed container at room temperature, away from heat, moisture, and direct light  · Ask your pharmacist, doctor, or health caregiver about the best way to dispose of any outdated medicine or medicine no longer needed  · Keep all medicine out of the reach of children  Never share your medicine with anyone    Drugs and Foods to Avoid:   Ask your doctor or pharmacist before using any other medicine, including over-the-counter medicines, vitamins, and herbal products  · Make sure your doctor knows if you also use Michael's wort, activated charcoal, albuterol (Ventolin®), alprazolam (Xanax®), cyclosporine (Gengraf®, Neoral®, Sandimmune®), diclofenac (Voltaren®), diphenoxylate, epoprostenol (Flolan®), indomethacin (Indocin®), kaolin-pectin (kaopectate), methylphenidate (Ritalin®), metoclopramide (Reglan®), nicotine gum, propantheline (Pro-Banthine®), sucralfate (Carafate®), or sulfasalazine (Azulfidine®)  Tell your doctor if you also use diuretics or water pills (such as bumetanide, furosemide, hydrochlorothiazide, spironolactone, Aldactazide®, Aldactone®, Bumex®, Dyazide®, Lasix®, Maxzide®, Midamor®, or Moduretic®), heart or blood pressure medicine (such as atenolol, captopril, carvedilol, diltiazem, metoprolol, nifedipine, nitrendipine, telmisartan, verapamil, Adalat®, Cardizem®, Lotrel®, Plendil®, Procardia®, Tiazac®, or Toprol®), certain medicines to treat heart rhythm problems (such as amiodarone, dofetilide, moricizine, propafenone, quinidine, sotalol, Betapace®, Cardioquin®, Cordarone®, Ethmozine®, Quinaglute®, Rythmol®, or Te-Moak), medicines to treat infection (such as azithromycin, clarithromycin, erythromycin, itraconazole, ketoconazole, neomycin, rifampin, tetracycline, Melo-Tab®, Mycifradin®, Rifadin®, Rimactane®, Sporanox®, or Zithromax®), a steroid medicine (such as dexamethasone, Medrol®), or cancer medicines    · Tell your doctor if you also use calcium supplements, dopamine (Intropin®), epinephrine (Adrenalin®), norepinephrine (Levophed®), ranolazine (Ranexa®), ritonavir (Norvir®), succinylcholine (Anectine®), teriparatide Arpan Sers), thyroid supplements (such as levothyroxine, Synthroid®), certain medicines to treat diabetes (such as acarbose, exenatide, metformin, miglitol, Byetta®, Glucophage®, Glyset®, or Precose®), medicine to lower cholesterol (such as cholestyramine, colestipol, Colestid®, or Parth Lundborg), or medicine to treat stomach ulcers (such as esomeprazole, lansoprazole, omeprazole, rabeprazole, Aciphex®, Nexium®, or Prilosec®)  · You should not use antacids (such as Maalox® or Mylanta®) at the same time you take digoxin  · Tell your doctor if you eat meals that are high in bran, such as cereals or muffins  Warnings While Using This Medicine:   · Make sure your doctor knows if you are pregnant or breastfeeding or if you have kidney disease, heart or blood vessel problems, breathing problems (such as hypoxia), mineral imbalances (such as high or low calcium, or low potassium or magnesium in the blood), or thyroid problems  Tell your doctor if you are on dialysis, have had a recent heart attack, or if you have recently been vomiting or had diarrhea  · Do not stop using this medicine suddenly  Your doctor will need to slowly decrease your dose before you stop it completely  · Watch for signs and symptoms of overdose while you are taking this medicine  Follow your doctor's directions carefully  The amount of this medicine needed to help most people is very close to the amount that could cause serious problems from overdose  Some early warning signs of overdose are confusion, loss of appetite, nausea, vomiting, diarrhea, or vision problems  Other signs of overdose are an uneven, pounding, or slow heartbeat or fainting  In babies and small children, the earliest signs of overdose are heartbeat changes, weight loss, stomach pain, or unusual behavior  · Your doctor will do lab tests at regular visits to check on the effects of this medicine  Keep all appointments    Possible Side Effects While Using This Medicine:   Call your doctor right away if you notice any of these side effects:  · Allergic reaction: Itching or hives, swelling in your face or hands, swelling or tingling in your mouth or throat, chest tightness, trouble breathing  · Changes in vision  · Confusion, tiredness or weakness, trouble breathing, numbness or tingling in hands, feet, or lips  · Fast, slow, or uneven heartbeat  · Nausea, vomiting, diarrhea, loss of appetite  · Unusual behavior, stomach pain, or weight loss (in children)  If you notice these less serious side effects, talk with your doctor:   · Headache or dizziness  If you notice other side effects that you think are caused by this medicine, tell your doctor  Call your doctor for medical advice about side effects  You may report side effects to FDA at 2-800-BYZ-9851  © 2017 [address] [employer]  Information is for End User's use only and may not be sold, redistributed or otherwise used for commercial purposes  The above information is an  only  It is not intended as medical advice for individual conditions or treatments  Talk to your doctor, nurse or pharmacist before following any medical regimen to see if it is safe and effective for you  Olanzapine (By mouth)   Olanzapine ([id number]-[id number])  Treats psychotic mental disorders, such as schizophrenia or bipolar disorder (manic-depressive illness)  Brand Name(s): ZyPREXA, ZyPREXA Zydis   There may be other brand names for this medicine  When This Medicine Should Not Be Used: This medicine is not right for everyone  Do not use it if you had an allergic reaction to olanzapine  How to Use This Medicine:   Tablet, Dissolving Tablet  · Take your medicine as directed  Your dose may need to be changed several times to find what works best for you  · Make sure your hands are dry before you handle the disintegrating tablet  Peel back the foil from the blister pack, then remove the tablet  Do not push the tablet through the foil  Place the tablet in your mouth  After it has melted, swallow or take a drink of water  · This medicine should come with a Medication Guide  Ask your pharmacist for a copy if you do not have one  · Missed dose: Take a dose as soon as you remember   If it is almost time for your next dose, wait until then and take a regular dose  Do not take extra medicine to make up for a missed dose  · Store the medicine in a closed container at room temperature, away from heat, moisture, and direct light  Keep the disintegrating tablet in the original package until you are ready to use it  Drugs and Foods to Avoid:   Ask your doctor or pharmacist before using any other medicine, including over-the-counter medicines, vitamins, and herbal products  · You must be careful if you are also using other medicine that might cause similar side effects as olanzapine  Make sure your doctor knows about all other medicines you are using  · Some medicines can affect how olanzapine works  Tell your doctor if you are using any of the following:  ¨ Carbamazepine, diazepam, fluoxetine, fluvoxamine, levodopa, omeprazole, or rifampin  ¨ Blood pressure medicine  c  · Tell your doctor if you use anything else that makes you sleepy  Some examples are allergy medicine, narcotic pain medicine, and alcohol  · Do not drink alcohol while you are using this medicine  · Tell your doctor if you smoke tobacco  You might need a different amount of this medicine if you smoke  Warnings While Using This Medicine:   · Tell your doctor if you are pregnant, or if you have kidney disease, liver disease, diabetes, glaucoma, prostate problems, or a history of breast cancer, neuroleptic malignant syndrome (NMS), seizures, or severe constipation  Tell your doctor if you have any kind of heart or circulation problems, including low blood pressure, heart failure, heart rhythm problems, or a history of a heart attack or stroke  Tell your doctor if you have a condition called phenylketonuria  · Do not breastfeed while you are using this medicine  · For some children, teenagers, and young adults, this medicine may increase mental or emotional problems  This may lead to thoughts of suicide and violence   Talk with your doctor right away if you have any thoughts or behavior changes that concern you  Tell your doctor if you or anyone in your family has a history of bipolar disorder or suicide attempts  · This medicine may cause the following problems:  ¨ Neuroleptic malignant syndrome (a nerve and muscle problem)  ¨ Drug reaction with eosinophilia and systemic symptoms (DRESS)  ¨ High blood sugar, cholesterol, or triglyceride levels  ¨ Tardive dyskinesia (a muscle problem that may become permanent)  · This medicine may make you dizzy or drowsy, or may cause trouble with thinking or controlling body movements, which may lead to falls, fractures or other injuries  Do not drive or do anything that could be dangerous until you know how this medicine affects you  You may also feel lightheaded when getting up suddenly from a lying or sitting position, so stand up slowly  · This medicine may make you bleed, bruise, or get infections more easily  Take precautions to prevent illness and injury  Wash your hands often  · This medicine may make it more difficult for your body to cool down  Be careful to not become overheated during exercise or hot weather, because you could have heat stroke  · Your doctor will do lab tests at regular visits to check on the effects of this medicine  Keep all appointments  · Keep all medicine out of the reach of children  Never share your medicine with anyone    Possible Side Effects While Using This Medicine:   Call your doctor right away if you notice any of these side effects:  · Allergic reaction: Itching or hives, swelling in your face or hands, swelling or tingling in your mouth or throat, chest tightness, trouble breathing  · Eye pain, trouble seeing  · Feeling very thirsty or hungry, change in how much or how often you urinate  · Fever, chills, cough, sore throat, body aches  · Jerky muscle movement you cannot control (often in your face, tongue, or jaw)  · Lightheadedness, dizziness, fainting  · Seizures or tremors  · Sweating, confusion, uneven heartbeat, muscle stiffness  · Swollen breasts, or liquid discharge from your nipples (men or women)  · Swollen, painful, or tender lymph glands in neck, armpit, or groin  · Trouble breathing or swallowing  · Unusual behavior, thoughts of hurting yourself or others  · Unusual bleeding, bruising, or weakness  If you notice these less serious side effects, talk with your doctor:   · Constipation, upset stomach  · Dry mouth  · Headache, tiredness  · Sleepiness or unusual drowsiness  · Weight gain  If you notice other side effects that you think are caused by this medicine, tell your doctor  Call your doctor for medical advice about side effects  You may report side effects to FDA at 4-560-FDA-9114  © 2017 [address] [employer] Information is for End User's use only and may not be sold, redistributed or otherwise used for commercial purposes  The above information is an  only  It is not intended as medical advice for individual conditions or treatments  Talk to your doctor, nurse or pharmacist before following any medical regimen to see if it is safe and effective for you

## 2019-11-21 NOTE — PLAN OF CARE
Problem: Alteration in Thoughts and Perception  Goal: Treatment Goal: Gain control of psychotic behaviors/thinking, reduce/eliminate presenting symptoms and demonstrate improved reality functioning upon discharge  Outcome: Adequate for Discharge  Goal: Verbalize thoughts and feelings  Description  Interventions:  - Promote a nonjudgmental and trusting relationship with the patient through active listening and therapeutic communication  - Assess patient's level of functioning, behavior and potential for risk  - Engage patient in 1 on 1 interactions  - Encourage patient to express fears, feelings, frustrations, and discuss symptoms    - [place] patient to reality, help patient recognize reality-based thinking   - Administer medications as ordered and assess for potential side effects  - Provide the patient education related to the signs and symptoms of the illness and desired effects of prescribed medications  Outcome: Adequate for Discharge  Goal: Refrain from acting on delusional thinking/internal stimuli  Description  Interventions:  - Monitor patient closely, per order   - Utilize least restrictive measures   - Set reasonable limits, give positive feedback for acceptable   - Administer medications as ordered and monitor of potential side effects  Outcome: Adequate for Discharge  Goal: Agree to be compliant with medication regime, as prescribed and report medication side effects  Description  Interventions:  - Offer appropriate PRN medication and supervise ingestion; conduct AIMS, as needed   Outcome: Adequate for Discharge  Goal: Complete daily ADLs, including personal hygiene independently, as able  Description  Interventions:  - Observe, teach, and assist patient with ADLS  - Monitor and promote a balance of rest/activity, with adequate nutrition and elimination   Outcome: Adequate for Discharge     Problem: Anxiety  Goal: Anxiety is at manageable level  Description  Interventions:  - Assess and monitor patient's anxiety level  - Monitor for signs and symptoms (heart palpitations, chest pain, shortness of breath, headaches, nausea, feeling jumpy, restlessness, irritable, apprehensive)  - Collaborate with interdisciplinary team and initiate plan and interventions as ordered    - [place] patient to unit/surroundings  - Explain treatment plan  - Encourage participation in care  - Encourage verbalization of concerns/fears  - Identify coping mechanisms  - Assist in developing anxiety-reducing skills  - Administer/offer alternative therapies  - Limit or eliminate stimulants  Outcome: Adequate for Discharge     Problem: SLEEP DISTURBANCE  Goal: Will exhibit normal sleeping pattern  Description  Interventions:  -  Assess the patients sleep pattern, noting recent changes  - Administer medication as ordered  - Decrease environmental stimuli, including noise, as appropriate during the night  - Encourage the patient to actively participate in unit groups and or exercise during the day to enhance ability to achieve adequate sleep at night  - Assess the patient, in the morning, encouraging a description of sleep experience  Outcome: Adequate for Discharge     Problem: PAIN - ADULT  Goal: Verbalizes/displays adequate comfort level or baseline comfort level  Description  Interventions:  - Encourage patient to monitor pain and request assistance  - Assess pain using appropriate pain scale  - Administer analgesics based on type and severity of pain and evaluate response  - Implement non-pharmacological measures as appropriate and evaluate response  - Consider cultural and social influences on pain and pain management  - Notify physician/advanced practitioner if interventions unsuccessful or patient reports new pain  Outcome: Adequate for Discharge     Problem: INFECTION - ADULT  Goal: Absence or prevention of progression during hospitalization  Description  INTERVENTIONS:  - Assess and monitor for signs and symptoms of infection  - Monitor lab/diagnostic results  - Monitor all insertion sites, i e  indwelling lines, tubes, and drains  - Monitor endotracheal if appropriate and nasal secretions for changes in amount and color  - [place] appropriate cooling/warming therapies per order  - Administer medications as ordered  - Instruct and encourage patient and family to use good hand hygiene technique  - Identify and instruct in appropriate isolation precautions for identified infection/condition  Outcome: Adequate for Discharge  Goal: Absence of fever/infection during neutropenic period  Description  INTERVENTIONS:  - Monitor WBC    Outcome: Adequate for Discharge     Problem: SAFETY ADULT  Goal: Patient will remain free of falls  Description  INTERVENTIONS:  - Assess patient frequently for physical needs  -  Identify cognitive and physical deficits and behaviors that affect risk of falls    -  [place] fall precautions as indicated by assessment   - Educate patient/family on patient safety including physical limitations  - Instruct patient to call for assistance with activity based on assessment  - Modify environment to reduce risk of injury  - Consider OT/PT consult to assist with strengthening/mobility  Outcome: Adequate for Discharge  Goal: Maintain or return to baseline ADL function  Description  INTERVENTIONS:  -  Assess patient's ability to carry out ADLs; assess patient's baseline for ADL function and identify physical deficits which impact ability to perform ADLs (bathing, care of mouth/teeth, toileting, grooming, dressing, etc )  - Assess/evaluate cause of self-care deficits   - Assess range of motion  - Assess patient's mobility; develop plan if impaired  - Assess patient's need for assistive devices and provide as appropriate  - Encourage maximum independence but intervene and supervise when necessary  - Involve family in performance of ADLs  - Assess for home care needs following discharge   - Consider OT consult to assist with ADL evaluation and planning for discharge  - Provide patient education as appropriate  Outcome: Adequate for Discharge  Goal: Maintain or return mobility status to optimal level  Description  INTERVENTIONS:  - Assess patient's baseline mobility status (ambulation, transfers, stairs, etc )    - Identify cognitive and physical deficits and behaviors that affect mobility  - Identify mobility aids required to assist with transfers and/or ambulation (gait belt, sit-to-stand, lift, walker, cane, etc )  - [place] fall precautions as indicated by assessment  - Record patient progress and toleration of activity level on Mobility SBAR; progress patient to next Phase/Stage  - Instruct patient to call for assistance with activity based on assessment  - Consider rehabilitation consult to assist with strengthening/weightbearing, etc   Outcome: Adequate for Discharge

## 2019-11-21 NOTE — PROGRESS NOTES
Patient approached in preparation for discharge back to [facility] today at 1100  Pt sitting up at side of bed  Up and dressed for the day  Greeted staff politely on approach  Bright affect  Pt reports readiness for discharge and denies concerns with such  Pt reports staff assist her with taking her medication and she intends to remain compliant following discharge  Pt was offered both flu and pneumonia vaccinations-pt declined both stating, "I've never taken them ever before, I won't start now"  Pt pleasant and cooperative  Agrees to make changes in needs known

## 2019-11-21 NOTE — PLAN OF CARE
Pt discharging back to [facility] today; PCP at facility will follow psych meds

## 2019-11-21 NOTE — SOCIAL WORK
SW placed phone call to patient nephew to advise of discharge; Pt nephew expressed frustration over patient being discharged so quickly;  SW explained that pt does not meet criteria for continued inpt treatment under 303 and is not agreeing to sign 201; Pt nephew expressed understanding;   No additional questions or concerns for SW at this time

## 2019-11-21 NOTE — NURSING NOTE
Patient discharged to [facility] via wheelchair accompanied by MHT and transport team with discharge instructions and all belongings

## 2019-11-21 NOTE — PROGRESS NOTES
Mayda Mitchell#  NZE:[id number] F  ELL:[id number]    R:[id number]  Adm Date: 11/16/2019 1932  7:32 PM   ATT PHY: Rachael Pleitez, [address]         Subjective     The patient was seen after reviewing the chart and discussing the case with caring staff  Today during our encounter, the patient reported no acute concerns  Of note, patient is scheduled for discharge today  Patient is medically cleared for discharge  Medication list has been reconciled  Scripts have been addressed  Objective     Vitals:    11/21/19 0821   BP: 166/81   Pulse: 87   Resp:    Temp:    SpO2:        General Appearance: Awake and Alert  No acute distress  HEENT: Normocephalic, atraumatic  PERRLA, EOMI, MMM  Heart: RRR, no murmurs  Normal S1 and S2   Lungs: CTA bilaterally with fair air entry  Assessment     Jossy Luke is a(n) 80y o  year old female with Psychosis  MEDICAL CLEARANCE  Patient is medically cleared for discharge  All scripts have been written for the patient      1  Cardiac with history of Hypertension/Edema  Amlodipine 5mg, aspirin 81mg, digoxin 125mcg, Lasix 20mg, lisinopril 20mg, metoprolol 25mg, and KCL 20MEq every other day  Will check digoxin level  2  CKD III  Chronic and stable  3  UTI  Keflex has finished its course      4  DJD/OA  Tylenol as needed  5  Gait Disturbance  PT/OT  6  Vitamin B12 deficiency  Monthly B12 injections  7  Vitamin-D deficiency  Patient is getting vitamin-D bolus doses  The patient was discussed with Dr Ashley Miranda and he is in agreement with the above note

## 2019-11-21 NOTE — PROGRESS NOTES
[id number]  Patient is quiet and keeps to herself  She was out for dinner but went to bed early and refused HS snack  She is pleasant and brightens upon approach  Denies depression and suicidal thoughts, but stated she is "anxious to go home " Otherwise, has no complaints  She was compliant with medications whole in applesauce  Will continue monitoring
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
## 2019-11-22 NOTE — BH TRANSITION RECORD
Contact Information: If you have any questions, concerns, pended studies, tests and/or procedures, or emergencies regarding your inpatient behavioral health visit  Please contact [facility] unit [phone] and ask to speak to a , nurse or physician  A contact is available 24 hours/ 7 days a week at this number  Summary of Procedures Performed During your Stay:  Below is a list of major procedures performed during your hospital stay and a summary of results:  - No major procedures performed  Pending Studies (From admission, onward)    None        If studies are pending at discharge, follow up with your PCP and/or referring provider

## 2020-01-01 ENCOUNTER — APPOINTMENT (EMERGENCY)
Dept: CT IMAGING | Facility: HOSPITAL | Age: 85
DRG: 082 | End: 2020-01-01
Payer: MEDICARE

## 2020-01-01 ENCOUNTER — TELEPHONE (OUTPATIENT)
Dept: NEUROSURGERY | Facility: CLINIC | Age: 85
End: 2020-01-01

## 2020-01-01 ENCOUNTER — APPOINTMENT (EMERGENCY)
Dept: CT IMAGING | Facility: HOSPITAL | Age: 85
End: 2020-01-01
Payer: MEDICARE

## 2020-01-01 ENCOUNTER — HOSPITAL ENCOUNTER (EMERGENCY)
Facility: HOSPITAL | Age: 85
End: 2020-10-03
Attending: EMERGENCY MEDICINE
Payer: MEDICARE

## 2020-01-01 ENCOUNTER — HOSPITAL ENCOUNTER (INPATIENT)
Facility: HOSPITAL | Age: 85
LOS: 2 days | Discharge: HOME | DRG: 304 | End: 2020-08-28
Attending: EMERGENCY MEDICINE | Admitting: STUDENT IN AN ORGANIZED HEALTH CARE EDUCATION/TRAINING PROGRAM
Payer: MEDICARE

## 2020-01-01 ENCOUNTER — APPOINTMENT (EMERGENCY)
Dept: CT IMAGING | Facility: HOSPITAL | Age: 85
DRG: 304 | End: 2020-01-01
Payer: MEDICARE

## 2020-01-01 ENCOUNTER — HOSPITAL ENCOUNTER (INPATIENT)
Facility: HOSPITAL | Age: 85
LOS: 3 days | DRG: 082 | End: 2020-10-10
Attending: EMERGENCY MEDICINE | Admitting: INTERNAL MEDICINE
Payer: MEDICARE

## 2020-01-01 ENCOUNTER — APPOINTMENT (EMERGENCY)
Dept: RADIOLOGY | Facility: HOSPITAL | Age: 85
End: 2020-01-01
Payer: MEDICARE

## 2020-01-01 ENCOUNTER — HOSPITAL ENCOUNTER (INPATIENT)
Facility: HOSPITAL | Age: 85
LOS: 3 days | Discharge: HOME | DRG: 085 | End: 2020-10-06
Attending: SURGERY | Admitting: SURGERY
Payer: MEDICARE

## 2020-01-01 ENCOUNTER — APPOINTMENT (INPATIENT)
Dept: CT IMAGING | Facility: HOSPITAL | Age: 85
DRG: 082 | End: 2020-01-01
Payer: MEDICARE

## 2020-01-01 VITALS
HEIGHT: 62 IN | BODY MASS INDEX: 20.24 KG/M2 | OXYGEN SATURATION: 94 % | DIASTOLIC BLOOD PRESSURE: 57 MMHG | HEART RATE: 78 BPM | SYSTOLIC BLOOD PRESSURE: 145 MMHG | TEMPERATURE: 98 F | WEIGHT: 110.01 LBS | RESPIRATION RATE: 16 BRPM

## 2020-01-01 VITALS
BODY MASS INDEX: 18.75 KG/M2 | SYSTOLIC BLOOD PRESSURE: 164 MMHG | TEMPERATURE: 98.3 F | HEART RATE: 94 BPM | WEIGHT: 102.51 LBS | OXYGEN SATURATION: 94 % | RESPIRATION RATE: 17 BRPM | DIASTOLIC BLOOD PRESSURE: 72 MMHG

## 2020-01-01 VITALS
TEMPERATURE: 97.9 F | HEIGHT: 62 IN | DIASTOLIC BLOOD PRESSURE: 92 MMHG | SYSTOLIC BLOOD PRESSURE: 154 MMHG | OXYGEN SATURATION: 90 % | WEIGHT: 111.99 LBS | BODY MASS INDEX: 20.61 KG/M2 | RESPIRATION RATE: 24 BRPM | HEART RATE: 113 BPM

## 2020-01-01 VITALS
HEART RATE: 87 BPM | SYSTOLIC BLOOD PRESSURE: 158 MMHG | RESPIRATION RATE: 18 BRPM | OXYGEN SATURATION: 98 % | DIASTOLIC BLOOD PRESSURE: 76 MMHG

## 2020-01-01 DIAGNOSIS — R63.6 UNDERWEIGHT: ICD-10-CM

## 2020-01-01 DIAGNOSIS — I60.9 SUBARACHNOID BLEED (HCC): ICD-10-CM

## 2020-01-01 DIAGNOSIS — I60.9 SUBARACHNOID HEMORRHAGE (HCC): Primary | ICD-10-CM

## 2020-01-01 DIAGNOSIS — R07.9 ACUTE CHEST PAIN: ICD-10-CM

## 2020-01-01 DIAGNOSIS — N39.0 UTI (URINARY TRACT INFECTION): ICD-10-CM

## 2020-01-01 DIAGNOSIS — I60.9 SUBARACHNOID BLEED (HCC): Primary | ICD-10-CM

## 2020-01-01 DIAGNOSIS — I10 UNCONTROLLED HYPERTENSION: ICD-10-CM
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
## 2020-01-01 DIAGNOSIS — M54.6 ACUTE THORACIC BACK PAIN: Primary | ICD-10-CM

## 2020-01-01 DIAGNOSIS — F03.90 DEMENTIA (HCC): ICD-10-CM

## 2020-01-01 LAB
ALBUMIN SERPL BCP-MCNC: 2.8 G/DL (ref 3.5–5)
ALBUMIN SERPL BCP-MCNC: 2.9 G/DL (ref 3.5–5)
ALBUMIN SERPL BCP-MCNC: 2.9 G/DL (ref 3.5–5)
ALBUMIN SERPL BCP-MCNC: 3.5 G/DL (ref 3.5–5)
ALP SERPL-CCNC: 111 U/L (ref 46–116)
ALP SERPL-CCNC: 112 U/L (ref 46–116)
ALP SERPL-CCNC: 113 U/L (ref 46–116)
ALP SERPL-CCNC: 116 U/L (ref 46–116)
ALT SERPL W P-5'-P-CCNC: 26 U/L (ref 12–78)
ALT SERPL W P-5'-P-CCNC: 31 U/L (ref 12–78)
ALT SERPL W P-5'-P-CCNC: 40 U/L (ref 12–78)
ALT SERPL W P-5'-P-CCNC: 56 U/L (ref 12–78)
ANION GAP SERPL CALCULATED.3IONS-SCNC: 10 MMOL/L (ref 4–13)
ANION GAP SERPL CALCULATED.3IONS-SCNC: 5 MMOL/L (ref 4–13)
ANION GAP SERPL CALCULATED.3IONS-SCNC: 7 MMOL/L (ref 4–13)
ANION GAP SERPL CALCULATED.3IONS-SCNC: 9 MMOL/L (ref 4–13)
APTT PPP: 28 SECONDS (ref 23–37)
APTT PPP: 30 SECONDS (ref 23–37)
AST SERPL W P-5'-P-CCNC: 24 U/L (ref 5–45)
AST SERPL W P-5'-P-CCNC: 30 U/L (ref 5–45)
AST SERPL W P-5'-P-CCNC: 42 U/L (ref 5–45)
AST SERPL W P-5'-P-CCNC: 50 U/L (ref 5–45)
ATRIAL RATE: 258 BPM
ATRIAL RATE: 394 BPM
ATRIAL RATE: 62 BPM
ATRIAL RATE: 81 BPM
BACTERIA UR QL AUTO: ABNORMAL /HPF
BACTERIA UR QL AUTO: ABNORMAL /HPF
BASOPHILS # BLD AUTO: 0.03 THOUSANDS/ΜL (ref 0–0.1)
BASOPHILS # BLD AUTO: 0.06 THOUSANDS/ΜL (ref 0–0.1)
BASOPHILS # BLD AUTO: 0.08 THOUSANDS/ΜL (ref 0–0.1)
BASOPHILS NFR BLD AUTO: 0 % (ref 0–1)
BASOPHILS NFR BLD AUTO: 1 % (ref 0–1)
BASOPHILS NFR BLD AUTO: 1 % (ref 0–1)
BILIRUB DIRECT SERPL-MCNC: 0.13 MG/DL (ref 0–0.2)
BILIRUB DIRECT SERPL-MCNC: 0.19 MG/DL (ref 0–0.2)
BILIRUB DIRECT SERPL-MCNC: 0.21 MG/DL (ref 0–0.2)
BILIRUB SERPL-MCNC: 0.6 MG/DL (ref 0.2–1)
BILIRUB SERPL-MCNC: 0.8 MG/DL (ref 0.2–1)
BILIRUB SERPL-MCNC: 0.8 MG/DL (ref 0.2–1)
BILIRUB SERPL-MCNC: 1.05 MG/DL (ref 0.2–1)
BILIRUB UR QL STRIP: NEGATIVE
BILIRUB UR QL STRIP: NEGATIVE
BUN SERPL-MCNC: 18 MG/DL (ref 5–25)
BUN SERPL-MCNC: 22 MG/DL (ref 5–25)
BUN SERPL-MCNC: 25 MG/DL (ref 5–25)
BUN SERPL-MCNC: 28 MG/DL (ref 5–25)
CALCIUM ALBUM COR SERPL-MCNC: 10.8 MG/DL (ref 8.3–10.1)
CALCIUM SERPL-MCNC: 9.5 MG/DL (ref 8.3–10.1)
CALCIUM SERPL-MCNC: 9.8 MG/DL (ref 8.3–10.1)
CALCIUM SERPL-MCNC: 9.8 MG/DL (ref 8.3–10.1)
CALCIUM SERPL-MCNC: 9.9 MG/DL (ref 8.3–10.1)
CHLORIDE SERPL-SCNC: 93 MMOL/L (ref 100–108)
CHLORIDE SERPL-SCNC: 95 MMOL/L (ref 100–108)
CHLORIDE SERPL-SCNC: 96 MMOL/L (ref 100–108)
CHLORIDE SERPL-SCNC: 98 MMOL/L (ref 100–108)
CLARITY UR: ABNORMAL
CLARITY UR: CLEAR
CO2 SERPL-SCNC: 30 MMOL/L (ref 21–32)
CO2 SERPL-SCNC: 31 MMOL/L (ref 21–32)
CO2 SERPL-SCNC: 32 MMOL/L (ref 21–32)
CO2 SERPL-SCNC: 33 MMOL/L (ref 21–32)
COLOR UR: YELLOW
COLOR UR: YELLOW
CREAT SERPL-MCNC: 0.65 MG/DL (ref 0.6–1.3)
CREAT SERPL-MCNC: 0.84 MG/DL (ref 0.6–1.3)
CREAT SERPL-MCNC: 0.99 MG/DL (ref 0.6–1.3)
CREAT SERPL-MCNC: 1.01 MG/DL (ref 0.6–1.3)
DIGOXIN SERPL-MCNC: 1 NG/ML (ref 0.8–2)
EOSINOPHIL # BLD AUTO: 0.05 THOUSAND/ΜL (ref 0–0.61)
EOSINOPHIL # BLD AUTO: 0.24 THOUSAND/ΜL (ref 0–0.61)
EOSINOPHIL # BLD AUTO: 0.63 THOUSAND/ΜL (ref 0–0.61)
EOSINOPHIL NFR BLD AUTO: 1 % (ref 0–6)
EOSINOPHIL NFR BLD AUTO: 3 % (ref 0–6)
EOSINOPHIL NFR BLD AUTO: 5 % (ref 0–6)
ERYTHROCYTE [DISTWIDTH] IN BLOOD BY AUTOMATED COUNT: 12.6 % (ref 11.6–15.1)
ERYTHROCYTE [DISTWIDTH] IN BLOOD BY AUTOMATED COUNT: 12.7 % (ref 11.6–15.1)
ERYTHROCYTE [DISTWIDTH] IN BLOOD BY AUTOMATED COUNT: 12.7 % (ref 11.6–15.1)
ERYTHROCYTE [DISTWIDTH] IN BLOOD BY AUTOMATED COUNT: 12.8 % (ref 11.6–15.1)
GFR SERPL CREATININE-BSD FRML MDRD: 47 ML/MIN/1.73SQ M
GFR SERPL CREATININE-BSD FRML MDRD: 48 ML/MIN/1.73SQ M
GFR SERPL CREATININE-BSD FRML MDRD: 58 ML/MIN/1.73SQ M
GFR SERPL CREATININE-BSD FRML MDRD: 75 ML/MIN/1.73SQ M
GLUCOSE SERPL-MCNC: 105 MG/DL (ref 65–140)
GLUCOSE SERPL-MCNC: 108 MG/DL (ref 65–140)
GLUCOSE SERPL-MCNC: 123 MG/DL (ref 65–140)
GLUCOSE SERPL-MCNC: 128 MG/DL (ref 65–140)
GLUCOSE UR STRIP-MCNC: NEGATIVE MG/DL
GLUCOSE UR STRIP-MCNC: NEGATIVE MG/DL
HCT VFR BLD AUTO: 37.6 % (ref 34.8–46.1)
HCT VFR BLD AUTO: 43.3 % (ref 34.8–46.1)
HCT VFR BLD AUTO: 44.3 % (ref 34.8–46.1)
HCT VFR BLD AUTO: 48.3 % (ref 34.8–46.1)
HGB BLD-MCNC: 12.3 G/DL (ref 11.5–15.4)
HGB BLD-MCNC: 14 G/DL (ref 11.5–15.4)
HGB BLD-MCNC: 14.2 G/DL (ref 11.5–15.4)
HGB BLD-MCNC: 15.1 G/DL (ref 11.5–15.4)
HGB UR QL STRIP.AUTO: ABNORMAL
HGB UR QL STRIP.AUTO: ABNORMAL
IMM GRANULOCYTES # BLD AUTO: 0.02 THOUSAND/UL (ref 0–0.2)
IMM GRANULOCYTES # BLD AUTO: 0.05 THOUSAND/UL (ref 0–0.2)
IMM GRANULOCYTES # BLD AUTO: 0.06 THOUSAND/UL (ref 0–0.2)
IMM GRANULOCYTES NFR BLD AUTO: 0 % (ref 0–2)
IMM GRANULOCYTES NFR BLD AUTO: 1 % (ref 0–2)
IMM GRANULOCYTES NFR BLD AUTO: 1 % (ref 0–2)
INR PPP: 0.96 (ref 0.84–1.19)
INR PPP: 1.04 (ref 0.84–1.19)
KETONES UR STRIP-MCNC: NEGATIVE MG/DL
KETONES UR STRIP-MCNC: NEGATIVE MG/DL
LEUKOCYTE ESTERASE UR QL STRIP: ABNORMAL
LEUKOCYTE ESTERASE UR QL STRIP: ABNORMAL
LIPASE SERPL-CCNC: 185 U/L (ref 73–393)
LYMPHOCYTES # BLD AUTO: 0.96 THOUSANDS/ΜL (ref 0.6–4.47)
LYMPHOCYTES # BLD AUTO: 1.18 THOUSANDS/ΜL (ref 0.6–4.47)
LYMPHOCYTES # BLD AUTO: 1.6 THOUSANDS/ΜL (ref 0.6–4.47)
LYMPHOCYTES NFR BLD AUTO: 10 % (ref 14–44)
LYMPHOCYTES NFR BLD AUTO: 18 % (ref 14–44)
LYMPHOCYTES NFR BLD AUTO: 9 % (ref 14–44)
MAGNESIUM SERPL-MCNC: 1.8 MG/DL (ref 1.6–2.6)
MCH RBC QN AUTO: 29.3 PG (ref 26.8–34.3)
MCH RBC QN AUTO: 29.5 PG (ref 26.8–34.3)
MCH RBC QN AUTO: 29.5 PG (ref 26.8–34.3)
MCH RBC QN AUTO: 30.2 PG (ref 26.8–34.3)
MCHC RBC AUTO-ENTMCNC: 31.3 G/DL (ref 31.4–37.4)
MCHC RBC AUTO-ENTMCNC: 31.6 G/DL (ref 31.4–37.4)
MCHC RBC AUTO-ENTMCNC: 32.7 G/DL (ref 31.4–37.4)
MCHC RBC AUTO-ENTMCNC: 32.8 G/DL (ref 31.4–37.4)
MCV RBC AUTO: 90 FL (ref 82–98)
MCV RBC AUTO: 92 FL (ref 82–98)
MCV RBC AUTO: 93 FL (ref 82–98)
MCV RBC AUTO: 94 FL (ref 82–98)
MONOCYTES # BLD AUTO: 0.66 THOUSAND/ΜL (ref 0.17–1.22)
MONOCYTES # BLD AUTO: 0.66 THOUSAND/ΜL (ref 0.17–1.22)
MONOCYTES # BLD AUTO: 0.81 THOUSAND/ΜL (ref 0.17–1.22)
MONOCYTES NFR BLD AUTO: 7 % (ref 4–12)
MUCOUS THREADS UR QL AUTO: ABNORMAL
NEUTROPHILS # BLD AUTO: 6.48 THOUSANDS/ΜL (ref 1.85–7.62)
NEUTROPHILS # BLD AUTO: 8.03 THOUSANDS/ΜL (ref 1.85–7.62)
NEUTROPHILS # BLD AUTO: 9.77 THOUSANDS/ΜL (ref 1.85–7.62)
NEUTS SEG NFR BLD AUTO: 71 % (ref 43–75)
NEUTS SEG NFR BLD AUTO: 77 % (ref 43–75)
NEUTS SEG NFR BLD AUTO: 81 % (ref 43–75)
NITRITE UR QL STRIP: NEGATIVE
NITRITE UR QL STRIP: POSITIVE
NON-SQ EPI CELLS URNS QL MICRO: ABNORMAL /HPF
NON-SQ EPI CELLS URNS QL MICRO: ABNORMAL /HPF
NRBC BLD AUTO-RTO: 0 /100 WBCS
PH UR STRIP.AUTO: 7 [PH]
PH UR STRIP.AUTO: 7.5 [PH]
PLATELET # BLD AUTO: 157 THOUSANDS/UL (ref 149–390)
PLATELET # BLD AUTO: 173 THOUSANDS/UL (ref 149–390)
PLATELET # BLD AUTO: 190 THOUSANDS/UL (ref 149–390)
PLATELET # BLD AUTO: 204 THOUSANDS/UL (ref 149–390)
PMV BLD AUTO: 11.4 FL (ref 8.9–12.7)
PMV BLD AUTO: 12.1 FL (ref 8.9–12.7)
POTASSIUM SERPL-SCNC: 3.3 MMOL/L (ref 3.5–5.3)
POTASSIUM SERPL-SCNC: 3.6 MMOL/L (ref 3.5–5.3)
POTASSIUM SERPL-SCNC: 3.8 MMOL/L (ref 3.5–5.3)
POTASSIUM SERPL-SCNC: 3.8 MMOL/L (ref 3.5–5.3)
PROT SERPL-MCNC: 6.9 G/DL (ref 6.4–8.2)
PROT SERPL-MCNC: 7.3 G/DL (ref 6.4–8.2)
PROT SERPL-MCNC: 7.4 G/DL (ref 6.4–8.2)
PROT SERPL-MCNC: 7.7 G/DL (ref 6.4–8.2)
PROT UR STRIP-MCNC: ABNORMAL MG/DL
PROT UR STRIP-MCNC: ABNORMAL MG/DL
PROTHROMBIN TIME: 13 SECONDS (ref 11.6–14.5)
PROTHROMBIN TIME: 13.1 SECONDS (ref 11.6–14.5)
QRS AXIS: 79 DEGREES
QRS AXIS: 81 DEGREES
QRS AXIS: 84 DEGREES
QRS AXIS: 95 DEGREES
QRSD INTERVAL: 82 MS
QRSD INTERVAL: 86 MS
QRSD INTERVAL: 86 MS
QRSD INTERVAL: 88 MS
QT INTERVAL: 326 MS
QT INTERVAL: 362 MS
QT INTERVAL: 398 MS
QT INTERVAL: 402 MS
QTC INTERVAL: 427 MS
QTC INTERVAL: 442 MS
QTC INTERVAL: 486 MS
QTC INTERVAL: 492 MS
RBC # BLD AUTO: 4.17 MILLION/UL (ref 3.81–5.12)
RBC # BLD AUTO: 4.7 MILLION/UL (ref 3.81–5.12)
RBC # BLD AUTO: 4.75 MILLION/UL (ref 3.81–5.12)
RBC # BLD AUTO: 5.16 MILLION/UL (ref 3.81–5.12)
RBC #/AREA URNS AUTO: ABNORMAL /HPF
RBC #/AREA URNS AUTO: ABNORMAL /HPF
SARS-COV-2 RNA RESP QL NAA+PROBE: NEGATIVE
SODIUM SERPL-SCNC: 133 MMOL/L (ref 136–145)
SODIUM SERPL-SCNC: 133 MMOL/L (ref 136–145)
SODIUM SERPL-SCNC: 135 MMOL/L (ref 136–145)
SODIUM SERPL-SCNC: 138 MMOL/L (ref 136–145)
SP GR UR STRIP.AUTO: 1.01 (ref 1–1.03)
SP GR UR STRIP.AUTO: 1.02 (ref 1–1.03)
T WAVE AXIS: -76 DEGREES
T WAVE AXIS: -83 DEGREES
T WAVE AXIS: -84 DEGREES
T WAVE AXIS: 270 DEGREES
TROPONIN I SERPL-MCNC: 0.02 NG/ML
TROPONIN I SERPL-MCNC: 0.03 NG/ML
TROPONIN I SERPL-MCNC: 0.05 NG/ML
TROPONIN I SERPL-MCNC: <0.02 NG/ML
TROPONIN I SERPL-MCNC: <0.02 NG/ML
UROBILINOGEN UR QL STRIP.AUTO: 0.2 E.U./DL
UROBILINOGEN UR QL STRIP.AUTO: 0.2 E.U./DL
VENTRICULAR RATE: 103 BPM
VENTRICULAR RATE: 88 BPM
VENTRICULAR RATE: 90 BPM
VENTRICULAR RATE: 92 BPM
WBC # BLD AUTO: 11.71 THOUSAND/UL (ref 4.31–10.16)
WBC # BLD AUTO: 12.53 THOUSAND/UL (ref 4.31–10.16)
WBC # BLD AUTO: 9.06 THOUSAND/UL (ref 4.31–10.16)
WBC # BLD AUTO: 9.78 THOUSAND/UL (ref 4.31–10.16)
WBC #/AREA URNS AUTO: ABNORMAL /HPF
WBC #/AREA URNS AUTO: ABNORMAL /HPF

## 2020-01-01 PROCEDURE — 80048 BASIC METABOLIC PNL TOTAL CA: CPT | Performed by: EMERGENCY MEDICINE

## 2020-01-01 PROCEDURE — 97167 OT EVAL HIGH COMPLEX 60 MIN: CPT

## 2020-01-01 PROCEDURE — 90662 IIV NO PRSV INCREASED AG IM: CPT | Performed by: SURGERY

## 2020-01-01 PROCEDURE — 81001 URINALYSIS AUTO W/SCOPE: CPT | Performed by: EMERGENCY MEDICINE

## 2020-01-01 PROCEDURE — 99285 EMERGENCY DEPT VISIT HI MDM: CPT | Performed by: EMERGENCY MEDICINE

## 2020-01-01 PROCEDURE — 93005 ELECTROCARDIOGRAM TRACING: CPT

## 2020-01-01 PROCEDURE — 97110 THERAPEUTIC EXERCISES: CPT

## 2020-01-01 PROCEDURE — 97163 PT EVAL HIGH COMPLEX 45 MIN: CPT

## 2020-01-01 PROCEDURE — 96374 THER/PROPH/DIAG INJ IV PUSH: CPT

## 2020-01-01 PROCEDURE — 99239 HOSP IP/OBS DSCHRG MGMT >30: CPT | Performed by: FAMILY MEDICINE

## 2020-01-01 PROCEDURE — 92610 EVALUATE SWALLOWING FUNCTION: CPT

## 2020-01-01 PROCEDURE — 99232 SBSQ HOSP IP/OBS MODERATE 35: CPT | Performed by: SURGERY

## 2020-01-01 PROCEDURE — 93010 ELECTROCARDIOGRAM REPORT: CPT | Performed by: INTERNAL MEDICINE

## 2020-01-01 PROCEDURE — G1004 CDSM NDSC: HCPCS

## 2020-01-01 PROCEDURE — 99222 1ST HOSP IP/OBS MODERATE 55: CPT | Performed by: INTERNAL MEDICINE

## 2020-01-01 PROCEDURE — 99285 EMERGENCY DEPT VISIT HI MDM: CPT

## 2020-01-01 PROCEDURE — 97116 GAIT TRAINING THERAPY: CPT

## 2020-01-01 PROCEDURE — 99223 1ST HOSP IP/OBS HIGH 75: CPT | Performed by: STUDENT IN AN ORGANIZED HEALTH CARE EDUCATION/TRAINING PROGRAM

## 2020-01-01 PROCEDURE — 92526 ORAL FUNCTION THERAPY: CPT

## 2020-01-01 PROCEDURE — 99291 CRITICAL CARE FIRST HOUR: CPT | Performed by: EMERGENCY MEDICINE

## 2020-01-01 PROCEDURE — 97530 THERAPEUTIC ACTIVITIES: CPT

## 2020-01-01 PROCEDURE — 85610 PROTHROMBIN TIME: CPT | Performed by: EMERGENCY MEDICINE

## 2020-01-01 PROCEDURE — 96365 THER/PROPH/DIAG IV INF INIT: CPT

## 2020-01-01 PROCEDURE — 99232 SBSQ HOSP IP/OBS MODERATE 35: CPT | Performed by: STUDENT IN AN ORGANIZED HEALTH CARE EDUCATION/TRAINING PROGRAM

## 2020-01-01 PROCEDURE — 99223 1ST HOSP IP/OBS HIGH 75: CPT | Performed by: INTERNAL MEDICINE

## 2020-01-01 PROCEDURE — 99231 SBSQ HOSP IP/OBS SF/LOW 25: CPT | Performed by: SURGERY

## 2020-01-01 PROCEDURE — 72170 X-RAY EXAM OF PELVIS: CPT

## 2020-01-01 PROCEDURE — 74174 CTA ABD&PLVS W/CONTRAST: CPT

## 2020-01-01 PROCEDURE — 96375 TX/PRO/DX INJ NEW DRUG ADDON: CPT

## 2020-01-01 PROCEDURE — 85730 THROMBOPLASTIN TIME PARTIAL: CPT | Performed by: EMERGENCY MEDICINE

## 2020-01-01 PROCEDURE — G0008 ADMIN INFLUENZA VIRUS VAC: HCPCS | Performed by: SURGERY

## 2020-01-01 PROCEDURE — 72125 CT NECK SPINE W/O DYE: CPT

## 2020-01-01 PROCEDURE — 97129 THER IVNTJ 1ST 15 MIN: CPT

## 2020-01-01 PROCEDURE — 80076 HEPATIC FUNCTION PANEL: CPT | Performed by: EMERGENCY MEDICINE

## 2020-01-01 PROCEDURE — 85025 COMPLETE CBC W/AUTO DIFF WBC: CPT | Performed by: EMERGENCY MEDICINE

## 2020-01-01 PROCEDURE — 99238 HOSP IP/OBS DSCHRG MGMT 30/<: CPT | Performed by: PHYSICIAN ASSISTANT

## 2020-01-01 PROCEDURE — 99233 SBSQ HOSP IP/OBS HIGH 50: CPT | Performed by: PHYSICIAN ASSISTANT

## 2020-01-01 PROCEDURE — 84484 ASSAY OF TROPONIN QUANT: CPT | Performed by: EMERGENCY MEDICINE

## 2020-01-01 PROCEDURE — 36415 COLL VENOUS BLD VENIPUNCTURE: CPT | Performed by: EMERGENCY MEDICINE

## 2020-01-01 PROCEDURE — 99233 SBSQ HOSP IP/OBS HIGH 50: CPT | Performed by: INTERNAL MEDICINE

## 2020-01-01 PROCEDURE — 80162 ASSAY OF DIGOXIN TOTAL: CPT | Performed by: EMERGENCY MEDICINE

## 2020-01-01 PROCEDURE — 83735 ASSAY OF MAGNESIUM: CPT | Performed by: EMERGENCY MEDICINE

## 2020-01-01 PROCEDURE — 97535 SELF CARE MNGMENT TRAINING: CPT

## 2020-01-01 PROCEDURE — 71275 CT ANGIOGRAPHY CHEST: CPT

## 2020-01-01 PROCEDURE — 80053 COMPREHEN METABOLIC PANEL: CPT | Performed by: EMERGENCY MEDICINE

## 2020-01-01 PROCEDURE — 99232 SBSQ HOSP IP/OBS MODERATE 35: CPT | Performed by: INTERNAL MEDICINE

## 2020-01-01 PROCEDURE — 70450 CT HEAD/BRAIN W/O DYE: CPT

## 2020-01-01 PROCEDURE — 83690 ASSAY OF LIPASE: CPT | Performed by: EMERGENCY MEDICINE

## 2020-01-01 PROCEDURE — 97166 OT EVAL MOD COMPLEX 45 MIN: CPT

## 2020-01-01 PROCEDURE — 99233 SBSQ HOSP IP/OBS HIGH 50: CPT | Performed by: NURSE PRACTITIONER

## 2020-01-01 PROCEDURE — 71045 X-RAY EXAM CHEST 1 VIEW: CPT

## 2020-01-01 PROCEDURE — 87635 SARS-COV-2 COVID-19 AMP PRB: CPT | Performed by: EMERGENCY MEDICINE

## 2020-01-01 PROCEDURE — 99239 HOSP IP/OBS DSCHRG MGMT >30: CPT | Performed by: STUDENT IN AN ORGANIZED HEALTH CARE EDUCATION/TRAINING PROGRAM

## 2020-01-01 PROCEDURE — 99223 1ST HOSP IP/OBS HIGH 75: CPT | Performed by: NURSE PRACTITIONER

## 2020-01-01 PROCEDURE — 85027 COMPLETE CBC AUTOMATED: CPT | Performed by: EMERGENCY MEDICINE

## 2020-01-01 PROCEDURE — 84484 ASSAY OF TROPONIN QUANT: CPT | Performed by: STUDENT IN AN ORGANIZED HEALTH CARE EDUCATION/TRAINING PROGRAM

## 2020-01-01 PROCEDURE — 99223 1ST HOSP IP/OBS HIGH 75: CPT | Performed by: SURGERY

## 2020-01-01 RX ORDER — ERGOCALCIFEROL 1.25 MG/1
50000 CAPSULE ORAL WEEKLY
Status: DISCONTINUED | OUTPATIENT
Start: 2020-01-01 | End: 2020-01-01 | Stop reason: HOSPADM

## 2020-01-01 RX ORDER — MAGNESIUM HYDROXIDE/ALUMINUM HYDROXICE/SIMETHICONE 120; 1200; 1200 MG/30ML; MG/30ML; MG/30ML
30 SUSPENSION ORAL ONCE
Status: COMPLETED | OUTPATIENT
Start: 2020-01-01 | End: 2020-01-01

## 2020-01-01 RX ORDER — ASPIRIN 81 MG/1
81 TABLET, CHEWABLE ORAL DAILY
Status: DISCONTINUED | OUTPATIENT
Start: 2020-01-01 | End: 2020-01-01 | Stop reason: HOSPADM

## 2020-01-01 RX ORDER — LOPERAMIDE HCL 1 MG/7.5ML
2 SUSPENSION ORAL 4 TIMES DAILY PRN
Status: DISCONTINUED | OUTPATIENT
Start: 2020-01-01 | End: 2020-01-01 | Stop reason: HOSPADM

## 2020-01-01 RX ORDER — LISINOPRIL 10 MG/1
TABLET ORAL
COMMUNITY
Start: 2020-01-01 | End: 2020-01-01 | Stop reason: HOSPADM

## 2020-01-01 RX ORDER — OXYCODONE HYDROCHLORIDE 5 MG/1
2.5 TABLET ORAL EVERY 4 HOURS PRN
Status: DISCONTINUED | OUTPATIENT
Start: 2020-01-01 | End: 2020-01-01

## 2020-01-01 RX ORDER — CEPHALEXIN 250 MG/1
500 CAPSULE ORAL ONCE
Status: COMPLETED | OUTPATIENT
Start: 2020-01-01 | End: 2020-01-01

## 2020-01-01 RX ORDER — DOCUSATE SODIUM 100 MG/1
100 CAPSULE, LIQUID FILLED ORAL 2 TIMES DAILY
Qty: 10 CAPSULE | Refills: 0
Start: 2020-01-01 | End: 2020-01-01

## 2020-01-01 RX ORDER — POTASSIUM CHLORIDE 20 MEQ/1
20 TABLET, EXTENDED RELEASE ORAL EVERY OTHER DAY
Status: DISCONTINUED | OUTPATIENT
Start: 2020-01-01 | End: 2020-01-01 | Stop reason: HOSPADM

## 2020-01-01 RX ORDER — SODIUM CHLORIDE 9 MG/ML
50 INJECTION, SOLUTION INTRAVENOUS CONTINUOUS
Status: DISCONTINUED | OUTPATIENT
Start: 2020-01-01 | End: 2020-01-01

## 2020-01-01 RX ORDER — METOPROLOL SUCCINATE 25 MG/1
25 TABLET, EXTENDED RELEASE ORAL DAILY
Status: DISCONTINUED | OUTPATIENT
Start: 2020-01-01 | End: 2020-01-01 | Stop reason: HOSPADM

## 2020-01-01 RX ORDER — SODIUM CHLORIDE 9 MG/ML
50 INJECTION, SOLUTION INTRAVENOUS CONTINUOUS
Status: DISCONTINUED | OUTPATIENT
Start: 2020-01-01 | End: 2020-01-01 | Stop reason: HOSPADM

## 2020-01-01 RX ORDER — ONDANSETRON 2 MG/ML
4 INJECTION INTRAMUSCULAR; INTRAVENOUS EVERY 6 HOURS PRN
Status: DISCONTINUED | OUTPATIENT
Start: 2020-01-01 | End: 2020-01-01 | Stop reason: HOSPADM

## 2020-01-01 RX ORDER — OLANZAPINE 2.5 MG/1
2.5 TABLET ORAL
Status: DISCONTINUED | OUTPATIENT
Start: 2020-01-01 | End: 2020-01-01 | Stop reason: HOSPADM

## 2020-01-01 RX ORDER — CYANOCOBALAMIN 1000 UG/ML
1000 INJECTION INTRAMUSCULAR; SUBCUTANEOUS
Status: DISCONTINUED | OUTPATIENT
Start: 2020-01-01 | End: 2020-01-01 | Stop reason: HOSPADM

## 2020-01-01 RX ORDER — DOCUSATE SODIUM 100 MG/1
100 CAPSULE, LIQUID FILLED ORAL 2 TIMES DAILY
Status: DISCONTINUED | OUTPATIENT
Start: 2020-01-01 | End: 2020-01-01 | Stop reason: HOSPADM

## 2020-01-01 RX ORDER — POTASSIUM CHLORIDE 20 MEQ/1
40 TABLET, EXTENDED RELEASE ORAL ONCE
Status: COMPLETED | OUTPATIENT
Start: 2020-01-01 | End: 2020-01-01

## 2020-01-01 RX ORDER — ACETAMINOPHEN, CAFFEINE AND DIHYDROCODEINE BITARTRATE 325; 30; 16 MG/1; MG/1; MG/1
TABLET ORAL
COMMUNITY
End: 2020-01-01 | Stop reason: HOSPADM

## 2020-01-01 RX ORDER — LORAZEPAM 2 MG/ML
0.5 INJECTION INTRAMUSCULAR EVERY 4 HOURS PRN
Status: DISCONTINUED | OUTPATIENT
Start: 2020-01-01 | End: 2020-01-01 | Stop reason: HOSPADM

## 2020-01-01 RX ORDER — LIDOCAINE 50 MG/G
1 PATCH TOPICAL DAILY
Status: DISCONTINUED | OUTPATIENT
Start: 2020-01-01 | End: 2020-01-01 | Stop reason: HOSPADM

## 2020-01-01 RX ORDER — METOPROLOL TARTRATE 5 MG/5ML
5 INJECTION INTRAVENOUS EVERY 6 HOURS PRN
Status: DISCONTINUED | OUTPATIENT
Start: 2020-01-01 | End: 2020-01-01 | Stop reason: HOSPADM

## 2020-01-01 RX ORDER — DIGOXIN 125 MCG
125 TABLET ORAL DAILY
Status: DISCONTINUED | OUTPATIENT
Start: 2020-01-01 | End: 2020-01-01 | Stop reason: HOSPADM

## 2020-01-01 RX ORDER — LISINOPRIL 10 MG/1
10 TABLET ORAL DAILY
Status: DISCONTINUED | OUTPATIENT
Start: 2020-01-01 | End: 2020-01-01 | Stop reason: HOSPADM

## 2020-01-01 RX ORDER — FUROSEMIDE 20 MG/1
20 TABLET ORAL DAILY
Status: DISCONTINUED | OUTPATIENT
Start: 2020-01-01 | End: 2020-01-01 | Stop reason: HOSPADM

## 2020-01-01 RX ORDER — GABAPENTIN 100 MG/1
100 CAPSULE ORAL
Status: DISCONTINUED | OUTPATIENT
Start: 2020-01-01 | End: 2020-01-01

## 2020-01-01 RX ORDER — HEPARIN SODIUM 5000 [USP'U]/ML
5000 INJECTION, SOLUTION INTRAVENOUS; SUBCUTANEOUS EVERY 8 HOURS SCHEDULED
Status: DISCONTINUED | OUTPATIENT
Start: 2020-01-01 | End: 2020-01-01 | Stop reason: HOSPADM

## 2020-01-01 RX ORDER — ACETAMINOPHEN 325 MG/1
975 TABLET ORAL EVERY 8 HOURS SCHEDULED
Status: DISCONTINUED | OUTPATIENT
Start: 2020-01-01 | End: 2020-01-01 | Stop reason: HOSPADM

## 2020-01-01 RX ORDER — OXYCODONE HYDROCHLORIDE 5 MG/1
5 TABLET ORAL EVERY 4 HOURS PRN
Status: DISCONTINUED | OUTPATIENT
Start: 2020-01-01 | End: 2020-01-01

## 2020-01-01 RX ORDER — ACETAMINOPHEN 325 MG/1
650 TABLET ORAL EVERY 4 HOURS PRN
Refills: 0
Start: 2020-01-01 | End: 2020-01-01 | Stop reason: HOSPADM

## 2020-01-01 RX ORDER — HYDROMORPHONE HCL/PF 1 MG/ML
0.5 SYRINGE (ML) INJECTION EVERY 4 HOURS PRN
Status: DISCONTINUED | OUTPATIENT
Start: 2020-01-01 | End: 2020-01-01 | Stop reason: HOSPADM

## 2020-01-01 RX ORDER — LABETALOL 20 MG/4 ML (5 MG/ML) INTRAVENOUS SYRINGE
10 ONCE
Status: COMPLETED | OUTPATIENT
Start: 2020-01-01 | End: 2020-01-01

## 2020-01-01 RX ADMIN — MAGNESIUM GLUCONATE 500 MG ORAL TABLET 400 MG: 500 TABLET ORAL at 08:43

## 2020-01-01 RX ADMIN — MAGNESIUM GLUCONATE 500 MG ORAL TABLET 400 MG: 500 TABLET ORAL at 17:31

## 2020-01-01 RX ADMIN — ASPIRIN 81 MG 81 MG: 81 TABLET ORAL at 09:54

## 2020-01-01 RX ADMIN — METOPROLOL SUCCINATE 25 MG: 25 TABLET, EXTENDED RELEASE ORAL at 09:54

## 2020-01-01 RX ADMIN — OLANZAPINE 2.5 MG: 2.5 TABLET, FILM COATED ORAL at 21:31

## 2020-01-01 RX ADMIN — ACETAMINOPHEN 975 MG: 325 TABLET, FILM COATED ORAL at 18:26

## 2020-01-01 RX ADMIN — DOCUSATE SODIUM 100 MG: 100 CAPSULE, LIQUID FILLED ORAL at 08:43

## 2020-01-01 RX ADMIN — METOPROLOL SUCCINATE 25 MG: 25 TABLET, EXTENDED RELEASE ORAL at 08:43

## 2020-01-01 RX ADMIN — DIGOXIN 125 MCG: 125 TABLET ORAL at 08:43

## 2020-01-01 RX ADMIN — POTASSIUM CHLORIDE 20 MEQ: 1500 TABLET, EXTENDED RELEASE ORAL at 08:43

## 2020-01-01 RX ADMIN — ASPIRIN 81 MG 81 MG: 81 TABLET ORAL at 09:42

## 2020-01-01 RX ADMIN — ACETAMINOPHEN 975 MG: 325 TABLET, FILM COATED ORAL at 14:03

## 2020-01-01 RX ADMIN — SODIUM CHLORIDE 50 ML/HR: 0.9 INJECTION, SOLUTION INTRAVENOUS at 16:03

## 2020-01-01 RX ADMIN — LIDOCAINE 5% 1 PATCH: 700 PATCH TOPICAL at 09:43

## 2020-01-01 RX ADMIN — ASPIRIN 81 MG 81 MG: 81 TABLET ORAL at 08:43

## 2020-01-01 RX ADMIN — HEPARIN SODIUM 5000 UNITS: 5000 INJECTION INTRAVENOUS; SUBCUTANEOUS at 21:46

## 2020-01-01 RX ADMIN — ACETAMINOPHEN 975 MG: 325 TABLET, FILM COATED ORAL at 21:42

## 2020-01-01 RX ADMIN — ENOXAPARIN SODIUM 40 MG: 40 INJECTION SUBCUTANEOUS at 09:54

## 2020-01-01 RX ADMIN — DOCUSATE SODIUM 100 MG: 100 CAPSULE, LIQUID FILLED ORAL at 17:43

## 2020-01-01 RX ADMIN — ALUMINUM HYDROXIDE, MAGNESIUM HYDROXIDE, AND SIMETHICONE 30 ML: 200; 200; 20 SUSPENSION ORAL at 06:05

## 2020-01-01 RX ADMIN — MORPHINE SULFATE 2 MG: 2 INJECTION, SOLUTION INTRAMUSCULAR; INTRAVENOUS at 05:40

## 2020-01-01 RX ADMIN — LISINOPRIL 10 MG: 10 TABLET ORAL at 08:43

## 2020-01-01 RX ADMIN — ACETAMINOPHEN 975 MG: 325 TABLET, FILM COATED ORAL at 05:14

## 2020-01-01 RX ADMIN — SODIUM CHLORIDE 50 ML/HR: 0.9 INJECTION, SOLUTION INTRAVENOUS at 14:03

## 2020-01-01 RX ADMIN — DIGOXIN 125 MCG: 125 TABLET ORAL at 08:44

## 2020-01-01 RX ADMIN — DOCUSATE SODIUM 100 MG: 100 CAPSULE, LIQUID FILLED ORAL at 18:32

## 2020-01-01 RX ADMIN — HEPARIN SODIUM 5000 UNITS: 5000 INJECTION INTRAVENOUS; SUBCUTANEOUS at 05:51

## 2020-01-01 RX ADMIN — HYDROMORPHONE HYDROCHLORIDE 0.5 MG: 1 INJECTION, SOLUTION INTRAMUSCULAR; INTRAVENOUS; SUBCUTANEOUS at 12:05

## 2020-01-01 RX ADMIN — HEPARIN SODIUM 5000 UNITS: 5000 INJECTION INTRAVENOUS; SUBCUTANEOUS at 13:49

## 2020-01-01 RX ADMIN — ACETAMINOPHEN 975 MG: 325 TABLET, FILM COATED ORAL at 05:58

## 2020-01-01 RX ADMIN — LABETALOL 20 MG/4 ML (5 MG/ML) INTRAVENOUS SYRINGE 10 MG: at 05:41

## 2020-01-01 RX ADMIN — MAGNESIUM GLUCONATE 500 MG ORAL TABLET 400 MG: 500 TABLET ORAL at 17:24

## 2020-01-01 RX ADMIN — INFLUENZA A VIRUS A/MICHIGAN/45/2015 X-275 (H1N1) ANTIGEN (FORMALDEHYDE INACTIVATED), INFLUENZA A VIRUS A/SINGAPORE/INFIMH-16-0019/2016 IVR-186 (H3N2) ANTIGEN (FORMALDEHYDE INACTIVATED), INFLUENZA B VIRUS B/PHUKET/3073/2013 ANTIGEN (FORMALDEHYDE INACTIVATED), AND INFLUENZA B VIRUS B/MARYLAND/15/2016 BX-69A ANTIGEN (FORMALDEHYDE INACTIVATED) 0.7 ML: 60; 60; 60; 60 INJECTION, SUSPENSION INTRAMUSCULAR at 05:59

## 2020-01-01 RX ADMIN — MAGNESIUM GLUCONATE 500 MG ORAL TABLET 400 MG: 500 TABLET ORAL at 10:06

## 2020-01-01 RX ADMIN — DESMOPRESSIN ACETATE 14.8 MCG: 4 INJECTION INTRAVENOUS at 12:44

## 2020-01-01 RX ADMIN — DIGOXIN 125 MCG: 125 TABLET ORAL at 09:42

## 2020-01-01 RX ADMIN — CEPHALEXIN 500 MG: 250 CAPSULE ORAL at 08:43

## 2020-01-01 RX ADMIN — ACETAMINOPHEN 975 MG: 325 TABLET, FILM COATED ORAL at 23:24

## 2020-01-01 RX ADMIN — ONDANSETRON 4 MG: 2 INJECTION INTRAMUSCULAR; INTRAVENOUS at 06:45

## 2020-01-01 RX ADMIN — METOPROLOL TARTRATE 25 MG: 25 TABLET, FILM COATED ORAL at 23:59

## 2020-01-01 RX ADMIN — IOHEXOL 100 ML: 350 INJECTION, SOLUTION INTRAVENOUS at 06:14

## 2020-01-01 RX ADMIN — DOCUSATE SODIUM 100 MG: 100 CAPSULE, LIQUID FILLED ORAL at 09:18

## 2020-01-01 RX ADMIN — FUROSEMIDE 20 MG: 40 TABLET ORAL at 09:42

## 2020-01-01 RX ADMIN — FUROSEMIDE 20 MG: 40 TABLET ORAL at 09:54

## 2020-01-01 RX ADMIN — OLANZAPINE 2.5 MG: 2.5 TABLET, FILM COATED ORAL at 21:37

## 2020-01-01 RX ADMIN — METOPROLOL SUCCINATE 25 MG: 25 TABLET, EXTENDED RELEASE ORAL at 09:43

## 2020-01-01 RX ADMIN — SODIUM CHLORIDE 50 ML/HR: 0.9 INJECTION, SOLUTION INTRAVENOUS at 18:25

## 2020-01-01 RX ADMIN — LISINOPRIL 10 MG: 10 TABLET ORAL at 09:42

## 2020-01-01 RX ADMIN — ENOXAPARIN SODIUM 40 MG: 40 INJECTION SUBCUTANEOUS at 09:42

## 2020-01-01 RX ADMIN — MAGNESIUM GLUCONATE 500 MG ORAL TABLET 400 MG: 500 TABLET ORAL at 09:43

## 2020-01-01 RX ADMIN — LIDOCAINE 5% 1 PATCH: 700 PATCH TOPICAL at 08:43

## 2020-01-01 RX ADMIN — LISINOPRIL 10 MG: 10 TABLET ORAL at 09:54

## 2020-01-01 RX ADMIN — LISINOPRIL 10 MG: 10 TABLET ORAL at 10:06

## 2020-01-01 RX ADMIN — POTASSIUM CHLORIDE 40 MEQ: 1500 TABLET, EXTENDED RELEASE ORAL at 11:03

## 2020-01-01 RX ADMIN — METOROPROLOL TARTRATE 5 MG: 5 INJECTION, SOLUTION INTRAVENOUS at 18:34

## 2020-01-01 RX ADMIN — POTASSIUM CHLORIDE 20 MEQ: 1500 TABLET, EXTENDED RELEASE ORAL at 09:43

## 2020-01-01 RX ADMIN — MAGNESIUM GLUCONATE 500 MG ORAL TABLET 400 MG: 500 TABLET ORAL at 09:54

## 2020-01-01 RX ADMIN — DIGOXIN 125 MCG: 125 TABLET ORAL at 09:54

## 2020-01-01 RX ADMIN — FUROSEMIDE 20 MG: 20 TABLET ORAL at 08:43

## 2020-01-01 RX ADMIN — SODIUM CHLORIDE 50 ML/HR: 0.9 INJECTION, SOLUTION INTRAVENOUS at 19:53

## 2020-01-01 RX ADMIN — FUROSEMIDE 20 MG: 40 TABLET ORAL at 08:43

## 2020-01-01 RX ADMIN — DOCUSATE SODIUM 100 MG: 100 CAPSULE, LIQUID FILLED ORAL at 17:08

## 2020-01-24 NOTE — PLAN OF CARE
Problem: Potential for Falls  Goal: Patient will remain free of falls  INTERVENTIONS:  - Assess patient frequently for physical needs  -  Identify cognitive and physical deficits and behaviors that affect risk of falls  -  [place] fall precautions as indicated by assessment   - Educate patient/family on patient safety including physical limitations  - Instruct patient to call for assistance with activity based on assessment  - Modify environment to reduce risk of injury  - Consider OT/PT consult to assist with strengthening/mobility    Outcome: Progressing      Problem: SAFETY ADULT  Goal: Patient will remain free of falls  INTERVENTIONS:  - Assess patient frequently for physical needs  -  Identify cognitive and physical deficits and behaviors that affect risk of falls    -  [place] fall precautions as indicated by assessment   - Educate patient/family on patient safety including physical limitations  - Instruct patient to call for assistance with activity based on assessment  - Modify environment to reduce risk of injury  - Consider OT/PT consult to assist with strengthening/mobility    Outcome: Progressing yes

## 2020-05-28 NOTE — PHYSICAL THERAPY NOTE
Physical Therapy Evaluation     Patient's Name: Carry Busing    Admitting Diagnosis  UTI (urinary tract infection) [N39 0]  Altered mental status [R41 82]  Abdominal pain [R10 9]    Problem List  Patient Active Problem List   Diagnosis    Colitis, acute    Dehydration    HTN (hypertension)    Acute kidney injury ([facility] [address] [facility] )    Hypokalemia    Anemia    Hypomagnesemia    Ambulatory dysfunction    Diarrhea    UTI (urinary tract infection)    Lymphedema of arm    Underweight       Past Medical History  Past Medical History:   Diagnosis Date    [facility] ([facility] )     Cancer ([facility] )     Hypertension     Lymphedema of arm     L arm       Past Surgical History  Past Surgical History:   Procedure Laterality Date    BLADDER REPAIR      BREAST SURGERY      HYSTERECTOMY      MASTECTOMY        09/10/18 1140   Pain Assessment   Pain Assessment No/denies pain   Pain Score No Pain   Restrictions/Precautions   Weight Bearing Precautions Per Order No   Braces or Orthoses Other (Comment)  (none per pt)   Other Precautions Chair Alarm; Fall Risk;Telemetry  (back safety precautions)   General   Chart Reviewed Yes   Response to Previous Treatment Patient with no complaints from previous session  Family/Caregiver Present No   Cognition   Overall Cognitive Status WFL   Arousal/Participation Alert; Responsive; Cooperative   Attention Within functional limits   Orientation Level Oriented to person;Oriented to place  (inconsistent to time)   Memory Within functional limits   Following Commands Follows multistep commands without difficulty   Comments pt agreeable to PT session   Subjective   Subjective pt motivated to participate, "I feel ready to go home"   Bed Mobility   Supine to Sit 6  Modified independent   Transfers   Sit to Stand 6  Modified independent  (x4 trials)   Additional items Armrests   Stand to Sit 6  Modified independent  (x4 trials)   Additional items Armrests   Toilet transfer 6  Modified independent Additional items Standard toilet   Additional Comments RN reporting pt has been ambulatory to/from BR independently without difficulty or need for AD   Ambulation/Elevation   Gait pattern Inconsistent dejan   Gait Assistance 5  Supervision   Additional items Verbal cues   Assistive Device None   Distance 200', 50'   Stair Management Assistance 5  Supervision  (seated rest between 2 trials)   Additional items Assist x 1;Verbal cues  (No signs of SOB/fatigue observed)   Stair Management Technique Two rails; Alternating pattern; Foreward   Number of Stairs 14  (7x2 x2 trials at 4" and 6"  steps)   Balance   Static Sitting Good   Dynamic Sitting Good   Static Standing Fair +   Dynamic Standing Fair +   Ambulatory Fair +   Higher level balance (Tinetti: 24/28 (low risk for falls))   Endurance Deficit   Endurance Deficit Yes   Endurance Deficit Description HR 94bpm, SpO2 95% on RA s/p mobility   Activity Tolerance   Activity Tolerance Patient tolerated treatment well   Nurse Made Aware yes, Kareem RN verbalized pt appropriate for PT session, made aware of session outcomes  IRA Schumacher aware of PT recs   Exercises   Heelslides Sitting;10 reps;AROM; Bilateral   Hip Flexion Sitting;10 reps;AROM; Bilateral   Hip Abduction Sitting;10 reps;AROM; Bilateral   Hip Adduction Sitting;10 reps;AROM; Bilateral   Knee AROM Long Arc Quad Sitting;10 reps;AROM; Bilateral   Ankle Pumps Sitting;10 reps;AROM; Bilateral   Marching Sitting;10 reps;AROM; Bilateral   Assessment   Prognosis Good   Problem List Decreased strength;Decreased endurance; Impaired balance;Decreased mobility   Assessment Pt seen for PT treatment session this date with interventions consisting of gait training w/ emphasis on improving pt's ability to ambulate level surfaces x 200+50 ft with close S provided by therapist with no AD, Therapeutic exercise consisting of: AROM 10 reps B LE in sitting position and navigating 14 stairs x2 trials (7x2, x2 trials  steps) w/ B handrail with alternating pattern with SBA  Pt agreeable to PT treatment session upon arrival, pt found supine in bed w/ HOB elevated, in no apparent distress, A&O x 3 and responsive  In comparison to previous session, pt with improvements in consistency with community distance gait trials, no overt LOB, no symptoms or report of SOB/lightheadedness/dizziness, Tinetti performed revealing 24/28 (low risk for falls), seated ther ex in pain free range with vc for technique < 25% of the time  Post session: pt returned back to recliner, all needs in reach and RN notified of session findings/recommendations  Continue to recommend return to [facility] with PT at time of d/c in order to maximize pt's functional independence and safety w/ mobility  Pt continues to be functioning below baseline level, and remains limited 2* factors listed above  PT will continue to see pt while here in order to address the deficits listed above and provide interventions consistent w/ POC in effort to achieve STGs  PT present for meeting at bedside with MARY Jo, pt, and  Lydia via telephone from [facility]  PT discussed POC, progress w/ PT since including pt's consistency with level surface mobility and stair trials  Pt verbalizing no concerns to return back to previous living environment and navigate stairs  PT discussed same with Dr Leoncio Whitfield  Barriers to Discharge None   Goals   Patient Goals to return home   STG Expiration Date 09/16/18   Short Term Goal #1 STGs remain appropriate   Treatment Day 3   Plan   Treatment/Interventions Functional transfer training;LE strengthening/ROM; Elevations; Therapeutic exercise; Endurance training;Patient/family training;Equipment eval/education; Bed mobility;Gait training;Spoke to nursing;Family;Spoke to MD   Progress Progressing toward goals   PT Frequency (3-5x/wk)   Recommendation   Recommendation Home PT  (return to [facility] with HHPT)   Equipment Recommended (none currently) PT - OK to Discharge Yes  (when medically cleared with HHPT and staff A prn)         Marcy Whiteside, PT, DPT Never smoker

## 2020-08-26 PROBLEM — I16.0 HYPERTENSIVE URGENCY: Status: ACTIVE | Noted: 2018-04-03

## 2020-08-26 PROBLEM — F03.90 DEMENTIA (HCC): Status: ACTIVE | Noted: 2020-01-01

## 2020-08-26 PROBLEM — R07.9 CHEST PAIN: Status: ACTIVE | Noted: 2020-01-01

## 2020-08-26 NOTE — H&P
H&P- Sully Cintron 12/6/1922, 80 y o  female MRN: [id number]    Unit/Bed#: -01 Encounter: [id number]    Primary Care Provider: Milana Granados DO   Date and time admitted to hospital: 8/26/2020  4:56 AM        * Chest pain  Assessment & Plan  80year old female patient was presented from [facility] complaining of chest pain/back pain  Troponin negative x2  CTA negative for aortic dissection  Currently asymptomatic and eager to go home  Symptoms likely secondary to hypertensive urgency  Resume home medications and monitor vitals per unit protocol  Dementia [facility])  Assessment & Plan  Patient likely has underlying dementia and also had episode of psychosis requiring inpatient psych admission in the past   Patient was discharged on Zyprexa which she is not taking for unclear reason  Currently patient is awake, alert, oriented to self, forgetful at times requiring frequent reorientation  Cooperative during exam   Will resume Zyprexa 2 5 mg q h s  I have discussed above with patient's Colin Dole over the phone at length and I have also discussed my concern about patient's ability to care for self including medications  Patient reports that he is in discussion with [facility] and will transition patient is to nursing home when [facility] not able to provide care for her  Atrial fibrillation [facility])  Assessment & Plan  Present admission with history of AFib  Currently patient is on digoxin, Toprol XL  Currently stable  Not on anticoagulation  Stage 3 chronic kidney disease [facility])  Assessment & Plan  Present admission with history of CKD  Reviewed previous labs and baseline creatinine is appears to be around 0 9-1 range  Currently creatinine at baseline  Ambulatory dysfunction  Assessment & Plan  Follow-up with PT OT radha     Hypertensive urgency  Assessment & Plan  Presented with hypertensive urgency with blood pressure 213/120 with complaining of chest pain and back pain    Troponin negative x2  CTA negative for aortic dissection  Resume home medications of lisinopril, Lasix, Toprol  Blood pressure improving and currently 156/76  Continue home medication continue monitor blood pressure  VTE Prophylaxis: Enoxaparin (Lovenox)    Code Status:  Level 3 DNR DNI  POLST: POLST form is not discussed and not completed at this time  Discussion with family:  Spoke with Moisés Garcia over the phone  Anticipated Length of Stay:  Patient will be admitted on an Inpatient basis with an anticipated length of stay of   > 2 midnights  Justification for Hospital Stay:  Hypertensive urgency    Total Time for Visit, including Counseling / Coordination of Care: 1 hour  Greater than 50% of this total time spent on direct patient counseling and coordination of care  Chief Complaint:   Chest pain, back pain,    History of Present Illness:    Brittanie Dodge is a 80 y o  female patient past medical history of dementia, hypertension, ambulate dysfunction, CKD stage 3, AFib not on anticoagulation, as chronic left-sided lymphedema post left mastectomy, who presents from [facility] home with complain of chest pain  On my encounter patient is comfortable not in distress  Patient is poor historian and forgetful  Upon reviewing the chart seems like patient had complained of chest pain and back pain  CTA negative for drugs aortic dissection  Troponin negative x2  Patient was noted to have elevated blood pressure  Patient states that she does not remember if she took her medications  Other labs otherwise unremarkable  UA negative for UTI  Patient currently reports feeling much better and eager to return home  Currently she denies chest pain, dyspnea, fever, chills, nausea, vomiting, abdominal pain, diarrhea, any other new complaints  No other events reported  Patient is level 3 DNR DNI per Moisés Garcia        Review of Systems:    Review of Systems   Constitutional: Negative for chills, fatigue and fever  HENT: Negative for congestion  Eyes: Negative for visual disturbance  Respiratory: Negative for apnea  Cardiovascular: Negative for palpitations and leg swelling  Gastrointestinal: Negative for abdominal distention and abdominal pain  Endocrine: Negative for polyuria  Genitourinary: Negative for difficulty urinating  Skin: Negative for rash  Neurological: Negative for syncope  Psychiatric/Behavioral: Negative for agitation  Past Medical and Surgical History:     Past Medical History:   Diagnosis Date    Anxiety     Breast cancer ([facility] )     Cancer ([facility] )     Depression     Hypertension     Lymphedema of arm     L arm    Psychosis ([place] [id number] )        Past Surgical History:   Procedure Laterality Date    BLADDER REPAIR      BREAST SURGERY      HYSTERECTOMY      MASTECTOMY         Meds/Allergies:    Prior to Admission medications    Medication Sig Start Date End Date Taking?  Authorizing Provider   APAP-Caff-Dihydrocodeine 325-30-16 MG TABS Take by mouth   Yes Historical Provider, MD   aspirin 81 mg chewable tablet Chew 1 tablet (81 mg total) daily 11/20/19 8/26/20 Yes Hillary Newman MD   digoxin (LANOXIN) 0 125 mg tablet Take 1 tablet (125 mcg total) by mouth daily 11/20/19 8/26/20 Yes Hillary Newman MD   ergocalciferol (VITAMIN D2) 50,000 units Take 1 capsule (50,000 Units total) by mouth once a week 11/20/19  Yes Hillary Newman MD   furosemide (LASIX) 20 mg tablet Take 1 tablet (20 mg total) by mouth daily 11/20/19  Yes Hillary Nemwan MD   lisinopril (ZESTRIL) 20 mg tablet Take 1 tablet (20 mg total) by mouth daily  Patient taking differently: Take 10 mg by mouth daily  11/20/19  Yes Hillary Newman MD   loperamide (IMODIUM) 1 mg/5 mL oral liquid Take 2 mg by mouth 4 (four) times a day as needed for diarrhea   Yes Historical Provider, MD   metoprolol succinate (TOPROL-XL) 25 mg 24 hr tablet Take 1 tablet (25 mg total) by mouth daily 11/20/19 8/26/20 Yes Hillary Newman MD potassium chloride (K-DUR,KLOR-CON) 10 mEq tablet Take 2 tablets (20 mEq total) by mouth every other day 11/20/19 8/26/20 Yes Corry Montero MD   cyanocobalamin 1,000 mcg/mL Inject 1 mL (1,000 mcg total) into a muscle every 30 (thirty) days  Patient not taking: Reported on 8/26/2020 12/20/19   Corry Montero MD   magnesium oxide (MAG-OX) 400 mg Take 1 tablet (400 mg total) by mouth 2 (two) times a day 11/20/19   Corry Montero MD   OLANZapine (ZyPREXA) 2 5 mg tablet Take 1 tablet (2 5 mg total) by mouth daily at bedtime  Patient not taking: Reported on 8/26/2020 11/21/19   RED Brown   amLODIPine (NORVASC) 5 mg tablet Take 1 tablet (5 mg total) by mouth daily 11/20/19 8/26/20  Corry Montero MD     I have reviewed home medications with patient personally  Allergies: No Known Allergies    Social History:     Marital Status:    Patient Pre-hospital Living Situation:  At [facility]  Patient Pre-hospital Level of Mobility:  Independent  Patient Pre-hospital Diet Restrictions:  None reported  Substance Use History:   Social History     Substance and Sexual Activity   Alcohol Use Never    Frequency: Never     Social History     Tobacco Use   Smoking Status Never Smoker   Smokeless Tobacco Never Used     Social History     Substance and Sexual Activity   Drug Use Never       Family History:    non-contributory    Physical Exam:     Vitals:   Blood Pressure: 121/63 (08/26/20 1422)  Pulse: 66 (08/26/20 1422)  Temperature: 98 4 °F (36 9 °C) (08/26/20 1422)  Temp Source: Oral (08/26/20 0515)  Respirations: 18 (08/26/20 1422)  Weight - Scale: 49 9 kg (110 lb 0 2 oz) (08/26/20 0501)  SpO2: 97 % (08/26/20 1422)    Physical Exam  Constitutional:       General: She is not in acute distress  Appearance: Normal appearance  She is not ill-appearing  Comments: Elderly female patient, not in acute distress  HENT:      Head: Normocephalic and atraumatic  Nose: No rhinorrhea        Mouth/Throat:      Pharynx: No oropharyngeal exudate or posterior oropharyngeal erythema  Eyes:      Extraocular Movements: Extraocular movements intact  Conjunctiva/sclera: Conjunctivae normal       Pupils: Pupils are equal, round, and reactive to light  Neck:      Musculoskeletal: Normal range of motion and neck supple  No neck rigidity  Cardiovascular:      Rate and Rhythm: Normal rate and regular rhythm  Pulses: Normal pulses  Heart sounds: Normal heart sounds  Pulmonary:      Effort: Pulmonary effort is normal  No respiratory distress  Breath sounds: Normal breath sounds  No stridor  No wheezing or rhonchi  Abdominal:      General: Bowel sounds are normal  There is no distension  Palpations: Abdomen is soft  Tenderness: There is no abdominal tenderness  Musculoskeletal: Normal range of motion  General: No swelling  Comments: Left upper extremity chronic lymphedema noted  Skin:     Findings: No rash  Neurological:      General: No focal deficit present  Mental Status: She is alert  Mental status is at baseline  Comments: Patient is forgetful, poor historian  Awake, alert  Cooperative during exam    Psychiatric:         Mood and Affect: Mood normal          Behavior: Behavior normal        Additional Data:     Lab Results: I have personally reviewed pertinent reports        Results from last 7 days   Lab Units 08/26/20  0528   WBC Thousand/uL 9 06   HEMOGLOBIN g/dL 15 1   HEMATOCRIT % 48 3*   PLATELETS Thousands/uL 157   NEUTROS PCT % 71   LYMPHS PCT % 18   MONOS PCT % 7   EOS PCT % 3     Results from last 7 days   Lab Units 08/26/20  0528   SODIUM mmol/L 138   POTASSIUM mmol/L 3 6   CHLORIDE mmol/L 98*   CO2 mmol/L 33*   BUN mg/dL 25   CREATININE mg/dL 1 01   ANION GAP mmol/L 7   CALCIUM mg/dL 9 8   ALBUMIN g/dL 3 5   TOTAL BILIRUBIN mg/dL 0 80   ALK PHOS U/L 113   ALT U/L 56   AST U/L 42   GLUCOSE RANDOM mg/dL 105     Results from last 7 days   Lab Units 08/26/20  0511 INR  1 04                   Imaging: I have personally reviewed pertinent reports  CTA dissection protocol chest abdomen pelvis w wo contrast   Final Result by Fernando Meehan DO (08/26 8557)   1  No evidence of aortic dissection or aortic aneurysm  2   Left upper extremity lymphedema  3   Moderate constipation and fecal stasis  Workstation performed: [id number]             EKG, Pathology, and Other Studies Reviewed on Admission:   · EKG:  Not available if done  Allscripts / Epic Records Reviewed: Yes     ** Please Note: This note has been constructed using a voice recognition system   **

## 2020-08-26 NOTE — ED NOTES
Unable to put IV in , after 6 attempts pt refused further attempts, Darnell Burnett PA-C notified, IV antibiotic changed to [address] Luis Palmer RN  08/26/20 1512

## 2020-08-26 NOTE — PLAN OF CARE
Problem: OCCUPATIONAL THERAPY ADULT  Goal: Performs self-care activities at highest level of function for planned discharge setting  See evaluation for individualized goals  Description:    Equipment Recommended: Other (comment)(none )       See flowsheet documentation for full assessment, interventions and recommendations  Note: Limitation: Decreased ADL status, Decreased UE strength, Decreased endurance, Decreased self-care trans, Decreased high-level ADLs  Prognosis: Good  Assessment: Pt is a 80 y o  F admitted to [facility] [address] [place] [address] on 8/26/2020 with chest pain  Cormobidites limiting pt performance include: tachyarrhythmia, central hypertension, chronic lymphedema secondary to remote history of breast cancer, and moderate mitral regurgitation, anxiety, and depression  OT orders received  Pt chart reviewed  Performed at least two patient idenfiters including name and wrist band during session  Pt resides in [facility]  Pt reported that at baseline she is ambulatory without use of AD  While at [place], pt is able to grooming and dress independently, but requires assistance while showering  Prior to admission, pt was independent in all other ADLs and functional mobility  Upon evaluation, pt was received, supine in bed, alert and oriented to person, disoriented to place, time, and situation, with no s/s of distress  Pt required mod I assist for eating and grooming  Pt required supervision assist for UB ADLs, toileting, and functional assistance  Pt required min A for LB ADLs  While completing bed mobility and transferring, pt required supervision assist, with HOB elevated and increased time to complete   Pt functionally ambulated without use of AD at supervision assist  Deficits limiting pt occupational performance at time of evaluation include: decreased high level congitive skills such as, decreased short term memory, decreased orientation, decreased recall of recent events, decreased dynamic sitting and standing balance, decreased endurance, and decreased activity tolerance  Occupational performance areas limited due to the deficits mentioned above include: bathing, dressing, toileting, fucntional mobility, community mobility, social participation, and leisure participation  Pt would benefit from continued skilled OT sessions while admitted to the hospital to address the deficits mentioned above and to maximize functional independence  From an OT standpoint, recommendation at d/c would be return to previous environment with skilled OT services  OT Discharge Recommendation: Home with skilled therapy([facility] with skilled OT )  OT - OK to Discharge:  Yes

## 2020-08-26 NOTE — OCCUPATIONAL THERAPY NOTE
Occupational Therapy Evaluation Note     Patient Name: Derek CHIU Date: 8/26/2020 08/26/20 6291   Note Type   Note type Eval only   Restrictions/Precautions   Weight Bearing Precautions Per Order No   Braces or Orthoses Other (Comment)  (none per pt )   Other Precautions Cognitive;Telemetry;Multiple lines; Fall Risk   Pain Assessment   Pain Assessment Tool Pain Assessment not indicated - pt denies pain   Pain Score No Pain   Home Living   Type of Home Assisted living  [facility] )   Home Layout One level   [address] Other (Comment)  (none at baseline )   Additional Comments Pt ambulatory without use of AD at baseline  Prior Function   Level of [place] Independent with ADLs and functional mobility   Lives With Facility staff   Receives Help From Home health   ADL Assistance Independent   IADLs Needs assistance   Falls in the last 6 months 0   Vocational Retired   Comments Pt is able to sponge bathe independently, but requires assistance for showering  Lifestyle   Autonomy Pt resides in [facility] living facility  Pt reported that at baseline she is ambulatory without use of AD  While at [place], pt is able to grooming and dress independently, but requires assistance while showering  Prior to admission, pt was independent in all other ADLs and functional mobility  Reciprocal Relationships Support of Facility Staff    Service to Others Retired    Marcie 80 around and look at what is going on    Psychosocial   Psychosocial (WDL) WDL   ADL   Where Assessed Supine, bed   Equipment Provided Other (Comment)  (none )   Eating Assistance 6  Modified independent   Eating Deficit Setup   Grooming Assistance 6  Modified Independent   Grooming Deficit Setup   UB Bathing Assistance 5  Supervision/Setup   UB Bathing Deficit Setup;Supervision/safety; Increased time to complete   LB Bathing Assistance 4  Minimal Assistance   LB Bathing Deficit Setup;Verbal cueing;Supervision/safety; Increased time to complete   UB Dressing Assistance 5  Supervision/Setup   UB Dressing Deficit Setup;Verbal cueing;Supervision/safety; Increased time to complete   LB Dressing Assistance 4  Minimal Assistance   LB Dressing Deficit Setup;Verbal cueing;Supervision/safety; Increased time to complete   Toileting Assistance  5  Supervision/Setup   Toileting Deficit Setup;Verbal cueing;Supervison/safety; Increased time to complete   Functional Assistance 5  Supervision/Setup   Functional Deficit Setup;Verbal cueing;Supervision/safety; Increased time to complete   Bed Mobility   Supine to Sit 5  Supervision   Additional items Assist x 1;HOB elevated; Increased time required;Verbal cues   Sit to Supine 5  Supervision   Additional items Assist x 1;Bedrails; Increased time required;Verbal cues   Transfers   Sit to Stand 5  Supervision   Additional items Assist x 1;HOB elevated; Increased time required;Verbal cues   Stand to Sit 5  Supervision   Additional items Assist x 1;HOB elevated; Bedrails; Increased time required;Verbal cues   Toilet transfer 5  Supervision   Additional items Assist x 1;Bedrails; Increased time required;Commode   Functional Mobility   Functional Mobility 5  Supervision   Additional Comments Pt functionally ambulated without use of AD during evaluation  Additional items   (none )   Balance   Static Sitting Good   Dynamic Sitting Fair +   Static Standing Fair   Dynamic Standing Fair -   Activity Tolerance   Activity Tolerance Patient tolerated treatment well   Nurse Made Aware RN verbalize pt appropriate for therapy  RN made aware of therapy outcomes      RUE Assessment   RUE Assessment WFL   LUE Assessment   LUE Assessment WFL   Hand Function   Gross Motor Coordination Functional   Fine Motor Coordination Functional   Sensation   Light Touch No apparent deficits  (BUEs)   Vision-Basic Assessment   Current Vision Does not wear glasses   Visual History Other (Comment)  (none )   Patient Visual Report Other (Comment)  (no changes in vision )   Cognition   Overall Cognitive Status Impaired   Arousal/Participation Alert; Responsive; Cooperative   Attention Attends with cues to redirect   Orientation Level Oriented to person;Disoriented to place; Disoriented to time;Disoriented to situation   Memory Decreased short term memory   Following Commands Follows one step commands without difficulty   Comments Pt alert and oriented to person, disoriented to time, place, and situation  Therapist administered Short Blessed Test  Pt obtained a score of 14, indicative of impairment consistent with dementia  Pt errored on short term memory recall tasks  Per chart review, findings consistent with cognitive evaluation on 11/2019  Cognition Assessment Tools Other (Comment)  (SBT )   Score 14   Assessment   Limitation Decreased ADL status; Decreased UE strength;Decreased endurance;Decreased self-care trans;Decreased high-level ADLs   Prognosis Good   Assessment Pt is a 80 y o  F [place] [facility] [place] [address] on 8/26/2020 with chest pain  Cormobidites limiting pt performance include: tachyarrhythmia, central hypertension, chronic lymphedema secondary to remote history of breast cancer, and moderate mitral regurgitation, anxiety, and depression  OT orders received  Pt chart reviewed  Performed at least two patient idenfiters including name and wrist band during session  Pt resides in [facility]  Pt reported that at baseline she is ambulatory without use of AD  While at [place], pt is able to grooming and dress independently, but requires assistance while showering  Prior to admission, pt was independent in all other ADLs and functional mobility  Upon evaluation, pt was received, supine in bed, alert and oriented to person, disoriented to place, time, and situation, with no s/s of distress  Pt required mod I assist for eating and grooming   Pt required supervision assist for UB ADLs, toileting, and functional assistance  Pt required min A for LB ADLs  While completing bed mobility and transferring, pt required supervision assist, with HOB elevated and increased time to complete  Pt functionally ambulated without use of AD at supervision assist  Deficits limiting pt occupational performance at time of evaluation include: decreased high level congitive skills such as, decreased short term memory, decreased orientation, decreased recall of recent events, decreased dynamic sitting and standing balance, decreased endurance, and decreased activity tolerance  Occupational performance areas limited due to the deficits mentioned above include: bathing, dressing, toileting, fucntional mobility, community mobility, social participation, and leisure participation  Pt would benefit from continued skilled OT sessions while admitted to the hospital to address the deficits mentioned above and to maximize functional independence  From an OT standpoint, recommendation at d/c would be return to previous environment with skilled OT services      Goals   Patient Goals "like a place where I can feel at home"    Recommendation   OT Discharge Recommendation Home with skilled therapy  Yee Davis with skilled OT )   Equipment Recommended Other (comment)  (none )   OT - OK to Discharge Yes   Barthel Index   Feeding 10   Bathing 0   Grooming Score 5   Dressing Score 5   Bladder Score 10   Bowels Score 10   Toilet Use Score 5   Transfers (Bed/Chair) Score 10   Mobility (Level Surface) Score 0   Stairs Score 0   Barthel Index Score 55   Modified Jonas Scale   Modified [place] Scale 3     Goals:     Patient will verbalize and demonstrate good safety awareness and good energy conservation techniques while completing a functional task with no more than 3 verbal cues       Patient will complete UB ADLs while standing at sink for 5 minutes with UE unilateral support at mod I level to increase dynamic standing balance and increase activty tolerance      Patient will complete LB ADLs while seated EOB/chair at mod I level to increase dynamic sitting balance and increase functional independence      Patient will transfer from chair/bed to chair/bed/toilet at mod I level to increase engagement in daily activities       Patient will engage in 1 new leisure activity to increase engagement in preferred activities and improve quality of life      Patient will demonstrate use of 3 positive coping strategies to increase stress management skills and improve quality of life

## 2020-08-26 NOTE — ASSESSMENT & PLAN NOTE
Patient likely has underlying dementia and also had episode of psychosis requiring inpatient psych admission in the past   Patient was discharged on Zyprexa which she is not taking for unclear reason  Currently patient is awake, alert, oriented to self, forgetful at times requiring frequent reorientation  Cooperative during exam   Will resume Zyprexa 2 5 mg q h s  I have discussed above with patient's Jennifer Rios over the phone at length and I have also discussed my concern about patient's ability to care for self including medications  Patient reports that he is in discussion with Jacquleine Griffith and will transition patient is to nursing home when Jacqueline Griffith not able to provide care for her

## 2020-08-26 NOTE — PHYSICAL THERAPY NOTE
Physical Therapy Evaluation     Patient's Name: Arabella Been    Admitting Diagnosis  Chest pain [R07 9]    Problem List  Patient Active Problem List   Diagnosis    Dehydration    HTN (hypertension)    Anemia    Ambulatory dysfunction    UTI (urinary tract infection)    Lymphedema of arm    Underweight    Moderate mitral regurgitation    Wide-complex tachycardia (HCC)    Multifocal atrial tachycardia (HCC)    Stage 3 chronic kidney disease ([place] [address] )    Hypertensive kidney disease with stage 3 chronic kidney disease (HCC)    Weakness    Atrial fibrillation (HCC)    Psychosis ([place] [address] )       Past Medical History  Past Medical History:   Diagnosis Date    Anxiety     Breast cancer ([place] [address] )     Cancer ([place] [address] )     Depression     Hypertension     Lymphedema of arm     L arm    Psychosis ([place] [address] )        Past Surgical History  Past Surgical History:   Procedure Laterality Date    BLADDER REPAIR      BREAST SURGERY      HYSTERECTOMY      MASTECTOMY          08/26/20 6285   Note Type   Note type Eval only   Pain Assessment   Pain Assessment Tool Pain Assessment not indicated - pt denies pain   Pain Score No Pain   Home Living   Type of Home Assisted living  [facility]i)   Home Layout One level   Bathroom Accessibility Accessible   Home Equipment Other (Comment)  (none at baseline )   Additional Comments Pt ambulatory without use of AD at baseline  Prior Function   Level of [place] Independent with ADLs and functional mobility   Lives With Facility staff   Receives Help From Home health   ADL Assistance Independent   IADLs Needs assistance   Falls in the last 6 months 0   Vocational Retired   Comments Pt is able to sponge bathe independently, but requires assistance for showering  Restrictions/Precautions   Weight Bearing Precautions Per Order No   Braces or Orthoses Other (Comment)  (none per pt )   Other Precautions Cognitive;Telemetry;Multiple lines; Fall Risk;Limb alert  (lymphedema L UE)   General Family/Caregiver Present No   Cognition   Overall Cognitive Status Impaired   Arousal/Participation Alert   Attention Attends with cues to redirect   Orientation Level Oriented to person;Disoriented to place; Disoriented to time;Disoriented to situation   Memory Decreased short term memory   Following Commands Follows one step commands without difficulty   RUE Assessment   RUE Assessment WFL   LUE Assessment   LUE Assessment WFL   RLE Assessment   RLE Assessment WFL  (grossly 3+/5)   LLE Assessment   LLE Assessment WFL  (grossly 3+/5)   Coordination   Movements are Fluid and Coordinated 1   Sensation WFL   Bed Mobility   Supine to Sit 5  Supervision   Additional items Assist x 1;HOB elevated; Increased time required;Verbal cues   Sit to Supine 5  Supervision   Additional items Assist x 1;HOB elevated; Increased time required;Verbal cues   Transfers   Sit to Stand 5  Supervision   Additional items Assist x 1; Increased time required;Verbal cues   Stand to Sit 5  Supervision   Additional items Assist x 1; Increased time required;Verbal cues   Toilet transfer 5  Supervision   Additional items Assist x 1; Increased time required;Verbal cues; Commode   Ambulation/Elevation   Gait pattern Decreased foot clearance; Short stride   Gait Assistance 5  Supervision   Additional items Assist x 1;Verbal cues   Assistive Device None   Distance 20'   Stair Management Assistance Not tested   Balance   Static Sitting Good   Dynamic Sitting Fair +   Static Standing Fair   Dynamic Standing [address] -   Endurance Deficit   Endurance Deficit Yes   Activity Tolerance   Activity Tolerance Patient tolerated treatment well   Medical Staff Made Aware TANA Plaza   Nurse Made Aware MARY Plaza verbalize pt appropriate for therapy  RN made aware of therapy outcomes  Assessment   Prognosis Good   Problem List Decreased strength;Decreased endurance; Impaired balance;Decreased mobility   Assessment Pt is 80 y o  female seen for PT evaluation on 8/26/2020 s/p admit to [facility] on 8/26/2020 w/ chest pain  PT consulted to assess pt's functional mobility and d/c needs  Order placed for PT eval and tx, w/ activity as tolerated order  Performed at least 2 patient identifiers during session: Name and wristband  Comorbidities affecting pt's physical performance at time of assessment include: anxiety, depression, hypertension, atrial fibrillation, breast cancer status post left mastectomy and lymph node resection with residual left upper extremity lymphedema, hysterectomy  PTA, pt was independent w/ all functional mobility w/ no AD/DME and resident of [address] [place] [address]  Personal factors affecting pt at time of IE include: inability to navigate community distances and decreased cognition  Please find objective findings from PT assessment regarding body systems outlined above with impairments and limitations including weakness, impaired balance, decreased endurance, gait deviations, decreased activity tolerance, fall risk and decreased cognition, as well as mobility assessment (need for cueing for mobility technique)  The following objective measures performed on IE also reveal limitations: Barthel Index: 55/100 and Modified [place]: 3 (moderate disability)  Pt's clinical presentation is currently unstable/unpredictable seen in pt's presentation of abnormal lab value(s), need for input for task focus and mobility technique and ongoing medical assessment  Pt to benefit from continued PT tx to address deficits as defined above and maximize level of functional independent mobility and consistency  From PT/mobility standpoint, recommendation at time of d/c would be return to [facility] c PT services pending progress in order to facilitate return to PLOF     Barriers to Discharge None  (pt resident of [facility] [place])   Goals   Patient Goals "like a place where I can feel at home"   STG Expiration Date 09/05/20   Short Term Goal #1 In 7-10 days: Increase bilateral LE strength 1/2 grade to facilitate independent mobility, Perform all bed mobility tasks modified independent to decrease caregiver burden, Perform all transfers modified independent to improve independence, Ambulate > 150 ft  with least restrictive assistive device modified independent w/o LOB and w/ normalized gait pattern 100% of the time, Increase all balance 1/2 grade to decrease risk for falls, Tolerate 4 hr OOB to faciliate upright tolerance, Improve Barthel Index score to 70 or greater to facilitate independence, PT provider will perform functional balance assessment to determine fall risk and PT to see and establish goals for stairs if/ when appropriate   PT Treatment Day 0   Plan   Treatment/Interventions Functional transfer training;LE strengthening/ROM; Therapeutic exercise; Endurance training;Patient/family training;Equipment eval/education; Bed mobility;Gait training;Spoke to nursing;OT   PT Frequency   (3-5x/wk)   Recommendation   PT Discharge Recommendation Home with skilled therapy; Return to previous environment with social support  (return to [facility] c PT services & assist from staff)   Equipment Recommended   (TBD)   PT - OK to Discharge Yes   Modified [place] Scale   Modified [place] Scale 3   Barthel Index   Feeding 10   Bathing 0   Grooming Score 5   Dressing Score 5   Bladder Score 10   Bowels Score 10   Toilet Use Score 5   Transfers (Bed/Chair) Score 10   Mobility (Level Surface) Score 0   Stairs Score 0   Barthel Index Score 55           Mario Estevez, PT, DPT

## 2020-08-26 NOTE — ED NOTES
1  CC CHEST PAIN, UTI  2  Medications/drip  3  Abnormal labs/vitals/assessment a+ox3, periods of confusion, left arm lymphedema   4  Medications/drips  5  Last time narcotics given n/a  6  IV/drains/ etc  No IV site, multiple attempts unsuccessful, pt refused to be stuck again, Dr Omar Roach aware  7  Isolation status   8  Skin wnl  9  Ambulation status min assist  10   Phone number [phone]  11 trauma y/n no     Almas Corral RN  08/26/20 6253

## 2020-08-26 NOTE — ASSESSMENT & PLAN NOTE
Presented with hypertensive urgency with blood pressure 213/120 with complaining of chest pain and back pain  Troponin negative x2  CTA negative for aortic dissection  Resume home medications of lisinopril, Lasix, Toprol  Blood pressure improving and currently 156/76  Continue home medication continue monitor blood pressure

## 2020-08-26 NOTE — ED NOTES
Pt requested to go home AMA  Charge nurse notified and contacted [place] [id number] [facility]  08/26/20 3426

## 2020-08-26 NOTE — ASSESSMENT & PLAN NOTE
Present admission with history of CKD  Reviewed previous labs and baseline creatinine is appears to be around 0 9-1 range  Currently creatinine at baseline

## 2020-08-26 NOTE — PLAN OF CARE
Problem: PHYSICAL THERAPY ADULT  Goal: Performs mobility at highest level of function for planned discharge setting  See evaluation for individualized goals  Description: Treatment/Interventions: Functional transfer training, LE strengthening/ROM, Therapeutic exercise, Endurance training, Patient/family training, Equipment eval/education, Bed mobility, Gait training, Spoke to nursing, OT  Equipment Recommended: (TBD)       See flowsheet documentation for full assessment, interventions and recommendations  Note: Prognosis: Good  Problem List: Decreased strength, Decreased endurance, Impaired balance, Decreased mobility  Assessment: Pt is 80 y o  female seen for PT evaluation on 8/26/2020 s/p admit to [facility] on 8/26/2020 w/ chest pain  PT consulted to assess pt's functional mobility and d/c needs  Order placed for PT eval and tx, w/ activity as tolerated order  Performed at least 2 patient identifiers during session: Name and wristband  Comorbidities affecting pt's physical performance at time of assessment include: anxiety, depression, hypertension, atrial fibrillation, breast cancer status post left mastectomy and lymph node resection with residual left upper extremity lymphedema, hysterectomy  PTA, pt was independent w/ all functional mobility w/ no AD/DME and resident of [address] [place] [address]  Personal factors affecting pt at time of IE include: inability to navigate community distances and decreased cognition  Please find objective findings from PT assessment regarding body systems outlined above with impairments and limitations including weakness, impaired balance, decreased endurance, gait deviations, decreased activity tolerance, fall risk and decreased cognition, as well as mobility assessment (need for cueing for mobility technique)  The following objective measures performed on IE also reveal limitations: Barthel Index: 55/100 and Modified [place]: 3 (moderate disability)   Pt's clinical presentation is currently unstable/unpredictable seen in pt's presentation of abnormal lab value(s), need for input for task focus and mobility technique and ongoing medical assessment  Pt to benefit from continued PT tx to address deficits as defined above and maximize level of functional independent mobility and consistency  From PT/mobility standpoint, recommendation at time of d/c would be return to [facility] PT services pending progress in order to facilitate return to OF  Barriers to Discharge: None(pt resident of [facility])     PT Discharge Recommendation: Home with skilled therapy, Return to previous environment with social support(return to [facility] PT services & assist from staff)     PT - OK to Discharge: Yes    See flowsheet documentation for full assessment

## 2020-08-26 NOTE — ED NOTES
Provider at bedside     [facility]  08/26/20 0122

## 2020-08-26 NOTE — CASE MANAGEMENT
LOS 5 HOURS  RISK OF UNPLANNED READMISSION SCORE 13  30 DAY READMISSION: NO  BUNDLE: NO    Patient presenting to [facility] ED from [facility] due to chest and back pain  VNA Hx w/ Residential and [facility]  [facility], [address] is contracted with [facility]  STR Hx at [facility]  Patient has advanced directives and POA is [place]  PCP: Rakesh Reeves [facility] manages all of patient's Rx, no barriers to obtaining Rx identified  Patient had a [place] stay at [address] [place] [address] from 11/16/2019-11/21/2019 due to psychosis  CM reviewed discharge planning process including the following: identifying help at home, patient preference for discharge planning needs, pharmacy preference, and availability of treatment team to discuss questions or concerns patient and/or family may have regarding understanding medications and recognizing signs and symptoms once discharged  CM also encouraged patient to follow up with all recommended appointments after discharge  Patient advised of importance for patient and family to participate in managing patients medical well being  CM name and role reviewed  Discharge Checklist reviewed and CM will continue to monitor for progress toward discharge goals in nursing and provider rounds  At this time, PT/OT eval pending and waiting for labwork  CM to follow through discharge

## 2020-08-26 NOTE — ED PROVIDER NOTES
History  Chief Complaint   Patient presents with    Chest Pain     Pt presents to ED via EMS  EMS reports pt complaining of onset of chest pain radiating to back after bearing down  Pt complaining of back pain during triage  Patient is a 80-year-old female with past medical history of anxiety, depression, hypertension, atrial fibrillation, breast cancer status post left mastectomy and lymph node resection with residual left upper extremity lymphedema, hysterectomy, presents to the emergency department by ambulance for acute left chest pain and thoracic back pain that started less than 1 hour ago  Patient is coming from [facility] personal care home and states that she went to use the bathroom early this morning and started feeling pain in her left lower chest as well as in her thoracic back region  She states now she is mostly feeling the pain in her back  She states it is mild in severity  When the pain first started she was having palpitations  She denies any diaphoresis, recent fever, chills, headache, dizziness or near syncope, cough or URI symptoms, hemoptysis, dyspnea, abdominal pain, nausea, vomiting, diarrhea, constipation, blood per rectum or melena, dysuria, change in urinary frequency, hematuria, flank pain, skin rash or color change, extremity swelling or pain worse than baseline, extremity weakness or paresthesia or other focal neurologic deficits  According to EMS and according to patient, patient does not want any invasive procedures done to her and she is DNI/DNR  History provided by:  Patient and EMS personnel   used: No    Chest Pain   Associated symptoms: back pain    Associated symptoms: no abdominal pain, no cough, no dizziness, no fever, no headache, no nausea, no numbness, no palpitations, no shortness of breath, not vomiting and no weakness        Prior to Admission Medications   Prescriptions Last Dose Informant Patient Reported? Taking? APAP-Caff-Dihydrocodeine 325-30-16 MG TABS Past Month at Unknown time  Yes Yes   Sig: Take by mouth   OLANZapine (ZyPREXA) 2 5 mg tablet Not Taking at Unknown time  No No   Sig: Take 1 tablet (2 5 mg total) by mouth daily at bedtime   Patient not taking: Reported on 8/26/2020   aspirin 81 mg chewable tablet 8/26/2020 at Unknown time  No Yes   Sig: Chew 1 tablet (81 mg total) daily   cyanocobalamin 1,000 mcg/mL Not Taking at Unknown time  No No   Sig: Inject 1 mL (1,000 mcg total) into a muscle every 30 (thirty) days   Patient not taking: Reported on 8/26/2020   digoxin (LANOXIN) 0 125 mg tablet 8/26/2020 at Unknown time  No Yes   Sig: Take 1 tablet (125 mcg total) by mouth daily   ergocalciferol (VITAMIN D2) 50,000 units 8/26/2020 at Unknown time  No Yes   Sig: Take 1 capsule (50,000 Units total) by mouth once a week   furosemide (LASIX) 20 mg tablet 8/26/2020 at Unknown time  No Yes   Sig: Take 1 tablet (20 mg total) by mouth daily   lisinopril (ZESTRIL) 20 mg tablet 8/26/2020 at Unknown time  No Yes   Sig: Take 1 tablet (20 mg total) by mouth daily   Patient taking differently: Take 10 mg by mouth daily    loperamide (IMODIUM) 1 mg/5 mL oral liquid Past Month at Unknown time  Yes Yes   Sig: Take 2 mg by mouth 4 (four) times a day as needed for diarrhea   magnesium oxide (MAG-OX) 400 mg   No No   Sig: Take 1 tablet (400 mg total) by mouth 2 (two) times a day   metoprolol succinate (TOPROL-XL) 25 mg 24 hr tablet 8/26/2020 at Unknown time  No Yes   Sig: Take 1 tablet (25 mg total) by mouth daily   potassium chloride (K-DUR,KLOR-CON) 10 mEq tablet 8/26/2020 at Unknown time  No Yes   Sig: Take 2 tablets (20 mEq total) by mouth every other day      Facility-Administered Medications: None       Past Medical History:   Diagnosis Date    Anxiety     [facility]     [facility] ([facility] )     Depression     Hypertension     Lymphedema of arm     L arm    Psychosis ([facility] )        Past Surgical History:   Procedure Laterality Date    BLADDER REPAIR      BREAST SURGERY      HYSTERECTOMY      MASTECTOMY         History reviewed  No pertinent family history  I have reviewed and agree with the history as documented  E-Cigarette/Vaping     E-Cigarette/Vaping Substances     Social History     Tobacco Use    Smoking status: Never Smoker    Smokeless tobacco: Never Used   Substance Use Topics    Alcohol use: No    Drug use: No       Review of Systems   Constitutional: Negative for chills and fever  HENT: Negative for congestion, ear pain, rhinorrhea and sore throat  Respiratory: Negative for cough, chest tightness, shortness of breath and wheezing  Cardiovascular: Positive for chest pain  Negative for palpitations and leg swelling  Gastrointestinal: Negative for abdominal distention, abdominal pain, blood in stool, constipation, diarrhea, nausea and vomiting  Genitourinary: Negative for dysuria, flank pain, frequency and hematuria  Musculoskeletal: Positive for back pain  Negative for neck pain  Skin: Negative for color change, pallor and rash  Allergic/Immunologic: Negative for immunocompromised state  Neurological: Negative for dizziness, syncope, weakness, light-headedness, numbness and headaches  Hematological: Negative for adenopathy  Psychiatric/Behavioral: Negative for confusion and decreased concentration  All other systems reviewed and are negative  Physical Exam  Physical Exam  Vitals signs and nursing note reviewed  Constitutional:       General: She is not in acute distress  Appearance: She is well-developed  She is not ill-appearing, toxic-appearing or diaphoretic  Comments: Patient is very frail  HENT:      Head: Normocephalic and atraumatic  Right Ear: External ear normal       Left Ear: External ear normal       Mouth/Throat:      Comments: Orpharyngeal exam deferred at this time due to risk of exposure to COVID-19 during current pandemic   Patient has no oropharyngeal complaints  Eyes:      Extraocular Movements: Extraocular movements intact  Pupils: Pupils are equal, round, and reactive to light  Neck:      Musculoskeletal: Normal range of motion and neck supple  Vascular: No JVD  Cardiovascular:      Rate and Rhythm: Normal rate  Rhythm irregular  Pulses: Normal pulses  Heart sounds: Normal heart sounds  No murmur  No friction rub  No gallop  Pulmonary:      Effort: Pulmonary effort is normal  No respiratory distress  Breath sounds: Normal breath sounds  No wheezing or rales  Chest:      Chest wall: No tenderness  Abdominal:      General: Bowel sounds are normal  There is no distension  Palpations: Abdomen is soft  Tenderness: There is no abdominal tenderness  There is no guarding or rebound  Musculoskeletal: Normal range of motion  General: No tenderness  Comments: +Chronic left upper extremity lymphedema  Lymphadenopathy:      Cervical: No cervical adenopathy  Skin:     General: Skin is warm and dry  Coloration: Skin is not pale  Findings: No erythema or rash  Neurological:      General: No focal deficit present  Mental Status: She is alert and oriented to person, place, and time  Sensory: No sensory deficit  Motor: No weakness     Psychiatric:         Mood and Affect: Mood normal          Behavior: Behavior normal          Vital Signs  ED Triage Vitals   Temperature Pulse Respirations Blood Pressure SpO2   08/26/20 0515 08/26/20 0501 08/26/20 0501 08/26/20 0501 08/26/20 0501   98 7 °F (37 1 °C) 94 20 (!) 213/120 95 %      Temp Source Heart Rate Source Patient Position - Orthostatic VS BP Location FiO2 (%)   08/26/20 0515 08/26/20 0501 08/26/20 0501 08/26/20 0501 --   Oral Monitor Sitting Right arm       Pain Score       08/26/20 0540       8         Vitals:    08/26/20 0530 08/26/20 0545 08/26/20 0600 08/26/20 0630   BP: (!) 205/99 (!) 173/84 124/60 (!) 182/96   BP Location: Right arm Right arm Right arm Right arm   Pulse: 90 92 70 78   Resp: (!) 27 16 20 12   Temp:       TempSrc:       SpO2: 95% 97% 93% 95%   Weight:           Visual Acuity      ED Medications  Medications   lidocaine (LIDODERM) 5 % patch 1 patch (has no administration in time range)   ceftriaxone (ROCEPHIN) 1 g/50 mL in dextrose IVPB (has no administration in time range)   morphine injection 2 mg (2 mg Intravenous Given 8/26/20 0540)   Labetalol HCl (NORMODYNE) injection 10 mg (10 mg Intravenous Given 8/26/20 0541)   aluminum-magnesium hydroxide-simethicone (MYLANTA) 200-200-20 mg/5 mL oral suspension 30 mL (30 mL Oral Given 8/26/20 0605)   iohexol (OMNIPAQUE) 350 MG/ML injection (MULTI-DOSE) 100 mL (100 mL Intravenous Given 8/26/20 0614)       Diagnostic Studies  Results Reviewed     Procedure Component Value Units Date/Time    Urine culture [[id number]]     Lab Status:  No result Specimen:  Urine     UA (URINE) with reflex to Scope [[id number]]  (Abnormal) Collected:  08/26/20 0646    Lab Status:  Final result Specimen:  Urine, Clean Catch Updated:  08/26/20 0713     Color, UA Yellow     Clarity, UA Clear     Specific Gravity, UA 1 010     pH, UA 7 5     Leukocytes, UA Small     Nitrite, UA Positive     Protein, UA Trace mg/dl      Glucose, UA Negative mg/dl      Ketones, UA Negative mg/dl      Urobilinogen, UA 0 2 E U /dl      Bilirubin, UA Negative     Blood, UA Trace-lysed    Urine Microscopic [[id number]] Collected:  08/26/20 0646    Lab Status:   In process Specimen:  Urine, Clean Catch Updated:  08/26/20 0713    Lipase [[id number]]  (Normal) Collected:  08/26/20 0528    Lab Status:  Final result Specimen:  Blood from Arm, Right Updated:  08/26/20 0559     Lipase 185 u/L     Basic metabolic panel [[id number]]  (Abnormal) Collected:  08/26/20 0528    Lab Status:  Final result Specimen:  Blood from Arm, Right Updated:  08/26/20 0559     Sodium 138 mmol/L      Potassium 3 6 mmol/L      Chloride 98 mmol/L CO2 33 mmol/L      ANION GAP 7 mmol/L      BUN 25 mg/dL      Creatinine 1 01 mg/dL      Glucose 105 mg/dL      Calcium 9 8 mg/dL      eGFR 47 ml/min/1 73sq m     Narrative:       [place] guidelines for Chronic Kidney Disease (CKD):     Stage 1 with normal or high GFR (GFR > 90 mL/min/1 73 square meters)    Stage 2 Mild CKD (GFR = 60-89 mL/min/1 73 square meters)    Stage 3A Moderate CKD (GFR = 45-59 mL/min/1 73 square meters)    Stage 3B Moderate CKD (GFR = 30-44 mL/min/1 73 square meters)    Stage 4 Severe CKD (GFR = 15-29 mL/min/1 73 square meters)    Stage 5 End Stage CKD (GFR <15 mL/min/1 73 square meters)  Note: GFR calculation is accurate only with a steady state creatinine    Hepatic function panel [[id number]]  (Abnormal) Collected:  08/26/20 0528    Lab Status:  Final result Specimen:  Blood from Arm, Right Updated:  08/26/20 0559     Total Bilirubin 0 80 mg/dL      Bilirubin, Direct 0 21 mg/dL      Alkaline Phosphatase 113 U/L      AST 42 U/L      ALT 56 U/L      Total Protein 7 7 g/dL      Albumin 3 5 g/dL     Magnesium [[id number]]  (Normal) Collected:  08/26/20 0528    Lab Status:  Final result Specimen:  Blood from Arm, Right Updated:  08/26/20 0559     Magnesium 1 8 mg/dL     Troponin I [[id number]]  (Normal) Collected:  08/26/20 0528    Lab Status:  Final result Specimen:  Blood from Arm, Right Updated:  08/26/20 0556     Troponin I <0 02 ng/mL     Digoxin level [[id number]]  (Normal) Collected:  08/26/20 0528    Lab Status:  Final result Specimen:  Blood from Arm, Right Updated:  08/26/20 0552     Digoxin Lvl 1 0 ng/mL     Protime-INR [[id number]]  (Normal) Collected:  08/26/20 0528    Lab Status:  Final result Specimen:  Blood from Arm, Right Updated:  08/26/20 0551     Protime 13 1 seconds      INR 1 04    APTT [[id number]]  (Normal) Collected:  08/26/20 0528    Lab Status:  Final result Specimen:  Blood from Arm, Right Updated:  08/26/20 0551     PTT 30 seconds     CBC and differential [[id number]]  (Abnormal) Collected:  08/26/20 0528    Lab Status:  Final result Specimen:  Blood from Arm, Right Updated:  08/26/20 0538     WBC 9 06 Thousand/uL      RBC 5 16 Million/uL      Hemoglobin 15 1 g/dL      Hematocrit 48 3 %      MCV 94 fL      MCH 29 3 pg      MCHC 31 3 g/dL      RDW 12 7 %      MPV 11 4 fL      Platelets 681 Thousands/uL      nRBC 0 /100 WBCs      Neutrophils Relative 71 %      Immat GRANS % 0 %      Lymphocytes Relative 18 %      Monocytes Relative 7 %      Eosinophils Relative 3 %      Basophils Relative 1 %      Neutrophils Absolute 6 48 Thousands/µL      Immature Grans Absolute 0 02 Thousand/uL      Lymphocytes Absolute 1 60 Thousands/µL      Monocytes Absolute 0 66 Thousand/µL      Eosinophils Absolute 0 24 Thousand/µL      Basophils Absolute 0 06 Thousands/µL                  CTA dissection protocol chest abdomen pelvis w wo contrast   Final Result by Dana Greer DO (08/26 0994)   1  No evidence of aortic dissection or aortic aneurysm  2   Left upper extremity lymphedema  3   Moderate constipation and fecal stasis                    Workstation performed: [id number]                    Procedures  ECG 12 Lead Documentation Only    Date/Time: 8/26/2020 5:11 AM  Performed by: Blake Flores DO  Authorized by: Blake Flores DO     ECG reviewed by me, the ED Provider: yes    Patient location:  ED  Previous ECG:     Previous ECG:  Compared to current    Comparison ECG info:  11-    Similarity:  Changes noted  Rate:     ECG rate:  90    ECG rate assessment: normal    Rhythm:     Rhythm: atrial fibrillation    Ectopy:     Ectopy: none    QRS:     QRS axis:  Normal    QRS intervals:  Normal  Conduction:     Conduction: normal    ST segments:     ST segments:  Abnormal    Depression:  II, III and aVF  T waves:     T waves: non-specific      T waves comment:  Inferior leads             ED Course  ED Course as of Aug 26 0719   Wed Aug 26, 2020   0628 Troponin I: <0 02   [id number] Updated patient that initial workup was unremarkable  Patient agreeable to staying for observation to rule out ACS  US AUDIT      Most Recent Value   Initial Alcohol Screen: US AUDIT-C    1  How often do you have a drink containing alcohol?  0 Filed at: 08/26/2020 0516   2  How many drinks containing alcohol do you have on a typical day you are drinking? 0 Filed at: 08/26/2020 0516   3a  Male UNDER 65: How often do you have five or more drinks on one occasion? 0 Filed at: 08/26/2020 0516   3b  FEMALE Any Age, or MALE 65+: How often do you have 4 or more drinks on one occassion? 0 Filed at: 08/26/2020 0516   Audit-C Score  0 Filed at: 08/26/2020 1534            HEART Risk Score      Most Recent Value   Heart Score Risk Calculator   History  0 Filed at: 08/26/2020 0711   ECG  2 Filed at: 08/26/2020 9114   Age  2 Filed at: 08/26/2020 1645   Risk Factors  1 Filed at: 08/26/2020 0711   Troponin  0 Filed at: 08/26/2020 7362   HEART Score  5 Filed at: 08/26/2020 4870            LIBERTAD/DAST-10      Most Recent Value   How many times in the past year have you    Used an illegal drug or used a prescription medication for non-medical reasons? Never Filed at: 08/26/2020 5935                                MDM  Number of Diagnoses or Management Options  Diagnosis management comments: 63-year-old female presents to the ED with acute onset left chest and thoracic back pain  Differential includes musculoskeletal pain, GERD or esophagitis, acute coronary syndrome or angina, aortic dissection, PE but less likely, anxiety, pancreatitis  Will work up with cardiac labs, lipase, EKG, CTA dissection protocol  Will give small dose of morphine for pain  Patient hypertensive in the ED with systolic >304 so will give labetolol 10mg IV         Amount and/or Complexity of Data Reviewed  Clinical lab tests: ordered and reviewed  Tests in the radiology section of CPT®: ordered and reviewed  Tests in the medicine section of CPT®: ordered and reviewed  Obtain history from someone other than the patient: yes  Independent visualization of images, tracings, or specimens: yes          Disposition  Final diagnoses:   Acute thoracic back pain   Acute chest pain   UTI (urinary tract infection)   Uncontrolled hypertension     Time reflects when diagnosis was documented in both MDM as applicable and the Disposition within this note     Time User Action Codes Description Comment    8/26/2020  7:12 AM West Bakari E Add [R07 9] Chest pain     8/26/2020  7:12 AM West Bakari E Add [M54 6] Acute thoracic back pain     8/26/2020  7:12 AM West Bakari E Add [R07 9] Acute chest pain     8/26/2020  7:12 AM West Bakari E Modify [M54 6] Acute thoracic back pain     8/26/2020  7:12 AM West Bakari E Remove [R07 9] Chest pain     8/26/2020  7:15 AM West Bakari E Add [N39 0] UTI (urinary tract infection)     8/26/2020  7:18 AM West Bakari E Add [I10] Uncontrolled hypertension       ED Disposition     ED Disposition Condition Date/Time Comment    Admit Stable Wed Aug 26, 2020  7:19 AM Case was discussed with ELÍAS and the patient's admission status was agreed to be Admission Status: inpatient status to the service of Dr Raleigh Horta   Follow-up Information    None         Patient's Medications   Discharge Prescriptions    No medications on file     No discharge procedures on file      PDMP Review     None          ED Provider  Electronically Signed by           Arun Montiel DO  08/26/20 0267

## 2020-08-26 NOTE — ASSESSMENT & PLAN NOTE
80year old female patient was presented from [facility] home complaining of chest pain/back pain  Troponin negative x2  CTA negative for aortic dissection  Currently asymptomatic and eager to go home  Symptoms likely secondary to hypertensive urgency  Resume home medications and monitor vitals per unit protocol

## 2020-08-26 NOTE — ASSESSMENT & PLAN NOTE
Present admission with history of AFib  Currently patient is on digoxin, Toprol XL  Currently stable  Not on anticoagulation

## 2020-08-27 NOTE — PROGRESS NOTES
Progress Note - Corinne Corolla 12/6/1922, 80 y o  female MRN: [id number]    Unit/Bed#: -Jie Encounter: [id number]    Primary Care Provider: Leia Ruelas DO   Date and time admitted to hospital: 8/26/2020  4:56 AM        * Chest pain  Assessment & Plan  80year old female patient was presented from [facility] complaining of chest pain/back pain  Troponin negative x3  CTA negative for aortic dissection  Currently asymptomatic and eager to go home  Symptoms likely secondary to hypertensive urgency  Resume home medications and monitor vitals per unit protocol  Dementia [facility])  Assessment & Plan  Patient likely has underlying dementia and also had episode of psychosis requiring inpatient psych admission in the past   Patient was discharged on Zyprexa which she is not taking for unclear reason  Currently patient is awake, alert, oriented to self, forgetful at times requiring frequent reorientation  Cooperative during exam   Will resume Zyprexa 2 5 mg q h s  I have discussed above with patient's Lenard Parks over the phone at length and I have also discussed my concern about patient's ability to care for self including medications  Patient reports that he is in discussion with [facility] and will transition patient is to nursing home when [facility] not able to provide care for her  Atrial fibrillation [facility])  Assessment & Plan  Present admission with history of AFib  Currently patient is on digoxin, Toprol XL  Currently stable  Not on anticoagulation  Stage 3 chronic kidney disease [facility])  Assessment & Plan  Present admission with history of CKD  Reviewed previous labs and baseline creatinine is appears to be around 0 9-1 range  Currently creatinine at baseline      Ambulatory dysfunction  Assessment & Plan  Follow-up with PT OT ponchoal     Hypertensive urgency  Assessment & Plan  Presented with hypertensive urgency with blood pressure 213/120 with complaining of chest pain and back pain   Troponin negative x2  CTA negative for aortic dissection  Resume home medications of lisinopril, Lasix, Toprol  Blood pressure much better  Continue home medication continue monitor blood pressure  VTE Pharmacologic Prophylaxis:   Pharmacologic: Enoxaparin (Lovenox)    Patient Centered Rounds: I have performed bedside rounds with nursing staff today  Education and Discussions with Family / Patient:  Spoke with Jazlyn Gamez over the phone at length  Time Spent for Care: 30 minutes  More than 50% of total time spent on counseling and coordination of care as described above  Current Length of Stay: 1 day(s)    Current Patient Status: Inpatient   Certification Statement: The patient will continue to require additional inpatient hospital stay due to Patient is hemodynamically stable for discharge but unable to release per CM  since [facility] Sites have now answered to CM's call for discharge process    Discharge Plan: Likely Tomorrow    Code Status: Level 3 - DNAR and DNI      Subjective:   Afebrile, hemodynamically stable  Blood pressure much better on home medications  Denies chest pain, dyspnea, fever, chills, nausea, vomiting, diarrhea, any other new complaints  Thierry Flow to go home  Objective:     Vitals:   Temp (24hrs), Av 3 °F (37 4 °C), Min:98 5 °F (36 9 °C), Max:99 8 °F (37 7 °C)    Temp:  [98 5 °F (36 9 °C)-99 8 °F (37 7 °C)] 99 6 °F (37 6 °C)  HR:  [68-99] 81  Resp:  [14-19] 19  BP: (102-165)/(59-92) 102/59  SpO2:  [93 %-95 %] 93 %  Body mass index is 20 12 kg/m²  Input and Output Summary (last 24 hours): Intake/Output Summary (Last 24 hours) at 2020 1619  Last data filed at 2020 1442  Gross per 24 hour   Intake 240 ml   Output 1100 ml   Net -860 ml       Physical Exam:     Physical Exam  Constitutional:       General: She is not in acute distress  Appearance: Normal appearance  She is not ill-appearing  Comments: Elderly female patient, not in acute distress  HENT:      Head: Normocephalic and atraumatic  Nose: No rhinorrhea  Mouth/Throat:      Pharynx: No oropharyngeal exudate or posterior oropharyngeal erythema  Eyes:      Extraocular Movements: Extraocular movements intact  Conjunctiva/sclera: Conjunctivae normal       Pupils: Pupils are equal, round, and reactive to light  Neck:      Musculoskeletal: Normal range of motion and neck supple  No neck rigidity  Cardiovascular:      Rate and Rhythm: Normal rate and regular rhythm  Pulses: Normal pulses  Heart sounds: Normal heart sounds  Pulmonary:      Effort: Pulmonary effort is normal  No respiratory distress  Breath sounds: Normal breath sounds  No stridor  No wheezing or rhonchi  Abdominal:      General: Bowel sounds are normal  There is no distension  Palpations: Abdomen is soft  Tenderness: There is no abdominal tenderness  Musculoskeletal: Normal range of motion  General: No swelling  Comments: Left upper extremity chronic lymphedema noted  Skin:     Findings: No rash  Neurological:      General: No focal deficit present  Mental Status: She is alert  Mental status is at baseline  Comments: Patient is forgetful, poor historian  Awake, alert    Cooperative during exam    Psychiatric:         Mood and Affect: Mood normal          Behavior: Behavior normal          Additional Data:     Labs:    Results from last 7 days   Lab Units 08/26/20  0528   WBC Thousand/uL 9 06   HEMOGLOBIN g/dL 15 1   HEMATOCRIT % 48 3*   PLATELETS Thousands/uL 157   NEUTROS PCT % 71   LYMPHS PCT % 18   MONOS PCT % 7   EOS PCT % 3     Results from last 7 days   Lab Units 08/26/20  0528   SODIUM mmol/L 138   POTASSIUM mmol/L 3 6   CHLORIDE mmol/L 98*   CO2 mmol/L 33*   BUN mg/dL 25   CREATININE mg/dL 1 01   ANION GAP mmol/L 7   CALCIUM mg/dL 9 8   ALBUMIN g/dL 3 5   TOTAL BILIRUBIN mg/dL 0 80   ALK PHOS U/L 113   ALT U/L 56   AST U/L 42   GLUCOSE RANDOM mg/dL 105 Results from last 7 days   Lab Units 08/26/20  0528   INR  1 04                       * I Have Reviewed All Lab Data Listed Above  * Additional Pertinent Lab Tests Reviewed: All Labs Within Last 24 Hours Reviewed      Recent Cultures (last 7 days):           Last 24 Hours Medication List:   Current Facility-Administered Medications   Medication Dose Route Frequency Provider Last Rate    aspirin  81 mg Oral Daily Dion Penaloza MD      digoxin  125 mcg Oral Daily Dion NOLAND MD      enoxaparin  40 mg Subcutaneous Daily Dion NOLAND MD      furosemide  20 mg Oral Daily Dion NOLAND MD      lidocaine  1 patch Topical Daily Dion NOLAND MD      lisinopril  10 mg Oral Daily Dion NOLAND MD      loperamide  2 mg Oral 4x Daily PRN Dion NOLAND MD      magnesium oxide  400 mg Oral BID Dion NOLAND MD      metoprolol succinate  25 mg Oral Daily Dion NOLAND MD      OLANZapine  2 5 mg Oral HS Dion NOLAND MD      potassium chloride  20 mEq Oral Every Other Day Lizabeth Pollard MD          Today, Patient Was Seen By: Viktor Hoyt MD    ** Please Note: Dictation voice to text software may have been used in the creation of this document   **

## 2020-08-27 NOTE — ASSESSMENT & PLAN NOTE
Patient likely has underlying dementia and also had episode of psychosis requiring inpatient psych admission in the past   Patient was discharged on Zyprexa which she is not taking for unclear reason  Currently patient is awake, alert, oriented to self, forgetful at times requiring frequent reorientation  Cooperative during exam   Will resume Zyprexa 2 5 mg q h s  I have discussed above with patient's Skeet Deems over the phone at length and I have also discussed my concern about patient's ability to care for self including medications  Patient reports that he is in discussion with [facility] and will transition patient is to nursing home when [facility] not able to provide care for her

## 2020-08-27 NOTE — CASE MANAGEMENT
CM informed by Dr Annamaria Regalado that pt okay benjamin return to [address] [place] [address] as per son  CM called Aishwarya Messing at [phone]  CM left message for admissions for callback on return there today  1535  CM called Aishwarya Messing multiple times w/ voicemail only  Left messages for callback  1543  CM called Aishwarya Messing multiple times w/ voicemail only  Left messages for callback  Igor Trinidad (Relative) [phone] (H) notified of same  Tony Butler states he was on the phone w/ Aishwarya Messing 10 minutes ago and they stated that they will take pt in return  I reiterated that I am unable to release pt unless I speak to Aishwarya Messing directly  Tony Butler will also have Aishwarya Messing call me      [phone]  CM called Aishwarya Messing multiple times w/ voicemail only  Left messages for callback

## 2020-08-27 NOTE — ASSESSMENT & PLAN NOTE
80year old female patient was presented from [facility] complaining of chest pain/back pain  Troponin negative x3  CTA negative for aortic dissection  Currently asymptomatic and eager to go home  Symptoms likely secondary to hypertensive urgency  Resume home medications and monitor vitals per unit protocol

## 2020-08-27 NOTE — ASSESSMENT & PLAN NOTE
Presented with hypertensive urgency with blood pressure 213/120 with complaining of chest pain and back pain  Troponin negative x2  CTA negative for aortic dissection  Resume home medications of lisinopril, Lasix, Toprol  Blood pressure much better  Continue home medication continue monitor blood pressure

## 2020-08-27 NOTE — PLAN OF CARE
Problem: PAIN - ADULT  Goal: Verbalizes/displays adequate comfort level or baseline comfort level  Description: Interventions:  - Encourage patient to monitor pain and request assistance  - Assess pain using appropriate pain scale  - Administer analgesics based on type and severity of pain and evaluate response  - Implement non-pharmacological measures as appropriate and evaluate response  - Consider cultural and social influences on pain and pain management  - Notify physician/advanced practitioner if interventions unsuccessful or patient reports new pain  Outcome: Progressing     Problem: SAFETY ADULT  Goal: Patient will remain free of falls  Description: INTERVENTIONS:  - Assess patient frequently for physical needs  -  Identify cognitive and physical deficits and behaviors that affect risk of falls    -  [place] fall precautions as indicated by assessment   - Educate patient/family on patient safety including physical limitations  - Instruct patient to call for assistance with activity based on assessment  - Modify environment to reduce risk of injury  - Consider OT/PT consult to assist with strengthening/mobility  Outcome: Progressing     Problem: DISCHARGE PLANNING  Goal: Discharge to home or other facility with appropriate resources  Description: INTERVENTIONS:  - Identify barriers to discharge w/patient and caregiver  - Arrange for needed discharge resources and transportation as appropriate  - Identify discharge learning needs (meds, wound care, etc )  - Arrange for interpretive services to assist at discharge as needed  - Refer to Case Management Department for coordinating discharge planning if the patient needs post-hospital services based on physician/advanced practitioner order or complex needs related to functional status, cognitive ability, or social support system  Outcome: Progressing     Problem: Knowledge Deficit  Goal: Patient/family/caregiver demonstrates understanding of disease process, treatment plan, medications, and discharge instructions  Description: Complete learning assessment and assess knowledge base    Interventions:  - Provide teaching at level of understanding  - Provide teaching via preferred learning methods  Outcome: Progressing

## 2020-08-28 PROBLEM — I50.32 CHRONIC DIASTOLIC CHF (CONGESTIVE HEART FAILURE) (HCC): Status: ACTIVE | Noted: 2020-01-01

## 2020-08-28 PROBLEM — E43 SEVERE PROTEIN-CALORIE MALNUTRITION (HCC): Status: ACTIVE | Noted: 2020-01-01

## 2020-08-28 NOTE — ASSESSMENT & PLAN NOTE
Wt Readings from Last 3 Encounters:   08/26/20 49 9 kg (110 lb 0 2 oz)   11/17/19 47 5 kg (104 lb 11 5 oz)   07/20/19 51 1 kg (112 lb 10 5 oz)         a

## 2020-08-28 NOTE — DISCHARGE INSTR - AVS FIRST PAGE
Recommended close follow-up with primary care provider in 3-5 days of discharge  Dayday Huerta to arrange for VNA  Recommended to have outpatient follow-up with VNA services for outpatient PT/OT

## 2020-08-28 NOTE — DISCHARGE SUMMARY
Discharge- Jeff Griffiths 12/6/1922, 80 y o  female MRN: [id number]    Unit/Bed#: -01 Encounter: [id number]    Primary Care Provider: Juana Osborn,    Date and time admitted to hospital: 8/26/2020  4:56 AM        * Chest pain  Assessment & Plan  80year old female patient was presented from [facility] complaining of chest pain/back pain  Troponin negative x3  CTA negative for aortic dissection  Currently asymptomatic and eager to go home  Symptoms likely secondary to hypertensive urgency  CP now resolved  Hemodynamically stable to discharge back to [place]  Chronic diastolic CHF (congestive heart failure) (HCC)  Assessment & Plan  Wt Readings from Last 3 Encounters:   08/26/20 49 9 kg (110 lb 0 2 oz)   11/17/19 47 5 kg (104 lb 11 5 oz)   07/20/19 51 1 kg (112 lb 10 5 oz)     Patient has history of chronic diastolic CHF  Most recent echo in 2018 noted with normal EF with grade 1 diastolic dysfunction  Currently not in acute exacerbation  Treated with low-salt, fluid-restricted diet  Continue aspirin, Lasix 20 mg daily, lisinopril 20 mg daily, digoxin 0 125 mg daily  Toprol XL 25 mg daily  Potassium supplement  Recommended close outpatient follow-up with primary care provider and primary Cardiology  Severe protein-calorie malnutrition ([address] )  Assessment & Plan  Malnutrition Findings:           BMI Findings: Body mass index is 20 12 kg/m²  Severe protein calorie malnourishment in the settings of dementia  Patient is noted cachectic with muscle wasting  Encouraged adequate p o  Intake and protein supplement  Dementia [facility])  Assessment & Plan  Patient likely has underlying dementia and also had episode of psychosis requiring inpatient psych admission in the past   Per last not from Psych facility, Patient was discharged on Zyprexa which she is not taking for unclear reason    Currently patient is awake, alert, oriented to self, forgetful at times requiring frequent reorientation  Cooperative during exam   Will resume Zyprexa 2 5 mg q h s  I have discussed above with patient's Lenard Parks over the phone at length and I have also discussed my concern about patient's ability to care for self including medications  Patient reports that he is in discussion with [facility] and will transition patient is to nursing home when [facility] not able to provide care for her  I have also spoke with Alivia at [place] regarding Zyprexa  Alivia reported that patient was on Zyprexa until December and has been discontinued for  reason unknown to her  I have discussed with Alivia to follow up with primary care provider and resume Zyprexa if no contraindication known to primary care provider and also consider outpatient psychiatry follow-up  Alivia agreed to above recommendation and reported that she will have that clarify by her PCP and place Pt  On list for Psychiatry to see her  Atrial fibrillation [facility])  Assessment & Plan  Present admission with history of AFib  Currently patient is on digoxin, Toprol XL  Currently stable  Not on anticoagulation  Out pt follow up  Stage 3 chronic kidney disease [facility])  Assessment & Plan  Present admission with history of CKD  Reviewed previous labs and baseline creatinine is appears to be around 0 9-1 range  Currently creatinine at baseline  Ambulatory dysfunction  Assessment & Plan  Follow-up with PT OT eval     Hypertensive urgency  Assessment & Plan  Presented with hypertensive urgency with blood pressure 213/120 with complaining of chest pain and back pain  Troponin negative x3  CTA negative for aortic dissection  Resumed home medications of lisinopril, Lasix, Toprol  Blood pressure much better on home medications  Continue home medication continue monitor blood pressure  Recommended close outpatient follow-up and encourage compliance with taking medications          Discharging Physician / Practitioner: Joseph Solorzano MD  PCP: Milton Smith DO  Admission Date:   Admission Orders (From admission, onward)     Ordered        08/26/20 0719  Inpatient Admission  Once         08/26/20 0713  Place in Observation  Once,   Status:  Canceled                   Discharge Date: 08/28/20    Resolved Problems  Date Reviewed: 8/28/2020    None          Reason for Admission:  Hypertensive urgency    Hospital Course:     Richelle Saenz is a 80 y o  female patient with  patient past medical history of dementia, hypertension, ambulate dysfunction, CKD stage 3, AFib not on anticoagulation, as chronic left-sided lymphedema post left mastectomy, who presents from [facility] with complain of chest pain  On my encounter patient is comfortable not in distress  Patient is poor historian and forgetful  Upon reviewing the chart seems like patient had complained of chest pain and back pain  CTA negative for  aortic dissection  Troponin negative x 3  Patient was noted to have uncontrolled hypertension and seems to be culprit of her symptoms  Patient was admitted for hypertensive urgency and treated with resuming her home medications  Subsequently chest pain have completely subsided  Blood pressure much better on home regimen without any changes to home medications  Currently patient is awake, alert, oriented at her baseline  Forgetful at times requires frequent reorientation  Medically optimized and hemodynamically stable for discharge  Patient was seen by PT and  recommended her to go back to previous living situation with home PT    I have discussed my concern with patient's Temitope Varma over the phone at length regarding patient's ability to continue care for self in terms of taking medications and plan moving forward is for patient to go back to [address] [place] [address] will follow-up with [facility] and transition patient to long-term skilled nursing care facility once [facility] is not able to provide care for patient's needs per POA's request  Pt is level 3 DNR/DNI per POA  Recommended close follow-up with primary care provider, consider Psychiatry outpatient follow-up  Currently patient is hemodynamically stable to discharge  No other events reported  Refer to earlier notes for further clarification  Please see above list of diagnoses and related plan for additional information  Condition at Discharge: fair     Discharge Day Visit / Exam:     Subjective:  Afebrile, hemodynamically stable  Patient is oriented to at her baseline  Requires frequent reorientation  Denies chest pain, dyspnea, fever, chills, nausea, vomiting, abdominal pain, diarrhea, any other new complaints  Melissa Ebbing to go back to [place]  Vitals: Blood Pressure: 145/57 (08/28/20 0647)  Pulse: 78 (08/28/20 0942)  Temperature: 98 °F (36 7 °C) (08/28/20 0647)  Temp Source: Oral (08/27/20 4418)  Respirations: 16 (08/27/20 2230)  Height: 5' 2" (157 5 cm) (08/27/20 0300)  Weight - Scale: 49 9 kg (110 lb 0 2 oz) (08/26/20 0501)  SpO2: 94 % (08/28/20 0942)  Exam:   Physical Exam  Constitutional:       General: She is not in acute distress  Appearance: Normal appearance  She is not ill-appearing  Comments: Elderly deconditioned, cachectic female patient not in acute distress  HENT:      Head: Normocephalic and atraumatic  Nose: No rhinorrhea  Mouth/Throat:      Pharynx: No oropharyngeal exudate or posterior oropharyngeal erythema  Eyes:      Extraocular Movements: Extraocular movements intact  Conjunctiva/sclera: Conjunctivae normal       Pupils: Pupils are equal, round, and reactive to light  Neck:      Musculoskeletal: Normal range of motion and neck supple  No neck rigidity  Cardiovascular:      Rate and Rhythm: Normal rate and regular rhythm  Pulses: Normal pulses  Heart sounds: Normal heart sounds  Pulmonary:      Effort: Pulmonary effort is normal  No respiratory distress  Breath sounds: Normal breath sounds  No stridor  No wheezing or rhonchi  Abdominal:      General: Bowel sounds are normal  There is no distension  Palpations: Abdomen is soft  Tenderness: There is no abdominal tenderness  Musculoskeletal: Normal range of motion  General: No swelling  Comments: Left upper extremity chronic lymphedema noted  Skin:     Findings: No rash  Neurological:      General: No focal deficit present  Mental Status: She is alert  Mental status is at baseline  Comments: Patient is forgetful, poor historian  Awake, alert  Cooperative during exam    Psychiatric:         Mood and Affect: Mood normal          Behavior: Behavior normal        Discussion with Family:  Had lengthy discussion yesterday over the phone with Iris Mai and I had answered all the questions appropriately  Discharge instructions/Information to patient and family:   See after visit summary for information provided to patient and family  Provisions for Follow-Up Care:  See after visit summary for information related to follow-up care and any pertinent home health orders  Disposition:     Other: back to [address] [place] [address] to [facility] SNF:   · Not Applicable to this Patient - Not Applicable to this Patient    Planned Readmission: none     Discharge Statement:  I spent 40 minutes discharging the patient  This time was spent on the day of discharge  I had direct contact with the patient on the day of discharge  Greater than 50% of the total time was spent examining patient, answering all patient questions, arranging and discussing plan of care with patient as well as directly providing post-discharge instructions  Additional time then spent on discharge activities  Discharge Medications:  See after visit summary for reconciled discharge medications provided to patient and family        ** Please Note: This note has been constructed using a voice recognition system **

## 2020-08-28 NOTE — ASSESSMENT & PLAN NOTE
80year old female patient was presented from [facility] complaining of chest pain/back pain  Troponin negative x3  CTA negative for aortic dissection  Currently asymptomatic and eager to go home  Symptoms likely secondary to hypertensive urgency  Resume home medications and monitor vitals per unit protocol

## 2020-08-28 NOTE — ASSESSMENT & PLAN NOTE
Patient likely has underlying dementia and also had episode of psychosis requiring inpatient psych admission in the past   Patient was discharged on Zyprexa which she is not taking for unclear reason  Currently patient is awake, alert, oriented to self, forgetful at times requiring frequent reorientation  Cooperative during exam   Will resume Zyprexa 2 5 mg q h s  I have discussed above with patient's Clarita Nissen over the phone at length and I have also discussed my concern about patient's ability to care for self including medications  Patient reports that he is in discussion with [facility] and will transition patient is to nursing home when [facility] not able to provide care for her

## 2020-08-28 NOTE — CASE MANAGEMENT
CM informed by Dr Bia Crystal that pt okay benjamin return to [facility] as per son  CM called Endy Speaker at [phone]  CM left message for admissions for callback on return there today  [facility] called to confim that pt can return to [facility]  Sierra requested that MD angelica Bunch to arrange VNA   Informed Dr Shea Ascencio via tiger text  of same & who wrote that pt is discharge  CM called Ed/SLETS  Await callback w/ pickup time  EDDIE Mckinney had stated he agrees to wheelchair [facility] yesterday during conversation       1010  SLETS informed me that pt will be picked up at 1230  Informed Endy Bunch and Personal Style Finder   And Dr Shea Ascencio via tiger text & MARY Valdez

## 2020-10-03 PROBLEM — I60.9 SUBARACHNOID BLEED (HCC): Status: ACTIVE | Noted: 2020-01-01

## 2020-10-04 PROBLEM — N18.30 STAGE 3 CHRONIC KIDNEY DISEASE (HCC): Status: RESOLVED | Noted: 2019-03-10 | Resolved: 2020-01-01

## 2020-10-04 PROBLEM — I47.1 MULTIFOCAL ATRIAL TACHYCARDIA (HCC): Status: RESOLVED | Noted: 2018-10-09 | Resolved: 2020-01-01

## 2020-10-07 PROBLEM — I62.9 INTRACRANIAL HEMORRHAGE (HCC): Status: ACTIVE | Noted: 2020-01-01

## 2021-09-22 NOTE — ASSESSMENT & PLAN NOTE
· Stable, but actually noted being on the lower side  · Holding lisinopril  · Continue with Norvasc 10 mg daily and Toprol 25 mg daily  · If blood pressures remain low, will continue on the same regimen at home    · No

## 2021-10-29 NOTE — PROGRESS NOTES
Patient is in bed sleeping at this time  She had intermittent periods of wakefulness throughout the majority of the overnight hours, to utilize the bathroom  No agitation or outbursts exhibited  Bed remains in the lowest position with side rails up x2  Bed alarm in place for patient safety  Will CTM via q7 minute safety checks  Yes
